# Patient Record
Sex: FEMALE | Race: WHITE | NOT HISPANIC OR LATINO | Employment: OTHER | URBAN - NONMETROPOLITAN AREA
[De-identification: names, ages, dates, MRNs, and addresses within clinical notes are randomized per-mention and may not be internally consistent; named-entity substitution may affect disease eponyms.]

---

## 2017-01-20 ENCOUNTER — OFFICE VISIT (OUTPATIENT)
Dept: ORTHOPEDIC SURGERY | Facility: CLINIC | Age: 62
End: 2017-01-20

## 2017-01-20 VITALS — HEIGHT: 72 IN | BODY MASS INDEX: 28.58 KG/M2 | WEIGHT: 211 LBS

## 2017-01-20 DIAGNOSIS — M17.12 LOCALIZED OSTEOARTHRITIS OF LEFT KNEE: ICD-10-CM

## 2017-01-20 DIAGNOSIS — M25.562 CHRONIC PAIN OF LEFT KNEE: Primary | ICD-10-CM

## 2017-01-20 DIAGNOSIS — G89.29 CHRONIC PAIN OF LEFT KNEE: Primary | ICD-10-CM

## 2017-01-20 PROCEDURE — 73560 X-RAY EXAM OF KNEE 1 OR 2: CPT | Performed by: NURSE PRACTITIONER

## 2017-01-20 PROCEDURE — 20610 DRAIN/INJ JOINT/BURSA W/O US: CPT | Performed by: NURSE PRACTITIONER

## 2017-01-20 PROCEDURE — 73565 X-RAY EXAM OF KNEES: CPT | Performed by: NURSE PRACTITIONER

## 2017-01-20 RX ORDER — LIDOCAINE HYDROCHLORIDE 10 MG/ML
2 INJECTION, SOLUTION INFILTRATION; PERINEURAL
Status: COMPLETED | OUTPATIENT
Start: 2017-01-20 | End: 2017-01-20

## 2017-01-20 RX ORDER — TRIAMCINOLONE ACETONIDE 40 MG/ML
40 INJECTION, SUSPENSION INTRA-ARTICULAR; INTRAMUSCULAR
Status: COMPLETED | OUTPATIENT
Start: 2017-01-20 | End: 2017-01-20

## 2017-01-20 RX ADMIN — LIDOCAINE HYDROCHLORIDE 2 ML: 10 INJECTION, SOLUTION INFILTRATION; PERINEURAL at 13:42

## 2017-01-20 RX ADMIN — TRIAMCINOLONE ACETONIDE 40 MG: 40 INJECTION, SUSPENSION INTRA-ARTICULAR; INTRAMUSCULAR at 13:42

## 2017-01-20 NOTE — MR AVS SNAPSHOT
"                        Mini Andersen   1/20/2017 1:20 PM   Office Visit    Dept Phone:  [phone]   Encounter #:  [id number]    Provider:  EDUARDA Florian   Department:  [facility]                Your Full Care Plan              Your Updated Medication List          This list is accurate as of: 1/20/17  2:07 PM.  Always use your most recent med list.                B-D 3CC LUER-AXEL SYR 26NJ1-6/2 22G X 1-1/2\" 3 ML misc   Generic drug:  Syringe/Needle (Disp)       clobetasol 0.05 % cream   Commonly known as:  TEMOVATE       cyanocobalamin 1000 MCG/ML injection       diphenhydrAMINE 25 mg capsule   Commonly known as:  BENADRYL       FLUVIRIN suspension injection   Generic drug:  Influenza Vac Typ A&B Surf Ant       furosemide 40 MG tablet   Commonly known as:  LASIX       gabapentin 100 MG capsule   Commonly known as:  NEURONTIN       levothyroxine 50 MCG tablet   Commonly known as:  SYNTHROID, LEVOTHROID       lidocaine 5 % ointment   Commonly known as:  XYLOCAINE       metFORMIN 500 MG tablet   Commonly known as:  GLUCOPHAGE       MONUROL 3 G pack   Generic drug:  fosfomycin       nystatin 474270 UNIT/GM powder   Commonly known as:  MYCOSTATIN       ondansetron 4 MG tablet   Commonly known as:  ZOFRAN       ONE TOUCH ULTRA TEST test strip   Generic drug:  glucose blood       oxyCODONE-acetaminophen  MG per tablet   Commonly known as:  PERCOCET       vitamin D 23734 UNITS capsule capsule   Commonly known as:  ERGOCALCIFEROL               We Performed the Following     Large Joint Arthrocentesis     XR Knee 1 or 2 View Left     XR Knee Bilateral AP Standing       You Were Diagnosed With        Codes Comments    Chronic pain of left knee    -  Primary ICD-10-CM: M25.562, G89.29  ICD-9-CM: 719.46, 338.29     Localized osteoarthritis of left knee     ICD-10-CM: M17.9  ICD-9-CM: 715.36       Instructions     None    Patient Instructions History      Upcoming Appointments  "    Visit Type Date Time Department    FOLLOW UP 1/20/2017  1:20 PM [facility]    FOLLOW UP 2/28/2017  1:50 PM [facility]    VISUAL FIELD 7/5/2017  9:45 AM [facility]    OV WITH VISUAL FIELD 7/5/2017 10:15 AM [facility]      MyChart Signup     Our records indicate that you have declined [facility] sign[facility]. If you would like to sign up for MyChart, please email [email or web address] or call [phone] to obtain an activation code.             Other Info from Your Visit           Your Appointments     Feb 28, 2017  1:50 PM CST   Follow Up with Jose Ballesteros MD   [facility] ORTHOPED[facility] (--)    [address]  [facility] [address] [phone]           Arrive 15 minutes prior to appointment.            Jul 05, 2017  9:45 AM CDT   Visual Field with [facility] O[facility]THALMOLOGY (--)    [address] [place] [address] [address]  [facility] [address] [place] [id number] [phone]            Jul 05, 2017 10:15 AM CDT   office visit with visual vitale with David Bustillos MD   [facility] OPHTHALMOLOGY (--)    [address] [place] [address] [address]  [facility] [address] [place] [id number] [phone]              Allergies     Bextra [Valdecoxib]  Shortness Of Breath    Cephalosporins  Shortness Of Breath    Detrol La [Tolterodine Tartrate Er]  Shortness Of Breath    Dicyclomine  Shortness Of Breath    Fluoxetine  Shortness Of Breath    Toprol Xl [Metoprolol]  Anaphylaxis    Chocolate Flavor      Tape      Aspirin  Rash    Atorvastatin  Rash    Ciprofloxin Hcl [Ciprofloxacin]  Rash    Claritin-d 12 Hour [Loratadine-pseudoephedrine Er]  Rash    Codeine  Rash    Macrobid [Nitrofurantoin]  Rash    Morphine And Related  Rash    Sulfa Antibiotics  Rash    Tramadol  Rash    Vioxx [Rofecoxib]  Rash    Zegerid [Omeprazole]  Rash      Reason for Visit     Left Knee - Follow-up PATIENT  "WAS INJECTED ON 11/11/2016      Vital Signs     Height Weight Body Mass Index Smoking Status          72\" (182.9 cm) 211 lb (95.7 kg) 28.62 kg/m2 Former Smoker        Problems and Diagnoses Noted     Chronic pain of left knee    Localized osteoarthritis of left knee      Results     XR Knee 1 or 2 View Left           XR Knee Bilateral AP Standing               "

## 2017-01-20 NOTE — PROGRESS NOTES
"Knee Joint Injection      Patient: Mini Andersen    YOB: 1955    Chief Complaint   Patient presents with   • Left Knee - Follow-up     PATIENT WAS INJECTED ON 11/11/2016       History of Present Illness: PAIN LEVEL IS AT 9. 1 WEEK AGO SHE TWISTED HER KNEE COMING STEPS. REPEAT XRAYS TODAY  Patient is here for an injection.  No new complaints.    Physical Exam: 61 y.o. female  General Appearance:    Alert, cooperative, in no acute distress                   Vitals:    01/20/17 1338   Weight: 211 lb (95.7 kg)   Height: 72\" (182.9 cm)        Patient is alert and oriented, ×3 no acute distress.  Affect is normal respiratory rate is normal unlabored.  Exam and complaints are unchanged.    Procedure:  Large Joint Arthrocentesis  Date/Time: 1/20/2017 1:42 PM  Consent given by: patient  Site marked: site marked  Timeout: Immediately prior to procedure a time out was called to verify the correct patient, procedure, equipment, support staff and site/side marked as required   Supporting Documentation  Indications: pain   Procedure Details  Location: knee - L knee  Preparation: Patient was prepped and draped in the usual sterile fashion  Needle size: 22 G  Approach: anteromedial  Medications administered: 40 mg triamcinolone acetonide 40 MG/ML; 2 mL lidocaine 1 %  Patient tolerance: patient tolerated the procedure well with no immediate complications          Assessment:    Diagnoses and all orders for this visit:    Chronic pain of left knee  -     Large Joint Arthrocentesis  -     XR Knee 1 or 2 View Left  -     XR Knee Bilateral AP Standing    Localized osteoarthritis of left knee  -     Large Joint Arthrocentesis        Plan:   Patient recommended to progress activity as tolerated based on pain.  Follow-up with Dr. Ballesteros in 1 month to discuss total knee replacement.    No Follow-up on file.    EDUARDA Nunez        Physical Exam    Ortho Exam    "

## 2017-05-23 ENCOUNTER — OFFICE VISIT (OUTPATIENT)
Dept: ORTHOPEDIC SURGERY | Facility: CLINIC | Age: 62
End: 2017-05-23

## 2017-05-23 VITALS — HEIGHT: 72 IN | BODY MASS INDEX: 30.34 KG/M2 | WEIGHT: 224 LBS

## 2017-05-23 DIAGNOSIS — G89.29 CHRONIC PAIN OF LEFT KNEE: ICD-10-CM

## 2017-05-23 DIAGNOSIS — M25.562 CHRONIC PAIN OF LEFT KNEE: ICD-10-CM

## 2017-05-23 DIAGNOSIS — M17.12 PRIMARY OSTEOARTHRITIS OF LEFT KNEE: Primary | ICD-10-CM

## 2017-05-23 DIAGNOSIS — E03.9 ACQUIRED HYPOTHYROIDISM: ICD-10-CM

## 2017-05-23 DIAGNOSIS — M17.12 LOCALIZED OSTEOARTHRITIS OF LEFT KNEE: ICD-10-CM

## 2017-05-23 DIAGNOSIS — E11.9 TYPE 2 DIABETES MELLITUS WITHOUT COMPLICATION, WITHOUT LONG-TERM CURRENT USE OF INSULIN (HCC): ICD-10-CM

## 2017-05-23 DIAGNOSIS — Z86.718 HISTORY OF DVT (DEEP VEIN THROMBOSIS): ICD-10-CM

## 2017-05-23 PROCEDURE — 99215 OFFICE O/P EST HI 40 MIN: CPT | Performed by: ORTHOPAEDIC SURGERY

## 2017-05-25 RX ORDER — OXYCODONE HCL 10 MG/1
10 TABLET, FILM COATED, EXTENDED RELEASE ORAL ONCE
Status: CANCELLED | OUTPATIENT
Start: 2017-07-24 | End: 2017-05-25

## 2017-05-25 RX ORDER — PREGABALIN 25 MG/1
75 CAPSULE ORAL ONCE
Status: CANCELLED | OUTPATIENT
Start: 2017-07-24 | End: 2017-05-25

## 2017-05-25 RX ORDER — ACETAMINOPHEN 325 MG/1
1000 TABLET ORAL ONCE
Status: CANCELLED | OUTPATIENT
Start: 2017-07-24 | End: 2017-05-25

## 2017-05-25 RX ORDER — SODIUM CHLORIDE 0.9 % (FLUSH) 0.9 %
1-10 SYRINGE (ML) INJECTION AS NEEDED
Status: CANCELLED | OUTPATIENT
Start: 2017-07-24

## 2017-06-12 DIAGNOSIS — Z86.718 PERSONAL HISTORY OF VENOUS THROMBOSIS AND EMBOLISM: Primary | ICD-10-CM

## 2017-06-12 DIAGNOSIS — M17.12 PRIMARY OSTEOARTHRITIS OF LEFT KNEE: ICD-10-CM

## 2017-07-05 ENCOUNTER — OFFICE VISIT (OUTPATIENT)
Dept: OPHTHALMOLOGY | Facility: CLINIC | Age: 62
End: 2017-07-05

## 2017-07-05 DIAGNOSIS — E11.9 DIABETES MELLITUS WITHOUT COMPLICATION (HCC): ICD-10-CM

## 2017-07-05 DIAGNOSIS — H40.003 BORDERLINE GLAUCOMA, BILATERAL: Primary | ICD-10-CM

## 2017-07-05 PROCEDURE — 92083 EXTENDED VISUAL FIELD XM: CPT | Performed by: OPHTHALMOLOGY

## 2017-07-05 PROCEDURE — 99213 OFFICE O/P EST LOW 20 MIN: CPT | Performed by: OPHTHALMOLOGY

## 2017-07-05 NOTE — PROGRESS NOTES
Subjective   Mini Andersen is a 62 y.o. female.   Chief Complaint   Patient presents with   • Follow-up   • Visual Field         Review of Systems    Objective   Base Eye Exam     Visual Acuity (Snellen - Linear)      Right Left   Dist sc 20/50 +1 20/40 -2         Tonometry (Applanation, 10:28 AM)      Right Left   Pressure 16 16         Pupils      Pupils   Right PERRL   Left PERRL         Extraocular Movement      Right Left   Result Full Full               Slit Lamp and Fundus Exam     External Exam      Right Left    External Normal Normal      Slit Lamp Exam      Right Left    Lids/Lashes Normal Normal    Conjunctiva/Sclera White and quiet White and quiet    Cornea Clear Clear    Anterior Chamber Deep and quiet Deep and quiet    Iris Round and reactive Round and reactive    Lens 1+ Nuclear sclerosis 1+ Nuclear sclerosis    Vitreous Normal Normal      Fundus Exam      Right Left    Disc Thin rim 0.15 ST, 0.2 IT Normal 0.3 ST, IT    Macula Normal Normal    Vessels Normal Normal              Das Visual Field :   OD- normal  OS- normal    Assessment/Plan   Diagnoses and all orders for this visit:    Borderline glaucoma, bilateral    Diabetes mellitus without complication      Plan:    Eye disease risks with diabetes discussed  Recommend ophthalmology f/u one year/prn

## 2017-07-10 ENCOUNTER — OFFICE VISIT (OUTPATIENT)
Dept: CARDIOLOGY | Facility: CLINIC | Age: 62
End: 2017-07-10

## 2017-07-10 VITALS
DIASTOLIC BLOOD PRESSURE: 75 MMHG | WEIGHT: 223 LBS | SYSTOLIC BLOOD PRESSURE: 123 MMHG | BODY MASS INDEX: 30.2 KG/M2 | HEIGHT: 72 IN | HEART RATE: 78 BPM

## 2017-07-10 DIAGNOSIS — I20.9 ANGINA PECTORIS (HCC): ICD-10-CM

## 2017-07-10 DIAGNOSIS — E03.9 HYPOTHYROIDISM, UNSPECIFIED TYPE: ICD-10-CM

## 2017-07-10 DIAGNOSIS — Z01.818 PRE-OPERATIVE CLEARANCE: ICD-10-CM

## 2017-07-10 DIAGNOSIS — E11.9 TYPE 2 DIABETES MELLITUS WITHOUT COMPLICATION, WITHOUT LONG-TERM CURRENT USE OF INSULIN (HCC): ICD-10-CM

## 2017-07-10 DIAGNOSIS — R06.02 SOB (SHORTNESS OF BREATH): Primary | ICD-10-CM

## 2017-07-10 PROBLEM — G47.33 OBSTRUCTIVE SLEEP APNEA SYNDROME: Status: ACTIVE | Noted: 2017-07-10

## 2017-07-10 PROCEDURE — 93000 ELECTROCARDIOGRAM COMPLETE: CPT | Performed by: INTERNAL MEDICINE

## 2017-07-10 PROCEDURE — 99204 OFFICE O/P NEW MOD 45 MIN: CPT | Performed by: INTERNAL MEDICINE

## 2017-07-10 RX ORDER — ATORVASTATIN CALCIUM 20 MG/1
20 TABLET, FILM COATED ORAL DAILY
Qty: 30 TABLET | Refills: 12 | Status: SHIPPED | OUTPATIENT
Start: 2017-07-10 | End: 2017-07-12

## 2017-07-10 NOTE — PROGRESS NOTES
[facility]  OFFICE NOTE    Mini Andersen  62 y.o. female    07/10/2017  1. Pre-operative clearance    2. Type 2 diabetes mellitus without complication, without long-term current use of insulin    3. Hypothyroidism, unspecified type    4. Angina pectoris     5. SOB (shortness of breath)        Chief complaint -Pre-Op left knee replacement surgery      History of present Illness- 62-year-old lady with history of diabetes, hypertension and hyperlipidemia cannot walk much due to severe arthritis of the left knee and she is going to have total knee replacement of the left knee.  She does have shortness of breath and she cannot exercise or walk too much because of the knee problems.  She was a former smoker quit 37 years ago.  I started her on Lipitor 20 mg daily as she is a diabetic and her LDL is 101 and triglycerides are high.  He never had any cardiac causes in the past of EKG showed sinus rhythm with small Q in aVL and lead 1              Allergies   Allergen Reactions   • Bextra [Valdecoxib] Shortness Of Breath   • Cephalosporins Shortness Of Breath   • Detrol La [Tolterodine Tartrate Er] Shortness Of Breath   • Dicyclomine Shortness Of Breath   • Fluoxetine Shortness Of Breath   • Toprol Xl [Metoprolol] Anaphylaxis   • Chocolate Flavor    • Tape    • Aspirin Rash   • Ciprofloxin Hcl [Ciprofloxacin] Rash   • Claritin-D 12 Hour [Loratadine-Pseudoephedrine Er] Rash   • Codeine Rash   • Macrobid [Nitrofurantoin] Rash   • Morphine And Related Rash   • Sulfa Antibiotics Rash   • Tramadol Rash   • Vioxx [Rofecoxib] Rash   • Zegerid [Omeprazole] Rash         Past Medical History:   Diagnosis Date   • Acute peritonitis    • Allergic rhinitis    • Bee sting    • Borderline glaucoma    • Chronic bronchitis    • Constipation     severe with an immotile colon      • Deep venous thrombosis    • Hypothyroidism    • Intestinal volvulus    • Muscle atrophy     O/E   • Nuclear senile cataract    •  "Nuclear senile cataract    • Polyneuropathy in diabetes    • Type 2 diabetes mellitus     NO BDR   • Type 2 diabetes mellitus without complications    • Unspecified disease of nail          Past Surgical History:   Procedure Laterality Date   • COLECTOMY PARTIAL / TOTAL  05/22/2014    Subtotal colectomy with ineo-rectostomy.   • INJECTION OF MEDICATION  12/07/2010    DEPO MEDROL   • OTHER SURGICAL HISTORY      Multiple gynecologic procedures         No family history on file.      Social History     Social History   • Marital status:      Spouse name: N/A   • Number of children: N/A   • Years of education: N/A     Occupational History   • Not on file.     Social History Main Topics   • Smoking status: Former Smoker   • Smokeless tobacco: Not on file   • Alcohol use No   • Drug use: Not on file   • Sexual activity: Not on file     Other Topics Concern   • Not on file     Social History Narrative         Current Outpatient Prescriptions   Medication Sig Dispense Refill   • B-D 3CC LUER-AXEL SYR 42WJ1-7/2 22G X 1-1/2\" 3 ML misc   12   • clobetasol (TEMOVATE) 0.05 % cream      • cyanocobalamin 1000 MCG/ML injection   12   • diphenhydrAMINE (BENADRYL) 25 mg capsule   6   • levothyroxine (SYNTHROID, LEVOTHROID) 50 MCG tablet Take 50 mcg by mouth daily.  1   • lidocaine (XYLOCAINE) 5 % ointment      • metFORMIN (GLUCOPHAGE) 500 MG tablet Take 500 mg by mouth 2 (two) times a day.  1   • MONUROL 3 G pack   0   • nystatin (MYCOSTATIN) 994680 UNIT/GM powder   2   • ondansetron (ZOFRAN) 4 MG tablet   0   • ONE TOUCH ULTRA TEST test strip TEST BID  5   • oxyCODONE-acetaminophen (PERCOCET)  MG per tablet 3 (Three) Times a Day As Needed.  0   • vitamin D (ERGOCALCIFEROL) 05715 UNITS capsule capsule Take 50,000 Units by mouth as needed.  1   • atorvastatin (LIPITOR) 20 MG tablet Take 1 tablet by mouth Daily. 30 tablet 12     No current facility-administered medications for this visit.          Review of Systems " "    Constitution: Denies any fatigue, fever or chills    HENT: Denies any headache, hearing impairment,     Eyes: Denies any blurring of vision, or photophobia     Cardivascular - As per history of present illness     Respiratory system- The separate NYHA class II with sleep apnea       Endocrine:   history of hyperlipidemia, diabetes,                      Hypothyrodism       Musculoskeletal:  Severe  debilitating arthritis of the left knee    Gastrointestinal: No nausea, vomiting, or melena    Genitourinary: No dysuria or hematuria    Neurological:   No history of seizure disorder, stroke, memory problems    Psychiatric/Behavioral:        No history of depression,  No history of bipolar disorder or schizophrenia     Hematological- no history of easy bruising or any bleeding diathesis            OBJECTIVE    /75  Pulse 78  Ht 72\" (182.9 cm)  Wt 223 lb (101 kg)  BMI 30.24 kg/m2      Physical Exam     Constitutional: is oriented to person, place, and time.     Skin-warm and dry    Well developed and nourished in no acute distress      Head: Normocephalic and atraumatic.     Eyes: Pupils are equal, round, and reactive to light.     Neck: Neck supple. No bruit in the carotids, no elevation of JVD    Cardiovascular: [place] in the fifth intercostal space   Regular rate, and  Rhythm,    S1 greater than S2, no S3 or S4, no gallop     Pulmonary/Chest:   Air  Entry is equal on both sides  No wheezing or crackles,      Abdominal: Soft.  No hepatosplenomegaly, bowel sounds are present    Musculoskeletal: No kyphoscoliosis, swollen left knee    Neurological: is alert and oriented to person, place, and time.    cranial nerve are intact .   No motor or sensory deficit    Extremities-no edema, no radial femoral delay      Psychiatric: He has a normal mood and affect.                  His behavior is normal.             ECG 12 Lead  Date/Time: 7/10/2017 10:59 AM  Performed by: ACE RIZZO  Authorized by: SO, " ACE SARMIENTO   Comparison: not compared with previous ECG   Rhythm: sinus rhythm  Comments: Sinus rhythm, with small Q in lead 1 and aVL with nonspecific ST changes              A/P    Pre-op left knee replacement surgery-patient with history of diabetes, shortness of breath and sleep apnea, hyperlipidemia schedule for a Lexiscan nuclear stress to rule out ischemia and if that is okay she can proceed with moderate risk by ACC/AHA guidelines.    Hyperlipidemia mild started on Lipitor she is a diabetic.    Severe pain of the knee she is on oxycodone.    Hypothyroidism on Synthroid supplements.            This document has been electronically signed by Ace Root MD on July 10, 2017 10:56 AM       EMR Dragon/Transcription disclaimer:   Some of this note may be an electronic transcription/translation of spoken language to printed text. The electronic translation of spoken language may permit erroneous, or at times, nonsensical words or phrases to be inadvertently transcribed; Although I have reviewed the note for such errors, some may still exist.

## 2017-07-11 LAB
BH CV ECHO MEAS - ACS: 2.1 CM
BH CV ECHO MEAS - AO MAX PG (FULL): 1.3 MMHG
BH CV ECHO MEAS - AO MAX PG: 6.8 MMHG
BH CV ECHO MEAS - AO MEAN PG (FULL): 2 MMHG
BH CV ECHO MEAS - AO MEAN PG: 4 MMHG
BH CV ECHO MEAS - AO ROOT AREA: 11.3 CM^2
BH CV ECHO MEAS - AO ROOT DIAM: 3.8 CM
BH CV ECHO MEAS - AO V2 MAX: 130 CM/SEC
BH CV ECHO MEAS - AO V2 MEAN: 87.3 CM/SEC
BH CV ECHO MEAS - AO V2 VTI: 29.4 CM
BH CV ECHO MEAS - EDV(CUBED): 132.7 ML
BH CV ECHO MEAS - EDV(TEICH): 123.8 ML
BH CV ECHO MEAS - EF(CUBED): 61.8 %
BH CV ECHO MEAS - EF(MOD-SP4): 60 %
BH CV ECHO MEAS - EF(TEICH): 53.1 %
BH CV ECHO MEAS - EPSS: 0.3 CM
BH CV ECHO MEAS - ESV(CUBED): 50.7 ML
BH CV ECHO MEAS - ESV(TEICH): 58.1 ML
BH CV ECHO MEAS - FS: 27.5 %
BH CV ECHO MEAS - LA DIMENSION: 3.9 CM
BH CV ECHO MEAS - LA/AO: 1
BH CV ECHO MEAS - LV MAX PG: 5.5 MMHG
BH CV ECHO MEAS - LV MEAN PG: 2 MMHG
BH CV ECHO MEAS - LV V1 MAX: 117 CM/SEC
BH CV ECHO MEAS - LV V1 MEAN: 69 CM/SEC
BH CV ECHO MEAS - LV V1 VTI: 25.9 CM
BH CV ECHO MEAS - LVIDD: 5.1 CM
BH CV ECHO MEAS - LVIDS: 3.7 CM
BH CV ECHO MEAS - LVPWD: 1.3 CM
BH CV ECHO MEAS - MR MAX PG: 16.2 MMHG
BH CV ECHO MEAS - MR MAX VEL: 201 CM/SEC
BH CV ECHO MEAS - MV A MAX VEL: 76 CM/SEC
BH CV ECHO MEAS - MV E MAX VEL: 90.6 CM/SEC
BH CV ECHO MEAS - MV E/A: 1.2
BH CV ECHO MEAS - PA MAX PG: 3.9 MMHG
BH CV ECHO MEAS - PA MEAN PG: 2 MMHG
BH CV ECHO MEAS - PA V2 MAX: 99.3 CM/SEC
BH CV ECHO MEAS - PA V2 MEAN: 66.8 CM/SEC
BH CV ECHO MEAS - PA V2 VTI: 23.2 CM
BH CV ECHO MEAS - PI END-D VEL: 117 CM/SEC
BH CV ECHO MEAS - RAP SYSTOLE: 10 MMHG
BH CV ECHO MEAS - RVDD: 2.8 CM
BH CV ECHO MEAS - RVSP: 28.3 MMHG
BH CV ECHO MEAS - SV(AO): 333.4 ML
BH CV ECHO MEAS - SV(CUBED): 82 ML
BH CV ECHO MEAS - SV(TEICH): 65.7 ML
BH CV ECHO MEAS - TR MAX VEL: 213.5 CM/SEC
BH CV NUCLEAR PRIOR STUDY: 2
BH CV STRESS COMMENTS STAGE 1: NORMAL
BH CV STRESS DOSE REGADENOSON STAGE 1: 0.4
BH CV STRESS DURATION MIN STAGE 1: 0
BH CV STRESS DURATION SEC STAGE 1: 15
BH CV STRESS PROTOCOL 1: NORMAL
BH CV STRESS RECOVERY BP: NORMAL MMHG
BH CV STRESS RECOVERY HR: 123 BPM
BH CV STRESS STAGE 1: 1
LV EF 2D ECHO EST: 55 %
LV EF NUC BP: 73 %
MAXIMAL PREDICTED HEART RATE: 160 BPM
PERCENT MAX PREDICTED HR: 78.13 %
STRESS BASELINE BP: NORMAL MMHG
STRESS BASELINE HR: 73 BPM
STRESS PERCENT HR: 92 %
STRESS POST PEAK BP: NORMAL MMHG
STRESS POST PEAK HR: 125 BPM
STRESS TARGET HR: 136 BPM

## 2017-07-12 ENCOUNTER — OFFICE VISIT (OUTPATIENT)
Dept: ORTHOPEDIC SURGERY | Facility: CLINIC | Age: 62
End: 2017-07-12

## 2017-07-12 ENCOUNTER — DOCUMENTATION (OUTPATIENT)
Dept: CARDIOLOGY | Facility: CLINIC | Age: 62
End: 2017-07-12

## 2017-07-12 ENCOUNTER — TELEPHONE (OUTPATIENT)
Dept: CARDIOLOGY | Facility: CLINIC | Age: 62
End: 2017-07-12

## 2017-07-12 ENCOUNTER — APPOINTMENT (OUTPATIENT)
Dept: PREADMISSION TESTING | Facility: HOSPITAL | Age: 62
End: 2017-07-12

## 2017-07-12 VITALS
BODY MASS INDEX: 29.8 KG/M2 | HEIGHT: 72 IN | SYSTOLIC BLOOD PRESSURE: 100 MMHG | OXYGEN SATURATION: 95 % | HEART RATE: 98 BPM | RESPIRATION RATE: 18 BRPM | WEIGHT: 220 LBS | DIASTOLIC BLOOD PRESSURE: 64 MMHG

## 2017-07-12 VITALS — HEIGHT: 72 IN | WEIGHT: 223 LBS | BODY MASS INDEX: 30.2 KG/M2

## 2017-07-12 DIAGNOSIS — M17.12 LOCALIZED OSTEOARTHRITIS OF LEFT KNEE: ICD-10-CM

## 2017-07-12 DIAGNOSIS — G89.29 CHRONIC PAIN OF LEFT KNEE: ICD-10-CM

## 2017-07-12 DIAGNOSIS — Z86.718 HISTORY OF DVT (DEEP VEIN THROMBOSIS): ICD-10-CM

## 2017-07-12 DIAGNOSIS — E11.9 TYPE 2 DIABETES MELLITUS WITHOUT COMPLICATION, WITHOUT LONG-TERM CURRENT USE OF INSULIN (HCC): ICD-10-CM

## 2017-07-12 DIAGNOSIS — M17.12 PRIMARY OSTEOARTHRITIS OF LEFT KNEE: ICD-10-CM

## 2017-07-12 DIAGNOSIS — M25.562 CHRONIC PAIN OF LEFT KNEE: ICD-10-CM

## 2017-07-12 DIAGNOSIS — M81.0 OSTEOPOROSIS: Primary | ICD-10-CM

## 2017-07-12 DIAGNOSIS — E03.9 ACQUIRED HYPOTHYROIDISM: ICD-10-CM

## 2017-07-12 LAB
ABO GROUP BLD: NORMAL
ANION GAP SERPL CALCULATED.3IONS-SCNC: 9 MMOL/L (ref 5–15)
BLD GP AB SCN SERPL QL: NEGATIVE
BUN BLD-MCNC: 12 MG/DL (ref 7–21)
BUN/CREAT SERPL: 11.5 (ref 7–25)
CALCIUM SPEC-SCNC: 9.4 MG/DL (ref 8.4–10.2)
CHLORIDE SERPL-SCNC: 104 MMOL/L (ref 95–110)
CO2 SERPL-SCNC: 27 MMOL/L (ref 22–31)
CREAT BLD-MCNC: 1.04 MG/DL (ref 0.5–1)
DEPRECATED RDW RBC AUTO: 45.5 FL (ref 36.4–46.3)
ERYTHROCYTE [DISTWIDTH] IN BLOOD BY AUTOMATED COUNT: 14.1 % (ref 11.5–14.5)
GFR SERPL CREATININE-BSD FRML MDRD: 54 ML/MIN/1.73 (ref 45–104)
GLUCOSE BLD-MCNC: 144 MG/DL (ref 60–100)
HCT VFR BLD AUTO: 38.2 % (ref 35–45)
HGB BLD-MCNC: 12.4 G/DL (ref 12–15.5)
Lab: NORMAL
MCH RBC QN AUTO: 28.4 PG (ref 26.5–34)
MCHC RBC AUTO-ENTMCNC: 32.5 G/DL (ref 31.4–36)
MCV RBC AUTO: 87.4 FL (ref 80–98)
MRSA DNA SPEC QL NAA+PROBE: NEGATIVE
PLATELET # BLD AUTO: 184 10*3/MM3 (ref 150–450)
PMV BLD AUTO: 11.9 FL (ref 8–12)
POTASSIUM BLD-SCNC: 4.3 MMOL/L (ref 3.5–5.1)
RBC # BLD AUTO: 4.37 10*6/MM3 (ref 3.77–5.16)
RH BLD: POSITIVE
SODIUM BLD-SCNC: 140 MMOL/L (ref 137–145)
WBC NRBC COR # BLD: 5.97 10*3/MM3 (ref 3.2–9.8)

## 2017-07-12 PROCEDURE — 87641 MR-STAPH DNA AMP PROBE: CPT | Performed by: ORTHOPAEDIC SURGERY

## 2017-07-12 PROCEDURE — 99215 OFFICE O/P EST HI 40 MIN: CPT | Performed by: NURSE PRACTITIONER

## 2017-07-12 PROCEDURE — 36415 COLL VENOUS BLD VENIPUNCTURE: CPT

## 2017-07-12 PROCEDURE — 85027 COMPLETE CBC AUTOMATED: CPT | Performed by: ANESTHESIOLOGY

## 2017-07-12 PROCEDURE — 86850 RBC ANTIBODY SCREEN: CPT | Performed by: ORTHOPAEDIC SURGERY

## 2017-07-12 PROCEDURE — 86900 BLOOD TYPING SEROLOGIC ABO: CPT | Performed by: ORTHOPAEDIC SURGERY

## 2017-07-12 PROCEDURE — 80048 BASIC METABOLIC PNL TOTAL CA: CPT | Performed by: ANESTHESIOLOGY

## 2017-07-12 PROCEDURE — 86901 BLOOD TYPING SEROLOGIC RH(D): CPT | Performed by: ORTHOPAEDIC SURGERY

## 2017-07-12 RX ORDER — SODIUM CHLORIDE 9 MG/ML
1000 INJECTION, SOLUTION INTRAVENOUS CONTINUOUS PRN
Status: CANCELLED | OUTPATIENT
Start: 2017-07-24

## 2017-07-12 RX ORDER — OMEPRAZOLE 20 MG/1
20 CAPSULE, DELAYED RELEASE ORAL AS NEEDED
COMMUNITY
End: 2022-05-11

## 2017-07-12 RX ORDER — CLONAZEPAM 1 MG/1
1 TABLET ORAL 3 TIMES DAILY PRN
COMMUNITY
End: 2017-08-10

## 2017-07-12 RX ORDER — CARBOXYMETHYLCELLULOSE SODIUM 5 MG/ML
1 SOLUTION/ DROPS OPHTHALMIC DAILY PRN
COMMUNITY

## 2017-07-12 RX ORDER — DOCUSATE SODIUM 100 MG/1
100 CAPSULE, LIQUID FILLED ORAL 2 TIMES DAILY
COMMUNITY
End: 2018-04-13

## 2017-07-12 NOTE — PROGRESS NOTES
Mini Andersen is a 62 y.o. female   Primary Care Provider Hawa Aquino MD     Chief Complaint   Patient presents with   • Left Knee - Pain   • Pre-op Exam   Pain scale today 8/10     HISTORY OF PRESENT ILLNESS:  Mini Andersen is here for followup of left knee pain and updated pre-op evaluation for left total knee arthroplasty. Medical and surgical history reviewed today. Home medication list reviewed today. The pain has not improved despite long term treatment with multiple conservative management trials.      Prior Arthritis treatments: [x]  PT    [x]  HEP      [x]  Attempt weight loss  [x]  NSAIDS      [x]  Cane   [x]  Intra-articular steroid inj      [x]  Viscosupplementation    Pain is chronic dull ache that is severe at times and worse with activity.  Difficulty with ADL's.  Patient is not happy with current quality of life and wants to discuss proceeding with surgical intervention.     CONCURRENT MEDICAL HISTORY:    Past Medical History:   Diagnosis Date   • Acute peritonitis    • Allergic rhinitis    • Bee sting    • Borderline glaucoma    • Chronic bronchitis    • Constipation     severe with an immotile colon      • Deep venous thrombosis    • Hypothyroidism    • Intestinal volvulus    • Muscle atrophy     O/E   • Nuclear senile cataract    • Nuclear senile cataract    • Polyneuropathy in diabetes    • Type 2 diabetes mellitus     NO BDR   • Type 2 diabetes mellitus without complications    • Unspecified disease of nail        Allergies   Allergen Reactions   • Bextra [Valdecoxib] Shortness Of Breath   • Cephalosporins Shortness Of Breath   • Detrol La [Tolterodine Tartrate Er] Shortness Of Breath   • Dicyclomine Shortness Of Breath   • Fluoxetine Shortness Of Breath   • Keflex [Cephalexin] Shortness Of Breath   • Toprol Xl [Metoprolol] Anaphylaxis   • Chocolate Flavor    • Tape    • Amoxicillin Rash   • Aspirin Rash   • Ciprofloxin Hcl [Ciprofloxacin] Rash   • Claritin-D 12 Hour  "[Loratadine-Pseudoephedrine Er] Rash   • Codeine Rash   • Macrobid [Nitrofurantoin] Rash   • Morphine And Related Rash   • Paxil [Paroxetine Hcl] Rash   • Prozac [Fluoxetine Hcl] Rash   • Sulfa Antibiotics Rash   • Tramadol Rash   • Vioxx [Rofecoxib] Rash   • Zegerid [Omeprazole] Rash   • Zoloft [Sertraline Hcl] Rash         Current Outpatient Prescriptions:   •  clonazePAM (KlonoPIN) 1 MG tablet, Take 1 mg by mouth 2 (Two) Times a Day As Needed for Seizures., Disp: , Rfl:   •  atorvastatin (LIPITOR) 20 MG tablet, Take 1 tablet by mouth Daily., Disp: 30 tablet, Rfl: 12  •  B-D 3CC LUER-AXEL SYR 69GK4-5/2 22G X 1-1/2\" 3 ML misc, , Disp: , Rfl: 12  •  clobetasol (TEMOVATE) 0.05 % cream, , Disp: , Rfl:   •  cyanocobalamin 1000 MCG/ML injection, , Disp: , Rfl: 12  •  diphenhydrAMINE (BENADRYL) 25 mg capsule, , Disp: , Rfl: 6  •  levothyroxine (SYNTHROID, LEVOTHROID) 50 MCG tablet, Take 50 mcg by mouth daily., Disp: , Rfl: 1  •  lidocaine (XYLOCAINE) 5 % ointment, , Disp: , Rfl:   •  metFORMIN (GLUCOPHAGE) 500 MG tablet, Take 500 mg by mouth 2 (two) times a day., Disp: , Rfl: 1  •  MONUROL 3 G pack, , Disp: , Rfl: 0  •  nystatin (MYCOSTATIN) 118740 UNIT/GM powder, , Disp: , Rfl: 2  •  ondansetron (ZOFRAN) 4 MG tablet, , Disp: , Rfl: 0  •  ONE TOUCH ULTRA TEST test strip, TEST BID, Disp: , Rfl: 5  •  oxyCODONE-acetaminophen (PERCOCET)  MG per tablet, 3 (Three) Times a Day As Needed., Disp: , Rfl: 0  •  vitamin D (ERGOCALCIFEROL) 84816 UNITS capsule capsule, Take 50,000 Units by mouth as needed., Disp: , Rfl: 1    Past Surgical History:   Procedure Laterality Date   • COLECTOMY PARTIAL / TOTAL  05/22/2014    Subtotal colectomy with ineo-rectostomy.   • INJECTION OF MEDICATION  12/07/2010    DEPO MEDROL   • OTHER SURGICAL HISTORY      Multiple gynecologic procedures       History reviewed. No pertinent family history.    Social History     Social History   • Marital status:      Spouse name: N/A   • Number of " "children: N/A   • Years of education: N/A     Occupational History   • Not on file.     Social History Main Topics   • Smoking status: Former Smoker   • Smokeless tobacco: Not on file   • Alcohol use No   • Drug use: Not on file   • Sexual activity: Not on file     Other Topics Concern   • Not on file     Social History Narrative        Review of Systems    PHYSICAL EXAMINATION:       Ht 72\" (182.9 cm)  Wt 223 lb (101 kg)  BMI 30.24 kg/m2    Physical Exam   Constitutional: She is oriented to person, place, and time. Vital signs are normal. She appears well-developed and well-nourished.   HENT:   Head: Normocephalic.   Cardiovascular: Normal heart sounds.    Pulmonary/Chest: Effort normal and breath sounds normal. No respiratory distress.   Abdominal: Soft. She exhibits no distension.   Musculoskeletal:        Right knee: She exhibits no effusion.        Left knee: She exhibits no effusion.   Neurological: She is alert and oriented to person, place, and time. GCS eye subscore is 4. GCS verbal subscore is 5. GCS motor subscore is 6.   Skin: Skin is warm, dry and intact.   Psychiatric: She has a normal mood and affect. Her speech is normal and behavior is normal. Judgment and thought content normal. Cognition and memory are normal.   Vitals reviewed.      GAIT:     []  Normal  [x]  Antalgic    Assistive device: [x]  None  []  Walker     []  Crutches  []  Cane     []  Wheelchair  []  Stretcher    Right Knee Exam     Tenderness   Right knee tenderness location: diffuse.    Range of Motion   Extension: abnormal   Flexion: abnormal     Muscle Strength     The patient has normal right knee strength.    Tests   Drawer:       Anterior - negative      Varus: negative  Valgus: negative    Other   Erythema: absent  Sensation: normal  Pulse: present  Swelling: none  Other tests: no effusion present    Comments:  Pain and crepitus with flexion and extension.       Left Knee Exam     Tenderness   Left knee tenderness location: " diffuse.    Range of Motion   Extension: abnormal   Flexion: abnormal     Muscle Strength     The patient has normal left knee strength.    Tests   Drawer:       Anterior - negative       Varus: negative  Valgus: negative    Other   Erythema: absent  Sensation: normal  Pulse: present  Swelling: none  Effusion: no effusion present    Comments:  Pain and crepitus with flexion and extension.                     PRIOR XRAYS FOR REVIEW:      Standing AP of both knees with lateral views of the left reveal severe medial compartmental joint degenerative changes with complete medial compartmental collapse and significant patellofemoral compartmental generative changes without any acute bony abnormality noted  01/20/17 at 2:45 PM by EDUARDA Nunez      Previous or Active DVT/PE: YES   SHE IS NOT A CANDIDATE FOR TXA    ASSESSMENT:    Diagnoses and all orders for this visit:    Osteoporosis    Localized osteoarthritis of left knee      PLAN    The patient voiced understanding of the risks, benefits, and alternative forms of treatment that were discussed and the patient consents to proceed with surgery.  All risks, benefits and alternatives were discussed.  Risks including to but not exclusive to anesthetic complications, including death, MI, CVA, infection, bleeding DVT, fracture, residual pain and need for future surgery.    High risk due to DM and prior DVT    Scheduled for left total knee arthroplasty by Dr. Ballesteros on 7/24/17     Continue HEP  Continue strength and conditioning     Patient has seen cardiology for pre-op clearance, Dr. Root.      Return to office for Post-operative evaluation.       EDUARDA Minor

## 2017-07-12 NOTE — DISCHARGE INSTRUCTIONS
[facility]  Pre-op Information and Guidelines    You will be called after 2 p.m. the day before your surgery (Friday for Monday surgery) and notified of your time for arrival and approximate surgery time.  If you have not received a call by 4P.M., please contact Same Day Surgery at [phone] of if outside [facility] call [phone].    Please Follow these Important Safety Guidelines:    • The morning of your procedure, take only the medications listed below with   A sip of water:_____________________________________________       ______________________________________________    • DO NOT eat or drink anything after 12:00 midnight the night before surgery  Specific instructions concerning drinking clear liquids will be discussed during  the pre-surgery instruction call the day before your surgery.    • If you take a blood thinner (ex. Plavix, Coumadin, aspirin), ask your doctor when to stop it before surgery  STOP DATE: _________________    • Only 2 visitors are allowed in patient rooms at a time  Your visitors will be asked to wait in the lobby until the admission process is complete with the exception of a parent with a child and patients in need of special assistance.    • YOU CANNOT DRIVE YOURSELF HOME  You must be accompanied by someone who will be responsible for driving you home after surgery and for your care at home.    • DO NOT chew gum, use breath mints, hard candy, or smoke the day of surgery  • DO NOT drink alcohol for at least 24 hours before your surgery  • DO NOT wear any jewelry and remove all body piercing before coming to the hospital  • DO NOT wear make-up to the hospital  • If you are having surgery on an extremity (arm/leg/foot) remove nail polish/artificial nails on the surgical side  • Clothing, glasses, contacts, dentures, and hairpieces must be removed before surgery  • Bathe the night before or the morning of your surgery and do not use powders/lotions on  skin.

## 2017-07-23 ENCOUNTER — ANESTHESIA EVENT (OUTPATIENT)
Dept: PERIOP | Facility: HOSPITAL | Age: 62
End: 2017-07-23

## 2017-07-24 ENCOUNTER — APPOINTMENT (OUTPATIENT)
Dept: GENERAL RADIOLOGY | Facility: HOSPITAL | Age: 62
End: 2017-07-24

## 2017-07-24 ENCOUNTER — ANESTHESIA (OUTPATIENT)
Dept: PERIOP | Facility: HOSPITAL | Age: 62
End: 2017-07-24

## 2017-07-24 ENCOUNTER — HOSPITAL ENCOUNTER (INPATIENT)
Facility: HOSPITAL | Age: 62
LOS: 2 days | Discharge: HOME HEALTH | End: 2017-07-26
Attending: ORTHOPAEDIC SURGERY | Admitting: ORTHOPAEDIC SURGERY

## 2017-07-24 DIAGNOSIS — M25.562 CHRONIC PAIN OF LEFT KNEE: ICD-10-CM

## 2017-07-24 DIAGNOSIS — M17.12 PRIMARY OSTEOARTHRITIS OF LEFT KNEE: Primary | ICD-10-CM

## 2017-07-24 DIAGNOSIS — Z74.09 IMPAIRED PHYSICAL MOBILITY: ICD-10-CM

## 2017-07-24 DIAGNOSIS — E11.9 TYPE 2 DIABETES MELLITUS WITHOUT COMPLICATION, WITHOUT LONG-TERM CURRENT USE OF INSULIN (HCC): ICD-10-CM

## 2017-07-24 DIAGNOSIS — Z86.718 HISTORY OF DVT (DEEP VEIN THROMBOSIS): ICD-10-CM

## 2017-07-24 DIAGNOSIS — G47.33 OBSTRUCTIVE SLEEP APNEA SYNDROME: ICD-10-CM

## 2017-07-24 DIAGNOSIS — K21.9 GASTROESOPHAGEAL REFLUX DISEASE WITHOUT ESOPHAGITIS: ICD-10-CM

## 2017-07-24 DIAGNOSIS — Z74.09 IMPAIRED MOBILITY AND ADLS: ICD-10-CM

## 2017-07-24 DIAGNOSIS — E03.9 ACQUIRED HYPOTHYROIDISM: ICD-10-CM

## 2017-07-24 DIAGNOSIS — G89.29 CHRONIC PAIN OF LEFT KNEE: ICD-10-CM

## 2017-07-24 DIAGNOSIS — M17.12 LOCALIZED OSTEOARTHRITIS OF LEFT KNEE: ICD-10-CM

## 2017-07-24 DIAGNOSIS — Z78.9 IMPAIRED MOBILITY AND ADLS: ICD-10-CM

## 2017-07-24 DIAGNOSIS — R00.2 PALPITATIONS: ICD-10-CM

## 2017-07-24 LAB
ABO GROUP BLD: NORMAL
BLD GP AB SCN SERPL QL: NEGATIVE
GLUCOSE BLDC GLUCOMTR-MCNC: 103 MG/DL (ref 70–130)
GLUCOSE BLDC GLUCOMTR-MCNC: 141 MG/DL (ref 70–130)
GLUCOSE BLDC GLUCOMTR-MCNC: 180 MG/DL (ref 70–130)
GLUCOSE BLDC GLUCOMTR-MCNC: 85 MG/DL (ref 70–130)
GLUCOSE BLDC GLUCOMTR-MCNC: 91 MG/DL (ref 70–130)
HCT VFR BLD AUTO: 32.5 % (ref 35–45)
HGB BLD-MCNC: 10.5 G/DL (ref 12–15.5)
Lab: NORMAL
RH BLD: POSITIVE

## 2017-07-24 PROCEDURE — 82962 GLUCOSE BLOOD TEST: CPT

## 2017-07-24 PROCEDURE — 88305 TISSUE EXAM BY PATHOLOGIST: CPT | Performed by: PATHOLOGY

## 2017-07-24 PROCEDURE — G8987 SELF CARE CURRENT STATUS: HCPCS

## 2017-07-24 PROCEDURE — 25010000002 MIDAZOLAM PER 1 MG: Performed by: NURSE ANESTHETIST, CERTIFIED REGISTERED

## 2017-07-24 PROCEDURE — G8978 MOBILITY CURRENT STATUS: HCPCS | Performed by: PHYSICAL THERAPIST

## 2017-07-24 PROCEDURE — 25010000002 HYDROMORPHONE PER 4 MG: Performed by: ORTHOPAEDIC SURGERY

## 2017-07-24 PROCEDURE — 25010000002 PROPOFOL 1000 MG/ML EMULSION: Performed by: NURSE ANESTHETIST, CERTIFIED REGISTERED

## 2017-07-24 PROCEDURE — G8988 SELF CARE GOAL STATUS: HCPCS

## 2017-07-24 PROCEDURE — 86850 RBC ANTIBODY SCREEN: CPT | Performed by: ORTHOPAEDIC SURGERY

## 2017-07-24 PROCEDURE — 97530 THERAPEUTIC ACTIVITIES: CPT | Performed by: PHYSICAL THERAPIST

## 2017-07-24 PROCEDURE — 86901 BLOOD TYPING SEROLOGIC RH(D): CPT | Performed by: ORTHOPAEDIC SURGERY

## 2017-07-24 PROCEDURE — 97162 PT EVAL MOD COMPLEX 30 MIN: CPT | Performed by: PHYSICAL THERAPIST

## 2017-07-24 PROCEDURE — 88305 TISSUE EXAM BY PATHOLOGIST: CPT | Performed by: ORTHOPAEDIC SURGERY

## 2017-07-24 PROCEDURE — 94799 UNLISTED PULMONARY SVC/PX: CPT

## 2017-07-24 PROCEDURE — 85018 HEMOGLOBIN: CPT | Performed by: ORTHOPAEDIC SURGERY

## 2017-07-24 PROCEDURE — 85014 HEMATOCRIT: CPT | Performed by: ORTHOPAEDIC SURGERY

## 2017-07-24 PROCEDURE — 97535 SELF CARE MNGMENT TRAINING: CPT

## 2017-07-24 PROCEDURE — 0SRD0J9 REPLACEMENT OF LEFT KNEE JOINT WITH SYNTHETIC SUBSTITUTE, CEMENTED, OPEN APPROACH: ICD-10-PCS | Performed by: ORTHOPAEDIC SURGERY

## 2017-07-24 PROCEDURE — 63710000001 INSULIN ASPART PER 5 UNITS: Performed by: ORTHOPAEDIC SURGERY

## 2017-07-24 PROCEDURE — 73560 X-RAY EXAM OF KNEE 1 OR 2: CPT

## 2017-07-24 PROCEDURE — 97165 OT EVAL LOW COMPLEX 30 MIN: CPT

## 2017-07-24 PROCEDURE — 25010000002 ONDANSETRON PER 1 MG: Performed by: NURSE ANESTHETIST, CERTIFIED REGISTERED

## 2017-07-24 PROCEDURE — C1776 JOINT DEVICE (IMPLANTABLE): HCPCS | Performed by: ORTHOPAEDIC SURGERY

## 2017-07-24 PROCEDURE — G8979 MOBILITY GOAL STATUS: HCPCS | Performed by: PHYSICAL THERAPIST

## 2017-07-24 PROCEDURE — C1713 ANCHOR/SCREW BN/BN,TIS/BN: HCPCS | Performed by: ORTHOPAEDIC SURGERY

## 2017-07-24 PROCEDURE — 27447 TOTAL KNEE ARTHROPLASTY: CPT | Performed by: ORTHOPAEDIC SURGERY

## 2017-07-24 PROCEDURE — 86900 BLOOD TYPING SEROLOGIC ABO: CPT | Performed by: ORTHOPAEDIC SURGERY

## 2017-07-24 DEVICE — ATTUNE PATELLA MEDIALIZED DOME 32MM CEMENTED AOX
Type: IMPLANTABLE DEVICE | Status: FUNCTIONAL
Brand: ATTUNE

## 2017-07-24 DEVICE — SMARTSET HV HIGH VISCOSITY BONE CEMENT 40G
Type: IMPLANTABLE DEVICE | Status: FUNCTIONAL
Brand: SMARTSET

## 2017-07-24 DEVICE — ATTUNE KNEE SYSTEM FEMORAL POSTERIOR STABILIZED SIZE 6 LEFT CEMENTED
Type: IMPLANTABLE DEVICE | Status: FUNCTIONAL
Brand: ATTUNE

## 2017-07-24 DEVICE — ATTUNE KNEE SYSTEM TIBIAL INSERT ROTATING PLATFORM POSTERIOR STABILIZED 6 8MM AOX
Type: IMPLANTABLE DEVICE | Status: FUNCTIONAL
Brand: ATTUNE

## 2017-07-24 DEVICE — TOTL KN ATTUNE DEPUY 9527038: Type: IMPLANTABLE DEVICE | Status: FUNCTIONAL

## 2017-07-24 DEVICE — ATTUNE KNEE SYSTEM TIBIAL BASE ROTATING PLATFORM SIZE 6 CEMENTED
Type: IMPLANTABLE DEVICE | Status: FUNCTIONAL
Brand: ATTUNE

## 2017-07-24 RX ORDER — CLONAZEPAM 0.5 MG/1
1 TABLET ORAL 3 TIMES DAILY PRN
Status: DISCONTINUED | OUTPATIENT
Start: 2017-07-24 | End: 2017-07-26 | Stop reason: HOSPADM

## 2017-07-24 RX ORDER — OXYCODONE HYDROCHLORIDE 5 MG/1
5 TABLET ORAL EVERY 4 HOURS PRN
Status: DISCONTINUED | OUTPATIENT
Start: 2017-07-24 | End: 2017-07-26 | Stop reason: HOSPADM

## 2017-07-24 RX ORDER — DIPHENHYDRAMINE HCL 25 MG
25 CAPSULE ORAL EVERY 6 HOURS PRN
Status: DISCONTINUED | OUTPATIENT
Start: 2017-07-24 | End: 2017-07-26 | Stop reason: HOSPADM

## 2017-07-24 RX ORDER — BACTERIOSTATIC SODIUM CHLORIDE 0.9 %
VIAL (ML) INJECTION AS NEEDED
Status: DISCONTINUED | OUTPATIENT
Start: 2017-07-24 | End: 2017-07-26 | Stop reason: HOSPADM

## 2017-07-24 RX ORDER — ACETAMINOPHEN 500 MG
1000 TABLET ORAL 4 TIMES DAILY
Status: DISCONTINUED | OUTPATIENT
Start: 2017-07-24 | End: 2017-07-26 | Stop reason: HOSPADM

## 2017-07-24 RX ORDER — OXYCODONE HCL 10 MG/1
10 TABLET, FILM COATED, EXTENDED RELEASE ORAL EVERY 12 HOURS SCHEDULED
Status: DISCONTINUED | OUTPATIENT
Start: 2017-07-24 | End: 2017-07-26 | Stop reason: HOSPADM

## 2017-07-24 RX ORDER — ONDANSETRON 4 MG/1
4 TABLET, FILM COATED ORAL EVERY 6 HOURS PRN
Status: DISCONTINUED | OUTPATIENT
Start: 2017-07-24 | End: 2017-07-26 | Stop reason: HOSPADM

## 2017-07-24 RX ORDER — NALOXONE HCL 0.4 MG/ML
0.2 VIAL (ML) INJECTION AS NEEDED
Status: DISCONTINUED | OUTPATIENT
Start: 2017-07-24 | End: 2017-07-24 | Stop reason: HOSPADM

## 2017-07-24 RX ORDER — NICOTINE POLACRILEX 4 MG
15 LOZENGE BUCCAL
Status: DISCONTINUED | OUTPATIENT
Start: 2017-07-24 | End: 2017-07-26 | Stop reason: HOSPADM

## 2017-07-24 RX ORDER — DIPHENHYDRAMINE HYDROCHLORIDE 50 MG/ML
12.5 INJECTION INTRAMUSCULAR; INTRAVENOUS
Status: DISCONTINUED | OUTPATIENT
Start: 2017-07-24 | End: 2017-07-24 | Stop reason: HOSPADM

## 2017-07-24 RX ORDER — ACETAMINOPHEN 650 MG/1
650 SUPPOSITORY RECTAL ONCE AS NEEDED
Status: DISCONTINUED | OUTPATIENT
Start: 2017-07-24 | End: 2017-07-24 | Stop reason: HOSPADM

## 2017-07-24 RX ORDER — ACETAMINOPHEN 325 MG/1
650 TABLET ORAL ONCE AS NEEDED
Status: DISCONTINUED | OUTPATIENT
Start: 2017-07-24 | End: 2017-07-24 | Stop reason: HOSPADM

## 2017-07-24 RX ORDER — ONDANSETRON 4 MG/1
4 TABLET, ORALLY DISINTEGRATING ORAL EVERY 6 HOURS PRN
Status: DISCONTINUED | OUTPATIENT
Start: 2017-07-24 | End: 2017-07-26 | Stop reason: HOSPADM

## 2017-07-24 RX ORDER — BISACODYL 5 MG/1
10 TABLET, DELAYED RELEASE ORAL DAILY PRN
Status: DISCONTINUED | OUTPATIENT
Start: 2017-07-25 | End: 2017-07-26 | Stop reason: HOSPADM

## 2017-07-24 RX ORDER — ACETAMINOPHEN 500 MG
500 TABLET ORAL ONCE
Status: COMPLETED | OUTPATIENT
Start: 2017-07-24 | End: 2017-07-24

## 2017-07-24 RX ORDER — ONDANSETRON 2 MG/ML
4 INJECTION INTRAMUSCULAR; INTRAVENOUS EVERY 6 HOURS PRN
Status: DISCONTINUED | OUTPATIENT
Start: 2017-07-24 | End: 2017-07-26 | Stop reason: HOSPADM

## 2017-07-24 RX ORDER — SCOLOPAMINE TRANSDERMAL SYSTEM 1 MG/1
1 PATCH, EXTENDED RELEASE TRANSDERMAL ONCE
Status: DISCONTINUED | OUTPATIENT
Start: 2017-07-24 | End: 2017-07-26 | Stop reason: HOSPADM

## 2017-07-24 RX ORDER — WARFARIN SODIUM 5 MG/1
5 TABLET ORAL
Status: DISCONTINUED | OUTPATIENT
Start: 2017-07-24 | End: 2017-07-26 | Stop reason: HOSPADM

## 2017-07-24 RX ORDER — LEVOTHYROXINE SODIUM 0.05 MG/1
50 TABLET ORAL
Status: DISCONTINUED | OUTPATIENT
Start: 2017-07-24 | End: 2017-07-26 | Stop reason: HOSPADM

## 2017-07-24 RX ORDER — CLINDAMYCIN PHOSPHATE 600 MG/50ML
600 INJECTION INTRAVENOUS EVERY 8 HOURS
Status: COMPLETED | OUTPATIENT
Start: 2017-07-24 | End: 2017-07-24

## 2017-07-24 RX ORDER — SODIUM CHLORIDE 0.9 % (FLUSH) 0.9 %
1-10 SYRINGE (ML) INJECTION AS NEEDED
Status: DISCONTINUED | OUTPATIENT
Start: 2017-07-24 | End: 2017-07-24

## 2017-07-24 RX ORDER — FLUMAZENIL 0.1 MG/ML
0.2 INJECTION INTRAVENOUS AS NEEDED
Status: DISCONTINUED | OUTPATIENT
Start: 2017-07-24 | End: 2017-07-24 | Stop reason: HOSPADM

## 2017-07-24 RX ORDER — DOCUSATE SODIUM 100 MG/1
100 CAPSULE, LIQUID FILLED ORAL 2 TIMES DAILY
Status: DISCONTINUED | OUTPATIENT
Start: 2017-07-24 | End: 2017-07-26 | Stop reason: HOSPADM

## 2017-07-24 RX ORDER — MIDAZOLAM HYDROCHLORIDE 1 MG/ML
INJECTION INTRAMUSCULAR; INTRAVENOUS AS NEEDED
Status: DISCONTINUED | OUTPATIENT
Start: 2017-07-24 | End: 2017-07-24 | Stop reason: SURG

## 2017-07-24 RX ORDER — SODIUM CHLORIDE 9 MG/ML
1000 INJECTION, SOLUTION INTRAVENOUS CONTINUOUS PRN
Status: DISCONTINUED | OUTPATIENT
Start: 2017-07-24 | End: 2017-07-24 | Stop reason: HOSPADM

## 2017-07-24 RX ORDER — SODIUM CHLORIDE 0.9 % (FLUSH) 0.9 %
1-10 SYRINGE (ML) INJECTION AS NEEDED
Status: DISCONTINUED | OUTPATIENT
Start: 2017-07-24 | End: 2017-07-26 | Stop reason: HOSPADM

## 2017-07-24 RX ORDER — NALOXONE HCL 0.4 MG/ML
0.1 VIAL (ML) INJECTION
Status: DISCONTINUED | OUTPATIENT
Start: 2017-07-24 | End: 2017-07-26 | Stop reason: HOSPADM

## 2017-07-24 RX ORDER — ACETAMINOPHEN 325 MG/1
325 TABLET ORAL EVERY 4 HOURS PRN
Status: DISCONTINUED | OUTPATIENT
Start: 2017-07-24 | End: 2017-07-26 | Stop reason: HOSPADM

## 2017-07-24 RX ORDER — OXYCODONE HCL 10 MG/1
10 TABLET, FILM COATED, EXTENDED RELEASE ORAL ONCE
Status: DISCONTINUED | OUTPATIENT
Start: 2017-07-24 | End: 2017-07-24

## 2017-07-24 RX ORDER — DEXTROSE MONOHYDRATE 25 G/50ML
25 INJECTION, SOLUTION INTRAVENOUS
Status: DISCONTINUED | OUTPATIENT
Start: 2017-07-24 | End: 2017-07-26 | Stop reason: HOSPADM

## 2017-07-24 RX ORDER — BACITRACIN 50000 [IU]/1
INJECTION, POWDER, FOR SOLUTION INTRAMUSCULAR AS NEEDED
Status: DISCONTINUED | OUTPATIENT
Start: 2017-07-24 | End: 2017-07-26 | Stop reason: HOSPADM

## 2017-07-24 RX ORDER — ONDANSETRON 2 MG/ML
4 INJECTION INTRAMUSCULAR; INTRAVENOUS ONCE AS NEEDED
Status: DISCONTINUED | OUTPATIENT
Start: 2017-07-24 | End: 2017-07-24 | Stop reason: HOSPADM

## 2017-07-24 RX ORDER — ACETAMINOPHEN 325 MG/1
1000 TABLET ORAL ONCE
Status: DISCONTINUED | OUTPATIENT
Start: 2017-07-24 | End: 2017-07-24

## 2017-07-24 RX ORDER — ONDANSETRON 2 MG/ML
INJECTION INTRAMUSCULAR; INTRAVENOUS AS NEEDED
Status: DISCONTINUED | OUTPATIENT
Start: 2017-07-24 | End: 2017-07-24 | Stop reason: SURG

## 2017-07-24 RX ORDER — SODIUM CHLORIDE 9 MG/ML
100 INJECTION, SOLUTION INTRAVENOUS CONTINUOUS
Status: DISCONTINUED | OUTPATIENT
Start: 2017-07-24 | End: 2017-07-26 | Stop reason: HOSPADM

## 2017-07-24 RX ORDER — FERROUS SULFATE TAB EC 324 MG (65 MG FE EQUIVALENT) 324 (65 FE) MG
324 TABLET DELAYED RESPONSE ORAL
Status: DISCONTINUED | OUTPATIENT
Start: 2017-07-24 | End: 2017-07-26 | Stop reason: HOSPADM

## 2017-07-24 RX ORDER — FAMOTIDINE 40 MG/1
40 TABLET, FILM COATED ORAL DAILY
Status: DISCONTINUED | OUTPATIENT
Start: 2017-07-24 | End: 2017-07-26 | Stop reason: HOSPADM

## 2017-07-24 RX ORDER — PREGABALIN 25 MG/1
75 CAPSULE ORAL ONCE
Status: COMPLETED | OUTPATIENT
Start: 2017-07-24 | End: 2017-07-24

## 2017-07-24 RX ADMIN — OXYCODONE HYDROCHLORIDE 5 MG: 5 TABLET ORAL at 23:40

## 2017-07-24 RX ADMIN — ACETAMINOPHEN 1000 MG: 500 TABLET ORAL at 18:26

## 2017-07-24 RX ADMIN — MIDAZOLAM 2 MG: 1 INJECTION INTRAMUSCULAR; INTRAVENOUS at 07:04

## 2017-07-24 RX ADMIN — CLINDAMYCIN IN 5 PERCENT DEXTROSE 600 MG: 12 INJECTION, SOLUTION INTRAVENOUS at 13:54

## 2017-07-24 RX ADMIN — PROPOFOL 40 MCG/KG/MIN: 10 INJECTION, EMULSION INTRAVENOUS at 07:50

## 2017-07-24 RX ADMIN — DEXTROSE MONOHYDRATE 900 MG: 50 INJECTION, SOLUTION INTRAVENOUS at 07:30

## 2017-07-24 RX ADMIN — ACETAMINOPHEN 1000 MG: 500 TABLET ORAL at 20:38

## 2017-07-24 RX ADMIN — HYDROMORPHONE HYDROCHLORIDE 1 MG: 1 INJECTION, SOLUTION INTRAMUSCULAR; INTRAVENOUS; SUBCUTANEOUS at 18:26

## 2017-07-24 RX ADMIN — DOCUSATE SODIUM 100 MG: 100 CAPSULE, LIQUID FILLED ORAL at 13:53

## 2017-07-24 RX ADMIN — OXYCODONE HYDROCHLORIDE 5 MG: 5 TABLET ORAL at 16:09

## 2017-07-24 RX ADMIN — HYDROMORPHONE HYDROCHLORIDE 1 MG: 1 INJECTION, SOLUTION INTRAMUSCULAR; INTRAVENOUS; SUBCUTANEOUS at 13:53

## 2017-07-24 RX ADMIN — FERROUS SULFATE TAB EC 324 MG (65 MG FE EQUIVALENT) 324 MG: 324 (65 FE) TABLET DELAYED RESPONSE at 12:04

## 2017-07-24 RX ADMIN — OXYCODONE HYDROCHLORIDE 5 MG: 5 TABLET ORAL at 12:05

## 2017-07-24 RX ADMIN — SCOPALAMINE 1 PATCH: 1 PATCH, EXTENDED RELEASE TRANSDERMAL at 06:55

## 2017-07-24 RX ADMIN — WARFARIN SODIUM 5 MG: 5 TABLET ORAL at 18:26

## 2017-07-24 RX ADMIN — INSULIN ASPART 2 UNITS: 100 INJECTION, SOLUTION INTRAVENOUS; SUBCUTANEOUS at 20:38

## 2017-07-24 RX ADMIN — FAMOTIDINE 40 MG: 40 TABLET ORAL at 12:04

## 2017-07-24 RX ADMIN — OXYCODONE HYDROCHLORIDE 10 MG: 10 TABLET, FILM COATED, EXTENDED RELEASE ORAL at 20:24

## 2017-07-24 RX ADMIN — ACETAMINOPHEN 1000 MG: 500 TABLET ORAL at 12:04

## 2017-07-24 RX ADMIN — SODIUM CHLORIDE 1000 ML: 9 INJECTION, SOLUTION INTRAVENOUS at 06:43

## 2017-07-24 RX ADMIN — CLINDAMYCIN IN 5 PERCENT DEXTROSE 600 MG: 12 INJECTION, SOLUTION INTRAVENOUS at 20:23

## 2017-07-24 RX ADMIN — DOCUSATE SODIUM 100 MG: 100 CAPSULE, LIQUID FILLED ORAL at 18:26

## 2017-07-24 RX ADMIN — SODIUM CHLORIDE 100 ML/HR: 900 INJECTION, SOLUTION INTRAVENOUS at 12:11

## 2017-07-24 RX ADMIN — PREGABALIN 75 MG: 75 CAPSULE ORAL at 06:43

## 2017-07-24 RX ADMIN — HYDROMORPHONE HYDROCHLORIDE 1 MG: 1 INJECTION, SOLUTION INTRAMUSCULAR; INTRAVENOUS; SUBCUTANEOUS at 23:38

## 2017-07-24 RX ADMIN — ONDANSETRON 4 MG: 2 INJECTION INTRAMUSCULAR; INTRAVENOUS at 08:17

## 2017-07-24 RX ADMIN — ACETAMINOPHEN 500 MG: 500 TABLET ORAL at 06:56

## 2017-07-24 NOTE — PLAN OF CARE
Problem: Patient Care Overview (Adult)  Goal: Plan of Care Review  Outcome: Ongoing (interventions implemented as appropriate)    07/24/17 9524   Coping/Psychosocial Response Interventions   Plan Of Care Reviewed With patient   Patient Care Overview   Progress no change   Outcome Evaluation   Outcome Summary/Follow up Plan pt vss. Working on pain medication       Goal: Adult Individualization and Mutuality  Outcome: Ongoing (interventions implemented as appropriate)  Goal: Discharge Needs Assessment  Outcome: Ongoing (interventions implemented as appropriate)    Problem: Fall Risk (Adult)  Goal: Absence of Falls  Outcome: Ongoing (interventions implemented as appropriate)    Problem: Knee Replacement, Total (Adult)  Goal: Signs and Symptoms of Listed Potential Problems Will be Absent or Manageable (Knee Replacement, Total)  Outcome: Ongoing (interventions implemented as appropriate)

## 2017-07-24 NOTE — THERAPY EVALUATION
Acute Care - Physical Therapy Initial Evaluation  [facility]     Patient Name: Mini Andersen  : 1955  MRN: [id number]  Today's Date: 2017   Onset of Illness/Injury or Date of Surgery Date: 17  Date of Referral to PT: 17  Referring Physician: Dr Ballesteros      Admit Date: 2017     Visit Dx:    ICD-10-CM ICD-9-CM   1. Chronic pain of left knee M25.562 719.46    G89.29 338.29   2. Localized osteoarthritis of left knee M17.9 715.36   3. Type 2 diabetes mellitus without complication, without long-term current use of insulin E11.9 250.00   4. Acquired hypothyroidism E03.9 244.9   5. History of DVT (deep vein thrombosis) Z86.718 V12.51   6. Primary osteoarthritis of left knee M17.12 715.16   7. Impaired physical mobility Z74.09 781.99     Patient Active Problem List   Diagnosis   • Palpitations   • Type 2 diabetes mellitus   • Hypothyroidism   • Primary osteoarthritis of left knee   • Knee pain   • Diabetes mellitus without complication   • Borderline glaucoma   • Nuclear cataract   • Abdominal pain   • Cobalamin deficiency   • Cramp of limb   • Depression   • Gastroesophageal reflux disease   • Callus of foot   • Deformity of foot   • Foot pain   • History of colonoscopy   • Low back pain   • Mononeuritis   • Spasm   • Neuropathy   • Lung field abnormal   • Encounter for screening mammogram for malignant neoplasm of breast   • Cardiac arrhythmia   • Arthralgia of lower leg   • Arthralgia of shoulder   • Pure hypercholesterolemia   • Seasonal allergic rhinitis   • Disorder of hand   • Osteoporosis   • Pre-operative clearance   • Type 2 diabetes mellitus without complication, without long-term current use of insulin   • Angina pectoris    • SOB (shortness of breath)   • Obstructive sleep apnea syndrome   • Chronic pain of left knee     Past Medical History:   Diagnosis Date   • Acid reflux    • Acute peritonitis    • Allergic rhinitis    • Arthritis    • Bee sting    • Borderline glaucoma     • Chronic bronchitis    • Constipation     severe with an immotile colon      • Deep venous thrombosis    • High cholesterol    • Hypothyroidism    • Intestinal volvulus    • Muscle atrophy     O/E   • Nuclear senile cataract    • Nuclear senile cataract    • Polyneuropathy in diabetes    • PONV (postoperative nausea and vomiting)    • Type 2 diabetes mellitus     NO BDR   • Type 2 diabetes mellitus without complications    • Unspecified disease of nail      Past Surgical History:   Procedure Laterality Date   • APPENDECTOMY     • BLADDER SUSPENSION     • COLECTOMY PARTIAL / TOTAL  05/22/2014    Subtotal colectomy with ineo-rectostomy.   • HERNIA REPAIR      multiple   • HYSTERECTOMY     • INJECTION OF MEDICATION  12/07/2010    DEPO MEDROL   • KNEE ARTHROSCOPY Left    • LAPAROSCOPIC CHOLECYSTECTOMY     • SALPINGO OOPHORECTOMY Left    • SALPINGO OOPHORECTOMY Right           PT ASSESSMENT (last 72 hours)      PT Evaluation       07/24/17 1332 07/24/17 1249    Rehab Evaluation    Document Type evaluation  -RM evaluation  -CB    Subjective Information agree to therapy;complains of;fatigue;pain  -RM agree to therapy;complains of;pain  -CB    Patient Effort, Rehab Treatment good  -RM adequate  -CB    Symptoms Noted During/After Treatment increased pain  -RM increased pain   nausea  -CB    General Information    Patient Profile Review yes  -RM yes  -CB    Onset of Illness/Injury or Date of Surgery Date  07/24/17  -CB    Referring Physician  Dr Ballesteros  -CB    General Observations  pale, laying supine with LLE externally rotated and flexed for comfort  -CB    Pertinent History Of Current Problem  elective surgery due to OA  -CB    Precautions/Limitations  fall precautions   total knee precautions  -CB    Prior Level of Function  independent:;gait;ADL's  -CB    Equipment Currently Used at Home  cane, straight;walker, rolling;commode  -CB    Plans/Goals Discussed With  patient and family  -CB    Living Environment    Lives  With spouse  -RM spouse  -CB    Living Arrangements house  -RM house  -CB    Home Accessibility ramps present at home  -RM ramps present at home  -CB    Stair Railings at Home  outside, present on right side  -CB    Transportation Available  family or friend will provide  -CB    Clinical Impression    Date of Referral to PT  07/24/17  -CB    PT Diagnosis  impaired physical mobility due to L TKA   -CB    Criteria for Skilled Therapeutic Interventions Met  treatment indicated;yes  -CB    Pathology/Pathophysiology Noted (Describe Specifically for Each System)  musculoskeletal  -CB    Impairments Found (describe specific impairments)  aerobic capacity/endurance;gait, locomotion, and balance;ROM  -CB    Functional Limitations in Following Categories (Describe Specific Limitations)  self-care;home management  -CB    Rehab Potential  good, to achieve stated therapy goals  -CB    Predicted Duration of Therapy Intervention (days/wks)  until goalsme or discharged  -CB    Vital Signs    Pre Systolic BP Rehab  131  -CB    Pre Treatment Diastolic BP  60  -CB    Post Systolic BP Rehab  142  -CB    Post Treatment Diastolic BP  64  -CB    Pretreatment Heart Rate (beats/min)  79  -CB    Posttreatment Heart Rate (beats/min)  73  -CB    Pre SpO2 (%)  95  -CB    O2 Delivery Pre Treatment  supplemental O2  -CB    Post SpO2 (%)  97  -CB    O2 Delivery Post Treatment  supplemental O2  -CB    Pre Patient Position  Supine  -CB    Intra Patient Position  Standing  -CB    Post Patient Position  Supine  -CB    Vision Assessment/Intervention    Visual Impairment  WFL with corrective lenses  -CB    Visual Impairment Comment  part time  -CB    Cognitive Assessment/Intervention    Current Cognitive/Communication Assessment  functional  -CB    Orientation Status  oriented x 4  -CB    Follows Commands/Answers Questions  100% of the time  -CB    Personal Safety  WNL/WFL  -CB    Personal Safety Interventions  fall prevention program maintained;gait  belt;nonskid shoes/slippers when out of bed  -CB    ROM (Range of Motion)    General ROM  lower extremity range of motion deficits identified  -CB    General ROM Detail  L knee -20 extension, 76 flexion  -CB    MMT (Manual Muscle Testing)    General MMT Assessment  lower extremity strength deficits identified  -CB    Mobility Assessment/Training    Extremity Weight-Bearing Status  left lower extremity  -CB    Left Lower Extremity Weight-Bearing  weight-bearing as tolerated  -CB    Bed Mobility, Assessment/Treatment    Bed Mobility, Assistive Device  bed rails;head of bed elevated  -CB    Bed Mob, Supine to Sit, [place]  minimum assist (75% patient effort)  -CB    Bed Mob, Sit to Supine, [place]  minimum assist (75% patient effort);moderate assist (50% patient effort)  -CB    Bed Mobility, Safety Issues  decreased use of legs for bridging/pushing  -CB    Bed Mobility, Impairments  pain;strength decreased;ROM decreased  -CB    Transfer Assessment/Treatment    Transfers, Sit-Stand [place]  minimum assist (75% patient effort)  -CB    Transfers, Stand-Sit [place]  minimum assist (75% patient effort)  -CB    Transfers, Sit-Stand-Sit, Assist Device  rolling walker  -CB    Transfer, Maintain Weight Bearing Status  able to maintain weight bearing status  -CB    Transfer, Impairments  pain;strength decreased;ROM decreased  -CB    Gait Assessment/Treatment    Gait, [place] Level  minimum assist (75% patient effort);contact guard assist  -CB    Gait, Assistive Device  rolling walker  -CB    Gait, Distance (Feet)  4  -CB    Sensory Assessment/Intervention    Light Touch  LLE;RLE  -CB    LLE Light Touch  WNL  -CB    Positioning and Restraints    Pre-Treatment Position  in bed  -CB    Post Treatment Position  bed  -CB      07/24/17 1227 07/24/17 0626    General Information    Equipment Currently Used at Home none  -AC glucometer;cane, straight;walker, rolling;walker, standard  -JEWELL    Living Environment     Lives With  spouse  -JEWELL    Living Arrangements  house  -JEWELL      User Key  (r) = Recorded By, (t) = Taken By, (c) = Cosigned By    Initials Name Provider Type    MT Osborn, PT Physical Therapist    AC Sandrine Hui, RN Registered Nurse    JEWELL English RN Registered Nurse    RM Keiry Escalera, OT Occupational Therapist              PT Recommendation and Plan  Anticipated Discharge Disposition: home with home health, home with outpatient services  Planned Therapy Interventions: bed mobility training, gait training, home exercise program, patient/family education, ROM (Range of Motion), strengthening, transfer training  PT Frequency: per priority policy (5-14)                 Outcome Measures       07/24/17 1249          How much help from another person do you currently need...    Turning from your back to your side while in flat bed without using bedrails? 3  -CB      Moving from lying on back to sitting on the side of a flat bed without bedrails? 3  -CB      Moving to and from a bed to a chair (including a wheelchair)? 3  -CB      Standing up from a chair using your arms (e.g., wheelchair, bedside chair)? 3  -CB      Climbing 3-5 steps with a railing? 2  -CB      To walk in hospital room? 3  -CB      AM-PAC 6 Clicks Score 17  -CB      Functional Assessment    Outcome Measure Options AM-PAC 6 Clicks Basic Mobility (PT)  -CB        User Key  (r) = Recorded By, (t) = Taken By, (c) = Cosigned By    Initials Name Provider Type    MT Osborn, PT Physical Therapist           Time Calculation:         PT Charges       07/24/17 1401          Time Calculation    Start Time 1249  -CB      Stop Time 1332  -CB      Time Calculation (min) 43 min  -CB      PT Received On 07/24/17  -CB      PT Goal Re-Cert Due Date 08/06/17  -CB        User Key  (r) = Recorded By, (t) = Taken By, (c) = Cosigned By    Initials Name Provider Type    MT Osborn, PT Physical Therapist          Therapy Charges for Today      Code Description Service Date Service Provider Modifiers Qty    93895809822 HC PT MOBILITY CURRENT 7/24/2017 Ivonne Osborn, PT GP, CK 1    29705766472 HC PT MOBILITY PROJECTED 7/24/2017 Ivonne Osborn, PT GP, CJ 1    44867581223 HC PT EVAL MOD COMPLEXITY 1 7/24/2017 Ivonne Osborn, PT GP 1    86940766543 HC PT THERAPEUTIC ACT EA 15 MIN 7/24/2017 Ivonne Osborn, PT GP 2          PT G-Codes  PT Professional Judgement Used?: Yes  Outcome Measure Options: AM-PAC 6 Clicks Basic Mobility (PT)  Score: 17  Functional Limitation: Mobility: Walking and moving around  Mobility: Walking and Moving Around Current Status (): At least 40 percent but less than 60 percent impaired, limited or restricted  Mobility: Walking and Moving Around Goal Status (): At least 20 percent but less than 40 percent impaired, limited or restricted      Ivonne Osborn, PT  7/24/2017

## 2017-07-24 NOTE — PLAN OF CARE
Problem: Patient Care Overview (Adult)  Goal: Plan of Care Review  Outcome: Ongoing (interventions implemented as appropriate)    07/24/17 1332   Coping/Psychosocial Response Interventions   Plan Of Care Reviewed With patient;spouse;family   Outcome Evaluation   Outcome Summary/Follow up Plan Initial OT evaluation complete. Pt reports 10/10 pain L knee. Performed bed mobility, transfers from bed & toilet withy Min assist. BUE tricep weakness. Pt may benefit from OT intervention of ADLs, strengthening, and education.  May benefit from HH services at discharge         Problem: Inpatient Occupational Therapy  Goal: Bed Mobility Goal LTG- OT  Outcome: Ongoing (interventions implemented as appropriate)    07/24/17 1332   Bed Mobility OT LTG   Bed Mobility OT LTG, Time to Achieve by discharge   Bed Mobility OT LTG, Activity Type all bed mobility   Bed Mobility OT LTG, [place] Level supervision required       Goal: Transfer Training Goal 1 LTG- OT  Outcome: Ongoing (interventions implemented as appropriate)    07/24/17 1332   Transfer Training OT LTG   Transfer Training OT LTG, Date Established 07/24/17   Transfer Training OT LTG, Time to Achieve by discharge   Transfer Training OT LTG, Activity Type all transfers   Transfer Training OT LTG, [place] Level supervision required   Transfer Training OT LTG, Assist Device walker, rolling       Goal: ADL Goal LTG- OT  Outcome: Ongoing (interventions implemented as appropriate)    07/24/17 1332   ADL OT LTG   ADL OT LTG, Date Established 07/24/17   ADL OT LTG, Time to Achieve by discharge   ADL OT LTG, Activity Type ADL skills  (bath, dress, toilet)   ADL OT LTG, [place] Level standby assist       Goal: Functional Mobility Goal LTG- OT  Outcome: Ongoing (interventions implemented as appropriate)    07/24/17 1332   Functional Mobility OT LTG   Functional Mobility Goal OT LTG, Date Established 07/24/17   Functional Mobility Goal OT LTG, Time to Achieve by discharge    Functional Mobility Goal OT LTG, [place] Level standby assist   Functional Mobility Goal OT LTG, Assist Device rolling walker   Functional Mobility Goal OT LTG, Distance to Achieve in hallway;to the sink;to the bathroom

## 2017-07-24 NOTE — OP NOTE
TOTAL KNEE ARTHROPLASTY ATTUNE  Procedure Note    Name:    Mini Andersen  YOB: 1955  Date of surgery:   7/24/2017    Pre-op Diagnosis:   Acquired hypothyroidism [E03.9]  History of DVT (deep vein thrombosis) [Z86.718]  Primary osteoarthritis of left knee [M17.12]  Chronic pain of left knee [M25.562, G89.29]  Localized osteoarthritis of left knee [M17.9]  Type 2 diabetes mellitus without complication, without long-term current use of insulin [E11.9]    Post-op Diagnosis:    Post-Op Diagnosis Codes:     * Acquired hypothyroidism [E03.9]     * History of DVT (deep vein thrombosis) [Z86.718]     * Primary osteoarthritis of left knee [M17.12]     * Chronic pain of left knee [M25.562, G89.29]     * Localized osteoarthritis of left knee [M17.9]     * Type 2 diabetes mellitus without complication, without long-term current use of insulin [E11.9]    Procedure:  Procedure(s):  TOTAL KNEE ARTHROPLASTY ATTUNE with adductor canal block - left    Surgeon(s):  Jose Ballesteros MD    SURGEON: Jose Ballesteros MD    ASSISTANT:  MARY CARMEN Beltran    Anesthesia: Spinal    Staff:   Circulator: Eloise Ramírez RN  Scrub Person: Desirae Guardado  Assistant: Beverley Abel CSA    Estimated Blood Loss: 200 mL    Specimens:                  ID Type Source Tests Collected by Time Destination   A : bone and soft tissue right knee Tissue Knee, Right TISSUE EXAM Jose Ballesteros MD 7/24/2017 0848          Drains:   Drain/Device Site 07/24/17 0911 Left knee gravity (Active)       [REMOVED] Urethral Catheter 07/24/17 0950 100% silicone 16 (Removed)   Removed 07/24/17 0951       Findings:  End-stage osteoarthritis Left knee    Complications: None    IMPLANTS:     Implant Name Type Inv. Item Serial No.  Lot No. LRB No. Used   CMT BONE SMARTSET HV 40GM - [id number] - [id number] Implant CMT BONE SMARTSET HV 40 [id number] DEPUY [id number] Left 1   CMT BONE SMARTSET HV 40GM - [id number] -  [id number] Implant [facility] BONE SMARTSET HV 40GM [id number] DEPUY [id number] Left 1   BASE TIB ATTUNE CMT RP SZ6 - [id number] - [id number] Implant BASE TIB ATTUNE CMT RP SZ6 [id number] [id number] Left 1   COMP PAT ATTUNE CMT MEDL/DOME 32MM - [id number] - [id number] Implant COMP PAT ATTUNE CMT MEDL/DOME 32MM [id number] DEPUY [id number] Left 1   COMP FEM ATTUNE CMT PS SZ6 LT - [id number] - [id number] Implant COMP FEM ATTUNE CMT PS SZ6 LT [id number] DEPUY [id number] Left 1   INSRT TIB ATTUNE PS RP SZ6 8MM - [id number] - [id number] Implant INSRT TIB ATTUNE PS RP SZ6 8MM [id number] DEPUY [id number] Left 1         PROCEDURE:    The patient was taken to the operating room and placed in the supine position. Preoperative antibiotics were administered. Surgical time out was performed. After adequate induction of anesthesia, the leg was prepped and draped in the usual sterile fashion, exsanguinated with an Ace bandage and the tourniquet inflated to 300 mmHg. A midline incision was performed followed by a medial parapatellar arthrotomy.    Attention was then placed to the patella. The patella was noted to track centrally through range of motion. The patella was then sized with the trials. The thickness of the patella was then measured and the cut was made in a free-hand technique. The patella protector was then placed and the surrounding osteophytes were removed.   The patella was subluxed laterally in a non-everted position.  A portion of the fat pad, ACL, and anterior horns of the meniscus were excised.     The drill hole was placed in the distal femur and the canal was the irrigated and suctioned. The IM leigh was placed and a 5 degree distal valgus cut was performed on the femur. The femur was then sized with a sizing guide. The femoral cutting block was placed and all femoral cuts were performed. The proximal tibia was exposed. We used the extramedullary tibial cutting guide set for removal of an appropriate amount of proximal tibia. The tibial cut  was performed. The posterior horns of the menisci were excised. The posterior osteophytes were removed. Flexion extension blocks were then used to balance the knee. The tibial cut surface was then sized with the sizing templates and the tibial and femoral trial were then placed. The knee was placed in full extension and then the patella was re-addressed. The patella was resurfaced and the preoperative thickness was reproduced. The patella tracked centrally through range of motion.     At this point all trial components were removed, the knee was copiously irrigated with pulsed lavage.. The cut surfaces were then dried with clean lap sponges, and the components were cemented tibia, followed by femur, then patella. The knee was held in full extension and all excess cement was removed. The knee was held still until the cement had completely hardened. We then placed the trial polyethylene spacer which resulted in full extension and excellent flexion-extension balance. We placed the final polyethylene spacer.    The knee was then copiously irrigated. The tourniquet was then released. There was excellent hemostasis. We placed a 10 Wolof Hemovac drain. We closed the knee in multiple layers in standard fashion. Sterile dressing were applied. At the end of the case, the sponge and needle counts were reported as being correct. There were no known complications. The patient was then transported to the recovery room in satisfactory condition..      Jose Ballesteros MD     Date: 7/24/2017  Time: 10:01 AM

## 2017-07-24 NOTE — ANESTHESIA POSTPROCEDURE EVALUATION
Patient: Mini Andersen    Procedure Summary     Date Anesthesia Start Anesthesia Stop Room / Location    07/24/17 0707 0954  [facility] OR 11 / [facility]       Procedure Diagnosis Surgeon Provider    TOTAL KNEE ARTHROPLASTY ATTUNE with adductor canal block (Left ) Acquired hypothyroidism; History of DVT (deep vein thrombosis); Primary osteoarthritis of left knee; Chronic pain of left knee; Localized osteoarthritis of left knee; Type 2 diabetes mellitus without complication, without long-term current use of insulin  (Acquired hypothyroidism [E03.9]; History of DVT (deep vein thrombosis) [Z86.718]; Primary osteoarthritis of left knee [M17.12]; Chronic pain of left knee [M25.562, G89.29]; Localized osteoarthritis of left knee [M17.9]; Type 2 diabetes mellitus without complication, without long-term current use of insulin [E11.9]) MD Rusty Shen MD          Anesthesia Type: spinal, general  Last vitals  BP        Temp        Pulse       Resp        SpO2          Post Anesthesia Care and Evaluation    Patient location during evaluation: PACU  Patient participation: complete - patient participated  Level of consciousness: awake  Pain score: 0  Pain management: adequate  Airway patency: patent  Anesthetic complications: No anesthetic complications  PONV Status: none  Cardiovascular status: acceptable  Respiratory status: acceptable  Hydration status: acceptable

## 2017-07-24 NOTE — PLAN OF CARE
Problem: Patient Care Overview (Adult)  Goal: Plan of Care Review  Outcome: Ongoing (interventions implemented as appropriate)    07/24/17 1249   Coping/Psychosocial Response Interventions   Plan Of Care Reviewed With spouse;patient   Outcome Evaluation   Outcome Summary/Follow up Plan Patient seen for initital PT greyson, was complaining of great deal of pain thru out, was able to come to sit EOB with min assist of one, sit to stand with entire bed raised up with CGA of fone, able to take 4-5 steps to HOB with rolling walker and CGAS of fone, stand to sit min, sit to supine min of fone ROM L Knee -20 extension and 76 flexion, could benefit from skilled PT to regain and return to highest elvel of function in ROM and strength, bed mobility, trnafers, and gait       Goal: Discharge Needs Assessment  Outcome: Ongoing (interventions implemented as appropriate)    07/24/17 1249   Discharge Needs Assessment   Concerns To Be Addressed discharge planning concerns   Discharge Facility/Level Of Care Needs (none identified)   Current Discharge Risk physical impairment   Discharge Disposition still a patient   Current Health   Outpatient/Agency/Support Group Needs homecare agency (specify level of care)   Living Environment   Transportation Available family or friend will provide   Self-Care   Equipment Currently Used at Home cane, straight;walker, rolling;commode         Problem: Inpatient Physical Therapy  Goal: Bed Mobility Goal STG- PT  Outcome: Ongoing (interventions implemented as appropriate)    07/24/17 1249   Bed Mobility PT STG   Bed Mobility PT STG, Date Established 07/24/17   Bed Mobility PT STG, Time to Achieve 2 - 3 days   Bed Mobility PT STG, Activity Type supine to sit/sit to supine   Bed Mobility PT STG, [place] Level independent   Transfer Training Goal, Assist Device bed rails   Bed Mobility PT STG, Additional Goal HOB up or down       Goal: Gait Training Goal LTG- PT  Outcome: Ongoing (interventions  implemented as appropriate)    07/24/17 1249   Gait Training PT LTG   Gait Training Goal PT LTG, Date Established 07/24/17   Gait Training Goal PT LTG, Time to Achieve by discharge   Gait Training Goal PT LTG, [place] Level contact guard assist   Gait Training Goal PT LTG, Assist Device walker, rolling   Gait Training Goal PT LTG, Distance to Achieve 200 feet       Goal: Strength Goal LTG- PT  Outcome: Ongoing (interventions implemented as appropriate)    07/24/17 1249   Strength Goal PT LTG   Strength Goal PT LTG, Date Established 07/24/17   Strength Goal PT LTG, Time to Achieve by discharge   Strength Goal PT LTG, Measure to Achieve 20 reps all ex BLE independently with no knee lag on L   Strength Goal PT LTG, Functional Goal get legs up onto bed without using UE       Goal: Range of Motion Goal LTG- PT  Outcome: Ongoing (interventions implemented as appropriate)    07/24/17 1249   Range of Motion PT LTG   Range of Motion Goal PT LTG, Date Established 07/24/17   Range of Motion Goal PT LTG, Time to Achieve by discharge   Range fo Motion Goal PT LTG, Joint L knee   Range of Motion Goal PT LTG, AROM Measure 0-90

## 2017-07-24 NOTE — INTERVAL H&P NOTE
H&P reviewed. The patient was examined and there are no changes to the H&P.     The patient voiced understanding of the risks, benefits, and alternative forms of treatment that were discussed and the patient consents to proceed with surgery.  All risks, benefits and alternatives were discussed.  Risks including to but not exclusive to anesthetic complications, including death, MI, CVA, infection, bleeding DVT, fracture, residual pain and need for future surgery.  This discussion was held with the patient by Jose Ballesteros MD and all questions were answered.

## 2017-07-24 NOTE — PLAN OF CARE
Problem: Patient Care Overview (Adult)  Goal: Plan of Care Review  Outcome: Ongoing (interventions implemented as appropriate)    07/24/17 1015   Coping/Psychosocial Response Interventions   Plan Of Care Reviewed With patient   Patient Care Overview   Progress no change   Outcome Evaluation   Outcome Summary/Follow up Plan vss meets pacu dc criteria          Problem: Perioperative Period (Adult)  Goal: Signs and Symptoms of Listed Potential Problems Will be Absent or Manageable (Perioperative Period)  Outcome: Ongoing (interventions implemented as appropriate)

## 2017-07-24 NOTE — ANESTHESIA PROCEDURE NOTES
Peripheral Block    Patient location during procedure: OR  Reason for block: at surgeon's request and post-op pain management  Preanesthetic Checklist  Completed: patient identified, site marked, surgical consent, pre-op evaluation, timeout performed, IV checked, risks and benefits discussed and monitors and equipment checked  Prep:  Sterile barriers:cap, gloves and sterile barriers  Prep: ChloraPrep  Patient monitoring: blood pressure monitoring, continuous pulse oximetry and EKG  Procedure  Sedation:yes  Guidance:ultrasound guided  ULTRASOUND INTERPRETATION.  Using ultrasound guidance a 22 G gauge needle was placed in close proximity to the femoral nerve, at which point, under ultrasound guidance anesthetic was injected in the area of the nerve and spread of the anesthesia was seen on ultrasound in close proximity thereto.  There were no abnormalities seen on ultrasound; a digital image was taken; and the patient tolerated the procedure with no complications.   Laterality:left  Block Type:adductor canal block  Injection Technique:single-shotNeedle Gauge:22 G    Medications  Local Injected:ropivacaine 0.5% Local Amount Injected:30mL  Post Assessment  Patient Tolerance:comfortable throughout block  Complications:no

## 2017-07-24 NOTE — ANESTHESIA PROCEDURE NOTES
Spinal Block    Patient location during procedure: OR  Indication:at surgeon's request and procedure for pain  Preanesthetic Checklist  Completed: patient identified, site marked, surgical consent, pre-op evaluation, timeout performed, IV checked, risks and benefits discussed and monitors and equipment checked  Spinal Block Prep:  Patient Position:sitting  Sterile Tech:cap, gloves, gown, mask and sterile barriers  Prep:Chloraprep  Patient Monitoring:blood pressure monitoring, continuous pulse oximetry and EKG  Spinal Block Procedure  Approach:midline  Guidance:landmark technique and palpation technique  Location:L3-L4  Needle Type:Pencan  Needle Gauge:24 G  Placement of Spinal needle event:cerebrospinal fluid aspirated    Fluid Appearance:clear  Post Assessment  Patient Tolerance:patient tolerated the procedure well with no apparent complications  Complications no

## 2017-07-24 NOTE — ANESTHESIA PREPROCEDURE EVALUATION
Anesthesia Evaluation     history of anesthetic complications (Scopalamine patch applied pre-op.): PONV  NPO Solid Status: > 8 hours       Airway   Mallampati: II  TM distance: >3 FB  Neck ROM: full  possible difficult intubation  Dental    (+) edentulous    Pulmonary - normal exam    breath sounds clear to auscultation  (+) shortness of breath, sleep apnea,   Cardiovascular - normal exam    ECG reviewed  Rhythm: regular  Rate: normal    (+) angina with exertion, DVT resolved, hyperlipidemia    ROS comment: EKG:NSR. EF 55%.    Neuro/Psych  (+) psychiatric history Depression,    GI/Hepatic/Renal/Endo    (+) obesity,  GERD well controlled, renal disease (Creatinine 1.04), diabetes mellitus type 2 poorly controlled, hypothyroidism,     Musculoskeletal     Abdominal   (+) obese,    Substance History      OB/GYN          Other   (+) arthritis                                     Anesthesia Plan    ASA 3     spinal and general   (Discussed peripheral nerve block(adductor canal) for post op pain relief and patient understands possible complications,risks and agrees.)  intravenous induction   Anesthetic plan and risks discussed with patient and spouse/significant other.

## 2017-07-24 NOTE — THERAPY EVALUATION
Acute Care - Occupational Therapy Initial Evaluation  [facility]     Patient Name: Mini Andersen  : 1955  MRN: [id number]  Today's Date: 2017  Onset of Illness/Injury or Date of Surgery Date: 17  Date of Referral to OT: 17  Referring Physician: Dr. Ballesteros    Admit Date: 2017       ICD-10-CM ICD-9-CM   1. Chronic pain of left knee M25.562 719.46    G89.29 338.29   2. Localized osteoarthritis of left knee M17.9 715.36   3. Type 2 diabetes mellitus without complication, without long-term current use of insulin E11.9 250.00   4. Acquired hypothyroidism E03.9 244.9   5. History of DVT (deep vein thrombosis) Z86.718 V12.51   6. Primary osteoarthritis of left knee M17.12 715.16   7. Impaired physical mobility Z74.09 781.99   8. Impaired mobility and ADLs Z74.09 799.89     Patient Active Problem List   Diagnosis   • Palpitations   • Type 2 diabetes mellitus   • Hypothyroidism   • Primary osteoarthritis of left knee   • Knee pain   • Diabetes mellitus without complication   • Borderline glaucoma   • Nuclear cataract   • Abdominal pain   • Cobalamin deficiency   • Cramp of limb   • Depression   • Gastroesophageal reflux disease   • Callus of foot   • Deformity of foot   • Foot pain   • History of colonoscopy   • Low back pain   • Mononeuritis   • Spasm   • Neuropathy   • Lung field abnormal   • Encounter for screening mammogram for malignant neoplasm of breast   • Cardiac arrhythmia   • Arthralgia of lower leg   • Arthralgia of shoulder   • Pure hypercholesterolemia   • Seasonal allergic rhinitis   • Disorder of hand   • Osteoporosis   • Pre-operative clearance   • Type 2 diabetes mellitus without complication, without long-term current use of insulin   • Angina pectoris    • SOB (shortness of breath)   • Obstructive sleep apnea syndrome   • Chronic pain of left knee     Past Medical History:   Diagnosis Date   • Acid reflux    • Acute peritonitis    • Allergic rhinitis    • Arthritis     • Bee sting    • Borderline glaucoma    • Chronic bronchitis    • Constipation     severe with an immotile colon      • Deep venous thrombosis    • High cholesterol    • Hypothyroidism    • Intestinal volvulus    • Muscle atrophy     O/E   • Nuclear senile cataract    • Nuclear senile cataract    • Polyneuropathy in diabetes    • PONV (postoperative nausea and vomiting)    • Type 2 diabetes mellitus     NO BDR   • Type 2 diabetes mellitus without complications    • Unspecified disease of nail      Past Surgical History:   Procedure Laterality Date   • APPENDECTOMY     • BLADDER SUSPENSION     • COLECTOMY PARTIAL / TOTAL  05/22/2014    Subtotal colectomy with ineo-rectostomy.   • HERNIA REPAIR      multiple   • HYSTERECTOMY     • INJECTION OF MEDICATION  12/07/2010    DEPO MEDROL   • KNEE ARTHROSCOPY Left    • LAPAROSCOPIC CHOLECYSTECTOMY     • SALPINGO OOPHORECTOMY Left    • SALPINGO OOPHORECTOMY Right           OT ASSESSMENT FLOWSHEET (last 72 hours)      OT Evaluation       07/24/17 1332 07/24/17 1249 07/24/17 1227 07/24/17 1222 07/24/17 0626    Rehab Evaluation    Document Type evaluation  -RM evaluation  -CB       Subjective Information agree to therapy;complains of;fatigue;pain  -RM agree to therapy;complains of;pain  -CB       Patient Effort, Rehab Treatment good  -RM adequate  -CB       Symptoms Noted During/After Treatment increased pain  -RM increased pain   nausea  -CB       General Information    Patient Profile Review yes  -RM yes  -CB       Onset of Illness/Injury or Date of Surgery Date 07/24/17  -RM 07/24/17  -CB       Referring Physician Dr. Ballesteros  -RM Dr Ballesteros  -CB       General Observations supine, cooperative with encouragement  -RM pale, laying supine with LLE externally rotated and flexed for comfort  -CB       Pertinent History Of Current Problem  elective surgery due to OA  -CB       Precautions/Limitations fall precautions  -RM fall precautions   total knee precautions  -CB       Prior  Level of Function independent:  -RM independent:;gait;ADL's  -CB       Equipment Currently Used at Home cane, straight;walker, rolling  -RM cane, straight;walker, rolling;commode  -CB none  -AC  glucometer;cane, straight;walker, rolling;walker, standard  -JEWELL    Plans/Goals Discussed With patient and family;agreed upon  -RM patient and family  -CB       Barriers to Rehab none identified  -RM        Living Environment    Lives With spouse  -RM spouse  -CB   spouse  -JEWELL    Living Arrangements house  -RM house  -CB   house  -JEWELL    Home Accessibility ramps present at home  -RM ramps present at home  -CB       Stair Railings at Home outside, present on right side  -RM outside, present on right side  -CB       Transportation Available family or friend will provide  -RM family or friend will provide  -CB       Clinical Impression    Date of Referral to OT 07/24/17  -RM        OT Diagnosis Impaired mobility & ADLs  -RM        Impairments Found (describe specific impairments) aerobic capacity/endurance;gait, locomotion, and balance;joint integrity and mobility;muscle performance;ventilation and respiration/gas exchange  -RM        Criteria for Skilled Therapeutic Interventions Met yes;treatment indicated  -RM        Rehab Potential good, to achieve stated therapy goals  -RM        Therapy Frequency other (see comments)   3-14x/wk  -RM        Anticipated Discharge Disposition home with home health  -RM        Functional Level Prior    Ambulation 0-->independent  -RM  0-->independent  -AC 0-->independent  -AC 1-->assistive equipment  -JEWLEL    Transferring 0-->independent  -RM  0-->independent  -AC 0-->independent  -AC 1-->assistive equipment  -JEWELL    Toileting 0-->independent  -RM  0-->independent  -AC 0-->independent  -AC 0-->independent  -JEWELL    Bathing 0-->independent  -RM  0-->independent  -AC 0-->independent  -AC 0-->independent  -JEWELL    Dressing 0-->independent  -RM  0-->independent  -AC 0-->independent  -AC  0-->independent  -JEWELL    Eating 0-->independent  -RM  0-->independent  -AC 0-->independent  -AC 0-->independent  -JEWELL    Communication 0-->understands/communicates without difficulty  -RM  0-->understands/communicates without difficulty  -AC 0-->understands/communicates without difficulty  -AC 0-->understands/communicates without difficulty  -JEWELL    Swallowing 0-->swallows foods/liquids without difficulty  -RM  0-->swallows foods/liquids without difficulty  -AC 0-->swallows foods/liquids without difficulty  -AC 0-->swallows foods/liquids without difficulty  -JEWELL    Vital Signs    Pre Systolic BP Rehab 142  -  -CB       Pre Treatment Diastolic BP 64  -RM 60  -CB       Post Systolic BP Rehab 129  -  -CB       Post Treatment Diastolic BP 61  -RM 64  -CB       Pretreatment Heart Rate (beats/min) 72  -RM 79  -CB       Posttreatment Heart Rate (beats/min) 73  -RM 73  -CB       Pre SpO2 (%) 98  -RM 95  -CB       O2 Delivery Pre Treatment supplemental O2  -RM supplemental O2  -CB       Post SpO2 (%) 93  -RM 97  -CB       O2 Delivery Post Treatment supplemental O2  -RM supplemental O2  -CB       Pre Patient Position Supine  -RM Supine  -CB       Intra Patient Position  Standing  -CB       Post Patient Position Supine  -RM Supine  -CB       Pain Assessment    Pain Assessment 0-10  -RM        Pain Score 10  -RM        Pain Type Acute pain  -RM        Pain Location Knee  -RM        Pain Orientation Left  -RM        Vision Assessment/Intervention    Visual Impairment WFL with corrective lenses  -RM WFL with corrective lenses  -CB       Visual Impairment Comment  part time  -CB       Cognitive Assessment/Intervention    Current Cognitive/Communication Assessment functional  -RM functional  -CB       Orientation Status oriented x 4  -RM oriented x 4  -CB       Follows Commands/Answers Questions 100% of the time  -% of the time  -CB       Personal Safety WNL/WFL  -RM WNL/WFL  -CB       Personal Safety  Interventions fall prevention program maintained;gait belt;nonskid shoes/slippers when out of bed;supervised activity  -RM fall prevention program maintained;gait belt;nonskid shoes/slippers when out of bed  -CB       ROM (Range of Motion)    General ROM upper extremity range of motion deficits identified  -RM lower extremity range of motion deficits identified  -CB       General ROM Detail limited B shoulder ROM  -RM L knee -20 extension, 76 flexion  -CB       General UE Assessment    ROM shoulder, left: UE ROM deficit;shoulder, right: UE ROM deficit  -RM        MMT (Manual Muscle Testing)    General MMT Assessment upper extremity strength deficits identified  -RM lower extremity strength deficits identified  -CB       General MMT Assessment Detail B elbow ext grossly 3+/5  -RM        Mobility Assessment/Training    Extremity Weight-Bearing Status  left lower extremity  -CB       Left Lower Extremity Weight-Bearing  weight-bearing as tolerated  -CB       Bed Mobility, Assessment/Treatment    Bed Mobility, Assistive Device  bed rails;head of bed elevated  -CB       Bed Mobility, Roll Left, [place] minimum assist (75% patient effort)  -RM        Bed Mob, Supine to Sit, [place] minimum assist (75% patient effort)  -RM minimum assist (75% patient effort)  -CB       Bed Mob, Sit to Supine, [place] minimum assist (75% patient effort)  -RM minimum assist (75% patient effort);moderate assist (50% patient effort)  -CB       Bed Mobility, Safety Issues decreased use of legs for bridging/pushing  -RM decreased use of legs for bridging/pushing  -CB       Bed Mobility, Impairments pain;strength decreased  -RM pain;strength decreased;ROM decreased  -CB       Transfer Assessment/Treatment    Transfers, Sit-Stand [place] minimum assist (75% patient effort)  -RM minimum assist (75% patient effort)  -CB       Transfers, Stand-Sit [place] minimum assist (75% patient effort)  -RM minimum assist (75% patient  effort)  -CB       Transfers, Sit-Stand-Sit, Assist Device rolling walker  -RM rolling walker  -CB       Toilet Transfer, [place] minimum assist (75% patient effort)  -RM        Toilet Transfer, Assistive Device bedside commode with drop arms;rolling walker  -RM        Transfer, Maintain Weight Bearing Status able to maintain weight bearing status  -RM able to maintain weight bearing status  -CB       Transfer, Impairments pain;strength decreased  -RM pain;strength decreased;ROM decreased  -CB       Functional Mobility    Functional Mobility- Ind. Level minimum assist (75% patient effort)  -RM        Functional Mobility- Device rolling walker  -RM        Functional Mobility-Distance (Feet) 30  -RM        Functional Mobility-Maintain WBing able to maintain weight bearing status  -RM        Therapy Exercises    Bilateral Upper Extremity elbow flexion/extension;pronation/supination;shoulder abduction/adduction;shoulder ER/IR;shoulder extension/flexion  -RM        Sensory Assessment/Intervention    Light Touch LUE;RUE  -RM LLE;RLE  -CB       LUE Light Touch WNL  -RM        RUE Light Touch WNL  -RM        LLE Light Touch  WNL  -CB       General Therapy Interventions    Planned Therapy Interventions activity intolerance;adaptive equipment training;ADL retraining;IADL retraining;balance training;bed mobility training;energy conservation;home exercise program;neuromuscular re-education;joint mobilization;ROM (Range of Motion);strengthening;stretching;transfer training  -RM        Positioning and Restraints    Pre-Treatment Position in bed  -RM in bed  -CB       Post Treatment Position bed  -RM bed  -CB       In Bed call light within reach;encouraged to call for assist;with nsg  -RM          User Key  (r) = Recorded By, (t) = Taken By, (c) = Cosigned By    Initials Name Effective Dates    CB Ivonne Osborn, PT 04/06/17 -     AC Sandrine Hui, RN 10/17/16 -     JEWELL Kenna English RN 10/17/16 -     RM Keiry Escalera, OT  03/22/17 -            Occupational Therapy Education     Title: PT OT SLP Therapies (Active)     Topic: Occupational Therapy (Active)     Point: ADL training (Active)    Description: Instruct learner(s) on proper safety adaptation and remediation techniques during self care or transfers.   Instruct in proper use of assistive devices.    Learning Progress Summary    Learner Readiness Method Response Comment Documented by Status   Patient Acceptance E NR   07/24/17 1423 Active               Point: Precautions (Active)    Description: Instruct learner(s) on prescribed precautions during self-care and functional transfers.    Learning Progress Summary    Learner Readiness Method Response Comment Documented by Status   Patient Acceptance E NR   07/24/17 1423 Active               Point: Body mechanics (Active)    Description: Instruct learner(s) on proper positioning and spine alignment during self-care, functional mobility activities and/or exercises.    Learning Progress Summary    Learner Readiness Method Response Comment Documented by Status   Patient Acceptance E NR   07/24/17 1423 Active                      User Key     Initials Effective Dates Name Provider Type Discipline     03/22/17 -  Keiry Escalera OT Occupational Therapist OT                  OT Recommendation and Plan  Anticipated Discharge Disposition: home with home health  Planned Therapy Interventions: activity intolerance, adaptive equipment training, ADL retraining, IADL retraining, balance training, bed mobility training, energy conservation, home exercise program, neuromuscular re-education, joint mobilization, ROM (Range of Motion), strengthening, stretching, transfer training  Therapy Frequency: other (see comments) (3-14x/wk)  Plan of Care Review  Plan Of Care Reviewed With: patient, spouse, family  Outcome Summary/Follow up Plan: Initial OT evaluation complete.  Pt reports 10/10 pain L knee.  Performed bed mobility, transfers from bed &  toilet withy Min assist.  BUE tricep weakness.  Pt may benefit from OT intervention of ADLs, strengthening, and education.          OT Goals       07/24/17 1332          Bed Mobility OT LTG    Bed Mobility OT LTG, Time to Achieve by discharge  -RM      Bed Mobility OT LTG, Activity Type all bed mobility  -RM      Bed Mobility OT LTG, [place] Level supervision required  -RM      Transfer Training OT LTG    Transfer Training OT LTG, Date Established 07/24/17  -RM      Transfer Training OT LTG, Time to Achieve by discharge  -RM      Transfer Training OT LTG, Activity Type all transfers  -RM      Transfer Training OT LTG, [place] Level supervision required  -RM      Transfer Training OT LTG, Assist Device walker, rolling  -RM      ADL OT LTG    ADL OT LTG, Date Established 07/24/17  -RM      ADL OT LTG, Time to Achieve by discharge  -RM      ADL OT LTG, Activity Type ADL skills   bath, dress, toilet  -RM      ADL OT LTG, [place] Level standby assist  -RM      Functional Mobility OT LTG    Functional Mobility Goal OT LTG, Date Established 07/24/17  -RM      Functional Mobility Goal OT LTG, Time to Achieve by discharge  -RM      Functional Mobility Goal OT LTG, [place] Level standby assist  -RM      Functional Mobility Goal OT LTG, Assist Device rolling walker  -RM      Functional Mobility Goal OT LTG, Distance to Achieve in hallway;to the sink;to the bathroom  -RM        User Key  (r) = Recorded By, (t) = Taken By, (c) = Cosigned By    Initials Name Provider Type     Keiry Escalera, OT Occupational Therapist                Outcome Measures       07/24/17 1332 07/24/17 1249       How much help from another person do you currently need...    Turning from your back to your side while in flat bed without using bedrails?  3  -CB     Moving from lying on back to sitting on the side of a flat bed without bedrails?  3  -CB     Moving to and from a bed to a chair (including a wheelchair)?  3  -CB     Standing  up from a chair using your arms (e.g., wheelchair, bedside chair)?  3  -CB     Climbing 3-5 steps with a railing?  2  -CB     To walk in hospital room?  3  -CB     AM-PAC 6 Clicks Score  17  -CB     How much help from another is currently needed...    Putting on and taking off regular lower body clothing? 2  -RM      Bathing (including washing, rinsing, and drying) 2  -RM      Toileting (which includes using toilet bed pan or urinal) 4  -RM      Putting on and taking off regular upper body clothing 3  -RM      Taking care of personal grooming (such as brushing teeth) 3  -RM      Eating meals 4  -RM      Score 18  -RM      Functional Assessment    Outcome Measure Options AM-PAC 6 Clicks Daily Activity (OT)  -RM AM-PAC 6 Clicks Basic Mobility (PT)  -CB       User Key  (r) = Recorded By, (t) = Taken By, (c) = Cosigned By    Initials Name Provider Type    CB Ivonne Osborn, PT Physical Therapist    RM Keiry Escalera OT Occupational Therapist          Time Calculation:   OT Start Time: 1332  OT Stop Time: 1403  OT Time Calculation (min): 31 min    Therapy Charges for Today     Code Description Service Date Service Provider Modifiers Qty    51588171741  OT SELFCARE CURRENT 7/24/2017 NICKI Goodman, CK 1    [id number]  OT SELFCARE PROJECTED 7/24/2017 NICKI Goodman, CI 1    [id number]  OT EVAL LOW COMPLEXITY 1 7/24/2017 Keiry Escalera OT GO 1    [id number]  OT SELF CARE/MGMT/TRAIN EA 15 MIN 7/24/2017 Keiry Escalera OT GO 1          OT G-codes  OT Professional Judgement Used?: Yes  OT Functional Scales Options: AM-PAC 6 Clicks Daily Activity (OT)  Score: 18  Functional Limitation: Self care  Self Care Current Status (): At least 40 percent but less than 60 percent impaired, limited or restricted  Self Care Goal Status (): At least 1 percent but less than 20 percent impaired, limited or restricted    Keiry Escalera OT  7/24/2017

## 2017-07-24 NOTE — PROGRESS NOTES
"Anticoagulation by Pharmacy - Warfarin Day 1 of 42    Mini Andersen is a 62 y.o.female  [Ht: 72\" (182.9 cm); Wt: 225 lb 5 oz (102 kg)] on Warfarin 5 mg PO  for indication of VTE prophylaxis s/p left TKA.    Goal INR: 1.7-2.5  Today's INR:   Lab Results   Component Value Date    INR 1.0 09/01/2015         Lab Results   Component Value Date    INR 1.0 09/01/2015    INR 1.1 06/03/2014    PROTIME 13.1 09/01/2015    PROTIME 14.0 06/03/2014     Lab Results   Component Value Date    HGB 10.5 (L) 07/24/2017    HGB 12.4 07/12/2017    HGB 12.2 09/01/2015     Lab Results   Component Value Date    HCT 32.5 (L) 07/24/2017    HCT 38.2 07/12/2017    HCT 37.7 09/01/2015     Assessment/Plan:  Will initiate therapy with warfarin 5 mg. Will continue to follow and adjust.    Sean Wells [facility]  07/24/17 1:32 PM     "

## 2017-07-24 NOTE — H&P (VIEW-ONLY)
Mini Andersen is a 62 y.o. female   Primary Care Provider Hawa Aquino MD     Chief Complaint   Patient presents with   • Left Knee - Pain   • Pre-op Exam   Pain scale today 8/10     HISTORY OF PRESENT ILLNESS:  Mini Andersen is here for followup of left knee pain and updated pre-op evaluation for left total knee arthroplasty. Medical and surgical history reviewed today. Home medication list reviewed today. The pain has not improved despite long term treatment with multiple conservative management trials.      Prior Arthritis treatments: [x]  PT    [x]  HEP      [x]  Attempt weight loss  [x]  NSAIDS      [x]  Cane   [x]  Intra-articular steroid inj      [x]  Viscosupplementation    Pain is chronic dull ache that is severe at times and worse with activity.  Difficulty with ADL's.  Patient is not happy with current quality of life and wants to discuss proceeding with surgical intervention.     CONCURRENT MEDICAL HISTORY:    Past Medical History:   Diagnosis Date   • Acute peritonitis    • Allergic rhinitis    • Bee sting    • Borderline glaucoma    • Chronic bronchitis    • Constipation     severe with an immotile colon      • Deep venous thrombosis    • Hypothyroidism    • Intestinal volvulus    • Muscle atrophy     O/E   • Nuclear senile cataract    • Nuclear senile cataract    • Polyneuropathy in diabetes    • Type 2 diabetes mellitus     NO BDR   • Type 2 diabetes mellitus without complications    • Unspecified disease of nail        Allergies   Allergen Reactions   • Bextra [Valdecoxib] Shortness Of Breath   • Cephalosporins Shortness Of Breath   • Detrol La [Tolterodine Tartrate Er] Shortness Of Breath   • Dicyclomine Shortness Of Breath   • Fluoxetine Shortness Of Breath   • Keflex [Cephalexin] Shortness Of Breath   • Toprol Xl [Metoprolol] Anaphylaxis   • Chocolate Flavor    • Tape    • Amoxicillin Rash   • Aspirin Rash   • Ciprofloxin Hcl [Ciprofloxacin] Rash   • Claritin-D 12 Hour  "[Loratadine-Pseudoephedrine Er] Rash   • Codeine Rash   • Macrobid [Nitrofurantoin] Rash   • Morphine And Related Rash   • Paxil [Paroxetine Hcl] Rash   • Prozac [Fluoxetine Hcl] Rash   • Sulfa Antibiotics Rash   • Tramadol Rash   • Vioxx [Rofecoxib] Rash   • Zegerid [Omeprazole] Rash   • Zoloft [Sertraline Hcl] Rash         Current Outpatient Prescriptions:   •  clonazePAM (KlonoPIN) 1 MG tablet, Take 1 mg by mouth 2 (Two) Times a Day As Needed for Seizures., Disp: , Rfl:   •  atorvastatin (LIPITOR) 20 MG tablet, Take 1 tablet by mouth Daily., Disp: 30 tablet, Rfl: 12  •  B-D 3CC LUER-AXEL SYR 25XZ7-6/2 22G X 1-1/2\" 3 ML misc, , Disp: , Rfl: 12  •  clobetasol (TEMOVATE) 0.05 % cream, , Disp: , Rfl:   •  cyanocobalamin 1000 MCG/ML injection, , Disp: , Rfl: 12  •  diphenhydrAMINE (BENADRYL) 25 mg capsule, , Disp: , Rfl: 6  •  levothyroxine (SYNTHROID, LEVOTHROID) 50 MCG tablet, Take 50 mcg by mouth daily., Disp: , Rfl: 1  •  lidocaine (XYLOCAINE) 5 % ointment, , Disp: , Rfl:   •  metFORMIN (GLUCOPHAGE) 500 MG tablet, Take 500 mg by mouth 2 (two) times a day., Disp: , Rfl: 1  •  MONUROL 3 G pack, , Disp: , Rfl: 0  •  nystatin (MYCOSTATIN) 384891 UNIT/GM powder, , Disp: , Rfl: 2  •  ondansetron (ZOFRAN) 4 MG tablet, , Disp: , Rfl: 0  •  ONE TOUCH ULTRA TEST test strip, TEST BID, Disp: , Rfl: 5  •  oxyCODONE-acetaminophen (PERCOCET)  MG per tablet, 3 (Three) Times a Day As Needed., Disp: , Rfl: 0  •  vitamin D (ERGOCALCIFEROL) 24904 UNITS capsule capsule, Take 50,000 Units by mouth as needed., Disp: , Rfl: 1    Past Surgical History:   Procedure Laterality Date   • COLECTOMY PARTIAL / TOTAL  05/22/2014    Subtotal colectomy with ineo-rectostomy.   • INJECTION OF MEDICATION  12/07/2010    DEPO MEDROL   • OTHER SURGICAL HISTORY      Multiple gynecologic procedures       History reviewed. No pertinent family history.    Social History     Social History   • Marital status:      Spouse name: N/A   • Number of " "children: N/A   • Years of education: N/A     Occupational History   • Not on file.     Social History Main Topics   • Smoking status: Former Smoker   • Smokeless tobacco: Not on file   • Alcohol use No   • Drug use: Not on file   • Sexual activity: Not on file     Other Topics Concern   • Not on file     Social History Narrative        Review of Systems    PHYSICAL EXAMINATION:       Ht 72\" (182.9 cm)  Wt 223 lb (101 kg)  BMI 30.24 kg/m2    Physical Exam   Constitutional: She is oriented to person, place, and time. Vital signs are normal. She appears well-developed and well-nourished.   HENT:   Head: Normocephalic.   Cardiovascular: Normal heart sounds.    Pulmonary/Chest: Effort normal and breath sounds normal. No respiratory distress.   Abdominal: Soft. She exhibits no distension.   Musculoskeletal:        Right knee: She exhibits no effusion.        Left knee: She exhibits no effusion.   Neurological: She is alert and oriented to person, place, and time. GCS eye subscore is 4. GCS verbal subscore is 5. GCS motor subscore is 6.   Skin: Skin is warm, dry and intact.   Psychiatric: She has a normal mood and affect. Her speech is normal and behavior is normal. Judgment and thought content normal. Cognition and memory are normal.   Vitals reviewed.      GAIT:     []  Normal  [x]  Antalgic    Assistive device: [x]  None  []  Walker     []  Crutches  []  Cane     []  Wheelchair  []  Stretcher    Right Knee Exam     Tenderness   Right knee tenderness location: diffuse.    Range of Motion   Extension: abnormal   Flexion: abnormal     Muscle Strength     The patient has normal right knee strength.    Tests   Drawer:       Anterior - negative      Varus: negative  Valgus: negative    Other   Erythema: absent  Sensation: normal  Pulse: present  Swelling: none  Other tests: no effusion present    Comments:  Pain and crepitus with flexion and extension.       Left Knee Exam     Tenderness   Left knee tenderness location: " diffuse.    Range of Motion   Extension: abnormal   Flexion: abnormal     Muscle Strength     The patient has normal left knee strength.    Tests   Drawer:       Anterior - negative       Varus: negative  Valgus: negative    Other   Erythema: absent  Sensation: normal  Pulse: present  Swelling: none  Effusion: no effusion present    Comments:  Pain and crepitus with flexion and extension.                     PRIOR XRAYS FOR REVIEW:      Standing AP of both knees with lateral views of the left reveal severe medial compartmental joint degenerative changes with complete medial compartmental collapse and significant patellofemoral compartmental generative changes without any acute bony abnormality noted  01/20/17 at 2:45 PM by EDUARDA Nunez      Previous or Active DVT/PE: YES   SHE IS NOT A CANDIDATE FOR TXA    ASSESSMENT:    Diagnoses and all orders for this visit:    Osteoporosis    Localized osteoarthritis of left knee      PLAN    The patient voiced understanding of the risks, benefits, and alternative forms of treatment that were discussed and the patient consents to proceed with surgery.  All risks, benefits and alternatives were discussed.  Risks including to but not exclusive to anesthetic complications, including death, MI, CVA, infection, bleeding DVT, fracture, residual pain and need for future surgery.    High risk due to DM and prior DVT    Scheduled for left total knee arthroplasty by Dr. Ballesteros on 7/24/17     Continue HEP  Continue strength and conditioning     Patient has seen cardiology for pre-op clearance, Dr. Root.      Return to office for Post-operative evaluation.       EDUARDA Minor

## 2017-07-25 LAB
GLUCOSE BLDC GLUCOMTR-MCNC: 114 MG/DL (ref 70–130)
GLUCOSE BLDC GLUCOMTR-MCNC: 136 MG/DL (ref 70–130)
GLUCOSE BLDC GLUCOMTR-MCNC: 163 MG/DL (ref 70–130)
GLUCOSE BLDC GLUCOMTR-MCNC: 166 MG/DL (ref 70–130)
HBA1C MFR BLD: 5.93 % (ref 4–5.6)
HOLD SPECIMEN: NORMAL
INR PPP: 1.08 (ref 0.8–1.2)
LAB AP CASE REPORT: NORMAL
Lab: NORMAL
PATH REPORT.FINAL DX SPEC: NORMAL
PATH REPORT.GROSS SPEC: NORMAL
PROTHROMBIN TIME: 13.9 SECONDS (ref 11.1–15.3)
WHOLE BLOOD HOLD SPECIMEN: NORMAL

## 2017-07-25 PROCEDURE — 94760 N-INVAS EAR/PLS OXIMETRY 1: CPT

## 2017-07-25 PROCEDURE — 97110 THERAPEUTIC EXERCISES: CPT

## 2017-07-25 PROCEDURE — 85610 PROTHROMBIN TIME: CPT | Performed by: ORTHOPAEDIC SURGERY

## 2017-07-25 PROCEDURE — 97116 GAIT TRAINING THERAPY: CPT

## 2017-07-25 PROCEDURE — 94799 UNLISTED PULMONARY SVC/PX: CPT

## 2017-07-25 PROCEDURE — 82962 GLUCOSE BLOOD TEST: CPT

## 2017-07-25 PROCEDURE — 83036 HEMOGLOBIN GLYCOSYLATED A1C: CPT | Performed by: ORTHOPAEDIC SURGERY

## 2017-07-25 PROCEDURE — 97535 SELF CARE MNGMENT TRAINING: CPT

## 2017-07-25 PROCEDURE — 99024 POSTOP FOLLOW-UP VISIT: CPT | Performed by: ORTHOPAEDIC SURGERY

## 2017-07-25 PROCEDURE — 25010000002 HYDROMORPHONE PER 4 MG: Performed by: ORTHOPAEDIC SURGERY

## 2017-07-25 PROCEDURE — 63710000001 INSULIN ASPART PER 5 UNITS: Performed by: ORTHOPAEDIC SURGERY

## 2017-07-25 RX ADMIN — OXYCODONE HYDROCHLORIDE 5 MG: 5 TABLET ORAL at 23:54

## 2017-07-25 RX ADMIN — SODIUM CHLORIDE 100 ML/HR: 900 INJECTION, SOLUTION INTRAVENOUS at 00:21

## 2017-07-25 RX ADMIN — LEVOTHYROXINE SODIUM 50 MCG: 50 TABLET ORAL at 05:37

## 2017-07-25 RX ADMIN — DOCUSATE SODIUM 100 MG: 100 CAPSULE, LIQUID FILLED ORAL at 08:21

## 2017-07-25 RX ADMIN — ACETAMINOPHEN 1000 MG: 500 TABLET ORAL at 17:02

## 2017-07-25 RX ADMIN — INSULIN ASPART 2 UNITS: 100 INJECTION, SOLUTION INTRAVENOUS; SUBCUTANEOUS at 17:02

## 2017-07-25 RX ADMIN — OXYCODONE HYDROCHLORIDE 10 MG: 10 TABLET, FILM COATED, EXTENDED RELEASE ORAL at 20:10

## 2017-07-25 RX ADMIN — HYDROMORPHONE HYDROCHLORIDE 1 MG: 1 INJECTION, SOLUTION INTRAMUSCULAR; INTRAVENOUS; SUBCUTANEOUS at 03:57

## 2017-07-25 RX ADMIN — INSULIN ASPART 2 UNITS: 100 INJECTION, SOLUTION INTRAVENOUS; SUBCUTANEOUS at 21:00

## 2017-07-25 RX ADMIN — ACETAMINOPHEN 1000 MG: 500 TABLET ORAL at 08:21

## 2017-07-25 RX ADMIN — ACETAMINOPHEN 1000 MG: 500 TABLET ORAL at 21:00

## 2017-07-25 RX ADMIN — ACETAMINOPHEN 1000 MG: 500 TABLET ORAL at 11:04

## 2017-07-25 RX ADMIN — OXYCODONE HYDROCHLORIDE 5 MG: 5 TABLET ORAL at 14:59

## 2017-07-25 RX ADMIN — FERROUS SULFATE TAB EC 324 MG (65 MG FE EQUIVALENT) 324 MG: 324 (65 FE) TABLET DELAYED RESPONSE at 08:21

## 2017-07-25 RX ADMIN — DOCUSATE SODIUM 100 MG: 100 CAPSULE, LIQUID FILLED ORAL at 17:02

## 2017-07-25 RX ADMIN — OXYCODONE HYDROCHLORIDE 5 MG: 5 TABLET ORAL at 10:07

## 2017-07-25 RX ADMIN — OXYCODONE HYDROCHLORIDE 10 MG: 10 TABLET, FILM COATED, EXTENDED RELEASE ORAL at 08:21

## 2017-07-25 RX ADMIN — FAMOTIDINE 40 MG: 40 TABLET ORAL at 08:21

## 2017-07-25 RX ADMIN — WARFARIN SODIUM 5 MG: 5 TABLET ORAL at 17:02

## 2017-07-25 RX ADMIN — ONDANSETRON 4 MG: 4 TABLET, FILM COATED ORAL at 14:59

## 2017-07-25 NOTE — NURSING NOTE
ARU consult received. Patient is not a candidate for ARU due to diagnosis of single joint replacement and not a top 10 diagnosis for rehab. Case management notified of this.

## 2017-07-25 NOTE — THERAPY TREATMENT NOTE
Acute Care - Physical Therapy Treatment Note  [facility]     Patient Name: Mini Andersen  : 1955  MRN: [id number]  Today's Date: 2017  Onset of Illness/Injury or Date of Surgery Date: 17  Date of Referral to PT: 17  Referring Physician: Dr. Ballesteros    Admit Date: 2017    Visit Dx:    ICD-10-CM ICD-9-CM   1. Chronic pain of left knee M25.562 719.46    G89.29 338.29   2. Localized osteoarthritis of left knee M17.9 715.36   3. Type 2 diabetes mellitus without complication, without long-term current use of insulin E11.9 250.00   4. Acquired hypothyroidism E03.9 244.9   5. History of DVT (deep vein thrombosis) Z86.718 V12.51   6. Primary osteoarthritis of left knee M17.12 715.16   7. Impaired physical mobility Z74.09 781.99   8. Impaired mobility and ADLs Z74.09 799.89     Patient Active Problem List   Diagnosis   • Palpitations   • Type 2 diabetes mellitus   • Hypothyroidism   • Primary osteoarthritis of left knee   • Knee pain   • Diabetes mellitus without complication   • Borderline glaucoma   • Nuclear cataract   • Abdominal pain   • Cobalamin deficiency   • Cramp of limb   • Depression   • Gastroesophageal reflux disease   • Callus of foot   • Deformity of foot   • Foot pain   • History of colonoscopy   • Low back pain   • Mononeuritis   • Spasm   • Neuropathy   • Lung field abnormal   • Encounter for screening mammogram for malignant neoplasm of breast   • Cardiac arrhythmia   • Arthralgia of lower leg   • Arthralgia of shoulder   • Pure hypercholesterolemia   • Seasonal allergic rhinitis   • Disorder of hand   • Osteoporosis   • Pre-operative clearance   • Type 2 diabetes mellitus without complication, without long-term current use of insulin   • Angina pectoris    • SOB (shortness of breath)   • Obstructive sleep apnea syndrome   • Chronic pain of left knee               Adult Rehabilitation Note       17 0915          Rehab Assessment/Intervention    Discipline physical  therapy assistant  -AM      Document Type therapy note (daily note)  -AM      Subjective Information agree to therapy;complains of;pain  -AM      Patient Effort, Rehab Treatment good  -AM      Symptoms Noted During/After Treatment fatigue  -AM      Precautions/Limitations other (see comments)   WBAT LLE  -AM      Equipment Issued to Patient gait belt  -AM      Recorded by [AM] Jaden Mtz PTA      Vital Signs    Pre Systolic BP Rehab 123  -AM      Pre Treatment Diastolic BP 60  -AM      Post Systolic BP Rehab 137  -AM      Post Treatment Diastolic BP 65  -AM      Pretreatment Heart Rate (beats/min) 104  -AM      Posttreatment Heart Rate (beats/min) 99  -AM      Pre SpO2 (%) 97  -AM      O2 Delivery Pre Treatment room air  -AM      Post SpO2 (%) 94  -AM      O2 Delivery Post Treatment room air  -AM      Pre Patient Position Sitting  -AM      Intra Patient Position Standing  -AM      Post Patient Position Supine  -AM      Recorded by [AM] Jaden Mtz PTA      Pain Assessment    Pain Assessment 0-10  -AM      Pain Score 10  -AM      Post Pain Score 10  -AM      Pain Type Acute pain;Surgical pain  -AM      Pain Location Knee  -AM      Pain Orientation Left  -AM      Pain Descriptors Sore  -AM      Pain Frequency Constant/continuous  -AM      Date Pain First Started 07/24/17  -AM      Clinical Progression Gradually improving  -AM      Patient's Stated Pain Goal No pain  -AM      Pain Intervention(s) Medication (See MAR);Cold applied;Ambulation/increased activity  -AM      Result of Injury No  -AM      Work-Related Injury No  -AM      Multiple Pain Sites No  -AM      Recorded by [AM] Jaden Mtz PTA      Vision Assessment/Intervention    Visual Impairment --  -AM      Recorded by [AM] Jaden Mtz PTA      Cognitive Assessment/Intervention    Current Cognitive/Communication Assessment functional  -AM      Orientation Status oriented x 4  -AM      Follows Commands/Answers Questions 100% of the time  -AM       Personal Safety Interventions gait belt;nonskid shoes/slippers when out of bed;supervised activity  -AM      Recorded by [AM] Jaden Mtz PTA      ROM (Range of Motion)    General ROM lower extremity range of motion deficits identified  -AM      Recorded by [AM] Jaden Mtz PTA      General LE Assessment    ROM knee, left: LE ROM deficit  -AM      Recorded by [AM] Jaden Mtz PTA      Left Knee    Extension/Flexion AROM within functional limits   16-74  -AM      Recorded by [AM] Jaden Mtz PTA      Mobility Assessment/Training    Extremity Weight-Bearing Status left lower extremity  -AM      Left Lower Extremity Weight-Bearing weight-bearing as tolerated  -AM      Recorded by [AM] Jaden Mtz PTA      Bed Mobility, Assessment/Treatment    Bed Mobility, Assistive Device head of bed elevated  -AM      Bed Mobility, Roll Left, [place] not tested  -AM      Bed Mobility, Roll Right, [place] not tested  -AM      Bed Mobility, Scoot/Bridge, [place] not tested  -AM      Bed Mob, Supine to Sit, [place] minimum assist (75% patient effort)  -AM      Bed Mob, Sit to Supine, [place] not tested  -AM      Bed Mob, Sidelying to Sit, [place] not tested  -AM      Bed Mob, Sit to Sidelying, [place] not tested  -AM      Bed Mobility, Safety Issues decreased use of arms for pushing/pulling;decreased use of legs for bridging/pushing  -AM      Bed Mobility, Impairments ROM decreased;strength decreased;pain  -AM      Recorded by [AM] Jaden Mtz PTA      Transfer Assessment/Treatment    Transfers, Bed-Chair [place] contact guard assist  -AM      Transfers, Chair-Bed [place] contact guard assist  -AM      Transfers, Bed-Chair-Bed, Assist Device rolling walker  -AM      Transfers, Sit-Stand [place] contact guard assist  -AM      Transfers, Stand-Sit [place] contact guard assist  -AM      Transfers, Sit-Stand-Sit, Assist Device rolling walker  -AM       Toilet Transfer, [place] not tested  -AM      Walk-In Shower Transfer, [place] not tested  -AM      Bathtub Transfer, [place] not tested  -AM      Transfer, Maintain Weight Bearing Status able to maintain weight bearing status  -AM      Transfer, Safety Issues step length decreased  -AM      Transfer, Impairments ROM decreased;strength decreased;pain  -AM      Recorded by [AM] Jaden Mtz PTA      Gait Assessment/Treatment    Gait, [place] Level contact guard assist  -AM      Gait, Assistive Device rolling walker  -AM      Gait, Distance (Feet) 70  -AM      Gait, Gait Pattern Analysis 3-point gait  -AM      Gait, Gait Deviations bilateral:;[place] decreased  -AM      Gait, Maintain Weight Bearing Status able to maintain weight bearing status  -AM      Gait, Safety Issues step length decreased  -AM      Gait, Impairments ROM decreased;strength decreased;pain  -AM      Recorded by [AM] Jaden Mtz PTA      Stairs Assessment/Treatment    Stairs, [place] Level not tested  -AM      Recorded by [AM] Jaden Mtz PTA      Therapy Exercises    Bilateral Lower Extremities AROM:;20 reps;supine;sitting;ankle pumps/circles;glut sets;heel slides;quad sets;SLR  -AM      Recorded by [AM] Jaden Mtz PTA      Sensory Assessment/Intervention    Light Touch --  -AM      LLE Light Touch --  -AM      Recorded by [AM] Jaden Mtz PTA      Positioning and Restraints    Pre-Treatment Position sitting in chair/recliner  -AM      Post Treatment Position bed  -AM      In Bed supine;call light within reach;encouraged to call for assist;exit alarm on;with family/caregiver  -AM      Recorded by [AM] Jaden Mtz PTA        User Key  (r) = Recorded By, (t) = Taken By, (c) = Cosigned By    Initials Name Effective Dates    AM Jaden Mtz PTA 10/17/16 -                 IP PT Goals       07/24/17 1249          Bed Mobility PT STG    Bed Mobility PT STG, Date Established 07/24/17  -CB      Bed  Mobility PT STG, Time to Achieve 2 - 3 days  -CB      Bed Mobility PT STG, Activity Type supine to sit/sit to supine  -CB      Bed Mobility PT STG, [place] Level independent  -CB      Transfer Training Goal, Assist Device bed rails  -CB      Bed Mobility PT STG, Additional Goal HOB up or down  -CB      Gait Training PT LTG    Gait Training Goal PT LTG, Date Established 07/24/17  -CB      Gait Training Goal PT LTG, Time to Achieve by discharge  -CB      Gait Training Goal PT LTG, [place] Level contact guard assist  -CB      Gait Training Goal PT LTG, Assist Device walker, rolling  -CB      Gait Training Goal PT LTG, Distance to Achieve 200 feet  -CB      Strength Goal PT LTG    Strength Goal PT LTG, Date Established 07/24/17  -CB      Strength Goal PT LTG, Time to Achieve by discharge  -CB      Strength Goal PT LTG, Measure to Achieve 20 reps all ex BLE independently with no knee lag on L  -CB      Strength Goal PT LTG, Functional Goal get legs up onto bed without using UE  -CB      Range of Motion PT LTG    Range of Motion Goal PT LTG, Date Established 07/24/17  -CB      Range of Motion Goal PT LTG, Time to Achieve by discharge  -CB      Range fo Motion Goal PT LTG, Joint L knee  -CB      Range of Motion Goal PT LTG, AROM Measure 0-90  -CB        User Key  (r) = Recorded By, (t) = Taken By, (c) = Cosigned By    Initials Name Provider Type    CB Ivonne Osborn, PT Physical Therapist          Physical Therapy Education     Title: PT OT SLP Therapies (Active)     Topic: Physical Therapy (Active)     Point: Mobility training (Active)    Learning Progress Summary    Learner Readiness Method Response Comment Documented by Status   Patient Acceptance D NR  CB 07/24/17 1403 Active               Point: Precautions (Active)    Learning Progress Summary    Learner Readiness Method Response Comment Documented by Status   Patient Acceptance D NR  CB 07/24/17 1403 Active                      User Key     Initials  Effective Dates Name Provider Type Discipline     04/06/17 -  Ivonne Osborn, PT Physical Therapist PT                    PT Recommendation and Plan  Anticipated Discharge Disposition: home with home health  Planned Therapy Interventions: bed mobility training, gait training, home exercise program, patient/family education, ROM (Range of Motion), strengthening, transfer training  PT Frequency: per priority policy (5-14)             Outcome Measures       07/25/17 0915 07/24/17 1332 07/24/17 1249    How much help from another person do you currently need...    Turning from your back to your side while in flat bed without using bedrails? 3  -AM  3  -CB    Moving from lying on back to sitting on the side of a flat bed without bedrails? 3  -AM  3  -CB    Moving to and from a bed to a chair (including a wheelchair)? 3  -AM  3  -CB    Standing up from a chair using your arms (e.g., wheelchair, bedside chair)? 3  -AM  3  -CB    Climbing 3-5 steps with a railing? 1  -AM  2  -CB    To walk in hospital room? 3  -AM  3  -CB    AM-PAC 6 Clicks Score 16  -AM  17  -CB    How much help from another is currently needed...    Putting on and taking off regular lower body clothing?  2  -RM     Bathing (including washing, rinsing, and drying)  2  -RM     Toileting (which includes using toilet bed pan or urinal)  4  -RM     Putting on and taking off regular upper body clothing  3  -RM     Taking care of personal grooming (such as brushing teeth)  3  -RM     Eating meals  4  -RM     Score  18  -RM     Other Outcome Measure Tool Used    Other Outcome Measure Tool Comments PADD SCORE   7  -AM      Functional Assessment    Outcome Measure Options AM-PAC 6 Clicks Basic Mobility (PT);Other Outcome Measure   PADD SCORE  -AM AM-PAC 6 Clicks Daily Activity (OT)  -RM AM-PAC 6 Clicks Basic Mobility (PT)  -CB      User Key  (r) = Recorded By, (t) = Taken By, (c) = Cosigned By    Initials Name Provider Type    CB Ivonne Osborn, PT Physical  Therapist    AM Jaden Mtz PTA Physical Therapy Assistant    IMELDA Escalera, OT Occupational Therapist           Time Calculation:         PT Charges       07/25/17 0915          Time Calculation    Start Time 0915  -AM      Stop Time 1010  -AM      Time Calculation (min) 55 min  -AM      PT Received On 07/25/17  -AM      PT - Next Appointment 07/25/17  -AM      Time Calculation- PT    Total Timed Code Minutes- PT 55 minute(s)  -AM        User Key  (r) = Recorded By, (t) = Taken By, (c) = Cosigned By    Initials Name Provider Type    AM Jaden Mtz PTA Physical Therapy Assistant          Therapy Charges for Today     Code Description Service Date Service Provider Modifiers Qty    41680292876 HC GAIT TRAINING EA 15 MIN 7/25/2017 Jaden Mtz PTA GP 2    12379999719 HC PT THER PROC EA 15 MIN 7/25/2017 Jaden Mtz PTA GP 2          PT G-Codes  PT Professional Judgement Used?: Yes  Outcome Measure Options: AM-PAC 6 Clicks Basic Mobility (PT), Other Outcome Measure (PADD SCORE)  Score: 17  Functional Limitation: Mobility: Walking and moving around  Mobility: Walking and Moving Around Current Status (): At least 40 percent but less than 60 percent impaired, limited or restricted  Mobility: Walking and Moving Around Goal Status (): At least 20 percent but less than 40 percent impaired, limited or restricted    Jaden Mtz PTA  7/25/2017

## 2017-07-25 NOTE — PROGRESS NOTES
Orthopedic Total Knee Progress Note      Patient: Mini Andersen  Date of Admission: 7/24/2017  YOB: 1955  Medical Record Number: [id number]    LOS: 1    Status Post: Procedure(s) (LRB):  TOTAL KNEE ARTHROPLASTY ATTUNE with adductor canal block (Left)        Systemic or Specific Complaints: Pain Control  Complications: None  Pain Relief: some relief    Pt seen and examined in AM in NAD. Per nursing no acute events overnight. Pt worked with physical therapy yesterday afternoon and was able to take 4-5 steps with rolling walker. Pt complained of pain during the physical therapy but states her pain medications are helping. Pt had no new complaints.    Physical Exam:  62 y.o. female alert and oriented  Temp:  [96.3 °F (35.7 °C)-99.2 °F (37.3 °C)] 98.1 °F (36.7 °C)  Heart Rate:  [] 92  Resp:  [14-20] 18  BP: (115-159)/(58-80) 130/62  Chest: Clear to auscultation bilaterally, no accessory muscle use  CV: Regular Rate and Rhythm, no murmurs, rubs, gallops  Abd: Soft, NT, nondistended with BS +  Ext: NV intact. ROM appropriate. Calf is soft and nontender.   Skin: bandage dry and intact.    Activity: Mobilizing Per P.T.   Weight Bearing: As Tolerated    Data Review  Admission on 07/24/2017   Component Date Value Ref Range Status   • ABO Type 07/24/2017 O   Final   • RH type 07/24/2017 Positive   Final   • Antibody Screen 07/24/2017 Negative   Final   • Previous History 07/24/2017 Previous Record on File   Final   • Glucose 07/24/2017 103  70 - 130 mg/dL Final    Meter: [id number]: [id number] HAILE LOJA   • Glucose 07/24/2017 91  70 - 130 mg/dL Final    Meter: [id number]: [id number] JIMMY ROGERS   • Hemoglobin 07/24/2017 10.5* 12.0 - 15.5 g/dL Final   • Hematocrit 07/24/2017 32.5* 35.0 - 45.0 % Final   • Glucose 07/24/2017 85  70 - 130 mg/dL Final    Meter: [id number]: [id number] EVANS KAYELA DAY   • Glucose 07/24/2017 141* 70 - 130 mg/dL Final    RN NotifiedMeter:  [id number]: [id number] QUYNH SCHMITZ   • Glucose 07/24/2017 180* 70 - 130 mg/dL Final    Sliding Scale AdminMeter: [id number]: [id number] ROSIBEL GALLOWAY       Xr Knee 1 Or 2 View Left    Result Date: 7/24/2017  Narrative: PROCEDURE: AP and lateral left knee COMPARISON: No comparison. HISTORY: Postop FINDINGS: A left knee prosthesis has been placed. Postsurgical air and fluid are noted in the soft tissues. There are overlying cutaneous staples. A drain has been placed.     Impression: CONCLUSION:  Postoperative left knee. Electronically signed by:  Ramana Barger MD  7/24/2017 10:49 AM CDT Workstation: [id number]      Medications    acetaminophen 1,000 mg Oral 4x Daily   docusate sodium 100 mg Oral BID   famotidine 40 mg Oral Daily   ferrous sulfate 324 mg Oral Daily With Breakfast   insulin aspart 0-9 Units Subcutaneous 4x Daily AC & at Bedtime   levothyroxine 50 mcg Oral Q AM   oxyCODONE 10 mg Oral Q12H   Scopolamine 1 patch Transdermal Once   warfarin 5 mg Oral Daily     acetaminophen  •  bacitracin  •  bisacodyl  •  clonazePAM  •  dextrose  •  dextrose  •  diphenhydrAMINE  •  glucagon (human recombinant)  •  HYDROmorphone **AND** naloxone  •  metFORMIN  •  ondansetron **OR** ondansetron ODT **OR** ondansetron  •  oxyCODONE  •  Pharmacy to dose warfarin  •  sodium chloride  •  sodium chloride    Assessment:  Doing well following total knee replacement  Patient Active Problem List   Diagnosis   • Palpitations   • Type 2 diabetes mellitus   • Hypothyroidism   • Primary osteoarthritis of left knee   • Knee pain   • Diabetes mellitus without complication   • Borderline glaucoma   • Nuclear cataract   • Abdominal pain   • Cobalamin deficiency   • Cramp of limb   • Depression   • Gastroesophageal reflux disease   • Callus of foot   • Deformity of foot   • Foot pain   • History of colonoscopy   • Low back pain   • Mononeuritis   • Spasm   • Neuropathy   • Lung field abnormal   • Encounter for screening  mammogram for malignant neoplasm of breast   • Cardiac arrhythmia   • Arthralgia of lower leg   • Arthralgia of shoulder   • Pure hypercholesterolemia   • Seasonal allergic rhinitis   • Disorder of hand   • Osteoporosis   • Pre-operative clearance   • Type 2 diabetes mellitus without complication, without long-term current use of insulin   • Angina pectoris    • SOB (shortness of breath)   • Obstructive sleep apnea syndrome   • Chronic pain of left knee         Plan:   Continue efforts to mobilize  Continue Pain Control Measures  Continue incisional Care  DVT prophylaxis    Discharge Plan:Home today or tomorrow    Zachary Mondragon MD PGY2  [place] [address]  Office: [phone]      This document has been electronically signed by Zachary Mondragon MD on July 25, 2017 5:54 AM       As above  No new complaints.  Mobilize with PT  Anticipate d/c planning for d/c 1-2 days

## 2017-07-25 NOTE — THERAPY TREATMENT NOTE
Acute Care - Occupational Therapy Treatment Note  [facility]     Patient Name: Mini Andersen  : 1955  MRN: [id number]  Today's Date: 2017  Onset of Illness/Injury or Date of Surgery Date: 17  Date of Referral to OT: 17  Referring Physician: Dr. Ballesteros      Admit Date: 2017    Visit Dx:     ICD-10-CM ICD-9-CM   1. Chronic pain of left knee M25.562 719.46    G89.29 338.29   2. Localized osteoarthritis of left knee M17.9 715.36   3. Type 2 diabetes mellitus without complication, without long-term current use of insulin E11.9 250.00   4. Acquired hypothyroidism E03.9 244.9   5. History of DVT (deep vein thrombosis) Z86.718 V12.51   6. Primary osteoarthritis of left knee M17.12 715.16   7. Impaired physical mobility Z74.09 781.99   8. Impaired mobility and ADLs Z74.09 799.89     Patient Active Problem List   Diagnosis   • Palpitations   • Type 2 diabetes mellitus   • Hypothyroidism   • Primary osteoarthritis of left knee   • Knee pain   • Diabetes mellitus without complication   • Borderline glaucoma   • Nuclear cataract   • Abdominal pain   • Cobalamin deficiency   • Cramp of limb   • Depression   • Gastroesophageal reflux disease   • Callus of foot   • Deformity of foot   • Foot pain   • History of colonoscopy   • Low back pain   • Mononeuritis   • Spasm   • Neuropathy   • Lung field abnormal   • Encounter for screening mammogram for malignant neoplasm of breast   • Cardiac arrhythmia   • Arthralgia of lower leg   • Arthralgia of shoulder   • Pure hypercholesterolemia   • Seasonal allergic rhinitis   • Disorder of hand   • Osteoporosis   • Pre-operative clearance   • Type 2 diabetes mellitus without complication, without long-term current use of insulin   • Angina pectoris    • SOB (shortness of breath)   • Obstructive sleep apnea syndrome   • Chronic pain of left knee             Adult Rehabilitation Note       17 1310 17 1307 17 0915    Rehab  Assessment/Intervention    Discipline physical therapy assistant  -AM occupational therapy assistant  -CS physical therapy assistant  -AM    Document Type therapy note (daily note)  -AM therapy note (daily note)  -CS therapy note (daily note)  -AM    Subjective Information agree to therapy;complains of;pain  -AM agree to therapy  -CS agree to therapy;complains of;pain  -AM    Patient Effort, Rehab Treatment good  -AM  good  -AM    Symptoms Noted During/After Treatment increased pain  -AM  fatigue  -AM    Precautions/Limitations other (see comments)   WBAT LLE  -AM  other (see comments)   WBAT LLE  -AM    Equipment Issued to Patient gait belt  -AM  gait belt  -AM    Recorded by [AM] Jaden Mtz PTA [CS] DRAKE Segal/TONE [AM] Jadne Mtz PTA    Vital Signs    Pre Systolic BP Rehab 111  -AM  123  -AM    Pre Treatment Diastolic BP 52  -AM  60  -AM    Post Systolic BP Rehab 132  -AM  137  -AM    Post Treatment Diastolic BP 65  -AM  65  -AM    Pretreatment Heart Rate (beats/min) 114  -  -  -AM    Posttreatment Heart Rate (beats/min) 133  -  -CS 99  -AM    Pre SpO2 (%) 93  -AM 93  -CS 97  -AM    O2 Delivery Pre Treatment room air  -AM room air  -CS room air  -AM    Post SpO2 (%) 94  -AM 98  -CS 94  -AM    O2 Delivery Post Treatment room air  -AM room air  -CS room air  -AM    Pre Patient Position Supine  -AM Supine  -CS Sitting  -AM    Intra Patient Position Standing  -AM  Standing  -AM    Post Patient Position Sitting  -AM  Supine  -AM    Recorded by [AM] Jaden Mtz PTA [CS] DRAKE Segal/TONE [AM] Jaden Mtz PTA    Pain Assessment    Pain Assessment 0-10  -AM 0-10  -CS 0-10  -AM    Pain Score 8  -AM 8  -CS 10  -AM    Post Pain Score 9  -AM 8  -CS 10  -AM    Pain Type Acute pain;Surgical pain  -AM Surgical pain  -CS Acute pain;Surgical pain  -AM    Pain Location Knee  -AM Knee  -CS Knee  -AM    Pain Orientation Left  -AM Left  -CS Left  -AM    Pain Descriptors Sore   -AM  Sore  -AM    Pain Frequency Constant/continuous  -AM  Constant/continuous  -AM    Date Pain First Started 07/24/17  -AM  07/24/17  -AM    Clinical Progression Gradually improving  -AM  Gradually improving  -AM    Patient's Stated Pain Goal No pain  -AM  No pain  -AM    Pain Intervention(s) Medication (See MAR);Cold applied;Ambulation/increased activity  -AM Medication (See MAR)  -CS Medication (See MAR);Cold applied;Ambulation/increased activity  -AM    Result of Injury No  -AM  No  -AM    Work-Related Injury No  -AM  No  -AM    Multiple Pain Sites No  -AM  No  -AM    Recorded by [AM] Jaden Mtz PTA [CS] DRAKE Segal/TONE [AM] Jaden Mtz PTA    Vision Assessment/Intervention    Visual Impairment   --  -AM    Recorded by   [AM] Jaden Mtz PTA    Cognitive Assessment/Intervention    Current Cognitive/Communication Assessment functional  -AM functional  -CS functional  -AM    Orientation Status oriented x 4  -AM oriented x 4  -CS oriented x 4  -AM    Follows Commands/Answers Questions 100% of the time  -% of the time  -% of the time  -AM    Personal Safety Interventions gait belt;nonskid shoes/slippers when out of bed;supervised activity  -AM  gait belt;nonskid shoes/slippers when out of bed;supervised activity  -AM    Recorded by [AM] Jaden Mtz PTA [CS] DRAKE Segal/TONE [AM] Jaden Mtz PTA    ROM (Range of Motion)    General ROM lower extremity range of motion deficits identified  -AM  lower extremity range of motion deficits identified  -AM    Recorded by [AM] Jaden Mtz PTA  [AM] Jaden Mtz PTA    General LE Assessment    ROM knee, left: LE ROM deficit  -AM  knee, left: LE ROM deficit  -AM    Recorded by [AM] Jaden Mtz PTA  [AM] Jaden Mtz PTA    Left Knee    Extension/Flexion AROM within functional limits   10-64  -AM  within functional limits   16-74  -AM    Recorded by [AM] Jaden Mtz PTA  [AM] Jaden Mtz PTA     Mobility Assessment/Training    Extremity Weight-Bearing Status left lower extremity  -AM  left lower extremity  -AM    Left Lower Extremity Weight-Bearing weight-bearing as tolerated  -AM  weight-bearing as tolerated  -AM    Recorded by [AM] Jaden Mtz PTA  [AM] Jaden Mtz PTA    Bed Mobility, Assessment/Treatment    Bed Mobility, Assistive Device head of bed elevated  -AM  head of bed elevated  -AM    Bed Mobility, Roll Left, [place] not tested  -AM  not tested  -AM    Bed Mobility, Roll Right, [place] not tested  -AM  not tested  -AM    Bed Mobility, Scoot/Bridge, [place] not tested  -AM  not tested  -AM    Bed Mob, Supine to Sit, [place] minimum assist (75% patient effort)  -AM  minimum assist (75% patient effort)  -AM    Bed Mob, Sit to Supine, [place] not tested  -AM  not tested  -AM    Bed Mob, Sidelying to Sit, [place] not tested  -AM  not tested  -AM    Bed Mob, Sit to Sidelying, [place] not tested  -AM  not tested  -AM    Bed Mobility, Safety Issues decreased use of arms for pushing/pulling;decreased use of legs for bridging/pushing  -AM  decreased use of arms for pushing/pulling;decreased use of legs for bridging/pushing  -AM    Bed Mobility, Impairments ROM decreased;strength decreased;pain  -AM  ROM decreased;strength decreased;pain  -AM    Recorded by [AM] Jaden tMz PTA  [AM] Jaden Mtz PTA    Transfer Assessment/Treatment    Transfers, Bed-Chair [place] contact guard assist  -AM  contact guard assist  -AM    Transfers, Chair-Bed [place] contact guard assist  -AM  contact guard assist  -AM    Transfers, Bed-Chair-Bed, Assist Device rolling walker  -AM  rolling walker  -AM    Transfers, Sit-Stand [place] contact guard assist  -AM  contact guard assist  -AM    Transfers, Stand-Sit [place] contact guard assist  -AM  contact guard assist  -AM    Transfers, Sit-Stand-Sit, Assist Device rolling walker  -AM  rolling walker  -AM     Toilet Transfer, [place] contact guard assist  -AM  not tested  -AM    Toilet Transfer, Assistive Device bedside commode without drop arms;rolling walker  -AM      Walk-In Shower Transfer, [place] not tested  -AM  not tested  -AM    Bathtub Transfer, [place] not tested  -AM  not tested  -AM    Transfer, Maintain Weight Bearing Status able to maintain weight bearing status  -AM  able to maintain weight bearing status  -AM    Transfer, Safety Issues step length decreased  -AM  step length decreased  -AM    Transfer, Impairments ROM decreased;strength decreased;pain  -AM  ROM decreased;strength decreased;pain  -AM    Recorded by [AM] Jaden Mtz PTA  [AM] Jaden Mtz PTA    Gait Assessment/Treatment    Gait, [place] Level contact guard assist  -AM  contact guard assist  -AM    Gait, Assistive Device rolling walker  -AM  rolling walker  -AM    Gait, Distance (Feet) 140   125+15  -AM  70  -AM    Gait, Gait Pattern Analysis 3-point gait  -AM  3-point gait  -AM    Gait, Gait Deviations bilateral:;[place] decreased  -AM  bilateral:;[place] decreased  -AM    Gait, Maintain Weight Bearing Status able to maintain weight bearing status  -AM  able to maintain weight bearing status  -AM    Gait, Safety Issues step length decreased  -AM  step length decreased  -AM    Gait, Impairments ROM decreased;strength decreased;pain  -AM  ROM decreased;strength decreased;pain  -AM    Recorded by [AM] Jaden Mtz PTA  [AM] Jaden Mtz PTA    Stairs Assessment/Treatment    Stairs, [place] Level not tested  -AM  not tested  -AM    Recorded by [AM] Jaden Mtz PTA  [AM] Jaden Mtz PTA    Upper Body Bathing Assessment/Training    UB Bathing Assess/Train Assistive Device  bath mitt  -CS     UB Bathing Assess/Train, Position  long sitting  -CS     UB Bathing Assess/Train, [place] Level  set up required  -CS     Recorded by  [CS] DRAKE Segal/L     Lower Body Bathing  Assessment/Training    LB Bathing Assess/Train Assistive Device  bath mitt  -CS     LB Bathing Assess/Train, Position  long sitting  -CS     LB Bathing Assess/Train, [place] Level  minimum assist (75% patient effort)  -CS     Recorded by  [CS] CANDIS Segal     Lower Body Dressing Assessment/Training    LB Dressing Assess/Train, Clothing Type  doffing:;donning:;slipper socks  -CS     LB Dressing Assess/Train, Position  long sitting  -CS     LB Dressing Assess/Train, [place]  moderate assist (50% patient effort)  -CS     Recorded by  [CS] CANDIS Segal     Grooming Assessment/Training    Grooming Assess/Train, Position  long sitting  -CS     Grooming Assess/Train, [place] Level  set up required  -CS     Grooming Assess/Train, Comment  shampoo cap, oral care, wash face, comb hair, apply deodorant/lotion  -CS     Recorded by  [CS] CANDIS Segal     Therapy Exercises    Bilateral Lower Extremities AROM:;20 reps;supine;sitting;ankle pumps/circles;glut sets;heel slides;quad sets;SLR  -AM  AROM:;20 reps;supine;sitting;ankle pumps/circles;glut sets;heel slides;quad sets;SLR  -AM    Bilateral Upper Extremity  AROM:;20 reps;sitting;elbow flexion/extension;hand pumps;pronation/supination;shoulder extension/flexion;shoulder ER/IR  -CS     BUE Resistance  manual resistance- minimal  -CS     Recorded by [AM] Jaden Mtz PTA [CS] CANDIS Segal [AM] Jaden Mtz PTA    Sensory Assessment/Intervention    Light Touch   --  -AM    LLE Light Touch   --  -AM    Recorded by   [AM] Jaden Mtz PTA    Positioning and Restraints    Pre-Treatment Position in bed  -AM in bed  -CS sitting in chair/recliner  -AM    Post Treatment Position chair  -AM bed  -CS bed  -AM    In Bed  supine;call light within reach;with family/caregiver  -CS supine;call light within reach;encouraged to call for assist;exit alarm on;with family/caregiver  -AM    In Chair reclined;call light within  reach;encouraged to call for assist;with family/caregiver;legs elevated  -AM      Recorded by [AM] Jaden Mtz PTA [CS] DRAKE Segal/TONE [AM] Jaden Mtz PTA      User Key  (r) = Recorded By, (t) = Taken By, (c) = Cosigned By    Initials Name Effective Dates    AM Jaden Mtz PTA 10/17/16 -     CS DRAKE Segal/TONE 10/17/16 -                 OT Goals       07/25/17 1536 07/24/17 1332       Bed Mobility OT LTG    Bed Mobility OT LTG, Time to Achieve  by discharge  -RM     Bed Mobility OT LTG, Activity Type  all bed mobility  -RM     Bed Mobility OT LTG, [place] Level  supervision required  -RM     Bed Mobility OT LTG, Date Goal Reviewed 07/25/17  -CS      Bed Mobility OT LTG, Outcome goal ongoing  -CS      Transfer Training OT LTG    Transfer Training OT LTG, Date Established  07/24/17  -RM     Transfer Training OT LTG, Time to Achieve  by discharge  -RM     Transfer Training OT LTG, Activity Type  all transfers  -RM     Transfer Training OT LTG, [place] Level  supervision required  -RM     Transfer Training OT LTG, Assist Device  walker, rolling  -RM     Transfer Training OT LTG, Date Goal Reviewed 07/25/17  -CS      Transfer Training OT LTG, Outcome goal ongoing  -CS      ADL OT LTG    ADL OT LTG, Date Established  07/24/17  -RM     ADL OT LTG, Time to Achieve  by discharge  -RM     ADL OT LTG, Activity Type  ADL skills   bath, dress, toilet  -RM     ADL OT LTG, [place] Level  standby assist  -RM     ADL OT LTG, Date Goal Reviewed 07/25/17  -CS      ADL OT LTG, Outcome goal ongoing  -CS      Functional Mobility OT LTG    Functional Mobility Goal OT LTG, Date Established  07/24/17  -RM     Functional Mobility Goal OT LTG, Time to Achieve  by discharge  -RM     Functional Mobility Goal OT LTG, [place] Level  standby assist  -RM     Functional Mobility Goal OT LTG, Assist Device  rolling walker  -RM     Functional Mobility Goal OT LTG, Distance to Achieve  in  hallway;to the sink;to the bathroom  -RM     Functional Mobility Goal OT LTG, Date Goal Reviewed 07/25/17  -CS      Functional Mobility Goal OT LTG, Outcome goal ongoing  -        User Key  (r) = Recorded By, (t) = Taken By, (c) = Cosigned By    Initials Name Provider Type    CANDIS Hobbs Occupational Therapy Assistant    RM Keiry Escalera, OT Occupational Therapist          Occupational Therapy Education     Title: PT OT SLP Therapies (Active)     Topic: Occupational Therapy (Active)     Point: ADL training (Active)    Description: Instruct learner(s) on proper safety adaptation and remediation techniques during self care or transfers.   Instruct in proper use of assistive devices.    Learning Progress Summary    Learner Readiness Method Response Comment Documented by Status   Patient Acceptance E,TB,D NR   07/25/17 1534 Active    Acceptance E NR   07/24/17 1423 Active               Point: Home exercise program (Active)    Description: Instruct learner(s) on appropriate technique for monitoring, assisting and/or progressing therapeutic exercises/activities.    Learning Progress Summary    Learner Readiness Method Response Comment Documented by Status   Patient Acceptance E,TB,D NR   07/25/17 1534 Active               Point: Precautions (Active)    Description: Instruct learner(s) on prescribed precautions during self-care and functional transfers.    Learning Progress Summary    Learner Readiness Method Response Comment Documented by Status   Patient Acceptance E,TB,D NR   07/25/17 1534 Active    Acceptance E NR   07/24/17 1423 Active               Point: Body mechanics (Active)    Description: Instruct learner(s) on proper positioning and spine alignment during self-care, functional mobility activities and/or exercises.    Learning Progress Summary    Learner Readiness Method Response Comment Documented by Status   Patient Acceptance E,TB,D NR   07/25/17 1534 Active    Acceptance E NR    07/24/17 1423 Active                      User Key     Initials Effective Dates Name Provider Type Discipline     10/17/16 -  CANDIS Segal Occupational Therapy Assistant OT    RM 03/22/17 -  Keiry Escalera OT Occupational Therapist OT                  OT Recommendation and Plan  Anticipated Discharge Disposition: home with home health  Planned Therapy Interventions: activity intolerance, adaptive equipment training, ADL retraining, IADL retraining, balance training, bed mobility training, energy conservation, home exercise program, neuromuscular re-education, joint mobilization, ROM (Range of Motion), strengthening, stretching, transfer training  Therapy Frequency: other (see comments) (3-14x/wk)  Plan of Care Review  Plan Of Care Reviewed With: patient  Progress: progress toward functional goals as expected  Outcome Summary/Follow up Plan: Pt tolerated tx fair this date. Pt completed an ADL. No goals met this tx. Continue POC.        Outcome Measures       07/25/17 1500 07/25/17 1310 07/25/17 0915    How much help from another person do you currently need...    Turning from your back to your side while in flat bed without using bedrails?  3  -AM 3  -AM    Moving from lying on back to sitting on the side of a flat bed without bedrails?  3  -AM 3  -AM    Moving to and from a bed to a chair (including a wheelchair)?  3  -AM 3  -AM    Standing up from a chair using your arms (e.g., wheelchair, bedside chair)?  3  -AM 3  -AM    Climbing 3-5 steps with a railing?  1  -AM 1  -AM    To walk in hospital room?  3  -AM 3  -AM    AM-PAC 6 Clicks Score  16  -AM 16  -AM    How much help from another is currently needed...    Putting on and taking off regular lower body clothing? 2  -CS      Bathing (including washing, rinsing, and drying) 2  -CS      Toileting (which includes using toilet bed pan or urinal) 4  -CS      Putting on and taking off regular upper body clothing 3  -CS      Taking care of personal  grooming (such as brushing teeth) 3  -CS      Eating meals 4  -CS      Score 18  -CS      Other Outcome Measure Tool Used    Other Outcome Measure Tool Comments   PADD SCORE   8  -AM    Functional Assessment    Outcome Measure Options AM-PAC 6 Clicks Daily Activity (OT)  -CS AM-PAC 6 Clicks Basic Mobility (PT)  -AM AM-PAC 6 Clicks Basic Mobility (PT);Other Outcome Measure   PADD SCORE  -AM      07/24/17 1332 07/24/17 1249       How much help from another person do you currently need...    Turning from your back to your side while in flat bed without using bedrails?  3  -CB     Moving from lying on back to sitting on the side of a flat bed without bedrails?  3  -CB     Moving to and from a bed to a chair (including a wheelchair)?  3  -CB     Standing up from a chair using your arms (e.g., wheelchair, bedside chair)?  3  -CB     Climbing 3-5 steps with a railing?  2  -CB     To walk in hospital room?  3  -CB     AM-PAC 6 Clicks Score  17  -CB     How much help from another is currently needed...    Putting on and taking off regular lower body clothing? 2  -RM      Bathing (including washing, rinsing, and drying) 2  -RM      Toileting (which includes using toilet bed pan or urinal) 4  -RM      Putting on and taking off regular upper body clothing 3  -RM      Taking care of personal grooming (such as brushing teeth) 3  -RM      Eating meals 4  -RM      Score 18  -RM      Functional Assessment    Outcome Measure Options AM-PAC 6 Clicks Daily Activity (OT)  -RM AM-PAC 6 Clicks Basic Mobility (PT)  -CB       User Key  (r) = Recorded By, (t) = Taken By, (c) = Cosigned By    Initials Name Provider Type    CB Ivonne Osborn, PT Physical Therapist    EARL Mtz, TIARA Physical Therapy Assistant    CANDIS Hobbs Occupational Therapy Assistant    RM Keiry Escalera, OT Occupational Therapist           Time Calculation:         Time Calculation- OT       07/25/17 1546          Time Calculation- OT    OT Start  Time 1307  -CS      OT Stop Time 1406  -CS      OT Time Calculation (min) 59 min  -CS      Total Timed Code Minutes- OT 59 minute(s)  -CS      OT Received On 07/25/17  -CS        User Key  (r) = Recorded By, (t) = Taken By, (c) = Cosigned By    Initials Name Provider Type    CS DRAKE Segal/TONE Occupational Therapy Assistant           Therapy Charges for Today     Code Description Service Date Service Provider Modifiers Qty    52829867008 HC OT SELF CARE/MGMT/TRAIN EA 15 MIN 7/25/2017 DRAKE Segal/L GO 3    15085966589 HC OT THER PROC EA 15 MIN 7/25/2017 DRAKE Segal/L GO 1          OT G-codes  OT Professional Judgement Used?: Yes  OT Functional Scales Options: AM-PAC 6 Clicks Daily Activity (OT)  Score: 18  Functional Limitation: Self care  Self Care Current Status (): At least 40 percent but less than 60 percent impaired, limited or restricted  Self Care Goal Status (): At least 1 percent but less than 20 percent impaired, limited or restricted    DRAKE Segal/TONE  7/25/2017

## 2017-07-25 NOTE — PLAN OF CARE
Problem: Patient Care Overview (Adult)  Goal: Adult Individualization and Mutuality  Outcome: Ongoing (interventions implemented as appropriate)  Goal: Discharge Needs Assessment  Outcome: Ongoing (interventions implemented as appropriate)    Problem: Knee Replacement, Total (Adult)  Goal: Signs and Symptoms of Listed Potential Problems Will be Absent or Manageable (Knee Replacement, Total)  Outcome: Ongoing (interventions implemented as appropriate)

## 2017-07-25 NOTE — PLAN OF CARE
Problem: Patient Care Overview (Adult)  Goal: Plan of Care Review  Outcome: Ongoing (interventions implemented as appropriate)    07/25/17 1310   Coping/Psychosocial Response Interventions   Plan Of Care Reviewed With patient   Patient Care Overview   Progress progress toward functional goals as expected   Outcome Evaluation   Outcome Summary/Follow up Plan Pt did not meet any PT goals this date. Pt able to get in/out bed Min A. Pt able to amb 70+125+15 ft w/RW CGA. ROM 10-64. PADD Score 8. Pt would benefit from  PT once discharged from this facility.        Goal: Discharge Needs Assessment  Outcome: Ongoing (interventions implemented as appropriate)    07/25/17 1310   Discharge Needs Assessment   Concerns To Be Addressed discharge planning concerns   Readmission Within The Last 30 Days no previous admission in last 30 days   Equipment Needed After Discharge none   Discharge Facility/Level Of Care Needs home with home health   Current Discharge Risk physical impairment   Discharge Disposition still a patient   Current Health   Anticipated Changes Related to Illness inability to care for self   Self-Care   Equipment Currently Used at Home cane, straight;walker, rolling   Living Environment   Transportation Available family or friend will provide         Problem: Inpatient Physical Therapy  Goal: Bed Mobility Goal STG- PT  Outcome: Ongoing (interventions implemented as appropriate)    07/24/17 1249 07/25/17 1310   Bed Mobility PT STG   Bed Mobility PT STG, Date Established 07/24/17 --    Bed Mobility PT STG, Time to Achieve 2 - 3 days --    Bed Mobility PT STG, Activity Type supine to sit/sit to supine --    Bed Mobility PT STG, [place] Level independent --    Transfer Training Goal, Assist Device bed rails --    Bed Mobility PT STG, Additional Goal HOB up or down --    Bed Mobility PT STG, Date Goal Reviewed --  07/25/17   Bed Mobility PT STG, Outcome --  goal not met       Goal: Gait Training Goal LTG-  PT  Outcome: Ongoing (interventions implemented as appropriate)    07/24/17 1249 07/25/17 1310   Gait Training PT LTG   Gait Training Goal PT LTG, Date Established 07/24/17 --    Gait Training Goal PT LTG, Time to Achieve by discharge --    Gait Training Goal PT LTG, [place] Level contact guard assist --    Gait Training Goal PT LTG, Assist Device walker, rolling --    Gait Training Goal PT LTG, Distance to Achieve 200 feet --    Gait Training Goal PT LTG, Date Goal Reviewed --  07/25/17   Gait Training Goal PT LTG, Outcome --  goal not met       Goal: Strength Goal LTG- PT  Outcome: Ongoing (interventions implemented as appropriate)    07/24/17 1249 07/25/17 1310   Strength Goal PT LTG   Strength Goal PT LTG, Date Established 07/24/17 --    Strength Goal PT LTG, Time to Achieve by discharge --    Strength Goal PT LTG, Measure to Achieve 20 reps all ex BLE independently with no knee lag on L --    Strength Goal PT LTG, Functional Goal get legs up onto bed without using UE --    Strength Goal PT LTG, Date Goal Reviewed --  07/25/17   Strength Goal PT LTG, Outcome --  goal not met       Goal: Range of Motion Goal LTG- PT  Outcome: Outcome(s) achieved Date Met:  07/25/17 07/24/17 1249 07/25/17 1310   Range of Motion PT LTG   Range of Motion Goal PT LTG, Date Established 07/24/17 --    Range of Motion Goal PT LTG, Time to Achieve by discharge --    Range fo Motion Goal PT LTG, Joint L knee --    Range of Motion Goal PT LTG, AROM Measure 0-90 --    Range of Motion Goal PT LTG, Date Goal Reviewed --  07/25/17   Range of Motion Goal PT LTG, Outcome --  goal not met

## 2017-07-25 NOTE — PLAN OF CARE
Problem: Fall Risk (Adult)  Goal: Identify Related Risk Factors and Signs and Symptoms  Outcome: Outcome(s) achieved Date Met:  07/25/17

## 2017-07-25 NOTE — PLAN OF CARE
Problem: Patient Care Overview (Adult)  Goal: Plan of Care Review  Outcome: Ongoing (interventions implemented as appropriate)    07/25/17 1536   Coping/Psychosocial Response Interventions   Plan Of Care Reviewed With patient   Patient Care Overview   Progress progress toward functional goals as expected   Outcome Evaluation   Outcome Summary/Follow up Plan Pt tolerated tx fair this date. Pt completed an ADL. No goals met this tx. Continue POC.         Problem: Inpatient Occupational Therapy  Goal: Bed Mobility Goal LTG- OT  Outcome: Ongoing (interventions implemented as appropriate)    07/24/17 1332 07/25/17 1536   Bed Mobility OT LTG   Bed Mobility OT LTG, Time to Achieve by discharge --    Bed Mobility OT LTG, Activity Type all bed mobility --    Bed Mobility OT LTG, [place] Level supervision required --    Bed Mobility OT LTG, Date Goal Reviewed --  07/25/17   Bed Mobility OT LTG, Outcome --  goal ongoing       Goal: Transfer Training Goal 1 LTG- OT  Outcome: Ongoing (interventions implemented as appropriate)    07/24/17 1332 07/25/17 1536   Transfer Training OT LTG   Transfer Training OT LTG, Date Established 07/24/17 --    Transfer Training OT LTG, Time to Achieve by discharge --    Transfer Training OT LTG, Activity Type all transfers --    Transfer Training OT LTG, [place] Level supervision required --    Transfer Training OT LTG, Assist Device walker, rolling --    Transfer Training OT LTG, Date Goal Reviewed --  07/25/17   Transfer Training OT LTG, Outcome --  goal ongoing       Goal: ADL Goal LTG- OT  Outcome: Ongoing (interventions implemented as appropriate)    07/24/17 1332 07/25/17 1536   ADL OT LTG   ADL OT LTG, Date Established 07/24/17 --    ADL OT LTG, Time to Achieve by discharge --    ADL OT LTG, Activity Type ADL skills  (bath, dress, toilet) --    ADL OT LTG, [place] Level standby assist --    ADL OT LTG, Date Goal Reviewed --  07/25/17   ADL OT LTG, Outcome --  goal ongoing        Goal: Functional Mobility Goal LTG- OT  Outcome: Ongoing (interventions implemented as appropriate)    07/25/17 1536   Functional Mobility OT LTG   Functional Mobility Goal OT LTG, Date Goal Reviewed 07/25/17   Functional Mobility Goal OT LTG, Outcome goal ongoing

## 2017-07-25 NOTE — PROGRESS NOTES
"Anticoagulation by Pharmacy - Warfarin    Mini Andersen is a 62 y.o.female  [Ht: 72\" (182.9 cm); Wt: 225 lb 5 oz (102 kg)] on Warfarin 5 mg PO  for indication of VTE prophylaxis s/p left TKA.    Goal INR: 1.7-2.5  Today's INR:   Lab Results   Component Value Date    INR 1.08 07/25/2017         Lab Results   Component Value Date    INR 1.08 07/25/2017    INR 1.0 09/01/2015    INR 1.1 06/03/2014    PROTIME 13.9 07/25/2017    PROTIME 13.1 09/01/2015    PROTIME 14.0 06/03/2014     Lab Results   Component Value Date    HGB 10.5 (L) 07/24/2017    HGB 12.4 07/12/2017    HGB 12.2 09/01/2015     Lab Results   Component Value Date    HCT 32.5 (L) 07/24/2017    HCT 38.2 07/12/2017    HCT 37.7 09/01/2015     Assessment/Plan:  INR up. Will continue to follow and adjust as needed.    Sean Wells, [facility]  07/25/17 2:44 PM     "

## 2017-07-25 NOTE — PLAN OF CARE
Problem: Patient Care Overview (Adult)  Goal: Plan of Care Review  Outcome: Ongoing (interventions implemented as appropriate)    07/24/17 1704 07/25/17 0138   Coping/Psychosocial Response Interventions   Plan Of Care Reviewed With --  patient   Patient Care Overview   Progress --  no change   Outcome Evaluation   Outcome Summary/Follow up Plan pt vss. Working on pain medication --        Goal: Adult Individualization and Mutuality  Outcome: Ongoing (interventions implemented as appropriate)  Goal: Discharge Needs Assessment  Outcome: Ongoing (interventions implemented as appropriate)    Problem: Fall Risk (Adult)  Goal: Absence of Falls  Outcome: Ongoing (interventions implemented as appropriate)    Problem: Knee Replacement, Total (Adult)  Goal: Signs and Symptoms of Listed Potential Problems Will be Absent or Manageable (Knee Replacement, Total)  Outcome: Ongoing (interventions implemented as appropriate)

## 2017-07-26 ENCOUNTER — ANTICOAGULATION VISIT (OUTPATIENT)
Dept: PHARMACY | Facility: HOSPITAL | Age: 62
End: 2017-07-26

## 2017-07-26 VITALS
RESPIRATION RATE: 18 BRPM | OXYGEN SATURATION: 95 % | TEMPERATURE: 99.4 F | SYSTOLIC BLOOD PRESSURE: 142 MMHG | WEIGHT: 225.31 LBS | DIASTOLIC BLOOD PRESSURE: 69 MMHG | HEIGHT: 72 IN | BODY MASS INDEX: 30.52 KG/M2 | HEART RATE: 105 BPM

## 2017-07-26 LAB
GLUCOSE BLDC GLUCOMTR-MCNC: 123 MG/DL (ref 70–130)
INR PPP: 1.17 (ref 0.8–1.2)
PROTHROMBIN TIME: 14.9 SECONDS (ref 11.1–15.3)

## 2017-07-26 PROCEDURE — 82962 GLUCOSE BLOOD TEST: CPT

## 2017-07-26 PROCEDURE — 85610 PROTHROMBIN TIME: CPT | Performed by: ORTHOPAEDIC SURGERY

## 2017-07-26 PROCEDURE — 97535 SELF CARE MNGMENT TRAINING: CPT

## 2017-07-26 PROCEDURE — 97110 THERAPEUTIC EXERCISES: CPT

## 2017-07-26 PROCEDURE — 97116 GAIT TRAINING THERAPY: CPT

## 2017-07-26 PROCEDURE — 99024 POSTOP FOLLOW-UP VISIT: CPT | Performed by: ORTHOPAEDIC SURGERY

## 2017-07-26 RX ORDER — HYDROCODONE BITARTRATE AND ACETAMINOPHEN 10; 325 MG/1; MG/1
1 TABLET ORAL EVERY 4 HOURS PRN
Qty: 40 TABLET | Refills: 0 | Status: SHIPPED | OUTPATIENT
Start: 2017-07-26 | End: 2017-08-03 | Stop reason: SDUPTHER

## 2017-07-26 RX ORDER — WARFARIN SODIUM 5 MG/1
5 TABLET ORAL
Qty: 30 TABLET | Refills: 1 | Status: SHIPPED | OUTPATIENT
Start: 2017-07-26 | End: 2017-09-04

## 2017-07-26 RX ADMIN — ONDANSETRON 4 MG: 4 TABLET, FILM COATED ORAL at 08:11

## 2017-07-26 RX ADMIN — DOCUSATE SODIUM 100 MG: 100 CAPSULE, LIQUID FILLED ORAL at 08:11

## 2017-07-26 RX ADMIN — ACETAMINOPHEN 1000 MG: 500 TABLET ORAL at 11:11

## 2017-07-26 RX ADMIN — FAMOTIDINE 40 MG: 40 TABLET ORAL at 08:10

## 2017-07-26 RX ADMIN — OXYCODONE HYDROCHLORIDE 5 MG: 5 TABLET ORAL at 04:16

## 2017-07-26 RX ADMIN — OXYCODONE HYDROCHLORIDE 10 MG: 10 TABLET, FILM COATED, EXTENDED RELEASE ORAL at 08:11

## 2017-07-26 RX ADMIN — OXYCODONE HYDROCHLORIDE 5 MG: 5 TABLET ORAL at 09:21

## 2017-07-26 RX ADMIN — FERROUS SULFATE TAB EC 324 MG (65 MG FE EQUIVALENT) 324 MG: 324 (65 FE) TABLET DELAYED RESPONSE at 08:11

## 2017-07-26 RX ADMIN — ACETAMINOPHEN 325 MG: 325 TABLET ORAL at 03:44

## 2017-07-26 RX ADMIN — LEVOTHYROXINE SODIUM 50 MCG: 50 TABLET ORAL at 06:13

## 2017-07-26 RX ADMIN — ACETAMINOPHEN 1000 MG: 500 TABLET ORAL at 08:11

## 2017-07-26 NOTE — DISCHARGE INSTR - APPOINTMENTS
Dr. Aquino   Aug 2nd, 2017@ 1:00 pm   Follow up  [phone]    Follow up with Dr Ballesteros  Aug 10, 2017@ 1:20  [phone]  2 weeks Post Op

## 2017-07-26 NOTE — THERAPY TREATMENT NOTE
Acute Care - Physical Therapy Treatment Note  [facility]     Patient Name: Mini Andersen  : 1955  MRN: [id number]  Today's Date: 2017  Onset of Illness/Injury or Date of Surgery Date: 17  Date of Referral to PT: 17  Referring Physician: Dr. Ballesteros    Admit Date: 2017    Visit Dx:    ICD-10-CM ICD-9-CM   1. Chronic pain of left knee M25.562 719.46    G89.29 338.29   2. Localized osteoarthritis of left knee M17.9 715.36   3. Type 2 diabetes mellitus without complication, without long-term current use of insulin E11.9 250.00   4. Acquired hypothyroidism E03.9 244.9   5. History of DVT (deep vein thrombosis) Z86.718 V12.51   6. Primary osteoarthritis of left knee M17.12 715.16   7. Impaired physical mobility Z74.09 781.99   8. Impaired mobility and ADLs Z74.09 799.89     Patient Active Problem List   Diagnosis   • Palpitations   • Type 2 diabetes mellitus   • Hypothyroidism   • Primary osteoarthritis of left knee   • Knee pain   • Diabetes mellitus without complication   • Borderline glaucoma   • Nuclear cataract   • Abdominal pain   • Cobalamin deficiency   • Cramp of limb   • Depression   • Gastroesophageal reflux disease   • Callus of foot   • Deformity of foot   • Foot pain   • History of colonoscopy   • Low back pain   • Mononeuritis   • Spasm   • Neuropathy   • Lung field abnormal   • Encounter for screening mammogram for malignant neoplasm of breast   • Cardiac arrhythmia   • Arthralgia of lower leg   • Arthralgia of shoulder   • Pure hypercholesterolemia   • Seasonal allergic rhinitis   • Disorder of hand   • Osteoporosis   • Pre-operative clearance   • Type 2 diabetes mellitus without complication, without long-term current use of insulin   • Angina pectoris    • SOB (shortness of breath)   • Obstructive sleep apnea syndrome   • Chronic pain of left knee               Adult Rehabilitation Note       17 0926 17 0800 17 1310    Rehab Assessment/Intervention     Discipline occupational therapy assistant  -CS physical therapy assistant  -AM physical therapy assistant  -AM    Document Type therapy note (daily note)  -CS therapy note (daily note)  -AM therapy note (daily note)  -AM    Subjective Information agree to therapy  -CS agree to therapy;complains of;pain  -AM agree to therapy;complains of;pain  -AM    Patient Effort, Rehab Treatment  good  -AM good  -AM    Symptoms Noted During/After Treatment  none  -AM increased pain  -AM    Precautions/Limitations  other (see comments)   WBAT LLE  -AM other (see comments)   WBAT LLE  -AM    Equipment Issued to Patient  gait belt  -AM gait belt  -AM    Recorded by [CS] DRAKE Segal/TONE [AM] Jaden Mtz PTA [AM] Jaden Mtz PTA    Vital Signs    Pre Systolic BP Rehab  142  -  -AM    Pre Treatment Diastolic BP  69  -AM 52  -AM    Post Systolic BP Rehab  137  -  -AM    Post Treatment Diastolic BP  67  -AM 65  -AM    Pretreatment Heart Rate (beats/min) 99  -  -  -AM    Posttreatment Heart Rate (beats/min) 109  -  -  -AM    Pre SpO2 (%) 96  -CS 95  -AM 93  -AM    O2 Delivery Pre Treatment room air  -CS room air  -AM room air  -AM    Post SpO2 (%) 92  -CS 96  -AM 94  -AM    O2 Delivery Post Treatment room air  -CS room air  -AM room air  -AM    Pre Patient Position Sitting  -CS Supine  -AM Supine  -AM    Intra Patient Position Standing  -CS Standing  -AM Standing  -AM    Post Patient Position Sitting  -CS Sitting  -AM Sitting  -AM    Recorded by [CS] DRAKE Segal/TONE [AM] Jaden Mtz PTA [AM] Jaden Mtz PTA    Pain Assessment    Pain Assessment 0-10  -CS 0-10  -AM 0-10  -AM    Pain Score 9  -CS 10  -AM 8  -AM    Post Pain Score 9  -CS 9  -AM 9  -AM    Pain Type Acute pain;Surgical pain  -CS Acute pain;Surgical pain  -AM Acute pain;Surgical pain  -AM    Pain Location Knee  -CS Knee  -AM Knee  -AM    Pain Orientation Left  -CS Left  -AM Left  -AM    Pain Descriptors   Sore  -AM Sore  -AM    Pain Frequency  Constant/continuous  -AM Constant/continuous  -AM    Date Pain First Started  07/24/17  -AM 07/24/17  -AM    Clinical Progression  Gradually improving  -AM Gradually improving  -AM    Patient's Stated Pain Goal  No pain  -AM No pain  -AM    Pain Intervention(s) Medication (See MAR)  -CS Medication (See MAR);Cold applied;Ambulation/increased activity  -AM Medication (See MAR);Cold applied;Ambulation/increased activity  -AM    Result of Injury  No  -AM No  -AM    Work-Related Injury  No  -AM No  -AM    Multiple Pain Sites  No  -AM No  -AM    Recorded by [CS] DRAKE Segal/TONE [AM] Jaden Mtz PTA [AM] Jaden Mtz PTA    Cognitive Assessment/Intervention    Current Cognitive/Communication Assessment functional  -CS functional  -AM functional  -AM    Orientation Status oriented x 4  -CS oriented x 4  -AM oriented x 4  -AM    Follows Commands/Answers Questions 100% of the time  -% of the time  -% of the time  -AM    Personal Safety Interventions gait belt;nonskid shoes/slippers when out of bed  -CS gait belt;nonskid shoes/slippers when out of bed;supervised activity  -AM gait belt;nonskid shoes/slippers when out of bed;supervised activity  -AM    Recorded by [CS] DRAKE Segal/TONE [AM] Jaden Mtz PTA [AM] Jaden Mtz PTA    ROM (Range of Motion)    General ROM  lower extremity range of motion deficits identified  -AM lower extremity range of motion deficits identified  -AM    Recorded by  [AM] Jaden Mtz PTA [AM] Jaden Mtz PTA    General LE Assessment    ROM  knee, left: LE ROM deficit  -AM knee, left: LE ROM deficit  -AM    Recorded by  [AM] Jaden Mtz PTA [AM] Jaden Mtz PTA    Left Knee    Extension/Flexion AROM  within functional limits   10-74  -AM within functional limits   10-64  -AM    Recorded by  [AM] Jaden Mtz PTA [AM] Jaden Mtz PTA    Mobility Assessment/Training    Extremity  Weight-Bearing Status  left lower extremity  -AM left lower extremity  -AM    Left Lower Extremity Weight-Bearing  weight-bearing as tolerated  -AM weight-bearing as tolerated  -AM    Recorded by  [AM] Jaden Mtz PTA [AM] Jaden Mtz PTA    Bed Mobility, Assessment/Treatment    Bed Mobility, Assistive Device  head of bed elevated  -AM head of bed elevated  -AM    Bed Mobility, Roll Left, [place]  not tested  -AM not tested  -AM    Bed Mobility, Roll Right, [place]  not tested  -AM not tested  -AM    Bed Mobility, Scoot/Bridge, [place]  not tested  -AM not tested  -AM    Bed Mob, Supine to Sit, [place]  supervision required  -AM minimum assist (75% patient effort)  -AM    Bed Mob, Sit to Supine, [place]  supervision required  -AM not tested  -AM    Bed Mob, Sidelying to Sit, [place]  not tested  -AM not tested  -AM    Bed Mob, Sit to Sidelying, [place]  not tested  -AM not tested  -AM    Bed Mobility, Safety Issues  decreased use of arms for pushing/pulling;decreased use of legs for bridging/pushing  -AM decreased use of arms for pushing/pulling;decreased use of legs for bridging/pushing  -AM    Bed Mobility, Impairments  ROM decreased;strength decreased;pain  -AM ROM decreased;strength decreased;pain  -AM    Recorded by  [AM] Jaden Mtz PTA [AM] Jaden Mtz PTA    Transfer Assessment/Treatment    Transfers, Bed-Chair [place]  stand by assist  -AM contact guard assist  -AM    Transfers, Chair-Bed [place]  stand by assist  -AM contact guard assist  -AM    Transfers, Bed-Chair-Bed, Assist Device  rolling walker  -AM rolling walker  -AM    Transfers, Sit-Stand [place] supervision required  -CS stand by assist  -AM contact guard assist  -AM    Transfers, Stand-Sit [place] supervision required  -CS stand by assist  -AM contact guard assist  -AM    Transfers, Sit-Stand-Sit, Assist Device rolling walker  -CS rolling walker  -AM rolling walker  -AM     Toilet Transfer, [place]  supervision required  -AM contact guard assist  -AM    Toilet Transfer, Assistive Device  bedside commode without drop arms;rolling walker  -AM bedside commode without drop arms;rolling walker  -AM    Walk-In Shower Transfer, [place]  not tested  -AM not tested  -AM    Bathtub Transfer, [place]  not tested  -AM not tested  -AM    Transfer, Maintain Weight Bearing Status  able to maintain weight bearing status  -AM able to maintain weight bearing status  -AM    Transfer, Safety Issues  step length decreased  -AM step length decreased  -AM    Transfer, Impairments  ROM decreased;strength decreased;pain  -AM ROM decreased;strength decreased;pain  -AM    Recorded by [CS] CANDIS Segal [AM] Jaden Mtz PTA [AM] Jaden Mtz PTA    Gait Assessment/Treatment    Gait, [place] Level  stand by assist  -AM contact guard assist  -AM    Gait, Assistive Device  rolling walker  -AM rolling walker  -AM    Gait, Distance (Feet)  175  -   125+15  -AM    Gait, Gait Pattern Analysis  3-point gait  -AM 3-point gait  -AM    Gait, Gait Deviations  bilateral:;lupillo decreased  -AM bilateral:;lupillo decreased  -AM    Gait, Maintain Weight Bearing Status  able to maintain weight bearing status  -AM able to maintain weight bearing status  -AM    Gait, Safety Issues  step length decreased  -AM step length decreased  -AM    Gait, Impairments  ROM decreased;strength decreased;pain  -AM ROM decreased;strength decreased;pain  -AM    Recorded by  [AM] Jaden Mtz PTA [AM] Jaden Mtz PTA    Stairs Assessment/Treatment    Number of Stairs  Ramp  -AM     Stairs, Handrail Location  none  -AM     Stairs, [place] Level  contact guard assist  -AM not tested  -AM    Stairs, Assistive Device  walker  -AM     Stairs, Technique Used  step to step (ascending);step to step (descending)  -AM     Stairs, Maintain Weight Bearing Status  able to maintain weight bearing  status  -AM     Stairs, Impairments  ROM decreased;strength decreased;pain  -AM     Recorded by  [AM] Jaden Mtz, PTA [AM] Jaden Mtz, PTA    Upper Body Bathing Assessment/Training    UB Bathing Assess/Train Assistive Device bath mitt  -CS      UB Bathing Assess/Train, Position sitting  -CS      UB Bathing Assess/Train, [place] Level set up required;independent  -CS      Recorded by [CS] CANDIS Segal      Lower Body Bathing Assessment/Training    LB Bathing Assess/Train Assistive Device bath mitt  -CS      LB Bathing Assess/Train, Position sitting;standing  -CS      LB Bathing Assess/Train, [place] Level contact guard assist  -CS      Recorded by [CS] CANDIS Segal      Upper Body Dressing Assessment/Training    UB Dressing Assess/Train, Clothing Type doffing:;donning:;hospital gown;t-shirt  -CS      UB Dressing Assess/Train, Position sitting  -CS      UB Dressing Assess/Train, [place] independent  -CS      Recorded by [CS] CANDIS Segal      Lower Body Dressing Assessment/Training    LB Dressing Assess/Train, Clothing Type doffing:;donning:;pants;slipper socks   underware  -CS      LB Dressing Assess/Train, Assist Device dressing stick;reacher;sock-aid  -CS      LB Dressing Assess/Train, Position sitting;standing  -CS      LB Dressing Assess/Train, [place] minimum assist (75% patient effort)  -CS      Recorded by [CS] CANDIS Segal      Grooming Assessment/Training    Grooming Assess/Train, Position sitting  -CS      Grooming Assess/Train, Indepen Level set up required  -CS      Recorded by [CS] CANDIS Segal      Therapy Exercises    Bilateral Lower Extremities  AROM:;20 reps;supine;sitting;ankle pumps/circles;glut sets;heel slides;quad sets;SLR  -AM AROM:;20 reps;supine;sitting;ankle pumps/circles;glut sets;heel slides;quad sets;SLR  -AM    Bilateral Upper Extremity AROM:;20 reps;sitting;elbow flexion/extension;hand  pumps;pronation/supination;shoulder extension/flexion;shoulder ER/IR  -CS      BUE Resistance manual resistance- minimal  -CS      Recorded by [CS] DRAKE Segal/TONE [AM] Jaden Mtz PTA [AM] Jaden Mtz PTA    Positioning and Restraints    Pre-Treatment Position sitting in chair/recliner  -CS in bed  -AM in bed  -AM    Post Treatment Position chair  -CS chair  -AM chair  -AM    In Chair reclined;call light within reach  -CS reclined;call light within reach;encouraged to call for assist;legs elevated  -AM reclined;call light within reach;encouraged to call for assist;with family/caregiver;legs elevated  -AM    Recorded by [CS] DRAKE Segal/TONE [AM] Jaden Mtz PTA [AM] Jaden Mtz PTA      07/25/17 1307 07/25/17 0915       Rehab Assessment/Intervention    Discipline occupational therapy assistant  - physical therapy assistant  -AM     Document Type therapy note (daily note)  -CS therapy note (daily note)  -AM     Subjective Information agree to therapy  -CS agree to therapy;complains of;pain  -AM     Patient Effort, Rehab Treatment  good  -AM     Symptoms Noted During/After Treatment  fatigue  -AM     Precautions/Limitations  other (see comments)   WBAT LLE  -AM     Equipment Issued to Patient  gait belt  -AM     Recorded by [CS] DRAKE Segal/TONE [AM] Jaden Mtz PTA     Vital Signs    Pre Systolic BP Rehab  123  -AM     Pre Treatment Diastolic BP  60  -AM     Post Systolic BP Rehab  137  -AM     Post Treatment Diastolic BP  65  -AM     Pretreatment Heart Rate (beats/min) 109  -  -AM     Posttreatment Heart Rate (beats/min) 112  -CS 99  -AM     Pre SpO2 (%) 93  -CS 97  -AM     O2 Delivery Pre Treatment room air  -CS room air  -AM     Post SpO2 (%) 98  -CS 94  -AM     O2 Delivery Post Treatment room air  -CS room air  -AM     Pre Patient Position Supine  -CS Sitting  -AM     Intra Patient Position  Standing  -AM     Post Patient Position  Supine  -AM      Recorded by [CS] DRAKE Segal/TONE [AM] Jaden Mtz PTA     Pain Assessment    Pain Assessment 0-10  -CS 0-10  -AM     Pain Score 8  -CS 10  -AM     Post Pain Score 8  -CS 10  -AM     Pain Type Surgical pain  -CS Acute pain;Surgical pain  -AM     Pain Location Knee  -CS Knee  -AM     Pain Orientation Left  -CS Left  -AM     Pain Descriptors  Sore  -AM     Pain Frequency  Constant/continuous  -AM     Date Pain First Started  07/24/17  -AM     Clinical Progression  Gradually improving  -AM     Patient's Stated Pain Goal  No pain  -AM     Pain Intervention(s) Medication (See MAR)  -CS Medication (See MAR);Cold applied;Ambulation/increased activity  -AM     Result of Injury  No  -AM     Work-Related Injury  No  -AM     Multiple Pain Sites  No  -AM     Recorded by [CS] DRAKE Segal/TONE [AM] Jaden Mtz PTA     Vision Assessment/Intervention    Visual Impairment  --  -AM     Recorded by  [AM] Jaden Mtz PTA     Cognitive Assessment/Intervention    Current Cognitive/Communication Assessment functional  -CS functional  -AM     Orientation Status oriented x 4  -CS oriented x 4  -AM     Follows Commands/Answers Questions 100% of the time  -% of the time  -AM     Personal Safety Interventions  gait belt;nonskid shoes/slippers when out of bed;supervised activity  -AM     Recorded by [CS] DRAKE Segal/TONE [AM] Jaden Mtz PTA     ROM (Range of Motion)    General ROM  lower extremity range of motion deficits identified  -AM     Recorded by  [AM] Jaden Mtz PTA     General LE Assessment    ROM  knee, left: LE ROM deficit  -AM     Recorded by  [AM] Jaden Mtz PTA     Left Knee    Extension/Flexion AROM  within functional limits   16-74  -AM     Recorded by  [AM] Jaden Mtz PTA     Mobility Assessment/Training    Extremity Weight-Bearing Status  left lower extremity  -AM     Left Lower Extremity Weight-Bearing  weight-bearing as tolerated  -AM     Recorded by  [AM]  Jaden Mtz PTA     Bed Mobility, Assessment/Treatment    Bed Mobility, Assistive Device  head of bed elevated  -AM     Bed Mobility, Roll Left, [place]  not tested  -AM     Bed Mobility, Roll Right, [place]  not tested  -AM     Bed Mobility, Scoot/Bridge, [place]  not tested  -AM     Bed Mob, Supine to Sit, [place]  minimum assist (75% patient effort)  -AM     Bed Mob, Sit to Supine, [place]  not tested  -AM     Bed Mob, Sidelying to Sit, [place]  not tested  -AM     Bed Mob, Sit to Sidelying, [place]  not tested  -AM     Bed Mobility, Safety Issues  decreased use of arms for pushing/pulling;decreased use of legs for bridging/pushing  -AM     Bed Mobility, Impairments  ROM decreased;strength decreased;pain  -AM     Recorded by  [AM] Jaden Mtz PTA     Transfer Assessment/Treatment    Transfers, Bed-Chair [place]  contact guard assist  -AM     Transfers, Chair-Bed [place]  contact guard assist  -AM     Transfers, Bed-Chair-Bed, Assist Device  rolling walker  -AM     Transfers, Sit-Stand [place]  contact guard assist  -AM     Transfers, Stand-Sit [place]  contact guard assist  -AM     Transfers, Sit-Stand-Sit, Assist Device  rolling walker  -AM     Toilet Transfer, [place]  not tested  -AM     Walk-In Shower Transfer, [place]  not tested  -AM     Bathtub Transfer, [place]  not tested  -AM     Transfer, Maintain Weight Bearing Status  able to maintain weight bearing status  -AM     Transfer, Safety Issues  step length decreased  -AM     Transfer, Impairments  ROM decreased;strength decreased;pain  -AM     Recorded by  [AM] Jaden Mtz PTA     Gait Assessment/Treatment    Gait, [place] Level  contact guard assist  -AM     Gait, Assistive Device  rolling walker  -AM     Gait, Distance (Feet)  70  -AM     Gait, Gait Pattern Analysis  3-point gait  -AM     Gait, Gait Deviations  bilateral:;[place] decreased  -AM     Gait, Maintain  Weight Bearing Status  able to maintain weight bearing status  -AM     Gait, Safety Issues  step length decreased  -AM     Gait, Impairments  ROM decreased;strength decreased;pain  -AM     Recorded by  [AM] Jaden Mtz PTA     Stairs Assessment/Treatment    Stairs, [place] Level  not tested  -AM     Recorded by  [AM] Jaden Mtz PTA     Upper Body Bathing Assessment/Training    UB Bathing Assess/Train Assistive Device bath mitt  -CS      UB Bathing Assess/Train, Position long sitting  -CS      UB Bathing Assess/Train, [place] Level set up required  -CS      Recorded by [CS] CANDIS Segal      Lower Body Bathing Assessment/Training    LB Bathing Assess/Train Assistive Device bath mitt  -CS      LB Bathing Assess/Train, Position long sitting  -CS      LB Bathing Assess/Train, [place] Level minimum assist (75% patient effort)  -CS      Recorded by [CS] CANDIS Segal      Lower Body Dressing Assessment/Training    LB Dressing Assess/Train, Clothing Type doffing:;donning:;slipper socks  -CS      LB Dressing Assess/Train, Position long sitting  -CS      LB Dressing Assess/Train, [place] moderate assist (50% patient effort)  -CS      Recorded by [CS] CANDIS Segal      Grooming Assessment/Training    Grooming Assess/Train, Position long sitting  -CS      Grooming Assess/Train, Indepen Level set up required  -CS      Grooming Assess/Train, Comment shampoo cap, oral care, wash face, comb hair, apply deodorant/lotion  -CS      Recorded by [CS] CANDIS Segal      Therapy Exercises    Bilateral Lower Extremities  AROM:;20 reps;supine;sitting;ankle pumps/circles;glut sets;heel slides;quad sets;SLR  -AM     Bilateral Upper Extremity AROM:;20 reps;sitting;elbow flexion/extension;hand pumps;pronation/supination;shoulder extension/flexion;shoulder ER/IR  -CS      BUE Resistance manual resistance- minimal  -CS      Recorded by [CS] DRAKE Segal/TONE [AM]  Jaden Mtz PTA     Sensory Assessment/Intervention    Light Touch  --  -AM     LLE Light Touch  --  -AM     Recorded by  [AM] Jaden Mtz PTA     Positioning and Restraints    Pre-Treatment Position in bed  -CS sitting in chair/recliner  -AM     Post Treatment Position bed  -CS bed  -AM     In Bed supine;call light within reach;with family/caregiver  -CS supine;call light within reach;encouraged to call for assist;exit alarm on;with family/caregiver  -AM     Recorded by [CS] CANDIS Segal [AM] Jaden Mtz PTA       User Key  (r) = Recorded By, (t) = Taken By, (c) = Cosigned By    Initials Name Effective Dates    AM Jaden Mtz PTA 10/17/16 -     CS CANDIS Segal 10/17/16 -                 IP PT Goals       07/25/17 1310 07/24/17 1249       Bed Mobility PT STG    Bed Mobility PT STG, Date Established  07/24/17  -CB     Bed Mobility PT STG, Time to Achieve  2 - 3 days  -CB     Bed Mobility PT STG, Activity Type  supine to sit/sit to supine  -CB     Bed Mobility PT STG, [place] Level  independent  -CB     Transfer Training Goal, Assist Device  bed rails  -CB     Bed Mobility PT STG, Additional Goal  HOB up or down  -CB     Bed Mobility PT STG, Date Goal Reviewed 07/25/17  -AM      Bed Mobility PT STG, Outcome goal not met  -AM      Gait Training PT LTG    Gait Training Goal PT LTG, Date Established  07/24/17  -CB     Gait Training Goal PT LTG, Time to Achieve  by discharge  -CB     Gait Training Goal PT LTG, [place] Level  contact guard assist  -CB     Gait Training Goal PT LTG, Assist Device  walker, rolling  -CB     Gait Training Goal PT LTG, Distance to Achieve  200 feet  -CB     Gait Training Goal PT LTG, Date Goal Reviewed 07/25/17  -AM      Gait Training Goal PT LTG, Outcome goal not met  -AM      Strength Goal PT LTG    Strength Goal PT LTG, Date Established  07/24/17  -CB     Strength Goal PT LTG, Time to Achieve  by discharge  -CB     Strength Goal PT LTG,  Measure to Achieve  20 reps all ex BLE independently with no knee lag on L  -CB     Strength Goal PT LTG, Functional Goal  get legs up onto bed without using UE  -CB     Strength Goal PT LTG, Date Goal Reviewed 07/25/17  -AM      Strength Goal PT LTG, Outcome goal not met  -AM      Range of Motion PT LTG    Range of Motion Goal PT LTG, Date Established  07/24/17  -CB     Range of Motion Goal PT LTG, Time to Achieve  by discharge  -CB     Range fo Motion Goal PT LTG, Joint  L knee  -CB     Range of Motion Goal PT LTG, AROM Measure  0-90  -CB     Range of Motion Goal PT LTG, Date Goal Reviewed 07/25/17  -AM      Range of Motion Goal PT LTG, Outcome goal not met  -AM        User Key  (r) = Recorded By, (t) = Taken By, (c) = Cosigned By    Initials Name Provider Type    CB Ivonne Osborn, PT Physical Therapist    AM Jaden Mtz, TIARA Physical Therapy Assistant          Physical Therapy Education     Title: PT OT SLP Therapies (Active)     Topic: Physical Therapy (Resolved)     Point: Mobility training (Resolved)    Learning Progress Summary    Learner Readiness Method Response Comment Documented by Status   Patient Acceptance D NR   07/24/17 1403 Active               Point: Home exercise program (Resolved)    Learning Progress Summary    Learner Readiness Method Response Comment Documented by Status   Patient Eager E,TB,D,H VU,DU HEP given AM 07/25/17 0915 Done               Point: Precautions (Resolved)    Learning Progress Summary    Learner Readiness Method Response Comment Documented by Status   Patient Acceptance D NR   07/24/17 1403 Active                      User Key     Initials Effective Dates Name Provider Type Discipline     04/06/17 -  Ivonne Osborn, PT Physical Therapist PT    AM 10/17/16 -  Jaden Mtz PTA Physical Therapy Assistant PT                    PT Recommendation and Plan  Anticipated Discharge Disposition: home with home health  Planned Therapy Interventions: bed mobility  training, gait training, home exercise program, patient/family education, ROM (Range of Motion), strengthening, transfer training  PT Frequency: per priority policy (5-14)  Plan of Care Review  Plan Of Care Reviewed With: patient  Progress: progress toward functional goals as expected  Outcome Summary/Follow up Plan: Pt did not meet any PT goals this date. Pt able to get in/out bed Min A. Pt able to amb 70+125+15 ft w/RW CGA. ROM 10-64. PADD Score 8. Pt would benefit from  PT once discharged from this facility.           Outcome Measures       07/26/17 0800 07/25/17 1500 07/25/17 1310    How much help from another person do you currently need...    Turning from your back to your side while in flat bed without using bedrails? 3  -AM  3  -AM    Moving from lying on back to sitting on the side of a flat bed without bedrails? 3  -AM  3  -AM    Moving to and from a bed to a chair (including a wheelchair)? 3  -AM  3  -AM    Standing up from a chair using your arms (e.g., wheelchair, bedside chair)? 3  -AM  3  -AM    Climbing 3-5 steps with a railing? 1  -AM  1  -AM    To walk in hospital room? 3  -AM  3  -AM    AM-PAC 6 Clicks Score 16  -AM  16  -AM    How much help from another is currently needed...    Putting on and taking off regular lower body clothing?  2  -CS     Bathing (including washing, rinsing, and drying)  2  -CS     Toileting (which includes using toilet bed pan or urinal)  4  -CS     Putting on and taking off regular upper body clothing  3  -CS     Taking care of personal grooming (such as brushing teeth)  3  -CS     Eating meals  4  -CS     Score  18  -CS     Functional Assessment    Outcome Measure Options AM-PAC 6 Clicks Basic Mobility (PT)  -AM AM-PAC 6 Clicks Daily Activity (OT)  -CS AM-PAC 6 Clicks Basic Mobility (PT)  -AM      07/25/17 0915 07/24/17 1332 07/24/17 1249    How much help from another person do you currently need...    Turning from your back to your side while in flat bed without using  bedrails? 3  -AM  3  -CB    Moving from lying on back to sitting on the side of a flat bed without bedrails? 3  -AM  3  -CB    Moving to and from a bed to a chair (including a wheelchair)? 3  -AM  3  -CB    Standing up from a chair using your arms (e.g., wheelchair, bedside chair)? 3  -AM  3  -CB    Climbing 3-5 steps with a railing? 1  -AM  2  -CB    To walk in hospital room? 3  -AM  3  -CB    AM-PAC 6 Clicks Score 16  -AM  17  -CB    How much help from another is currently needed...    Putting on and taking off regular lower body clothing?  2  -RM     Bathing (including washing, rinsing, and drying)  2  -RM     Toileting (which includes using toilet bed pan or urinal)  4  -RM     Putting on and taking off regular upper body clothing  3  -RM     Taking care of personal grooming (such as brushing teeth)  3  -RM     Eating meals  4  -RM     Score  18  -RM     Other Outcome Measure Tool Used    Other Outcome Measure Tool Comments PADD SCORE   8  -AM      Functional Assessment    Outcome Measure Options AM-PAC 6 Clicks Basic Mobility (PT);Other Outcome Measure   PADD SCORE  -AM AM-PAC 6 Clicks Daily Activity (OT)  -RM AM-PAC 6 Clicks Basic Mobility (PT)  -CB      User Key  (r) = Recorded By, (t) = Taken By, (c) = Cosigned By    Initials Name Provider Type    CB Ivonne Osborn, PT Physical Therapist    EARL Mtz PTA Physical Therapy Assistant    DRAKE Hobbs/TONE Occupational Therapy Assistant    RM Keiry Escalera, OT Occupational Therapist           Time Calculation:         PT Charges       07/26/17 0800          Time Calculation    Start Time 0800  -AM      Stop Time 0900  -AM      Time Calculation (min) 60 min  -AM      PT Received On 07/26/17  -AM      PT - Next Appointment 07/26/17  -AM      Time Calculation- PT    Total Timed Code Minutes- PT 60 minute(s)  -AM        User Key  (r) = Recorded By, (t) = Taken By, (c) = Cosigned By    Initials Name Provider Type    AM Jaden Mtz PTA  Physical Therapy Assistant          Therapy Charges for Today     Code Description Service Date Service Provider Modifiers Qty    10176532221 HC GAIT TRAINING EA 15 MIN 7/25/2017 Jaden Mtz, PTA GP 2    13081187021 HC PT THER PROC EA 15 MIN 7/25/2017 Jaden Mtz, PTA GP 2    59537023117 HC GAIT TRAINING EA 15 MIN 7/25/2017 Jaden Mtz, PTA GP 1    92468541509 HC PT THER PROC EA 15 MIN 7/25/2017 Jaden Mtz, PTA GP 1    86282141638 HC PT SELF CARE/MGMT/TRAIN EA 15 MIN 7/25/2017 Jaden Mtz, PTA GP 1    95975139012 HC GAIT TRAINING EA 15 MIN 7/26/2017 Jaden Mtz, PTA GP 2    88931112280 HC PT THER PROC EA 15 MIN 7/26/2017 Jaden Mtz, PTA GP 2          PT G-Codes  PT Professional Judgement Used?: Yes  Outcome Measure Options: AM-PAC 6 Clicks Basic Mobility (PT)  Score: 17  Functional Limitation: Mobility: Walking and moving around  Mobility: Walking and Moving Around Current Status (): At least 40 percent but less than 60 percent impaired, limited or restricted  Mobility: Walking and Moving Around Goal Status (): At least 20 percent but less than 40 percent impaired, limited or restricted    Jaden Mtz PTA  7/26/2017

## 2017-07-26 NOTE — DISCHARGE PLACEMENT REQUEST
"Lata Ro (62 y.o. Female)     Date of Birth Social Security Number Address Home Phone MRN    1955  [address] [facility] [address] [phone] [id number]    [employer] Mar[employer] Status          Jew        Admission Date Admission Type Admitting Provider Attending Provider Department, Room/Bed    7/24/17 Elective Jose Ballesteros MD Dodds, James Carpenter, MD [facility], 361/1    Discharge Date Discharge Disposition Discharge Destination         Home-Health Care [facility]             Attending Provider: Jose Ballesteros MD     Allergies:  Bextra [Valdecoxib], Cephalosporins, Detrol La [Tolterodine Tartrate Er], Dicyclomine, Fluoxetine, Keflex [Cephalexin], Toprol Xl [Metoprolol], Chocolate Flavor, Other, Amoxicillin, Aspirin, Ciprofloxin Hcl [Ciprofloxacin], Claritin-d 12 Hour [Loratadine-pseudoephedrine Er], Codeine, Demerol [Meperidine], Levaquin [Levofloxacin], Lipitor [Atorvastatin], Macrobid [Nitrofurantoin], Morphine And Related, Paxil [Paroxetine Hcl], Sulfa Antibiotics, Tape, Tramadol, Vioxx [Rofecoxib], Zegerid [Omeprazole], Zoloft [Sertraline Hcl]    Isolation:  None   Infection:  None   Code Status:  FULL    Ht:  72\" (182.9 cm)   Wt:  225 lb 5 oz (102 kg)    Admission Cmt:  None   Principal Problem:  Primary osteoarthritis of left knee [M17.12]                 Active Insurance as of 7/24/2017     Primary Coverage     Payor Plan Insurance Group Employer/Plan Group    MEDICARE MEDICARE A & B      Payor Plan Address Payor Plan Phone Number Effective From Effective To    [address] [phone] 5/1/2003     [place] [address]       Subscriber Name Subscriber Birth Date Member ID       LATA RO 1955 [id number]           Secondary Coverage     Payor Plan Insurance Group Employer/Plan Group    ANTHEM BLUE CROSS ANTHEM [facility] SUPP [id number]     Payor Plan Address Payor Plan Phone Number Effective From Effective To    [address]  [address]     [place] [address]       Subscriber Name Subscriber Birth Date Member ID       LATA ANDERSEN 1955 [id number]                 Emergency Contacts      (Rel.) Home Phone Work Phone Mobile Phone    Carlos Eduardo Andersen (Spouse) -- -- [phone]          [address] [address]  Phone:  [phone]  Fax:   Date Ordered: 2017         Patient:  Lata Andersen MRN:  [id number]   [address]  [facility] [address] :  1955  SSN:    Phone: [phone] Sex:  F     Weight: 225 lb 5 oz (102 kg)         Ht Readings from Last 1 Encounters:   17 72\" (182.9 cm)         Walker                         (Order ID: [id number])    Diagnosis:  Chronic pain of left knee (M25.562,G89.29 [ICD-10-CM] 719.46,338.29 [ICD-9-CM])  Type 2 diabetes mellitus without complication, without long-term current use of insulin (E11.9 [ICD-10-CM] 250.00 [ICD-9-CM])  Acquired hypothyroidism (E03.9 [ICD-10-CM] 244.9 [ICD-9-CM])  History of DVT (deep vein thrombosis) (Z86.718 [ICD-10-CM] V12.51 [ICD-9-CM])  Impaired physical mobility (Z74.09 [ICD-10-CM] 781.99 [ICD-9-CM])  Impaired mobility and ADLs (Z74.09 [ICD-10-CM] 799.89 [ICD-9-CM])  Gastroesophageal reflux disease without esophagitis (K21.9 [ICD-10-CM] 530.81 [ICD-9-CM])  Obstructive sleep apnea syndrome (G47.33 [ICD-10-CM] 327.23 [ICD-9-CM])  Palpitations (R00.2 [ICD-10-CM] 785.1 [ICD-9-CM])   Quantity:  1     Equipment:  Walker Folding with Wheels  Length of Need (99 Months = Lifetime): 99 Months = Lifetime            Authorizing Provider:Jose Ballesteros MD  Authorizing Provider's NPI: [id number]  Order Entered By: Jose Ballesteros MD 2017 11:30 AM     Electronically signed by: Jose Ballesteros MD 2017 11:30 AM                History & Physical      EDUARDA Minor at 2017 10:40 AM          Lata Andersen is " a 62 y.o. female   Primary Care Provider Hawa Aquino MD     Chief Complaint   Patient presents with   • Left Knee - Pain   • Pre-op Exam   Pain scale today 8/10     HISTORY OF PRESENT ILLNESS:  Mini Andersen is here for followup of left knee pain and updated pre-op evaluation for left total knee arthroplasty. Medical and surgical history reviewed today. Home medication list reviewed today. The pain has not improved despite long term treatment with multiple conservative management trials.      Prior Arthritis treatments: [x]  PT    [x]  HEP      [x]  Attempt weight loss  [x]  NSAIDS      [x]  Cane   [x]  Intra-articular steroid inj      [x]  Viscosupplementation    Pain is chronic dull ache that is severe at times and worse with activity.  Difficulty with ADL's.  Patient is not happy with current quality of life and wants to discuss proceeding with surgical intervention.     CONCURRENT MEDICAL HISTORY:    Past Medical History:   Diagnosis Date   • Acute peritonitis    • Allergic rhinitis    • Bee sting    • Borderline glaucoma    • Chronic bronchitis    • Constipation     severe with an immotile colon      • Deep venous thrombosis    • Hypothyroidism    • Intestinal volvulus    • Muscle atrophy     O/E   • Nuclear senile cataract    • Nuclear senile cataract    • Polyneuropathy in diabetes    • Type 2 diabetes mellitus     NO BDR   • Type 2 diabetes mellitus without complications    • Unspecified disease of nail        Allergies   Allergen Reactions   • Bextra [Valdecoxib] Shortness Of Breath   • Cephalosporins Shortness Of Breath   • Detrol La [Tolterodine Tartrate Er] Shortness Of Breath   • Dicyclomine Shortness Of Breath   • Fluoxetine Shortness Of Breath   • Keflex [Cephalexin] Shortness Of Breath   • Toprol Xl [Metoprolol] Anaphylaxis   • Chocolate Flavor    • Tape    • Amoxicillin Rash   • Aspirin Rash   • Ciprofloxin Hcl [Ciprofloxacin] Rash   • Claritin-D 12 Hour [Loratadine-Pseudoephedrine Er]  "Rash   • Codeine Rash   • Macrobid [Nitrofurantoin] Rash   • Morphine And Related Rash   • Paxil [Paroxetine Hcl] Rash   • Prozac [Fluoxetine Hcl] Rash   • Sulfa Antibiotics Rash   • Tramadol Rash   • Vioxx [Rofecoxib] Rash   • Zegerid [Omeprazole] Rash   • Zoloft [Sertraline Hcl] Rash         Current Outpatient Prescriptions:   •  clonazePAM (KlonoPIN) 1 MG tablet, Take 1 mg by mouth 2 (Two) Times a Day As Needed for Seizures., Disp: , Rfl:   •  atorvastatin (LIPITOR) 20 MG tablet, Take 1 tablet by mouth Daily., Disp: 30 tablet, Rfl: 12  •  B-D 3CC LUER-AXEL SYR 19WN7-0/2 22G X 1-1/2\" 3 ML misc, , Disp: , Rfl: 12  •  clobetasol (TEMOVATE) 0.05 % cream, , Disp: , Rfl:   •  cyanocobalamin 1000 MCG/ML injection, , Disp: , Rfl: 12  •  diphenhydrAMINE (BENADRYL) 25 mg capsule, , Disp: , Rfl: 6  •  levothyroxine (SYNTHROID, LEVOTHROID) 50 MCG tablet, Take 50 mcg by mouth daily., Disp: , Rfl: 1  •  lidocaine (XYLOCAINE) 5 % ointment, , Disp: , Rfl:   •  metFORMIN (GLUCOPHAGE) 500 MG tablet, Take 500 mg by mouth 2 (two) times a day., Disp: , Rfl: 1  •  MONUROL 3 G pack, , Disp: , Rfl: 0  •  nystatin (MYCOSTATIN) 647971 UNIT/GM powder, , Disp: , Rfl: 2  •  ondansetron (ZOFRAN) 4 MG tablet, , Disp: , Rfl: 0  •  ONE TOUCH ULTRA TEST test strip, TEST BID, Disp: , Rfl: 5  •  oxyCODONE-acetaminophen (PERCOCET)  MG per tablet, 3 (Three) Times a Day As Needed., Disp: , Rfl: 0  •  vitamin D (ERGOCALCIFEROL) 36911 UNITS capsule capsule, Take 50,000 Units by mouth as needed., Disp: , Rfl: 1    Past Surgical History:   Procedure Laterality Date   • COLECTOMY PARTIAL / TOTAL  05/22/2014    Subtotal colectomy with ineo-rectostomy.   • INJECTION OF MEDICATION  12/07/2010    DEPO MEDROL   • OTHER SURGICAL HISTORY      Multiple gynecologic procedures       History reviewed. No pertinent family history.    Social History     Social History   • Marital status:      Spouse name: N/A   • Number of children: N/A   • Years of " "education: N/A     Occupational History   • Not on file.     Social History Main Topics   • Smoking status: Former Smoker   • Smokeless tobacco: Not on file   • Alcohol use No   • Drug use: Not on file   • Sexual activity: Not on file     Other Topics Concern   • Not on file     Social History Narrative        Review of Systems    PHYSICAL EXAMINATION:       Ht 72\" (182.9 cm)  Wt 223 lb (101 kg)  BMI 30.24 kg/m2    Physical Exam   Constitutional: She is oriented to person, place, and time. Vital signs are normal. She appears well-developed and well-nourished.   HENT:   Head: Normocephalic.   Cardiovascular: Normal heart sounds.    Pulmonary/Chest: Effort normal and breath sounds normal. No respiratory distress.   Abdominal: Soft. She exhibits no distension.   Musculoskeletal:        Right knee: She exhibits no effusion.        Left knee: She exhibits no effusion.   Neurological: She is alert and oriented to person, place, and time. GCS eye subscore is 4. GCS verbal subscore is 5. GCS motor subscore is 6.   Skin: Skin is warm, dry and intact.   Psychiatric: She has a normal mood and affect. Her speech is normal and behavior is normal. Judgment and thought content normal. Cognition and memory are normal.   Vitals reviewed.      GAIT:     []  Normal  [x]  Antalgic    Assistive device: [x]  None  []  Walker     []  Crutches  []  Cane     []  Wheelchair  []  Stretcher    Right Knee Exam     Tenderness   Right knee tenderness location: diffuse.    Range of Motion   Extension: abnormal   Flexion: abnormal     Muscle Strength     The patient has normal right knee strength.    Tests   Drawer:       Anterior - negative      Varus: negative  Valgus: negative    Other   Erythema: absent  Sensation: normal  Pulse: present  Swelling: none  Other tests: no effusion present    Comments:  Pain and crepitus with flexion and extension.       Left Knee Exam     Tenderness   Left knee tenderness location: diffuse.    Range of Motion "   Extension: abnormal   Flexion: abnormal     Muscle Strength     The patient has normal left knee strength.    Tests   Drawer:       Anterior - negative       Varus: negative  Valgus: negative    Other   Erythema: absent  Sensation: normal  Pulse: present  Swelling: none  Effusion: no effusion present    Comments:  Pain and crepitus with flexion and extension.                     PRIOR XRAYS FOR REVIEW:      Standing AP of both knees with lateral views of the left reveal severe medial compartmental joint degenerative changes with complete medial compartmental collapse and significant patellofemoral compartmental generative changes without any acute bony abnormality noted  01/20/17 at 2:45 PM by EDUARDA Nunez      Previous or Active DVT/PE: YES   SHE IS NOT A CANDIDATE FOR TXA    ASSESSMENT:    Diagnoses and all orders for this visit:    Osteoporosis    Localized osteoarthritis of left knee      PLAN    The patient voiced understanding of the risks, benefits, and alternative forms of treatment that were discussed and the patient consents to proceed with surgery.  All risks, benefits and alternatives were discussed.  Risks including to but not exclusive to anesthetic complications, including death, MI, CVA, infection, bleeding DVT, fracture, residual pain and need for future surgery.    High risk due to DM and prior DVT    Scheduled for left total knee arthroplasty by Dr. Ballesteros on 7/24/17     Continue HEP  Continue strength and conditioning     Patient has seen cardiology for pre-op clearance, Dr. Root.      Return to office for Post-operative evaluation.       EDUARDA Minor               Electronically signed by EDUARDA Minor at 7/12/2017  1:52 PM      Jose Ballesteros MD at 7/24/2017  6:48 AM          H&P reviewed. The patient was examined and there are no changes to the H&P.     The patient voiced understanding of the risks, benefits, and alternative forms of treatment that were  discussed and the patient consents to proceed with surgery.  All risks, benefits and alternatives were discussed.  Risks including to but not exclusive to anesthetic complications, including death, MI, CVA, infection, bleeding DVT, fracture, residual pain and need for future surgery.  This discussion was held with the patient by Jose Ballesteros MD and all questions were answered.       Electronically signed by Jose Ballesteros MD at 7/24/2017  6:51 AM    Source Note             Mini Andersen is a 62 y.o. female   Primary Care Provider Hawa Aquino MD     Chief Complaint   Patient presents with   • Left Knee - Pain   • Pre-op Exam   Pain scale today 8/10     HISTORY OF PRESENT ILLNESS:  Mini Andersen is here for followup of left knee pain and updated pre-op evaluation for left total knee arthroplasty. Medical and surgical history reviewed today. Home medication list reviewed today. The pain has not improved despite long term treatment with multiple conservative management trials.      Prior Arthritis treatments: [x]  PT    [x]  HEP      [x]  Attempt weight loss  [x]  NSAIDS      [x]  Cane   [x]  Intra-articular steroid inj      [x]  Viscosupplementation    Pain is chronic dull ache that is severe at times and worse with activity.  Difficulty with ADL's.  Patient is not happy with current quality of life and wants to discuss proceeding with surgical intervention.     CONCURRENT MEDICAL HISTORY:    Past Medical History:   Diagnosis Date   • Acute peritonitis    • Allergic rhinitis    • Bee sting    • Borderline glaucoma    • Chronic bronchitis    • Constipation     severe with an immotile colon      • Deep venous thrombosis    • Hypothyroidism    • Intestinal volvulus    • Muscle atrophy     O/E   • Nuclear senile cataract    • Nuclear senile cataract    • Polyneuropathy in diabetes    • Type 2 diabetes mellitus     NO BDR   • Type 2 diabetes mellitus without complications    • Unspecified disease  "of nail        Allergies   Allergen Reactions   • Bextra [Valdecoxib] Shortness Of Breath   • Cephalosporins Shortness Of Breath   • Detrol La [Tolterodine Tartrate Er] Shortness Of Breath   • Dicyclomine Shortness Of Breath   • Fluoxetine Shortness Of Breath   • Keflex [Cephalexin] Shortness Of Breath   • Toprol Xl [Metoprolol] Anaphylaxis   • Chocolate Flavor    • Tape    • Amoxicillin Rash   • Aspirin Rash   • Ciprofloxin Hcl [Ciprofloxacin] Rash   • Claritin-D 12 Hour [Loratadine-Pseudoephedrine Er] Rash   • Codeine Rash   • Macrobid [Nitrofurantoin] Rash   • Morphine And Related Rash   • Paxil [Paroxetine Hcl] Rash   • Prozac [Fluoxetine Hcl] Rash   • Sulfa Antibiotics Rash   • Tramadol Rash   • Vioxx [Rofecoxib] Rash   • Zegerid [Omeprazole] Rash   • Zoloft [Sertraline Hcl] Rash         Current Outpatient Prescriptions:   •  clonazePAM (KlonoPIN) 1 MG tablet, Take 1 mg by mouth 2 (Two) Times a Day As Needed for Seizures., Disp: , Rfl:   •  atorvastatin (LIPITOR) 20 MG tablet, Take 1 tablet by mouth Daily., Disp: 30 tablet, Rfl: 12  •  B-D 3CC LUER-AXEL SYR 85HR2-2/2 22G X 1-1/2\" 3 ML misc, , Disp: , Rfl: 12  •  clobetasol (TEMOVATE) 0.05 % cream, , Disp: , Rfl:   •  cyanocobalamin 1000 MCG/ML injection, , Disp: , Rfl: 12  •  diphenhydrAMINE (BENADRYL) 25 mg capsule, , Disp: , Rfl: 6  •  levothyroxine (SYNTHROID, LEVOTHROID) 50 MCG tablet, Take 50 mcg by mouth daily., Disp: , Rfl: 1  •  lidocaine (XYLOCAINE) 5 % ointment, , Disp: , Rfl:   •  metFORMIN (GLUCOPHAGE) 500 MG tablet, Take 500 mg by mouth 2 (two) times a day., Disp: , Rfl: 1  •  MONUROL 3 G pack, , Disp: , Rfl: 0  •  nystatin (MYCOSTATIN) 147036 UNIT/GM powder, , Disp: , Rfl: 2  •  ondansetron (ZOFRAN) 4 MG tablet, , Disp: , Rfl: 0  •  ONE TOUCH ULTRA TEST test strip, TEST BID, Disp: , Rfl: 5  •  oxyCODONE-acetaminophen (PERCOCET)  MG per tablet, 3 (Three) Times a Day As Needed., Disp: , Rfl: 0  •  vitamin D (ERGOCALCIFEROL) 24175 UNITS capsule " "capsule, Take 50,000 Units by mouth as needed., Disp: , Rfl: 1    Past Surgical History:   Procedure Laterality Date   • COLECTOMY PARTIAL / TOTAL  05/22/2014    Subtotal colectomy with ineo-rectostomy.   • INJECTION OF MEDICATION  12/07/2010    DEPO MEDROL   • OTHER SURGICAL HISTORY      Multiple gynecologic procedures       History reviewed. No pertinent family history.    Social History     Social History   • Marital status:      Spouse name: N/A   • Number of children: N/A   • Years of education: N/A     Occupational History   • Not on file.     Social History Main Topics   • Smoking status: Former Smoker   • Smokeless tobacco: Not on file   • Alcohol use No   • Drug use: Not on file   • Sexual activity: Not on file     Other Topics Concern   • Not on file     Social History Narrative        Review of Systems    PHYSICAL EXAMINATION:       Ht 72\" (182.9 cm)  Wt 223 lb (101 kg)  BMI 30.24 kg/m2    Physical Exam   Constitutional: She is oriented to person, place, and time. Vital signs are normal. She appears well-developed and well-nourished.   HENT:   Head: Normocephalic.   Cardiovascular: Normal heart sounds.    Pulmonary/Chest: Effort normal and breath sounds normal. No respiratory distress.   Abdominal: Soft. She exhibits no distension.   Musculoskeletal:        Right knee: She exhibits no effusion.        Left knee: She exhibits no effusion.   Neurological: She is alert and oriented to person, place, and time. GCS eye subscore is 4. GCS verbal subscore is 5. GCS motor subscore is 6.   Skin: Skin is warm, dry and intact.   Psychiatric: She has a normal mood and affect. Her speech is normal and behavior is normal. Judgment and thought content normal. Cognition and memory are normal.   Vitals reviewed.      GAIT:     []  Normal  [x]  Antalgic    Assistive device: [x]  None  []  Walker     []  Crutches  []  Cane     []  Wheelchair  []  Stretcher    Right Knee Exam     Tenderness   Right knee tenderness " location: diffuse.    Range of Motion   Extension: abnormal   Flexion: abnormal     Muscle Strength     The patient has normal right knee strength.    Tests   Drawer:       Anterior - negative      Varus: negative  Valgus: negative    Other   Erythema: absent  Sensation: normal  Pulse: present  Swelling: none  Other tests: no effusion present    Comments:  Pain and crepitus with flexion and extension.       Left Knee Exam     Tenderness   Left knee tenderness location: diffuse.    Range of Motion   Extension: abnormal   Flexion: abnormal     Muscle Strength     The patient has normal left knee strength.    Tests   Drawer:       Anterior - negative       Varus: negative  Valgus: negative    Other   Erythema: absent  Sensation: normal  Pulse: present  Swelling: none  Effusion: no effusion present    Comments:  Pain and crepitus with flexion and extension.                     PRIOR XRAYS FOR REVIEW:      Standing AP of both knees with lateral views of the left reveal severe medial compartmental joint degenerative changes with complete medial compartmental collapse and significant patellofemoral compartmental generative changes without any acute bony abnormality noted  01/20/17 at 2:45 PM by EDUARDA Nunez      Previous or Active DVT/PE: YES   SHE IS NOT A CANDIDATE FOR TXA    ASSESSMENT:    Diagnoses and all orders for this visit:    Osteoporosis    Localized osteoarthritis of left knee      PLAN    The patient voiced understanding of the risks, benefits, and alternative forms of treatment that were discussed and the patient consents to proceed with surgery.  All risks, benefits and alternatives were discussed.  Risks including to but not exclusive to anesthetic complications, including death, MI, CVA, infection, bleeding DVT, fracture, residual pain and need for future surgery.    High risk due to DM and prior DVT    Scheduled for left total knee arthroplasty by Dr. Ballesteros on 7/24/17     Continue HEP  Continue  strength and conditioning     Patient has seen cardiology for pre-op clearance, Dr. Root.      Return to office for Post-operative evaluation.       EDUARDA Minor                Electronically signed by EDUARDA Minor at 7/12/2017  1:52 PM                 Operative/Procedure Notes (all)      Jose Ballesteros MD at 7/24/2017 10:00 AM  Version 1 of 1          TOTAL KNEE ARTHROPLASTY ATTUNE  Procedure Note    Name:    Mini Andersen  YOB: 1955  Date of surgery:   7/24/2017    Pre-op Diagnosis:   Acquired hypothyroidism [E03.9]  History of DVT (deep vein thrombosis) [Z86.718]  Primary osteoarthritis of left knee [M17.12]  Chronic pain of left knee [M25.562, G89.29]  Localized osteoarthritis of left knee [M17.9]  Type 2 diabetes mellitus without complication, without long-term current use of insulin [E11.9]    Post-op Diagnosis:    Post-Op Diagnosis Codes:     * Acquired hypothyroidism [E03.9]     * History of DVT (deep vein thrombosis) [Z86.718]     * Primary osteoarthritis of left knee [M17.12]     * Chronic pain of left knee [M25.562, G89.29]     * Localized osteoarthritis of left knee [M17.9]     * Type 2 diabetes mellitus without complication, without long-term current use of insulin [E11.9]    Procedure:  Procedure(s):  TOTAL KNEE ARTHROPLASTY ATTUNE with adductor canal block - left    Surgeon(s):  Jose Ballesteros MD    SURGEON: Jose Ballesteros MD    ASSISTANT:  Beverley Abel CST    Anesthesia: Spinal    Staff:   Circulator: Eloise Ramírez RN  Scrub Person: Desirae Guardado  Assistant: Beverley Abel CSA    Estimated Blood Loss: 200 mL    Specimens:                  ID Type Source Tests Collected by Time Destination   A : bone and soft tissue right knee Tissue Knee, Right TISSUE EXAM Jose Ballesteros MD 7/24/2017 0848          Drains:   Drain/Device Site 07/24/17 0911 Left knee gravity (Active)       [REMOVED] Urethral Catheter  07/24/17 0950 100% silicone 16 (Removed)   Removed 07/24/17 0951       Findings:  End-stage osteoarthritis Left knee    Complications: None    IMPLANTS:     Implant Name Type Inv. Item Serial No.  Lot No. LRB No. Used   CMT BONE SMARTSET HV 40GM - [id number] - [id number] Implant CMT BONE SMARTSET HV 40GM [id number] [id number] Left 1   CMT BONE SMARTSET HV 40GM - [id number] - [id number] Implant CMT BONE SMARTSET HV 40GM [id number] [id number] Left 1   BASE TIB ATTUNE CMT RP SZ6 - [id number] - [id number] Implant BASE TIB ATTUNE CMT RP SZ6 [id number] [id number] Left 1   COMP PAT ATTUNE CMT MEDL/DOME 32MM - [id number] - [id number] Implant COMP PAT ATTUNE CMT MEDL/DOME 32MM [id number] DEPUY [id number] Left 1   COMP FEM ATTUNE CMT PS SZ6 LT - [id number] - [id number] Implant COMP FEM ATTUNE CMT PS SZ6 LT [id number] DEPUY [id number] Left 1   INSRT TIB ATTUNE PS RP SZ6 8MM - [id number] - [id number] Implant INSRT TIB ATTUNE PS RP SZ6 8MM [id number] DEPUY [id number] Left 1         PROCEDURE:    The patient was taken to the operating room and placed in the supine position. Preoperative antibiotics were administered. Surgical time out was performed. After adequate induction of anesthesia, the leg was prepped and draped in the usual sterile fashion, exsanguinated with an Ace bandage and the tourniquet inflated to 300 mmHg. A midline incision was performed followed by a medial parapatellar arthrotomy.    Attention was then placed to the patella. The patella was noted to track centrally through range of motion. The patella was then sized with the trials. The thickness of the patella was then measured and the cut was made in a free-hand technique. The patella protector was then placed and the surrounding osteophytes were removed.   The patella was subluxed laterally in a non-everted position.  A portion of the fat pad, ACL, and anterior horns of the meniscus were excised.     The drill hole was placed in the distal femur and the canal was  the irrigated and suctioned. The IM leigh was placed and a 5 degree distal valgus cut was performed on the femur. The femur was then sized with a sizing guide. The femoral cutting block was placed and all femoral cuts were performed. The proximal tibia was exposed. We used the extramedullary tibial cutting guide set for removal of an appropriate amount of proximal tibia. The tibial cut was performed. The posterior horns of the menisci were excised. The posterior osteophytes were removed. Flexion extension blocks were then used to balance the knee. The tibial cut surface was then sized with the sizing templates and the tibial and femoral trial were then placed. The knee was placed in full extension and then the patella was re-addressed. The patella was resurfaced and the preoperative thickness was reproduced. The patella tracked centrally through range of motion.     At this point all trial components were removed, the knee was copiously irrigated with pulsed lavage.. The cut surfaces were then dried with clean lap sponges, and the components were cemented tibia, followed by femur, then patella. The knee was held in full extension and all excess cement was removed. The knee was held still until the cement had completely hardened. We then placed the trial polyethylene spacer which resulted in full extension and excellent flexion-extension balance. We placed the final polyethylene spacer.    The knee was then copiously irrigated. The tourniquet was then released. There was excellent hemostasis. We placed a 10 [place] Hemovac drain. We closed the knee in multiple layers in standard fashion. Sterile dressing were applied. At the end of the case, the sponge and needle counts were reported as being correct. There were no known complications. The patient was then transported to the recovery room in satisfactory condition..      Jose Ballesteros MD     Date: 7/24/2017  Time: 10:01 AM       Electronically signed by Jose  Shivam Ballesteros MD at 7/24/2017 10:01 AM

## 2017-07-26 NOTE — THERAPY DISCHARGE NOTE
Acute Care - Physical Therapy Treatment Note/Discharge  [facility]     Patient Name: Mini Andersen  : 1955  MRN: [id number]  Today's Date: 2017  Onset of Illness/Injury or Date of Surgery Date: 17  Date of Referral to PT: 17  Referring Physician: Dr. Ballesteros    Admit Date: 2017    Visit Dx:    ICD-10-CM ICD-9-CM   1. Primary osteoarthritis of left knee M17.12 715.16   2. Chronic pain of left knee M25.562 719.46    G89.29 338.29   3. Localized osteoarthritis of left knee M17.9 715.36   4. Type 2 diabetes mellitus without complication, without long-term current use of insulin E11.9 250.00   5. Acquired hypothyroidism E03.9 244.9   6. History of DVT (deep vein thrombosis) Z86.718 V12.51   7. Impaired physical mobility Z74.09 781.99   8. Impaired mobility and ADLs Z74.09 799.89   9. Gastroesophageal reflux disease without esophagitis K21.9 530.81   10. Obstructive sleep apnea syndrome G47.33 327.23   11. Palpitations R00.2 785.1     Patient Active Problem List   Diagnosis   • Palpitations   • Type 2 diabetes mellitus   • Hypothyroidism   • Primary osteoarthritis of left knee   • Knee pain   • Diabetes mellitus without complication   • Borderline glaucoma   • Nuclear cataract   • Abdominal pain   • Cobalamin deficiency   • Cramp of limb   • Depression   • Gastroesophageal reflux disease   • Callus of foot   • Deformity of foot   • Foot pain   • History of colonoscopy   • Low back pain   • Mononeuritis   • Spasm   • Neuropathy   • Lung field abnormal   • Encounter for screening mammogram for malignant neoplasm of breast   • Cardiac arrhythmia   • Arthralgia of lower leg   • Arthralgia of shoulder   • Pure hypercholesterolemia   • Seasonal allergic rhinitis   • Disorder of hand   • Osteoporosis   • Pre-operative clearance   • Type 2 diabetes mellitus without complication, without long-term current use of insulin   • Angina pectoris    • SOB (shortness of breath)   • Obstructive sleep  apnea syndrome   • Chronic pain of left knee       Physical Therapy Education     Title: PT OT SLP Therapies (Done)     Topic: Physical Therapy (Resolved)     Point: Mobility training (Resolved)    Learning Progress Summary    Learner Readiness Method Response Comment Documented by Status   Patient Acceptance D NR  CB 07/24/17 1403 Active               Point: Home exercise program (Resolved)    Learning Progress Summary    Learner Readiness Method Response Comment Documented by Status   Patient Eager E,TB,D,H VU,DU HEP given AM 07/25/17 0915 Done               Point: Precautions (Resolved)    Learning Progress Summary    Learner Readiness Method Response Comment Documented by Status   Patient Acceptance D NR  CB 07/24/17 1403 Active                      User Key     Initials Effective Dates Name Provider Type Discipline     04/06/17 -  Ivonne Osborn, PT Physical Therapist PT    AM 10/17/16 -  Jaden Mtz, PTA Physical Therapy Assistant PT                    IP PT Goals       07/26/17 0800 07/25/17 1310 07/24/17 1249    Bed Mobility PT STG    Bed Mobility PT STG, Date Established   07/24/17  -CB    Bed Mobility PT STG, Time to Achieve   2 - 3 days  -CB    Bed Mobility PT STG, Activity Type   supine to sit/sit to supine  -CB    Bed Mobility PT STG, [place] Level   independent  -CB    Transfer Training Goal, Assist Device   bed rails  -CB    Bed Mobility PT STG, Additional Goal   HOB up or down  -CB    Bed Mobility PT STG, Date Goal Reviewed 07/26/17  -AM 07/25/17  -AM     Bed Mobility PT STG, Outcome goal met  -AM goal not met  -AM     Gait Training PT LTG    Gait Training Goal PT LTG, Date Established   07/24/17  -CB    Gait Training Goal PT LTG, Time to Achieve   by discharge  -CB    Gait Training Goal PT LTG, [place] Level   contact guard assist  -CB    Gait Training Goal PT LTG, Assist Device   walker, rolling  -CB    Gait Training Goal PT LTG, Distance to Achieve   200 feet  -CB    Gait Training  Goal PT LTG, Date Goal Reviewed 07/26/17  -AM 07/25/17  -AM     Gait Training Goal PT LTG, Outcome goal not met  -AM goal not met  -AM     Gait Training Goal PT LTG, Reason Goal Not Met discharged from facility  -AM      Strength Goal PT LTG    Strength Goal PT LTG, Date Established   07/24/17  -CB    Strength Goal PT LTG, Time to Achieve   by discharge  -CB    Strength Goal PT LTG, Measure to Achieve   20 reps all ex BLE independently with no knee lag on L  -CB    Strength Goal PT LTG, Functional Goal   get legs up onto bed without using UE  -CB    Strength Goal PT LTG, Date Goal Reviewed 07/26/17  -AM 07/25/17  -AM     Strength Goal PT LTG, Outcome goal not met  -AM goal not met  -AM     Strength Goal PT LTG, Reason Goal Not Met discharged from facility  -AM      Range of Motion PT LTG    Range of Motion Goal PT LTG, Date Established   07/24/17  -CB    Range of Motion Goal PT LTG, Time to Achieve   by discharge  -CB    Range fo Motion Goal PT LTG, Joint   L knee  -CB    Range of Motion Goal PT LTG, AROM Measure   0-90  -CB    Range of Motion Goal PT LTG, Date Goal Reviewed 07/26/17  -AM 07/25/17  -AM     Range of Motion Goal PT LTG, Outcome goal not met  -AM goal not met  -AM     Reason Goal Not Met (ROM) PT LTG discharged from facility  -AM        User Key  (r) = Recorded By, (t) = Taken By, (c) = Cosigned By    Initials Name Provider Type    MT Osborn, PT Physical Therapist    AM Jaden Mtz, TIARA Physical Therapy Assistant              Adult Rehabilitation Note       07/26/17 0926 07/26/17 0800 07/25/17 1310    Rehab Assessment/Intervention    Discipline occupational therapy assistant  -CS physical therapy assistant  -AM physical therapy assistant  -AM    Document Type therapy note (daily note)  -CS therapy note (daily note)  -AM therapy note (daily note)  -AM    Subjective Information agree to therapy  -CS agree to therapy;complains of;pain  -AM agree to therapy;complains of;pain  -AM    Patient  Effort, Rehab Treatment  good  -AM good  -AM    Symptoms Noted During/After Treatment  none  -AM increased pain  -AM    Precautions/Limitations  other (see comments)   WBAT LLE  -AM other (see comments)   WBAT LLE  -AM    Equipment Issued to Patient  gait belt  -AM gait belt  -AM    Recorded by [CS] DRAKE Segal/TONE [AM] Jaden Mtz PTA [AM] Jaden Mtz PTA    Vital Signs    Pre Systolic BP Rehab  142  -  -AM    Pre Treatment Diastolic BP  69  -AM 52  -AM    Post Systolic BP Rehab  137  -  -AM    Post Treatment Diastolic BP  67  -AM 65  -AM    Pretreatment Heart Rate (beats/min) 99  -  -  -AM    Posttreatment Heart Rate (beats/min) 109  -  -  -AM    Pre SpO2 (%) 96  -CS 95  -AM 93  -AM    O2 Delivery Pre Treatment room air  -CS room air  -AM room air  -AM    Post SpO2 (%) 92  -CS 96  -AM 94  -AM    O2 Delivery Post Treatment room air  -CS room air  -AM room air  -AM    Pre Patient Position Sitting  -CS Supine  -AM Supine  -AM    Intra Patient Position Standing  -CS Standing  -AM Standing  -AM    Post Patient Position Sitting  -CS Sitting  -AM Sitting  -AM    Recorded by [CS] DRAKE Segal/TONE [AM] Jaden Mtz PTA [AM] Jaden Mtz PTA    Pain Assessment    Pain Assessment 0-10  -CS 0-10  -AM 0-10  -AM    Pain Score 9  -CS 10  -AM 8  -AM    Post Pain Score 9  -CS 9  -AM 9  -AM    Pain Type Acute pain;Surgical pain  -CS Acute pain;Surgical pain  -AM Acute pain;Surgical pain  -AM    Pain Location Knee  -CS Knee  -AM Knee  -AM    Pain Orientation Left  -CS Left  -AM Left  -AM    Pain Descriptors  Sore  -AM Sore  -AM    Pain Frequency  Constant/continuous  -AM Constant/continuous  -AM    Date Pain First Started  07/24/17  -AM 07/24/17  -AM    Clinical Progression  Gradually improving  -AM Gradually improving  -AM    Patient's Stated Pain Goal  No pain  -AM No pain  -AM    Pain Intervention(s) Medication (See MAR)  -CS Medication (See MAR);Cold  applied;Ambulation/increased activity  -AM Medication (See MAR);Cold applied;Ambulation/increased activity  -AM    Result of Injury  No  -AM No  -AM    Work-Related Injury  No  -AM No  -AM    Multiple Pain Sites  No  -AM No  -AM    Recorded by [CS] DRAKE Segal/TONE [AM] Jaden Mtz PTA [AM] Jaden Mtz PTA    Cognitive Assessment/Intervention    Current Cognitive/Communication Assessment functional  -CS functional  -AM functional  -AM    Orientation Status oriented x 4  -CS oriented x 4  -AM oriented x 4  -AM    Follows Commands/Answers Questions 100% of the time  -% of the time  -% of the time  -AM    Personal Safety Interventions gait belt;nonskid shoes/slippers when out of bed  -CS gait belt;nonskid shoes/slippers when out of bed;supervised activity  -AM gait belt;nonskid shoes/slippers when out of bed;supervised activity  -AM    Recorded by [CS] DRAKE Segal/TONE [AM] Jaden Mtz PTA [AM] Jaden Mtz PTA    ROM (Range of Motion)    General ROM  lower extremity range of motion deficits identified  -AM lower extremity range of motion deficits identified  -AM    Recorded by  [AM] Jaden Mtz PTA [AM] Jaden Mtz PTA    General LE Assessment    ROM  knee, left: LE ROM deficit  -AM knee, left: LE ROM deficit  -AM    Recorded by  [AM] Jaden Mtz PTA [AM] Jaden Mtz PTA    Left Knee    Extension/Flexion AROM  within functional limits   10-74  -AM within functional limits   10-64  -AM    Recorded by  [AM] Jaden Mtz PTA [AM] Jaden Mtz PTA    Mobility Assessment/Training    Extremity Weight-Bearing Status  left lower extremity  -AM left lower extremity  -AM    Left Lower Extremity Weight-Bearing  weight-bearing as tolerated  -AM weight-bearing as tolerated  -AM    Recorded by  [AM] Jaden Mtz PTA [AM] Jaden Mtz PTA    Bed Mobility, Assessment/Treatment    Bed Mobility, Assistive Device  head of bed elevated  -AM head of bed  elevated  -AM    Bed Mobility, Roll Left, [place]  not tested  -AM not tested  -AM    Bed Mobility, Roll Right, [place]  not tested  -AM not tested  -AM    Bed Mobility, Scoot/Bridge, [place]  not tested  -AM not tested  -AM    Bed Mob, Supine to Sit, [place]  supervision required  -AM minimum assist (75% patient effort)  -AM    Bed Mob, Sit to Supine, [place]  supervision required  -AM not tested  -AM    Bed Mob, Sidelying to Sit, [place]  not tested  -AM not tested  -AM    Bed Mob, Sit to Sidelying, [place]  not tested  -AM not tested  -AM    Bed Mobility, Safety Issues  decreased use of arms for pushing/pulling;decreased use of legs for bridging/pushing  -AM decreased use of arms for pushing/pulling;decreased use of legs for bridging/pushing  -AM    Bed Mobility, Impairments  ROM decreased;strength decreased;pain  -AM ROM decreased;strength decreased;pain  -AM    Recorded by  [AM] Jaden Mtz, PTA [AM] Jaden Mtz, PTA    Transfer Assessment/Treatment    Transfers, Bed-Chair [place]  stand by assist  -AM contact guard assist  -AM    Transfers, Chair-Bed [place]  stand by assist  -AM contact guard assist  -AM    Transfers, Bed-Chair-Bed, Assist Device  rolling walker  -AM rolling walker  -AM    Transfers, Sit-Stand [place] supervision required  -CS stand by assist  -AM contact guard assist  -AM    Transfers, Stand-Sit [place] supervision required  -CS stand by assist  -AM contact guard assist  -AM    Transfers, Sit-Stand-Sit, Assist Device rolling walker  -CS rolling walker  -AM rolling walker  -AM    Toilet Transfer, [place]  supervision required  -AM contact guard assist  -AM    Toilet Transfer, Assistive Device  bedside commode without drop arms;rolling walker  -AM bedside commode without drop arms;rolling walker  -AM    Walk-In Shower Transfer, [place]  not tested  -AM not tested  -AM    Bathtub Transfer, [place]  not tested  -AM not  tested  -AM    Transfer, Maintain Weight Bearing Status  able to maintain weight bearing status  -AM able to maintain weight bearing status  -AM    Transfer, Safety Issues  step length decreased  -AM step length decreased  -AM    Transfer, Impairments  ROM decreased;strength decreased;pain  -AM ROM decreased;strength decreased;pain  -AM    Recorded by [CS] CANDIS Segal [AM] Jaden Mtz PTA [AM] Jaden Mtz PTA    Gait Assessment/Treatment    Gait, [place] Level  stand by assist  -AM contact guard assist  -AM    Gait, Assistive Device  rolling walker  -AM rolling walker  -AM    Gait, Distance (Feet)  175  -   125+15  -AM    Gait, Gait Pattern Analysis  3-point gait  -AM 3-point gait  -AM    Gait, Gait Deviations  bilateral:;[place] decreased  -AM bilateral:;l[place] decreased  -AM    Gait, Maintain Weight Bearing Status  able to maintain weight bearing status  -AM able to maintain weight bearing status  -AM    Gait, Safety Issues  step length decreased  -AM step length decreased  -AM    Gait, Impairments  ROM decreased;strength decreased;pain  -AM ROM decreased;strength decreased;pain  -AM    Recorded by  [AM] Jaden Mtz PTA [AM] Jaden Mtz PTA    Stairs Assessment/Treatment    Number of Stairs  Ramp  -AM     Stairs, Handrail Location  none  -AM     Stairs, [place] Level  contact guard assist  -AM not tested  -AM    Stairs, Assistive Device  walker  -AM     Stairs, Technique Used  step to step (ascending);step to step (descending)  -AM     Stairs, Maintain Weight Bearing Status  able to maintain weight bearing status  -AM     Stairs, Impairments  ROM decreased;strength decreased;pain  -AM     Recorded by  [AM] Jaden Mtz PTA [AM] Jaden Mtz PTA    Upper Body Bathing Assessment/Training    UB Bathing Assess/Train Assistive Device bath mitt  -      UB Bathing Assess/Train, Position sitting  -      UB Bathing Assess/Train, [place] Level set up  required;independent  -CS      Recorded by [CS] CANDIS Segal      Lower Body Bathing Assessment/Training    LB Bathing Assess/Train Assistive Device bath mitt  -CS      LB Bathing Assess/Train, Position sitting;standing  -CS      LB Bathing Assess/Train, [place] Level contact guard assist  -CS      Recorded by [CS] CANDIS Segal      Upper Body Dressing Assessment/Training    UB Dressing Assess/Train, Clothing Type doffing:;donning:;hospital gown;t-shirt  -CS      UB Dressing Assess/Train, Position sitting  -CS      UB Dressing Assess/Train, [place] independent  -CS      Recorded by [CS] CANDIS Segal      Lower Body Dressing Assessment/Training    LB Dressing Assess/Train, Clothing Type doffing:;donning:;pants;slipper socks   underware  -CS      LB Dressing Assess/Train, Assist Device dressing stick;reacher;sock-aid  -CS      LB Dressing Assess/Train, Position sitting;standing  -CS      LB Dressing Assess/Train, [place] minimum assist (75% patient effort)  -CS      Recorded by [CS] CANDIS Segal      Grooming Assessment/Training    Grooming Assess/Train, Position sitting  -CS      Grooming Assess/Train, [place] Level set up required  -CS      Recorded by [CS] CANDIS Segal      Therapy Exercises    Bilateral Lower Extremities  AROM:;20 reps;supine;sitting;ankle pumps/circles;glut sets;heel slides;quad sets;SLR  -AM AROM:;20 reps;supine;sitting;ankle pumps/circles;glut sets;heel slides;quad sets;SLR  -AM    Bilateral Upper Extremity AROM:;20 reps;sitting;elbow flexion/extension;hand pumps;pronation/supination;shoulder extension/flexion;shoulder ER/IR  -CS      BUE Resistance manual resistance- minimal  -CS      Recorded by [CS] CANDIS Segal [AM] Jaden Mtz, PTA [AM] Jaden Mtz, PTA    Positioning and Restraints    Pre-Treatment Position sitting in chair/recliner  -CS in bed  -AM in bed  -AM    Post Treatment Position chair   -CS chair  -AM chair  -AM    In Chair reclined;call light within reach  -CS reclined;call light within reach;encouraged to call for assist;legs elevated  -AM reclined;call light within reach;encouraged to call for assist;with family/caregiver;legs elevated  -AM    Recorded by [CS] DRAKE Segal/TONE [AM] Jaden Mtz PTA [AM] Jaden tMz PTA      07/25/17 1307 07/25/17 0915       Rehab Assessment/Intervention    Discipline occupational therapy assistant  -CS physical therapy assistant  -AM     Document Type therapy note (daily note)  -CS therapy note (daily note)  -AM     Subjective Information agree to therapy  -CS agree to therapy;complains of;pain  -AM     Patient Effort, Rehab Treatment  good  -AM     Symptoms Noted During/After Treatment  fatigue  -AM     Precautions/Limitations  other (see comments)   WBAT LLE  -AM     Equipment Issued to Patient  gait belt  -AM     Recorded by [CS] DRAKE Segal/TONE [AM] Jaden Mtz PTA     Vital Signs    Pre Systolic BP Rehab  123  -AM     Pre Treatment Diastolic BP  60  -AM     Post Systolic BP Rehab  137  -AM     Post Treatment Diastolic BP  65  -AM     Pretreatment Heart Rate (beats/min) 109  -  -AM     Posttreatment Heart Rate (beats/min) 112  -CS 99  -AM     Pre SpO2 (%) 93  -CS 97  -AM     O2 Delivery Pre Treatment room air  -CS room air  -AM     Post SpO2 (%) 98  -CS 94  -AM     O2 Delivery Post Treatment room air  -CS room air  -AM     Pre Patient Position Supine  -CS Sitting  -AM     Intra Patient Position  Standing  -AM     Post Patient Position  Supine  -AM     Recorded by [CS] DRAKE Segal/TONE [AM] Jaden Mtz PTA     Pain Assessment    Pain Assessment 0-10  -CS 0-10  -AM     Pain Score 8  -CS 10  -AM     Post Pain Score 8  -CS 10  -AM     Pain Type Surgical pain  -CS Acute pain;Surgical pain  -AM     Pain Location Knee  -CS Knee  -AM     Pain Orientation Left  -CS Left  -AM     Pain Descriptors  Sore  -AM     Pain  Frequency  Constant/continuous  -AM     Date Pain First Started  07/24/17  -AM     Clinical Progression  Gradually improving  -AM     Patient's Stated Pain Goal  No pain  -AM     Pain Intervention(s) Medication (See MAR)  -CS Medication (See MAR);Cold applied;Ambulation/increased activity  -AM     Result of Injury  No  -AM     Work-Related Injury  No  -AM     Multiple Pain Sites  No  -AM     Recorded by [CS] DRAKE Segal/TONE [AM] Jaden Mtz PTA     Vision Assessment/Intervention    Visual Impairment  --  -AM     Recorded by  [AM] Jaden Mtz PTA     Cognitive Assessment/Intervention    Current Cognitive/Communication Assessment functional  -CS functional  -AM     Orientation Status oriented x 4  -CS oriented x 4  -AM     Follows Commands/Answers Questions 100% of the time  -% of the time  -AM     Personal Safety Interventions  gait belt;nonskid shoes/slippers when out of bed;supervised activity  -AM     Recorded by [CS] DRAKE Segal/TONE [AM] Jaden Mtz PTA     ROM (Range of Motion)    General ROM  lower extremity range of motion deficits identified  -AM     Recorded by  [AM] Jaden Mtz PTA     General LE Assessment    ROM  knee, left: LE ROM deficit  -AM     Recorded by  [AM] Jaden Mtz PTA     Left Knee    Extension/Flexion AROM  within functional limits   16-74  -AM     Recorded by  [AM] Jaden Mtz PTA     Mobility Assessment/Training    Extremity Weight-Bearing Status  left lower extremity  -AM     Left Lower Extremity Weight-Bearing  weight-bearing as tolerated  -AM     Recorded by  [AM] Jaden Mtz PTA     Bed Mobility, Assessment/Treatment    Bed Mobility, Assistive Device  head of bed elevated  -AM     Bed Mobility, Roll Left, [place]  not tested  -AM     Bed Mobility, Roll Right, [place]  not tested  -AM     Bed Mobility, Scoot/Bridge, [place]  not tested  -AM     Bed Mob, Supine to Sit, [place]  minimum assist (75% patient  effort)  -AM     Bed Mob, Sit to Supine, [place]  not tested  -AM     Bed Mob, Sidelying to Sit, [place]  not tested  -AM     Bed Mob, Sit to Sidelying, [place]  not tested  -AM     Bed Mobility, Safety Issues  decreased use of arms for pushing/pulling;decreased use of legs for bridging/pushing  -AM     Bed Mobility, Impairments  ROM decreased;strength decreased;pain  -AM     Recorded by  [AM] Jaden Mtz PTA     Transfer Assessment/Treatment    Transfers, Bed-Chair [place]  contact guard assist  -AM     Transfers, Chair-Bed [place]  contact guard assist  -AM     Transfers, Bed-Chair-Bed, Assist Device  rolling walker  -AM     Transfers, Sit-Stand [place]  contact guard assist  -AM     Transfers, Stand-Sit [place]  contact guard assist  -AM     Transfers, Sit-Stand-Sit, Assist Device  rolling walker  -AM     Toilet Transfer, [place]  not tested  -AM     Walk-In Shower Transfer, [place]  not tested  -AM     Bathtub Transfer, [place]  not tested  -AM     Transfer, Maintain Weight Bearing Status  able to maintain weight bearing status  -AM     Transfer, Safety Issues  step length decreased  -AM     Transfer, Impairments  ROM decreased;strength decreased;pain  -AM     Recorded by  [AM] Jaden Mtz PTA     Gait Assessment/Treatment    Gait, [place] Level  contact guard assist  -AM     Gait, Assistive Device  rolling walker  -AM     Gait, Distance (Feet)  70  -AM     Gait, Gait Pattern Analysis  3-point gait  -AM     Gait, Gait Deviations  bilateral:;[place] decreased  -AM     Gait, Maintain Weight Bearing Status  able to maintain weight bearing status  -AM     Gait, Safety Issues  step length decreased  -AM     Gait, Impairments  ROM decreased;strength decreased;pain  -AM     Recorded by  [AM] Jaden Mtz PTA     Stairs Assessment/Treatment    Stairs, [place] Level  not tested  -AM     Recorded by  [AM] Jaden Mtz PTA     Upper Body Bathing  Assessment/Training    UB Bathing Assess/Train Assistive Device bath mitt  -CS      UB Bathing Assess/Train, Position long sitting  -CS      UB Bathing Assess/Train, [place] Level set up required  -CS      Recorded by [CS] CANDIS Segal      Lower Body Bathing Assessment/Training    LB Bathing Assess/Train Assistive Device bath mitt  -CS      LB Bathing Assess/Train, Position long sitting  -CS      LB Bathing Assess/Train, [place] Level minimum assist (75% patient effort)  -CS      Recorded by [CS] CANDIS Segal      Lower Body Dressing Assessment/Training    LB Dressing Assess/Train, Clothing Type doffing:;donning:;slipper socks  -CS      LB Dressing Assess/Train, Position long sitting  -CS      LB Dressing Assess/Train, [place] moderate assist (50% patient effort)  -CS      Recorded by [CS] CANDIS Segal      Grooming Assessment/Training    Grooming Assess/Train, Position long sitting  -CS      Grooming Assess/Train, [place] Level set up required  -CS      Grooming Assess/Train, Comment shampoo cap, oral care, wash face, comb hair, apply deodorant/lotion  -CS      Recorded by [CS] CANDIS Segal      Therapy Exercises    Bilateral Lower Extremities  AROM:;20 reps;supine;sitting;ankle pumps/circles;glut sets;heel slides;quad sets;SLR  -AM     Bilateral Upper Extremity AROM:;20 reps;sitting;elbow flexion/extension;hand pumps;pronation/supination;shoulder extension/flexion;shoulder ER/IR  -CS      BUE Resistance manual resistance- minimal  -CS      Recorded by [CS] CANDIS Segal [AM] Jaden Mtz PTA     Sensory Assessment/Intervention    Light Touch  --  -AM     LLE Light Touch  --  -AM     Recorded by  [AM] Jaden Mtz PTA     Positioning and Restraints    Pre-Treatment Position in bed  -CS sitting in chair/recliner  -AM     Post Treatment Position bed  -CS bed  -AM     In Bed supine;call light within reach;with family/caregiver  -CS  supine;call light within reach;encouraged to call for assist;exit alarm on;with family/caregiver  -AM     Recorded by [CS] DRAKE Segal/TONE [AM] Jaden Mtz PTA       User Key  (r) = Recorded By, (t) = Taken By, (c) = Cosigned By    Initials Name Effective Dates    AM Jaden Mtz PTA 10/17/16 -     CS CANDIS Segal 10/17/16 -           PT Recommendation and Plan  Anticipated Discharge Disposition: home with home health  Planned Therapy Interventions: bed mobility training, gait training, home exercise program, patient/family education, ROM (Range of Motion), strengthening, transfer training  PT Frequency: per priority policy (5-14)  Plan of Care Review  Plan Of Care Reviewed With: patient  Progress: progress toward functional goals as expected  Outcome Summary/Follow up Plan: Pt did not meet any PT goals this date. Pt able to get in/out bed Min A. Pt able to amb 70+125+15 ft w/RW CGA. ROM 10-64. PADD Score 8. Pt would benefit from  PT once discharged from this facility.           Outcome Measures       07/26/17 1100 07/26/17 0800 07/25/17 1500    How much help from another person do you currently need...    Turning from your back to your side while in flat bed without using bedrails?  3  -AM     Moving from lying on back to sitting on the side of a flat bed without bedrails?  3  -AM     Moving to and from a bed to a chair (including a wheelchair)?  3  -AM     Standing up from a chair using your arms (e.g., wheelchair, bedside chair)?  3  -AM     Climbing 3-5 steps with a railing?  1  -AM     To walk in hospital room?  3  -AM     AM-PAC 6 Clicks Score  16  -AM     How much help from another is currently needed...    Putting on and taking off regular lower body clothing? 2  -CS  2  -CS    Bathing (including washing, rinsing, and drying) 2  -CS  2  -CS    Toileting (which includes using toilet bed pan or urinal) 4  -CS  4  -CS    Putting on and taking off regular upper body clothing 3   -CS  3  -CS    Taking care of personal grooming (such as brushing teeth) 3  -CS  3  -CS    Eating meals 4  -CS  4  -CS    Score 18  -CS  18  -CS    Functional Assessment    Outcome Measure Options --  -CS AM-PAC 6 Clicks Basic Mobility (PT)  -AM AM-PAC 6 Clicks Daily Activity (OT)  -CS      07/25/17 1310 07/25/17 0915 07/24/17 1332    How much help from another person do you currently need...    Turning from your back to your side while in flat bed without using bedrails? 3  -AM 3  -AM     Moving from lying on back to sitting on the side of a flat bed without bedrails? 3  -AM 3  -AM     Moving to and from a bed to a chair (including a wheelchair)? 3  -AM 3  -AM     Standing up from a chair using your arms (e.g., wheelchair, bedside chair)? 3  -AM 3  -AM     Climbing 3-5 steps with a railing? 1  -AM 1  -AM     To walk in hospital room? 3  -AM 3  -AM     AM-PAC 6 Clicks Score 16  -AM 16  -AM     How much help from another is currently needed...    Putting on and taking off regular lower body clothing?   2  -RM    Bathing (including washing, rinsing, and drying)   2  -RM    Toileting (which includes using toilet bed pan or urinal)   4  -RM    Putting on and taking off regular upper body clothing   3  -RM    Taking care of personal grooming (such as brushing teeth)   3  -RM    Eating meals   4  -RM    Score   18  -RM    Other Outcome Measure Tool Used    Other Outcome Measure Tool Comments  PADD SCORE   8  -AM     Functional Assessment    Outcome Measure Options AM-PAC 6 Clicks Basic Mobility (PT)  -AM AM-PAC 6 Clicks Basic Mobility (PT);Other Outcome Measure   PADD SCORE  -AM AM-PAC 6 Clicks Daily Activity (OT)  -RM      07/24/17 1249          How much help from another person do you currently need...    Turning from your back to your side while in flat bed without using bedrails? 3  -CB      Moving from lying on back to sitting on the side of a flat bed without bedrails? 3  -CB      Moving to and from a bed to a chair  (including a wheelchair)? 3  -CB      Standing up from a chair using your arms (e.g., wheelchair, bedside chair)? 3  -CB      Climbing 3-5 steps with a railing? 2  -CB      To walk in hospital room? 3  -CB      AM-PAC 6 Clicks Score 17  -CB      Functional Assessment    Outcome Measure Options AM-PAC 6 Clicks Basic Mobility (PT)  -CB        User Key  (r) = Recorded By, (t) = Taken By, (c) = Cosigned By    Initials Name Provider Type    CB Ivonne Osborn, PT Physical Therapist    AM Jaden Mtz PTA Physical Therapy Assistant    NITA Greene, DRAKE/L Occupational Therapy Assistant    RM Keiry Escalera, OT Occupational Therapist           Time Calculation:         PT Charges       07/26/17 0800          Time Calculation    Start Time 0800  -AM      Stop Time 0900  -AM      Time Calculation (min) 60 min  -AM      PT Received On 07/26/17  -AM      PT - Next Appointment 07/26/17  -AM      Time Calculation- PT    Total Timed Code Minutes- PT 60 minute(s)  -AM        User Key  (r) = Recorded By, (t) = Taken By, (c) = Cosigned By    Initials Name Provider Type    AM Jaden Mtz PTA Physical Therapy Assistant          Therapy Charges for Today     Code Description Service Date Service Provider Modifiers Qty    98264147810 HC GAIT TRAINING EA 15 MIN 7/25/2017 Jaden Mtz PTA GP 2    32581738399 HC PT THER PROC EA 15 MIN 7/25/2017 Jaden Mtz PTA GP 2    71363882046 HC GAIT TRAINING EA 15 MIN 7/25/2017 Jaden Mtz PTA GP 1    85884471002 HC PT THER PROC EA 15 MIN 7/25/2017 Jaden Mtz PTA GP 1    96839906654 HC PT SELF CARE/MGMT/TRAIN EA 15 MIN 7/25/2017 Jaden Mtz PTA GP 1    28248632681 HC GAIT TRAINING EA 15 MIN 7/26/2017 Jaden Mtz PTA GP 2    78852590989 HC PT THER PROC EA 15 MIN 7/26/2017 Jaden Mtz PTA GP 2          PT G-Codes  PT Professional Judgement Used?: Yes  Outcome Measure Options: AM-PAC 6 Clicks Basic Mobility (PT)  Score: 17  Functional Limitation:  Mobility: Walking and moving around  Mobility: Walking and Moving Around Current Status (): At least 40 percent but less than 60 percent impaired, limited or restricted  Mobility: Walking and Moving Around Goal Status (): At least 20 percent but less than 40 percent impaired, limited or restricted    PT Discharge Summary  Anticipated Discharge Disposition: home with home health  Reason for Discharge: Discharge from facility, Per MD order  Outcomes Achieved: Unable to make functional progress toward goals at this time  Discharge Destination: Home with home health    Jaden Mtz, PTA  7/26/2017

## 2017-07-26 NOTE — PLAN OF CARE
Problem: Inpatient Occupational Therapy  Goal: Bed Mobility Goal LTG- OT  Outcome: Unable to achieve outcome(s) by discharge Date Met:  07/26/17 07/24/17 1332 07/26/17 1536   Bed Mobility OT LTG   Bed Mobility OT LTG, Time to Achieve by discharge --    Bed Mobility OT LTG, Activity Type all bed mobility --    Bed Mobility OT LTG, [place] Level supervision required --    Bed Mobility OT LTG, Date Goal Reviewed --  07/26/17   Bed Mobility OT LTG, Outcome --  goal not met   Bed Mobility OT LTG, Reason Goal Not Met --  discharged from facility       Goal: Transfer Training Goal 1 LTG- OT  Outcome: Unable to achieve outcome(s) by discharge Date Met:  07/26/17 07/24/17 1332 07/26/17 1536   Transfer Training OT LTG   Transfer Training OT LTG, Date Established 07/24/17 --    Transfer Training OT LTG, Time to Achieve by discharge --    Transfer Training OT LTG, Activity Type all transfers --    Transfer Training OT LTG, [place] Level supervision required --    Transfer Training OT LTG, Assist Device walker, rolling --    Transfer Training OT LTG, Date Goal Reviewed --  07/26/17   Transfer Training OT LTG, Outcome --  goal not met   Transfer Training OT LTG, Reason Goal Not Met --  discharged from facility       Goal: ADL Goal LTG- OT  Outcome: Unable to achieve outcome(s) by discharge Date Met:  07/26/17 07/24/17 1332 07/26/17 1536   ADL OT LTG   ADL OT LTG, Date Established 07/24/17 --    ADL OT LTG, Time to Achieve by discharge --    ADL OT LTG, Activity Type ADL skills  (bath, dress, toilet) --    ADL OT LTG, [place] Level standby assist --    ADL OT LTG, Date Goal Reviewed --  07/26/17   ADL OT LTG, Outcome --  goal not met   ADL OT LTG, Reason Goal Not Met --  discharged from facility       Goal: Functional Mobility Goal LTG- OT  Outcome: Unable to achieve outcome(s) by discharge Date Met:  07/26/17 07/24/17 1332 07/26/17 1536   Functional Mobility OT LTG   Functional Mobility Goal OT LTG,  Date Established 07/24/17 --    Functional Mobility Goal OT LTG, Time to Achieve by discharge --    Functional Mobility Goal OT LTG, [place] Level standby assist --    Functional Mobility Goal OT LTG, Assist Device rolling walker --    Functional Mobility Goal OT LTG, Distance to Achieve in hallway;to the sink;to the bathroom --    Functional Mobility Goal OT LTG, Date Goal Reviewed --  07/26/17   Functional Mobility Goal OT LTG, Outcome --  goal not met   Functional Mobility Goal OT LTG, Reason Goal Not Met --  discharged from facility

## 2017-07-26 NOTE — PLAN OF CARE
Problem: Patient Care Overview (Adult)  Goal: Plan of Care Review  Outcome: Ongoing (interventions implemented as appropriate)    07/26/17 0402   Coping/Psychosocial Response Interventions   Plan Of Care Reviewed With patient   Patient Care Overview   Progress improving   Outcome Evaluation   Outcome Summary/Follow up Plan Patient VSS, ambulated to bathroom walker and assist x1 several times       Goal: Adult Individualization and Mutuality  Outcome: Ongoing (interventions implemented as appropriate)  Goal: Discharge Needs Assessment  Outcome: Ongoing (interventions implemented as appropriate)    Problem: Fall Risk (Adult)  Goal: Absence of Falls  Outcome: Ongoing (interventions implemented as appropriate)    Problem: Knee Replacement, Total (Adult)  Goal: Signs and Symptoms of Listed Potential Problems Will be Absent or Manageable (Knee Replacement, Total)  Outcome: Ongoing (interventions implemented as appropriate)

## 2017-07-26 NOTE — THERAPY DISCHARGE NOTE
Acute Care - Occupational Therapy Discharge Summary  [facility]     Patient Name: Mini Andersen  : 1955  MRN: [id number]    Today's Date: 2017  Onset of Illness/Injury or Date of Surgery Date: 17    Date of Referral to OT: 17  Referring Physician: Dr. Ballesteros      Admit Date: 2017        OT Recommendation and Plan    Visit Dx:    ICD-10-CM ICD-9-CM   1. Primary osteoarthritis of left knee M17.12 715.16   2. Chronic pain of left knee M25.562 719.46    G89.29 338.29   3. Localized osteoarthritis of left knee M17.9 715.36   4. Type 2 diabetes mellitus without complication, without long-term current use of insulin E11.9 250.00   5. Acquired hypothyroidism E03.9 244.9   6. History of DVT (deep vein thrombosis) Z86.718 V12.51   7. Impaired physical mobility Z74.09 781.99   8. Impaired mobility and ADLs Z74.09 799.89   9. Gastroesophageal reflux disease without esophagitis K21.9 530.81   10. Obstructive sleep apnea syndrome G47.33 327.23   11. Palpitations R00.2 785.1               Time Calculation- OT       17 1140          Time Calculation- OT    OT Start Time 0926  -CS      OT Stop Time 1020  -CS      OT Time Calculation (min) 54 min  -CS      Total Timed Code Minutes- OT 54 minute(s)  -CS      OT Received On 17  -        User Key  (r) = Recorded By, (t) = Taken By, (c) = Cosigned By    Initials Name Provider Type    CANDIS Hobbs Occupational Therapy Assistant                  OT Goals       17 1536 17 1138 17 1536    Bed Mobility OT LTG    Bed Mobility OT LTG, Date Goal Reviewed 17  - 17  - 17  -    Bed Mobility OT LTG, Outcome goal not met  -  goal ongoing  -    Bed Mobility OT LTG, Reason Goal Not Met discharged from facility  -      Transfer Training OT LTG    Transfer Training OT LTG, Date Goal Reviewed 17  - 17  - 17  -CS    Transfer Training OT LTG, Outcome goal not met  -   goal ongoing  -    Transfer Training OT LTG, Reason Goal Not Met discharged from facility  -      ADL OT LTG    ADL OT LTG, Date Goal Reviewed 07/26/17  - 07/26/17  - 07/25/17  -    ADL OT LTG, Outcome goal not met  -  goal ongoing  -CS    ADL OT LTG, Reason Goal Not Met discharged from facility  -      Functional Mobility OT LTG    Functional Mobility Goal OT LTG, Date Goal Reviewed 07/26/17  - 07/26/17  - 07/25/17  -    Functional Mobility Goal OT LTG, Outcome goal not met  -  goal ongoing  -    Functional Mobility Goal OT LTG, Reason Goal Not Met discharged from facility  -        07/24/17 1332          Bed Mobility OT LTG    Bed Mobility OT LTG, Time to Achieve by discharge  -RM      Bed Mobility OT LTG, Activity Type all bed mobility  -RM      Bed Mobility OT LTG, [place] Level supervision required  -RM      Transfer Training OT LTG    Transfer Training OT LTG, Date Established 07/24/17  -RM      Transfer Training OT LTG, Time to Achieve by discharge  -RM      Transfer Training OT LTG, Activity Type all transfers  -RM      Transfer Training OT LTG, [place] Level supervision required  -RM      Transfer Training OT LTG, Assist Device walker, rolling  -RM      ADL OT LTG    ADL OT LTG, Date Established 07/24/17  -RM      ADL OT LTG, Time to Achieve by discharge  -RM      ADL OT LTG, Activity Type ADL skills   bath, dress, toilet  -RM      ADL OT LTG, [place] Level standby assist  -RM      Functional Mobility OT LTG    Functional Mobility Goal OT LTG, Date Established 07/24/17  -RM      Functional Mobility Goal OT LTG, Time to Achieve by discharge  -RM      Functional Mobility Goal OT LTG, [place] Level standby assist  -RM      Functional Mobility Goal OT LTG, Assist Device rolling walker  -RM      Functional Mobility Goal OT LTG, Distance to Achieve in hallway;to the sink;to the bathroom  -RM        User Key  (r) = Recorded By, (t) = Taken By, (c) = Cosigned By     Initials Name Provider Type     Kami Mendez OTR/L Occupational Therapist    DRAKE Hobbs/TONE Occupational Therapy Assistant    RM Keiry Escalera OT Occupational Therapist                Outcome Measures       07/26/17 1100 07/26/17 0800 07/25/17 1500    How much help from another person do you currently need...    Turning from your back to your side while in flat bed without using bedrails?  3  -AM     Moving from lying on back to sitting on the side of a flat bed without bedrails?  3  -AM     Moving to and from a bed to a chair (including a wheelchair)?  3  -AM     Standing up from a chair using your arms (e.g., wheelchair, bedside chair)?  3  -AM     Climbing 3-5 steps with a railing?  1  -AM     To walk in hospital room?  3  -AM     AM-PAC 6 Clicks Score  16  -AM     How much help from another is currently needed...    Putting on and taking off regular lower body clothing? 2  -CS  2  -CS    Bathing (including washing, rinsing, and drying) 2  -CS  2  -CS    Toileting (which includes using toilet bed pan or urinal) 4  -CS  4  -CS    Putting on and taking off regular upper body clothing 3  -CS  3  -CS    Taking care of personal grooming (such as brushing teeth) 3  -CS  3  -CS    Eating meals 4  -CS  4  -CS    Score 18  -CS  18  -CS    Functional Assessment    Outcome Measure Options --  -CS AM-PAC 6 Clicks Basic Mobility (PT)  -AM AM-PAC 6 Clicks Daily Activity (OT)  -CS      07/25/17 1310 07/25/17 0915 07/24/17 1332    How much help from another person do you currently need...    Turning from your back to your side while in flat bed without using bedrails? 3  -AM 3  -AM     Moving from lying on back to sitting on the side of a flat bed without bedrails? 3  -AM 3  -AM     Moving to and from a bed to a chair (including a wheelchair)? 3  -AM 3  -AM     Standing up from a chair using your arms (e.g., wheelchair, bedside chair)? 3  -AM 3  -AM     Climbing 3-5 steps with a railing? 1  -AM 1  -AM     To  walk in hospital room? 3  -AM 3  -AM     AM-PAC 6 Clicks Score 16  -AM 16  -AM     How much help from another is currently needed...    Putting on and taking off regular lower body clothing?   2  -RM    Bathing (including washing, rinsing, and drying)   2  -RM    Toileting (which includes using toilet bed pan or urinal)   4  -RM    Putting on and taking off regular upper body clothing   3  -RM    Taking care of personal grooming (such as brushing teeth)   3  -RM    Eating meals   4  -RM    Score   18  -RM    Other Outcome Measure Tool Used    Other Outcome Measure Tool Comments  PADD SCORE   8  -AM     Functional Assessment    Outcome Measure Options AM-PAC 6 Clicks Basic Mobility (PT)  -AM AM-PAC 6 Clicks Basic Mobility (PT);Other Outcome Measure   PADD SCORE  -AM AM-PAC 6 Clicks Daily Activity (OT)  -RM      07/24/17 1249          How much help from another person do you currently need...    Turning from your back to your side while in flat bed without using bedrails? 3  -CB      Moving from lying on back to sitting on the side of a flat bed without bedrails? 3  -CB      Moving to and from a bed to a chair (including a wheelchair)? 3  -CB      Standing up from a chair using your arms (e.g., wheelchair, bedside chair)? 3  -CB      Climbing 3-5 steps with a railing? 2  -CB      To walk in hospital room? 3  -CB      AM-PAC 6 Clicks Score 17  -CB      Functional Assessment    Outcome Measure Options AM-PAC 6 Clicks Basic Mobility (PT)  -CB        User Key  (r) = Recorded By, (t) = Taken By, (c) = Cosigned By    Initials Name Provider Type    CB Ivonne Osborn, PT Physical Therapist    AM Jaden Mtz, TIARA Physical Therapy Assistant    DRAKE Hobbs/TONE Occupational Therapy Assistant    RM Keiry Escalera, OT Occupational Therapist              OT Discharge Summary  Anticipated Discharge Disposition: home with home health  Reason for Discharge: Discharge from facility, Per MD order  Outcomes Achieved:  Patient able to partially acheive established goals  Discharge Destination: Home with home health      Kami Mendez OTR/TONE  7/26/2017

## 2017-07-26 NOTE — PATIENT INSTRUCTIONS
Take warfarin 5mg by mouth on 7/26  Take warfarin 5mg by mouth on 7/27  [facility] will recheck INR on 7/28 and pharmacy will inform you of any changes to warfarin schedule.

## 2017-07-26 NOTE — PROGRESS NOTES
Orthopedic Total Knee Progress Note      Patient: Mini Andersen  Date of Admission: 7/24/2017  YOB: 1955  Medical Record Number: [id number]    LOS: 2    Status Post: Procedure(s) (LRB):  TOTAL KNEE ARTHROPLASTY ATTUNE with adductor canal block (Left)        Systemic or Specific Complaints: No Complaints  Complications: None  Pain Relief: some relief    Physical Exam:  62 y.o. female alert and oriented  Temp:  [98.6 °F (37 °C)-100 °F (37.8 °C)] 99.4 °F (37.4 °C)  Heart Rate:  [] 105  Resp:  [18] 18  BP: (111-140)/(55-66) 112/55    Abd: Soft, NT, with BS +  Ext: NV intact. ROM appropriate. Calf is soft and nontender. Negative Homans Sn  Skin: Incision clean dry and intact w/out signs or  symptoms of infection.    Activity: Mobilizing Per P.T.   Weight Bearing: As Tolerated    Data Review  Admission on 07/24/2017   Component Date Value Ref Range Status   • ABO Type 07/24/2017 O   Final   • RH type 07/24/2017 Positive   Final   • Antibody Screen 07/24/2017 Negative   Final   • Previous History 07/24/2017 Previous Record on File   Final   • Glucose 07/24/2017 103  70 - 130 mg/dL Final    Meter: [id number]: [id number] BLACKBETTY LOJA   • Case Report 07/24/2017    Final                    Value:Surgical Pathology Report                         Case: [id number]                                  Authorizing Provider:  Jose Ballesteros MD  Collected:           07/24/2017 08:48 AM          Ordering Location:     [facility]             Received:            07/24/2017 10:14 AM                                 [place]                                                              Pathologist:           Xavier Montero MD                                                         Specimen:    Knee, Right, bone and soft tissue right knee                                              • Final Diagnosis 07/24/2017    Final                    Value:This result contains rich text  formatting which cannot be displayed here.   • Gross Description 07/24/2017    Final                    Value:This result contains rich text formatting which cannot be displayed here.   • Glucose 07/24/2017 91  70 - 130 mg/dL Final    Meter: [id number]: [id number] JIMMY ROGERS   • Hemoglobin 07/24/2017 10.5* 12.0 - 15.5 g/dL Final   • Hematocrit 07/24/2017 32.5* 35.0 - 45.0 % Final   • Glucose 07/24/2017 85  70 - 130 mg/dL Final    Meter: [id number]: [id number] KARMA NUNEZ   • Glucose 07/24/2017 141* 70 - 130 mg/dL Final    RN NotifiedMeter: [id number]: [id number] QUYNH SCHMITZ   • Glucose 07/24/2017 180* 70 - 130 mg/dL Final    Sliding Scale AdminMeter: [id number]: [id number] ROSIBEL GALLOWAY   • Protime 07/25/2017 13.9  11.1 - 15.3 Seconds Final   • INR 07/25/2017 1.08  0.80 - 1.20 Final   • Extra Tube 07/25/2017 hold for add-on   Final    Auto resulted   • Extra Tube 07/25/2017 Hold for add-ons.   Final    Auto resulted.   • Glucose 07/25/2017 114  70 - 130 mg/dL Final    RN NotifiedMeter: [id number]: [id number] DIANA DURBIN   • Hemoglobin A1C 07/25/2017 5.93* 4 - 5.6 % Final   • Glucose 07/25/2017 136* 70 - 130 mg/dL Final    Meter: [id number]: [id number] ROSALVA BRO   • Glucose 07/25/2017 166* 70 - 130 mg/dL Final    Sliding Scale AdminMeter: [id number]: [id number] ROSALVA DOVE   • Glucose 07/25/2017 163* 70 - 130 mg/dL Final    Sliding Scale AdminMeter: [id number]: [id number] BARONECHAR NEWTONI   • Protime 07/26/2017 14.9  11.1 - 15.3 Seconds Final   • INR 07/26/2017 1.17  0.80 - 1.20 Final   • Glucose 07/26/2017 123  70 - 130 mg/dL Final    RN NotifiedMeter: [id number]: [id number] ANDRE CORBIN       Xr Knee 1 Or 2 View Left    Result Date: 7/24/2017  Narrative: PROCEDURE: AP and lateral left knee COMPARISON: No comparison. HISTORY: Postop FINDINGS: A left knee prosthesis has been placed. Postsurgical air and  fluid are noted in the soft tissues. There are overlying cutaneous staples. A drain has been placed.     Impression: CONCLUSION:  Postoperative left knee. Electronically signed by:  Ramana Barger MD  7/24/2017 10:49 AM CDT Workstation: [id number]      Medications    acetaminophen 1,000 mg Oral 4x Daily   docusate sodium 100 mg Oral BID   famotidine 40 mg Oral Daily   ferrous sulfate 324 mg Oral Daily With Breakfast   insulin aspart 0-9 Units Subcutaneous 4x Daily AC & at Bedtime   levothyroxine 50 mcg Oral Q AM   oxyCODONE 10 mg Oral Q12H   Scopolamine 1 patch Transdermal Once   warfarin 5 mg Oral Daily     acetaminophen  •  bacitracin  •  bisacodyl  •  clonazePAM  •  dextrose  •  dextrose  •  diphenhydrAMINE  •  glucagon (human recombinant)  •  HYDROmorphone **AND** naloxone  •  metFORMIN  •  ondansetron **OR** ondansetron ODT **OR** ondansetron  •  oxyCODONE  •  Pharmacy to dose warfarin  •  sodium chloride  •  sodium chloride    Assessment:  Doing well following total knee replacement  Patient Active Problem List   Diagnosis   • Palpitations   • Type 2 diabetes mellitus   • Hypothyroidism   • Primary osteoarthritis of left knee   • Knee pain   • Diabetes mellitus without complication   • Borderline glaucoma   • Nuclear cataract   • Abdominal pain   • Cobalamin deficiency   • Cramp of limb   • Depression   • Gastroesophageal reflux disease   • Callus of foot   • Deformity of foot   • Foot pain   • History of colonoscopy   • Low back pain   • Mononeuritis   • Spasm   • Neuropathy   • Lung field abnormal   • Encounter for screening mammogram for malignant neoplasm of breast   • Cardiac arrhythmia   • Arthralgia of lower leg   • Arthralgia of shoulder   • Pure hypercholesterolemia   • Seasonal allergic rhinitis   • Disorder of hand   • Osteoporosis   • Pre-operative clearance   • Type 2 diabetes mellitus without complication, without long-term current use of insulin   • Angina pectoris    • SOB (shortness of breath)    • Obstructive sleep apnea syndrome   • Chronic pain of left knee         Plan:   Continue efforts to mobilize  Continue Pain Control Measures  Continue incisional Care  DVT prophylaxis    Discharge Plan:Home today with home health      Jose Ballesteros MD    Date: 7/26/2017   Time: 7:29 AM

## 2017-07-26 NOTE — PLAN OF CARE
Problem: Patient Care Overview (Adult)  Goal: Plan of Care Review  Outcome: Ongoing (interventions implemented as appropriate)    07/26/17 1138   Coping/Psychosocial Response Interventions   Plan Of Care Reviewed With patient   Outcome Evaluation   Outcome Summary/Follow up Plan Pt tolerated tx well this date. Pt completed an ADL. Pt was educated on LH AE(sock aid, reacher, dressing stick, and etc.). No goals met this date. Continue POC.         Problem: Inpatient Occupational Therapy  Goal: Bed Mobility Goal LTG- OT  Outcome: Ongoing (interventions implemented as appropriate)    07/24/17 1332 07/25/17 1536 07/26/17 1138   Bed Mobility OT LTG   Bed Mobility OT LTG, Time to Achieve by discharge --  --    Bed Mobility OT LTG, Activity Type all bed mobility --  --    Bed Mobility OT LTG, [place] Level supervision required --  --    Bed Mobility OT LTG, Date Goal Reviewed --  --  07/26/17   Bed Mobility OT LTG, Outcome --  goal ongoing --        Goal: Transfer Training Goal 1 LTG- OT  Outcome: Ongoing (interventions implemented as appropriate)    07/24/17 1332 07/25/17 1536 07/26/17 1138   Transfer Training OT LTG   Transfer Training OT LTG, Date Established 07/24/17 --  --    Transfer Training OT LTG, Time to Achieve by discharge --  --    Transfer Training OT LTG, Activity Type all transfers --  --    Transfer Training OT LTG, [place] Level supervision required --  --    Transfer Training OT LTG, Assist Device walker, rolling --  --    Transfer Training OT LTG, Date Goal Reviewed --  --  07/26/17   Transfer Training OT LTG, Outcome --  goal ongoing --        Goal: ADL Goal LTG- OT  Outcome: Ongoing (interventions implemented as appropriate)    07/24/17 1332 07/25/17 1536 07/26/17 1138   ADL OT LTG   ADL OT LTG, Date Established 07/24/17 --  --    ADL OT LTG, Time to Achieve by discharge --  --    ADL OT LTG, Activity Type ADL skills  (bath, dress, toilet) --  --    ADL OT LTG, [place] Level standby  assist --  --    ADL OT LTG, Date Goal Reviewed --  --  07/26/17   ADL OT LTG, Outcome --  goal ongoing --        Goal: Functional Mobility Goal LTG- OT  Outcome: Ongoing (interventions implemented as appropriate)    07/24/17 1332 07/25/17 1536 07/26/17 1138   Functional Mobility OT LTG   Functional Mobility Goal OT LTG, Date Established 07/24/17 --  --    Functional Mobility Goal OT LTG, Time to Achieve by discharge --  --    Functional Mobility Goal OT LTG, [place] Level standby assist --  --    Functional Mobility Goal OT LTG, Assist Device rolling walker --  --    Functional Mobility Goal OT LTG, Distance to Achieve in hallway;to the sink;to the bathroom --  --    Functional Mobility Goal OT LTG, Date Goal Reviewed --  --  07/26/17   Functional Mobility Goal OT LTG, Outcome --  goal ongoing --

## 2017-07-26 NOTE — DISCHARGE SUMMARY
Patient Name: Mini Andersen  Patient YOB: 1955    Date of Admission:  7/24/2017  Date of Discharge:  7/26/2017  Discharge Diagnosis: TOTAL KNEE ARTHROPLASTY ATTUNE with adductor canal block  Presenting Problem/History of Present Illness: Acquired hypothyroidism [E03.9]  History of DVT (deep vein thrombosis) [Z86.718]  Primary osteoarthritis of left knee [M17.12]  Chronic pain of left knee [M25.562, G89.29]  Localized osteoarthritis of left knee [M17.9]  Type 2 diabetes mellitus without complication, without long-term current use of insulin [E11.9]  Chronic pain of left knee [M25.562, G89.29]  Admitting Physician:  Jose Ballesteros MD    Consults:   Consults     No orders found from 6/25/2017 to 7/25/2017.          DETAILS OF HOSPITAL STAY:  Patient is a 62 y.o. female was admitted to the floor following the above procedure and underwent an uncomplicated hospital stay.  Patient did well with physical therapy and was ambulating well without problems.  On the day of discharge the wound was clean, dry and intact and calf was soft and non tender and Homans sign was negative.  Patient was tolerating  without problems.  Patient was discharged home.    Condition on Discharge:  Stable    Vital Signs  Temp:  [99.2 °F (37.3 °C)-100 °F (37.8 °C)] 99.4 °F (37.4 °C)  Heart Rate:  [] 105  Resp:  [18] 18  BP: (111-142)/(55-69) 142/69    LABS:      Admission on 07/24/2017, Discharged on 07/26/2017   Component Date Value Ref Range Status   • ABO Type 07/24/2017 O   Final   • RH type 07/24/2017 Positive   Final   • Antibody Screen 07/24/2017 Negative   Final   • Previous History 07/24/2017 Previous Record on File   Final   • Glucose 07/24/2017 103  70 - 130 mg/dL Final    Meter: [id number]: [id number] HAILE LOAJ   • Case Report 07/24/2017    Final                    Value:Surgical Pathology Report                         Case: [id number]                                  Authorizing  Provider:  Jose Ballesteros MD  Collected:           07/24/2017 08:48 AM          Ordering Location:     [facility]             Received:            07/24/2017 10:14 AM                                 [place]                                                              Pathologist:           Xavier Montero MD                                                         Specimen:    Knee, Right, bone and soft tissue right knee                                              • Final Diagnosis 07/24/2017    Final                    Value:This result contains rich text formatting which cannot be displayed here.   • Gross Description 07/24/2017    Final                    Value:This result contains rich text formatting which cannot be displayed here.   • Glucose 07/24/2017 91  70 - 130 mg/dL Final    Meter: [id number]: [id number] JIMMY ROGERS   • Hemoglobin 07/24/2017 10.5* 12.0 - 15.5 g/dL Final   • Hematocrit 07/24/2017 32.5* 35.0 - 45.0 % Final   • Glucose 07/24/2017 85  70 - 130 mg/dL Final    Meter: [id number]: [id number] KARMA NUNEZ   • Glucose 07/24/2017 141* 70 - 130 mg/dL Final    RN NotifiedMeter: [id number]: [id number] QUYNHJUAN SCHMITZ   • Glucose 07/24/2017 180* 70 - 130 mg/dL Final    Sliding Scale AdminMeter: [id number]: [id number] ROSIBEL GALLOWAY   • Protime 07/25/2017 13.9  11.1 - 15.3 Seconds Final   • INR 07/25/2017 1.08  0.80 - 1.20 Final   • Extra Tube 07/25/2017 hold for add-on   Final    Auto resulted   • Extra Tube 07/25/2017 Hold for add-ons.   Final    Auto resulted.   • Glucose 07/25/2017 114  70 - 130 mg/dL Final    RN NotifiedMeter: [id number]: [id number] DIANA DURBIN   • Hemoglobin A1C 07/25/2017 5.93* 4 - 5.6 % Final   • Glucose 07/25/2017 136* 70 - 130 mg/dL Final    Meter: [id number]: [id number] ROSALVA BRO   • Glucose 07/25/2017 166* 70 - 130 mg/dL Final    Sliding Scale AdminMeter: [id number]:  [id number] ROSALVA DOVE   • Glucose 07/25/2017 163* 70 - 130 mg/dL Final    Sliding Scale AdminMeter: [id number]: [id number] LIZABETH TRAVIS   • Protime 07/26/2017 14.9  11.1 - 15.3 Seconds Final   • INR 07/26/2017 1.17  0.80 - 1.20 Final   • Glucose 07/26/2017 123  70 - 130 mg/dL Final    RN NotifiedMeter: [id number]: [id number] HOOK SHAQUILLE       Xr Knee 1 Or 2 View Left    Result Date: 7/24/2017  Narrative: PROCEDURE: AP and lateral left knee COMPARISON: No comparison. HISTORY: Postop FINDINGS: A left knee prosthesis has been placed. Postsurgical air and fluid are noted in the soft tissues. There are overlying cutaneous staples. A drain has been placed.     Impression: CONCLUSION:  Postoperative left knee. Electronically signed by:  Ramana Barger MD  7/24/2017 10:49 AM CDT Workstation: [id number]      Discharge Medications   [facility] Medication Instructions CORY:[id number]    Printed on:07/26/17 5171   Medication Information                      Calcium Citrate (CITRACAL PO)  Take 2 tablets by mouth Daily.             carboxymethylcellulose (REFRESH PLUS) 0.5 % solution  Administer 1 drop to both eyes Daily As Needed for Dry Eyes.             clonazePAM (KlonoPIN) 1 MG tablet  Take 1 mg by mouth 3 (Three) Times a Day As Needed for Seizures.             cyanocobalamin 1000 MCG/ML injection  Inject  into the shoulder, thigh, or buttocks Every 28 (Twenty-Eight) Days.             diphenhydrAMINE (BENADRYL) 25 mg capsule  Take 25 mg by mouth Every 6 (Six) Hours As Needed.             docusate sodium (COLACE) 100 MG capsule  Take 100 mg by mouth 2 (Two) Times a Day. 2 tabs             HYDROcodone-acetaminophen (NORCO)  MG per tablet  Take 1 tablet by mouth Every 4 (Four) Hours As Needed for Moderate Pain (4-6).             levothyroxine (SYNTHROID, LEVOTHROID) 50 MCG tablet  Take 50 mcg by mouth daily.             lidocaine (XYLOCAINE) 5 % ointment  Apply  topically  Daily As Needed.             metFORMIN (GLUCOPHAGE) 500 MG tablet  Take 500 mg by mouth Daily As Needed (For FSBS > 140).             omeprazole (priLOSEC) 20 MG capsule  Take 20 mg by mouth Daily.             ondansetron (ZOFRAN) 4 MG tablet  Take 4 mg by mouth Every 8 (Eight) Hours As Needed.             ONE TOUCH ULTRA TEST test strip  TEST BID             vitamin D (ERGOCALCIFEROL) 45194 UNITS capsule capsule  Take 50,000 Units by mouth Every 7 (Seven) Days.             warfarin (COUMADIN) 5 MG tablet  Take 1 tablet by mouth Daily for 40 days. Take 1 tablet by mouth daily or as directed by [facility] pharmacy                 Activity at Discharge:     Discharge Instructions: Patient is to continue with physical therapy exercises daily and continue working with the physical therapist as ordered. Patient may weight bear as tolerated. Continue ADIN hose daily and ice regularly. Patient instructed on frequent calf pumping exercises.  Patient also instructed on incentive spirometer during hospitalization and encouraged to continue to use at home regularly. Patient may shower on POD#2. The wound should be gently cleaned with antibacterial soap then allowed to dry.  If there is any drainage then it can be covered with a dry sterile dressing.  If there is drainage, redness or swelling, then the physician should be notified. Follow up appointment in 2 weeks - patient to call the office at [phone] to schedule.  Coumadin management per hospital pharmacy.  All other meds per the discharge med/rec    Discharge Diagnosis:    Patient Active Problem List   Diagnosis   • Palpitations   • Type 2 diabetes mellitus   • Hypothyroidism   • Primary osteoarthritis of left knee   • Knee pain   • Diabetes mellitus without complication   • Borderline glaucoma   • Nuclear cataract   • Abdominal pain   • Cobalamin deficiency   • Cramp of limb   • Depression   • Gastroesophageal reflux disease   • Callus of foot   • Deformity of foot    • Foot pain   • History of colonoscopy   • Low back pain   • Mononeuritis   • Spasm   • Neuropathy   • Lung field abnormal   • Encounter for screening mammogram for malignant neoplasm of breast   • Cardiac arrhythmia   • Arthralgia of lower leg   • Arthralgia of shoulder   • Pure hypercholesterolemia   • Seasonal allergic rhinitis   • Disorder of hand   • Osteoporosis   • Pre-operative clearance   • Type 2 diabetes mellitus without complication, without long-term current use of insulin   • Angina pectoris    • SOB (shortness of breath)   • Obstructive sleep apnea syndrome   • Chronic pain of left knee       Follow-up Appointments  No future appointments.  Additional Instructions for the Follow-ups that You Need to Schedule     Discharge Follow-Up With Specified Provider    As directed    To:  tez england r peyton   Follow Up:  2 Weeks   Follow Up Details:  staples out, repeat xrays       Referral to Home Health    As directed    Face to Face Visit Date:  7/26/2017   Follow-up Provider for Plan of Care?:  I will be treating the patient on an ongoing basis.  Please send me the Plan of Care for signature.   Follow-up Provider:  MARVA BALLESTEROS   Reason/Clinical Findings:  tka   Describe mobility limitations that make leaving home difficult:  limited mobility and transportation   Nursing/Therapeutic Services Requested:   Physical Therapy  Occupational Therapy      PT orders:   Total joint pathway  Transfer training  Strengthening  Home safety assessment      Occupational orders:   Activities of daily living  Strengthening  Home safety assessment      Frequency:  1 Week 1                    Marva Ballesteros MD  07/26/17  3:13 PM

## 2017-07-26 NOTE — PROGRESS NOTES
Anticoagulation- Warfarin     Completed Discharge Warfarin Counseling    Discussed the followin. Reason for Medication and Length of therapy  2. Drug-Drug Interactions  3. Drug-Diet Interactions  4. Drug- Alcohol Interactions  5. Signs and Symptoms of Bleeding  6. Fall risk- go to the Emergency Room  7. Home procedures:  Patient is going home with 5mg.  Patient has [facility].  8. Sent the Rx to: [facility] ([phone]) for warfarin 5mg , Number: 30  Si tablet every night or As Directed + 1 refills  9. Gave Warfarin booklet  10. Answered all Questions  11. Called in lab orders to [facility]  12.  Informed patient of recheck INR on  by [facility]    Philippe Dickson [facility]  17  10:56 AM

## 2017-07-26 NOTE — THERAPY TREATMENT NOTE
Acute Care - Occupational Therapy Treatment Note  [facility]     Patient Name: Mini Andersen  : 1955  MRN: [id number]  Today's Date: 2017  Onset of Illness/Injury or Date of Surgery Date: 17  Date of Referral to OT: 17  Referring Physician: Dr. Ballesteros      Admit Date: 2017    Visit Dx:     ICD-10-CM ICD-9-CM   1. Primary osteoarthritis of left knee M17.12 715.16   2. Chronic pain of left knee M25.562 719.46    G89.29 338.29   3. Localized osteoarthritis of left knee M17.9 715.36   4. Type 2 diabetes mellitus without complication, without long-term current use of insulin E11.9 250.00   5. Acquired hypothyroidism E03.9 244.9   6. History of DVT (deep vein thrombosis) Z86.718 V12.51   7. Impaired physical mobility Z74.09 781.99   8. Impaired mobility and ADLs Z74.09 799.89   9. Gastroesophageal reflux disease without esophagitis K21.9 530.81   10. Obstructive sleep apnea syndrome G47.33 327.23   11. Palpitations R00.2 785.1     Patient Active Problem List   Diagnosis   • Palpitations   • Type 2 diabetes mellitus   • Hypothyroidism   • Primary osteoarthritis of left knee   • Knee pain   • Diabetes mellitus without complication   • Borderline glaucoma   • Nuclear cataract   • Abdominal pain   • Cobalamin deficiency   • Cramp of limb   • Depression   • Gastroesophageal reflux disease   • Callus of foot   • Deformity of foot   • Foot pain   • History of colonoscopy   • Low back pain   • Mononeuritis   • Spasm   • Neuropathy   • Lung field abnormal   • Encounter for screening mammogram for malignant neoplasm of breast   • Cardiac arrhythmia   • Arthralgia of lower leg   • Arthralgia of shoulder   • Pure hypercholesterolemia   • Seasonal allergic rhinitis   • Disorder of hand   • Osteoporosis   • Pre-operative clearance   • Type 2 diabetes mellitus without complication, without long-term current use of insulin   • Angina pectoris    • SOB (shortness of breath)   • Obstructive sleep apnea  syndrome   • Chronic pain of left knee             Adult Rehabilitation Note       07/26/17 0926 07/26/17 0800 07/25/17 1310    Rehab Assessment/Intervention    Discipline occupational therapy assistant  -CS physical therapy assistant  -AM physical therapy assistant  -AM    Document Type therapy note (daily note)  -CS therapy note (daily note)  -AM therapy note (daily note)  -AM    Subjective Information agree to therapy  -CS agree to therapy;complains of;pain  -AM agree to therapy;complains of;pain  -AM    Patient Effort, Rehab Treatment  good  -AM good  -AM    Symptoms Noted During/After Treatment  none  -AM increased pain  -AM    Precautions/Limitations  other (see comments)   WBAT LLE  -AM other (see comments)   WBAT LLE  -AM    Equipment Issued to Patient  gait belt  -AM gait belt  -AM    Recorded by [CS] DRAKE Segal/TONE [AM] Jaden Mtz PTA [AM] Jaden Mtz PTA    Vital Signs    Pre Systolic BP Rehab  142  -  -AM    Pre Treatment Diastolic BP  69  -AM 52  -AM    Post Systolic BP Rehab  137  -  -AM    Post Treatment Diastolic BP  67  -AM 65  -AM    Pretreatment Heart Rate (beats/min) 99  -  -  -AM    Posttreatment Heart Rate (beats/min) 109  -  -  -AM    Pre SpO2 (%) 96  -CS 95  -AM 93  -AM    O2 Delivery Pre Treatment room air  -CS room air  -AM room air  -AM    Post SpO2 (%) 92  -CS 96  -AM 94  -AM    O2 Delivery Post Treatment room air  -CS room air  -AM room air  -AM    Pre Patient Position Sitting  -CS Supine  -AM Supine  -AM    Intra Patient Position Standing  -CS Standing  -AM Standing  -AM    Post Patient Position Sitting  -CS Sitting  -AM Sitting  -AM    Recorded by [CS] DRAKE Segal/TONE [AM] Jaden Mtz PTA [AM] Jaden Mtz PTA    Pain Assessment    Pain Assessment 0-10  -CS 0-10  -AM 0-10  -AM    Pain Score 9  -CS 10  -AM 8  -AM    Post Pain Score 9  -CS 9  -AM 9  -AM    Pain Type Acute pain;Surgical pain  -CS Acute  pain;Surgical pain  -AM Acute pain;Surgical pain  -AM    Pain Location Knee  -CS Knee  -AM Knee  -AM    Pain Orientation Left  -CS Left  -AM Left  -AM    Pain Descriptors  Sore  -AM Sore  -AM    Pain Frequency  Constant/continuous  -AM Constant/continuous  -AM    Date Pain First Started  07/24/17  -AM 07/24/17  -AM    Clinical Progression  Gradually improving  -AM Gradually improving  -AM    Patient's Stated Pain Goal  No pain  -AM No pain  -AM    Pain Intervention(s) Medication (See MAR)  -CS Medication (See MAR);Cold applied;Ambulation/increased activity  -AM Medication (See MAR);Cold applied;Ambulation/increased activity  -AM    Result of Injury  No  -AM No  -AM    Work-Related Injury  No  -AM No  -AM    Multiple Pain Sites  No  -AM No  -AM    Recorded by [CS] DRAKE Segal/TONE [AM] Jaden Mtz PTA [AM] Jaden Mtz PTA    Cognitive Assessment/Intervention    Current Cognitive/Communication Assessment functional  -CS functional  -AM functional  -AM    Orientation Status oriented x 4  -CS oriented x 4  -AM oriented x 4  -AM    Follows Commands/Answers Questions 100% of the time  -% of the time  -% of the time  -AM    Personal Safety Interventions gait belt;nonskid shoes/slippers when out of bed  -CS gait belt;nonskid shoes/slippers when out of bed;supervised activity  -AM gait belt;nonskid shoes/slippers when out of bed;supervised activity  -AM    Recorded by [CS] DRAKE Segal/TONE [AM] Jaden Mtz PTA [AM] Jaden Mtz PTA    ROM (Range of Motion)    General ROM  lower extremity range of motion deficits identified  -AM lower extremity range of motion deficits identified  -AM    Recorded by  [AM] Jaden Mtz PTA [AM] Jaden Mtz PTA    General LE Assessment    ROM  knee, left: LE ROM deficit  -AM knee, left: LE ROM deficit  -AM    Recorded by  [AM] Jaden Mtz PTA [AM] Jaden Mtz PTA    Left Knee    Extension/Flexion AROM  within functional limits    10-74  -AM within functional limits   10-64  -AM    Recorded by  [AM] Jaden Mtz PTA [AM] Jaden Mtz PTA    Mobility Assessment/Training    Extremity Weight-Bearing Status  left lower extremity  -AM left lower extremity  -AM    Left Lower Extremity Weight-Bearing  weight-bearing as tolerated  -AM weight-bearing as tolerated  -AM    Recorded by  [AM] Jaden Mtz PTA [AM] Jaden Mtz PTA    Bed Mobility, Assessment/Treatment    Bed Mobility, Assistive Device  head of bed elevated  -AM head of bed elevated  -AM    Bed Mobility, Roll Left, [place]  not tested  -AM not tested  -AM    Bed Mobility, Roll Right, [place]  not tested  -AM not tested  -AM    Bed Mobility, Scoot/Bridge, [place]  not tested  -AM not tested  -AM    Bed Mob, Supine to Sit, [place]  supervision required  -AM minimum assist (75% patient effort)  -AM    Bed Mob, Sit to Supine, [place]  supervision required  -AM not tested  -AM    Bed Mob, Sidelying to Sit, [place]  not tested  -AM not tested  -AM    Bed Mob, Sit to Sidelying, [place]  not tested  -AM not tested  -AM    Bed Mobility, Safety Issues  decreased use of arms for pushing/pulling;decreased use of legs for bridging/pushing  -AM decreased use of arms for pushing/pulling;decreased use of legs for bridging/pushing  -AM    Bed Mobility, Impairments  ROM decreased;strength decreased;pain  -AM ROM decreased;strength decreased;pain  -AM    Recorded by  [AM] Jaden Mtz PTA [AM] Jaden Mtz PTA    Transfer Assessment/Treatment    Transfers, Bed-Chair [place]  stand by assist  -AM contact guard assist  -AM    Transfers, Chair-Bed [place]  stand by assist  -AM contact guard assist  -AM    Transfers, Bed-Chair-Bed, Assist Device  rolling walker  -AM rolling walker  -AM    Transfers, Sit-Stand [place] supervision required  -CS stand by assist  -AM contact guard assist  -AM    Transfers, Stand-Sit [place] supervision  required  -CS stand by assist  -AM contact guard assist  -AM    Transfers, Sit-Stand-Sit, Assist Device rolling walker  -CS rolling walker  -AM rolling walker  -AM    Toilet Transfer, [place]  supervision required  -AM contact guard assist  -AM    Toilet Transfer, Assistive Device  bedside commode without drop arms;rolling walker  -AM bedside commode without drop arms;rolling walker  -AM    Walk-In Shower Transfer, [place]  not tested  -AM not tested  -AM    Bathtub Transfer, [place]  not tested  -AM not tested  -AM    Transfer, Maintain Weight Bearing Status  able to maintain weight bearing status  -AM able to maintain weight bearing status  -AM    Transfer, Safety Issues  step length decreased  -AM step length decreased  -AM    Transfer, Impairments  ROM decreased;strength decreased;pain  -AM ROM decreased;strength decreased;pain  -AM    Recorded by [CS] CANDIS Segal [AM] Jaden Mtz PTA [AM] Jaden Mtz PTA    Gait Assessment/Treatment    Gait, [place] Level  stand by assist  -AM contact guard assist  -AM    Gait, Assistive Device  rolling walker  -AM rolling walker  -AM    Gait, Distance (Feet)  175  -   125+15  -AM    Gait, Gait Pattern Analysis  3-point gait  -AM 3-point gait  -AM    Gait, Gait Deviations  bilateral:;[place] decreased  -AM bilateral:;[place] decreased  -AM    Gait, Maintain Weight Bearing Status  able to maintain weight bearing status  -AM able to maintain weight bearing status  -AM    Gait, Safety Issues  step length decreased  -AM step length decreased  -AM    Gait, Impairments  ROM decreased;strength decreased;pain  -AM ROM decreased;strength decreased;pain  -AM    Recorded by  [AM] Jaden Mtz PTA [AM] Jaden Mtz PTA    Stairs Assessment/Treatment    Number of Stairs  Ramp  -AM     Stairs, Handrail Location  none  -AM     Stairs, [place] Level  contact guard assist  -AM not tested  -AM    Stairs, Assistive Device  walker  -AM      Stairs, Technique Used  step to step (ascending);step to step (descending)  -AM     Stairs, Maintain Weight Bearing Status  able to maintain weight bearing status  -AM     Stairs, Impairments  ROM decreased;strength decreased;pain  -AM     Recorded by  [AM] Jaden Mtz PTA [AM] Jaden Mtz PTA    Upper Body Bathing Assessment/Training    UB Bathing Assess/Train Assistive Device bath mitt  -CS      UB Bathing Assess/Train, Position sitting  -CS      UB Bathing Assess/Train, [place] Level set up required;independent  -CS      Recorded by [CS] CANDIS Segal      Lower Body Bathing Assessment/Training    LB Bathing Assess/Train Assistive Device bath mitt  -CS      LB Bathing Assess/Train, Position sitting;standing  -CS      LB Bathing Assess/Train, [place] Level contact guard assist  -CS      Recorded by [CS] CANDIS Segal      Upper Body Dressing Assessment/Training    UB Dressing Assess/Train, Clothing Type doffing:;donning:;hospital gown;t-shirt  -CS      UB Dressing Assess/Train, Position sitting  -CS      UB Dressing Assess/Train, [place] independent  -CS      Recorded by [CS] CANDIS Segal      Lower Body Dressing Assessment/Training    LB Dressing Assess/Train, Clothing Type doffing:;donning:;pants;slipper socks   underware  -CS      LB Dressing Assess/Train, Assist Device dressing stick;reacher;sock-aid  -CS      LB Dressing Assess/Train, Position sitting;standing  -CS      LB Dressing Assess/Train, [place] minimum assist (75% patient effort)  -CS      Recorded by [CS] CANDIS Segal      Grooming Assessment/Training    Grooming Assess/Train, Position sitting  -CS      Grooming Assess/Train, [place] Level set up required  -CS      Recorded by [CS] CANDIS Segal      Therapy Exercises    Bilateral Lower Extremities  AROM:;20 reps;supine;sitting;ankle pumps/circles;glut sets;heel slides;quad sets;SLR  -AM AROM:;20  reps;supine;sitting;ankle pumps/circles;glut sets;heel slides;quad sets;SLR  -AM    Bilateral Upper Extremity AROM:;20 reps;sitting;elbow flexion/extension;hand pumps;pronation/supination;shoulder extension/flexion;shoulder ER/IR  -CS      BUE Resistance manual resistance- minimal  -CS      Recorded by [CS] DRAKE Segal/TONE [AM] Jaden Mtz PTA [AM] Jaden Mtz PTA    Positioning and Restraints    Pre-Treatment Position sitting in chair/recliner  -CS in bed  -AM in bed  -AM    Post Treatment Position chair  -CS chair  -AM chair  -AM    In Chair reclined;call light within reach  -CS reclined;call light within reach;encouraged to call for assist;legs elevated  -AM reclined;call light within reach;encouraged to call for assist;with family/caregiver;legs elevated  -AM    Recorded by [CS] DRAKE Segal/TONE [AM] Jaden Mtz PTA [AM] Jaden Mtz PTA      07/25/17 1307 07/25/17 0915       Rehab Assessment/Intervention    Discipline occupational therapy assistant  - physical therapy assistant  -AM     Document Type therapy note (daily note)  -CS therapy note (daily note)  -AM     Subjective Information agree to therapy  -CS agree to therapy;complains of;pain  -AM     Patient Effort, Rehab Treatment  good  -AM     Symptoms Noted During/After Treatment  fatigue  -AM     Precautions/Limitations  other (see comments)   WBAT LLE  -AM     Equipment Issued to Patient  gait belt  -AM     Recorded by [CS] DRAKE Segal/TONE [AM] Jaden Mtz PTA     Vital Signs    Pre Systolic BP Rehab  123  -AM     Pre Treatment Diastolic BP  60  -AM     Post Systolic BP Rehab  137  -AM     Post Treatment Diastolic BP  65  -AM     Pretreatment Heart Rate (beats/min) 109  -  -AM     Posttreatment Heart Rate (beats/min) 112  -CS 99  -AM     Pre SpO2 (%) 93  -CS 97  -AM     O2 Delivery Pre Treatment room air  -CS room air  -AM     Post SpO2 (%) 98  -CS 94  -AM     O2 Delivery Post Treatment room air   -CS room air  -AM     Pre Patient Position Supine  -CS Sitting  -AM     Intra Patient Position  Standing  -AM     Post Patient Position  Supine  -AM     Recorded by [CS] DRAKE Segal/TONE [AM] Jaden Mtz PTA     Pain Assessment    Pain Assessment 0-10  -CS 0-10  -AM     Pain Score 8  -CS 10  -AM     Post Pain Score 8  -CS 10  -AM     Pain Type Surgical pain  -CS Acute pain;Surgical pain  -AM     Pain Location Knee  -CS Knee  -AM     Pain Orientation Left  -CS Left  -AM     Pain Descriptors  Sore  -AM     Pain Frequency  Constant/continuous  -AM     Date Pain First Started  07/24/17  -AM     Clinical Progression  Gradually improving  -AM     Patient's Stated Pain Goal  No pain  -AM     Pain Intervention(s) Medication (See MAR)  -CS Medication (See MAR);Cold applied;Ambulation/increased activity  -AM     Result of Injury  No  -AM     Work-Related Injury  No  -AM     Multiple Pain Sites  No  -AM     Recorded by [CS] DRAKE Segal/TONE [AM] Jaden Mtz PTA     Vision Assessment/Intervention    Visual Impairment  --  -AM     Recorded by  [AM] Jaden Mtz PTA     Cognitive Assessment/Intervention    Current Cognitive/Communication Assessment functional  -CS functional  -AM     Orientation Status oriented x 4  -CS oriented x 4  -AM     Follows Commands/Answers Questions 100% of the time  -% of the time  -AM     Personal Safety Interventions  gait belt;nonskid shoes/slippers when out of bed;supervised activity  -AM     Recorded by [CS] DRAKE Segal/TONE [AM] Jaden Mtz PTA     ROM (Range of Motion)    General ROM  lower extremity range of motion deficits identified  -AM     Recorded by  [AM] Jaden Mtz PTA     General LE Assessment    ROM  knee, left: LE ROM deficit  -AM     Recorded by  [AM] Jaden Mtz PTA     Left Knee    Extension/Flexion AROM  within functional limits   16-74  -AM     Recorded by  [AM] Jaden Mtz PTA     Mobility Assessment/Training     Extremity Weight-Bearing Status  left lower extremity  -AM     Left Lower Extremity Weight-Bearing  weight-bearing as tolerated  -AM     Recorded by  [AM] Jaden Mtz PTA     Bed Mobility, Assessment/Treatment    Bed Mobility, Assistive Device  head of bed elevated  -AM     Bed Mobility, Roll Left, [place]  not tested  -AM     Bed Mobility, Roll Right, [place]  not tested  -AM     Bed Mobility, Scoot/Bridge, [place]  not tested  -AM     Bed Mob, Supine to Sit, [place]  minimum assist (75% patient effort)  -AM     Bed Mob, Sit to Supine, [place]  not tested  -AM     Bed Mob, Sidelying to Sit, [place]  not tested  -AM     Bed Mob, Sit to Sidelying, [place]  not tested  -AM     Bed Mobility, Safety Issues  decreased use of arms for pushing/pulling;decreased use of legs for bridging/pushing  -AM     Bed Mobility, Impairments  ROM decreased;strength decreased;pain  -AM     Recorded by  [AM] Jaden Mtz PTA     Transfer Assessment/Treatment    Transfers, Bed-Chair [place]  contact guard assist  -AM     Transfers, Chair-Bed [place]  contact guard assist  -AM     Transfers, Bed-Chair-Bed, Assist Device  rolling walker  -AM     Transfers, Sit-Stand [place]  contact guard assist  -AM     Transfers, Stand-Sit [place]  contact guard assist  -AM     Transfers, Sit-Stand-Sit, Assist Device  rolling walker  -AM     Toilet Transfer, [place]  not tested  -AM     Walk-In Shower Transfer, [place]  not tested  -AM     Bathtub Transfer, [place]  not tested  -AM     Transfer, Maintain Weight Bearing Status  able to maintain weight bearing status  -AM     Transfer, Safety Issues  step length decreased  -AM     Transfer, Impairments  ROM decreased;strength decreased;pain  -AM     Recorded by  [AM] Jaden Mtz PTA     Gait Assessment/Treatment    Gait, [place] Level  contact guard assist  -AM     Gait, Assistive Device  rolling walker  -AM     Gait,  Distance (Feet)  70  -AM     Gait, Gait Pattern Analysis  3-point gait  -AM     Gait, Gait Deviations  bilateral:;[place] decreased  -AM     Gait, Maintain Weight Bearing Status  able to maintain weight bearing status  -AM     Gait, Safety Issues  step length decreased  -AM     Gait, Impairments  ROM decreased;strength decreased;pain  -AM     Recorded by  [AM] Jaden Mtz PTA     Stairs Assessment/Treatment    Stairs, [place] Level  not tested  -AM     Recorded by  [AM] Jaden Mtz PTA     Upper Body Bathing Assessment/Training    UB Bathing Assess/Train Assistive Device bath mitt  -CS      UB Bathing Assess/Train, Position long sitting  -CS      UB Bathing Assess/Train, [place] Level set up required  -CS      Recorded by [CS] CANDIS Segal      Lower Body Bathing Assessment/Training    LB Bathing Assess/Train Assistive Device bath mitt  -CS      LB Bathing Assess/Train, Position long sitting  -CS      LB Bathing Assess/Train, [place] Level minimum assist (75% patient effort)  -CS      Recorded by [CS] CANDIS Segal      Lower Body Dressing Assessment/Training    LB Dressing Assess/Train, Clothing Type doffing:;donning:;slipper socks  -CS      LB Dressing Assess/Train, Position long sitting  -CS      LB Dressing Assess/Train, [place] moderate assist (50% patient effort)  -CS      Recorded by [CS] CANDIS Segal      Grooming Assessment/Training    Grooming Assess/Train, Position long sitting  -CS      Grooming Assess/Train, [place] Level set up required  -CS      Grooming Assess/Train, Comment shampoo cap, oral care, wash face, comb hair, apply deodorant/lotion  -CS      Recorded by [CS] CANDIS Segal      Therapy Exercises    Bilateral Lower Extremities  AROM:;20 reps;supine;sitting;ankle pumps/circles;glut sets;heel slides;quad sets;SLR  -AM     Bilateral Upper Extremity AROM:;20 reps;sitting;elbow flexion/extension;hand  pumps;pronation/supination;shoulder extension/flexion;shoulder ER/IR  -CS      BUE Resistance manual resistance- minimal  -CS      Recorded by [CS] DRAKE Segal/TONE [AM] Jaden Mtz PTA     Sensory Assessment/Intervention    Light Touch  --  -AM     LLE Light Touch  --  -AM     Recorded by  [AM] Jaden Mtz PTA     Positioning and Restraints    Pre-Treatment Position in bed  -CS sitting in chair/recliner  -AM     Post Treatment Position bed  -CS bed  -AM     In Bed supine;call light within reach;with family/caregiver  -CS supine;call light within reach;encouraged to call for assist;exit alarm on;with family/caregiver  -AM     Recorded by [CS] DRAKE Segal/TONE [AM] Jaden Mtz PTA       User Key  (r) = Recorded By, (t) = Taken By, (c) = Cosigned By    Initials Name Effective Dates    AM Jaden Mtz PTA 10/17/16 -     CS CANDIS Segal 10/17/16 -                 OT Goals       07/26/17 1138 07/25/17 1536 07/24/17 1332    Bed Mobility OT LTG    Bed Mobility OT LTG, Time to Achieve   by discharge  -RM    Bed Mobility OT LTG, Activity Type   all bed mobility  -RM    Bed Mobility OT LTG, [place] Level   supervision required  -RM    Bed Mobility OT LTG, Date Goal Reviewed 07/26/17  -CS 07/25/17  -CS     Bed Mobility OT LTG, Outcome  goal ongoing  -CS     Transfer Training OT LTG    Transfer Training OT LTG, Date Established   07/24/17  -RM    Transfer Training OT LTG, Time to Achieve   by discharge  -RM    Transfer Training OT LTG, Activity Type   all transfers  -RM    Transfer Training OT LTG, [place] Level   supervision required  -RM    Transfer Training OT LTG, Assist Device   walker, rolling  -RM    Transfer Training OT LTG, Date Goal Reviewed 07/26/17  -CS 07/25/17  -CS     Transfer Training OT LTG, Outcome  goal ongoing  -CS     ADL OT LTG    ADL OT LTG, Date Established   07/24/17  -RM    ADL OT LTG, Time to Achieve   by discharge  -RM    ADL OT LTG, Activity  Type   ADL skills   bath, dress, toilet  -RM    ADL OT LTG, [place] Level   standby assist  -RM    ADL OT LTG, Date Goal Reviewed 07/26/17  -CS 07/25/17  -CS     ADL OT LTG, Outcome  goal ongoing  -CS     Functional Mobility OT LTG    Functional Mobility Goal OT LTG, Date Established   07/24/17  -RM    Functional Mobility Goal OT LTG, Time to Achieve   by discharge  -RM    Functional Mobility Goal OT LTG, [place] Level   standby assist  -RM    Functional Mobility Goal OT LTG, Assist Device   rolling walker  -RM    Functional Mobility Goal OT LTG, Distance to Achieve   in hallway;to the sink;to the bathroom  -RM    Functional Mobility Goal OT LTG, Date Goal Reviewed 07/26/17  -CS 07/25/17  -CS     Functional Mobility Goal OT LTG, Outcome  goal ongoing  -CS       User Key  (r) = Recorded By, (t) = Taken By, (c) = Cosigned By    Initials Name Provider Type    DRAKE Hobbs/TONE Occupational Therapy Assistant    RM Keiry Escalera, OT Occupational Therapist          Occupational Therapy Education     Title: PT OT SLP Therapies (Done)     Topic: Occupational Therapy (Done)     Point: ADL training (Done)    Description: Instruct learner(s) on proper safety adaptation and remediation techniques during self care or transfers.   Instruct in proper use of assistive devices.    Learning Progress Summary    Learner Readiness Method Response Comment Documented by Status   Patient Acceptance E,TB,D VU   07/26/17 1137 Done    Acceptance E,TB,D NR   07/25/17 1534 Active    Acceptance E NR   07/24/17 1423 Active               Point: Home exercise program (Done)    Description: Instruct learner(s) on appropriate technique for monitoring, assisting and/or progressing therapeutic exercises/activities.    Learning Progress Summary    Learner Readiness Method Response Comment Documented by Status   Patient Acceptance E,TB,D VU   07/26/17 1137 Done    Acceptance E,TB,D NR   07/25/17 1534 Active                Point: Precautions (Done)    Description: Instruct learner(s) on prescribed precautions during self-care and functional transfers.    Learning Progress Summary    Learner Readiness Method Response Comment Documented by Status   Patient Acceptance E,TB,D VU   07/26/17 1137 Done    Acceptance E,TB,D NR   07/25/17 1534 Active    Acceptance E NR   07/24/17 1423 Active               Point: Body mechanics (Done)    Description: Instruct learner(s) on proper positioning and spine alignment during self-care, functional mobility activities and/or exercises.    Learning Progress Summary    Learner Readiness Method Response Comment Documented by Status   Patient Acceptance E,TB,D VU   07/26/17 1137 Done    Acceptance E,TB,D NR   07/25/17 1534 Active    Acceptance E NR   07/24/17 1423 Active                      User Key     Initials Effective Dates Name Provider Type Discipline     10/17/16 -  CANDIS Segal Occupational Therapy Assistant OT     03/22/17 -  Keiry Escalera, OT Occupational Therapist OT                  OT Recommendation and Plan  Anticipated Discharge Disposition: home with home health  Planned Therapy Interventions: activity intolerance, adaptive equipment training, ADL retraining, IADL retraining, balance training, bed mobility training, energy conservation, home exercise program, neuromuscular re-education, joint mobilization, ROM (Range of Motion), strengthening, stretching, transfer training  Therapy Frequency: other (see comments) (3-14x/wk)  Plan of Care Review  Plan Of Care Reviewed With: patient  Progress: progress toward functional goals as expected  Outcome Summary/Follow up Plan: Pt tolerated tx well this date. Pt completed an ADL. Pt was educated on LH AE(sock aid, reacher, dressing stick, and etc.). No goals met this date. Continue POC.        Outcome Measures       07/26/17 1100 07/26/17 0800 07/25/17 1500    How much help from another person do you currently need...     Turning from your back to your side while in flat bed without using bedrails?  3  -AM     Moving from lying on back to sitting on the side of a flat bed without bedrails?  3  -AM     Moving to and from a bed to a chair (including a wheelchair)?  3  -AM     Standing up from a chair using your arms (e.g., wheelchair, bedside chair)?  3  -AM     Climbing 3-5 steps with a railing?  1  -AM     To walk in hospital room?  3  -AM     AM-PAC 6 Clicks Score  16  -AM     How much help from another is currently needed...    Putting on and taking off regular lower body clothing? 2  -CS  2  -CS    Bathing (including washing, rinsing, and drying) 2  -CS  2  -CS    Toileting (which includes using toilet bed pan or urinal) 4  -CS  4  -CS    Putting on and taking off regular upper body clothing 3  -CS  3  -CS    Taking care of personal grooming (such as brushing teeth) 3  -CS  3  -CS    Eating meals 4  -CS  4  -CS    Score 18  -CS  18  -CS    Functional Assessment    Outcome Measure Options --  -CS AM-PAC 6 Clicks Basic Mobility (PT)  -AM AM-PAC 6 Clicks Daily Activity (OT)  -CS      07/25/17 1310 07/25/17 0915 07/24/17 1332    How much help from another person do you currently need...    Turning from your back to your side while in flat bed without using bedrails? 3  -AM 3  -AM     Moving from lying on back to sitting on the side of a flat bed without bedrails? 3  -AM 3  -AM     Moving to and from a bed to a chair (including a wheelchair)? 3  -AM 3  -AM     Standing up from a chair using your arms (e.g., wheelchair, bedside chair)? 3  -AM 3  -AM     Climbing 3-5 steps with a railing? 1  -AM 1  -AM     To walk in hospital room? 3  -AM 3  -AM     AM-PAC 6 Clicks Score 16  -AM 16  -AM     How much help from another is currently needed...    Putting on and taking off regular lower body clothing?   2  -RM    Bathing (including washing, rinsing, and drying)   2  -RM    Toileting (which includes using toilet bed pan or urinal)   4  -RM     Putting on and taking off regular upper body clothing   3  -RM    Taking care of personal grooming (such as brushing teeth)   3  -RM    Eating meals   4  -RM    Score   18  -RM    Other Outcome Measure Tool Used    Other Outcome Measure Tool Comments  PADD SCORE   8  -AM     Functional Assessment    Outcome Measure Options AM-PAC 6 Clicks Basic Mobility (PT)  -AM AM-PAC 6 Clicks Basic Mobility (PT);Other Outcome Measure   PADD SCORE  -AM AM-PAC 6 Clicks Daily Activity (OT)  -RM      07/24/17 1249          How much help from another person do you currently need...    Turning from your back to your side while in flat bed without using bedrails? 3  -CB      Moving from lying on back to sitting on the side of a flat bed without bedrails? 3  -CB      Moving to and from a bed to a chair (including a wheelchair)? 3  -CB      Standing up from a chair using your arms (e.g., wheelchair, bedside chair)? 3  -CB      Climbing 3-5 steps with a railing? 2  -CB      To walk in hospital room? 3  -CB      AM-PAC 6 Clicks Score 17  -CB      Functional Assessment    Outcome Measure Options AM-PAC 6 Clicks Basic Mobility (PT)  -CB        User Key  (r) = Recorded By, (t) = Taken By, (c) = Cosigned By    Initials Name Provider Type    CB Ivonne Osborn, PT Physical Therapist    EARL Mtz, PTA Physical Therapy Assistant    CANDIS Hobbs Occupational Therapy Assistant    IMELDA Escalera, OT Occupational Therapist           Time Calculation:         Time Calculation- OT       07/26/17 1140          Time Calculation- OT    OT Start Time 0926  -CS      OT Stop Time 1020  -CS      OT Time Calculation (min) 54 min  -CS      Total Timed Code Minutes- OT 54 minute(s)  -CS      OT Received On 07/26/17  -CS        User Key  (r) = Recorded By, (t) = Taken By, (c) = Cosigned By    Initials Name Provider Type    CANDIS Hobbs Occupational Therapy Assistant           Therapy Charges for Today     Code Description  Service Date Service Provider Modifiers Qty    11215840988 HC OT SELF CARE/MGMT/TRAIN EA 15 MIN 7/25/2017 Yelena Greene JUAN/L GO 3    84840897054 HC OT THER PROC EA 15 MIN 7/25/2017 Yelena Greene JUAN/L GO 1    75114588044 HC OT SELF CARE/MGMT/TRAIN EA 15 MIN 7/26/2017 Yelena Greene JUAN/L GO 3    42425597862 HC OT THER PROC EA 15 MIN 7/26/2017 ZEN SegalA/L GO 1          OT G-codes  OT Professional Judgement Used?: Yes  OT Functional Scales Options: AM-PAC 6 Clicks Daily Activity (OT)  Score: 18  Functional Limitation: Self care  Self Care Current Status (): At least 40 percent but less than 60 percent impaired, limited or restricted  Self Care Goal Status (): At least 1 percent but less than 20 percent impaired, limited or restricted    Yelena Greene JUAN/L  7/26/2017

## 2017-07-26 NOTE — PLAN OF CARE
Problem: Inpatient Physical Therapy  Goal: Bed Mobility Goal STG- PT  Outcome: Unable to achieve outcome(s) by discharge Date Met:  07/26/17 07/24/17 1249 07/26/17 0800   Bed Mobility PT STG   Bed Mobility PT STG, Date Established 07/24/17 --    Bed Mobility PT STG, Time to Achieve 2 - 3 days --    Bed Mobility PT STG, Activity Type supine to sit/sit to supine --    Bed Mobility PT STG, [place] Level independent --    Transfer Training Goal, Assist Device bed rails --    Bed Mobility PT STG, Additional Goal HOB up or down --    Bed Mobility PT STG, Date Goal Reviewed --  07/26/17   Bed Mobility PT STG, Outcome --  goal met       Goal: Gait Training Goal LTG- PT  Outcome: Unable to achieve outcome(s) by discharge Date Met:  07/26/17 07/24/17 1249 07/26/17 0800   Gait Training PT LTG   Gait Training Goal PT LTG, Date Established 07/24/17 --    Gait Training Goal PT LTG, Time to Achieve by discharge --    Gait Training Goal PT LTG, [place] Level contact guard assist --    Gait Training Goal PT LTG, Assist Device walker, rolling --    Gait Training Goal PT LTG, Distance to Achieve 200 feet --    Gait Training Goal PT LTG, Date Goal Reviewed --  07/26/17   Gait Training Goal PT LTG, Outcome --  goal not met   Gait Training Goal PT LTG, Reason Goal Not Met --  discharged from facility       Goal: Strength Goal LTG- PT  Outcome: Unable to achieve outcome(s) by discharge Date Met:  07/26/17 07/24/17 1249 07/26/17 0800   Strength Goal PT LTG   Strength Goal PT LTG, Date Established 07/24/17 --    Strength Goal PT LTG, Time to Achieve by discharge --    Strength Goal PT LTG, Measure to Achieve 20 reps all ex BLE independently with no knee lag on L --    Strength Goal PT LTG, Functional Goal get legs up onto bed without using UE --    Strength Goal PT LTG, Date Goal Reviewed --  07/26/17   Strength Goal PT LTG, Outcome --  goal not met   Strength Goal PT LTG, Reason Goal Not Met --  discharged from facility        Goal: Range of Motion Goal LTG- PT  Outcome: Unable to achieve outcome(s) by discharge Date Met:  07/26/17 07/24/17 1249 07/26/17 0800   Range of Motion PT LTG   Range of Motion Goal PT LTG, Date Established 07/24/17 --    Range of Motion Goal PT LTG, Time to Achieve by discharge --    Range fo Motion Goal PT LTG, Joint L knee --    Range of Motion Goal PT LTG, AROM Measure 0-90 --    Range of Motion Goal PT LTG, Date Goal Reviewed --  07/26/17   Range of Motion Goal PT LTG, Outcome --  goal not met   Reason Goal Not Met (ROM) PT LTG --  discharged from facility

## 2017-07-28 ENCOUNTER — ANTICOAGULATION VISIT (OUTPATIENT)
Dept: PHARMACY | Facility: HOSPITAL | Age: 62
End: 2017-07-28

## 2017-07-28 LAB — INR PPP: 1.2

## 2017-07-28 NOTE — PROGRESS NOTES
Spoke with Ms. Andersen. Reviewed current lab.  No s/s of bleeding. No new meds. No missed doses. Reviewed schedule for follow week. Pt read back regimen and verbalized understanding. All questions answered. Next INR on 8/2 provided by [facility]   (called and gave order to Yi).    Tomasz Hernandez III, [facility]  07/28/17  3:25 PM

## 2017-08-02 ENCOUNTER — ANTICOAGULATION VISIT (OUTPATIENT)
Dept: PHARMACY | Facility: HOSPITAL | Age: 62
End: 2017-08-02

## 2017-08-02 LAB — INR PPP: 1.4

## 2017-08-02 NOTE — PROGRESS NOTES
Spoke with LUCIO Shearer, [facility]. Patient doing well. Missed dose on Sunday. Will continue alternating 7.5 mg and 5 mg dose. Next INR Wednesday 8/9.

## 2017-08-03 RX ORDER — HYDROCODONE BITARTRATE AND ACETAMINOPHEN 10; 325 MG/1; MG/1
1 TABLET ORAL EVERY 6 HOURS PRN
Qty: 40 TABLET | Refills: 0 | Status: SHIPPED | OUTPATIENT
Start: 2017-08-03 | End: 2017-08-24 | Stop reason: SDUPTHER

## 2017-08-09 ENCOUNTER — ANTICOAGULATION VISIT (OUTPATIENT)
Dept: PHARMACY | Facility: HOSPITAL | Age: 62
End: 2017-08-09

## 2017-08-09 LAB — INR PPP: 2.2 (ref 1.7–2.5)

## 2017-08-09 PROCEDURE — 85610 PROTHROMBIN TIME: CPT | Performed by: ORTHOPAEDIC SURGERY

## 2017-08-09 NOTE — PROGRESS NOTES
Spoke with [facility] RN while with Mini on the home visit. Reviewed current lab.  No s/s of bleeding. No new meds. No missed doses. Reviewed schedule for follow week. Pt read back regimen and verbalized understanding. All questions answered. Next INR on 8/16 provided by [facility].    Everette Mackay RPH  08/09/17  2:52 PM

## 2017-08-10 ENCOUNTER — OFFICE VISIT (OUTPATIENT)
Dept: ORTHOPEDIC SURGERY | Facility: CLINIC | Age: 62
End: 2017-08-10

## 2017-08-10 VITALS — BODY MASS INDEX: 30.34 KG/M2 | HEIGHT: 72 IN | WEIGHT: 224 LBS

## 2017-08-10 DIAGNOSIS — M25.562 CHRONIC PAIN OF LEFT KNEE: Primary | ICD-10-CM

## 2017-08-10 DIAGNOSIS — Z96.652 PRESENCE OF TOTAL LEFT KNEE JOINT PROSTHESIS: ICD-10-CM

## 2017-08-10 DIAGNOSIS — M17.12 LOCALIZED OSTEOARTHRITIS OF LEFT KNEE: ICD-10-CM

## 2017-08-10 DIAGNOSIS — G89.29 CHRONIC PAIN OF LEFT KNEE: Primary | ICD-10-CM

## 2017-08-10 PROCEDURE — 99024 POSTOP FOLLOW-UP VISIT: CPT | Performed by: ORTHOPAEDIC SURGERY

## 2017-08-10 NOTE — PROGRESS NOTES
"Mini Andersen : 1955 MRN: [id number] DATE: 2017    Chief Complaint:  Chief Complaint   Patient presents with   • Left Knee - Post-op       SUBJECTIVE:Patient returns today for 2 week follow up of left total knee replacement. Patient reports doing well with no unusual complaints. Appears to be progressing appropriately.    OBJECTIVE:   Vitals:    08/10/17 1330   Weight: 224 lb (102 kg)   Height: 72\" (182.9 cm)       Exam:. The incision is healing appropriately. No sign of infection.   Range of motion:   Flexion:  95  Extension: 0  The calf is soft and nontender with a negative Homans sign.      DIAGNOSTIC STUDIES  Xrays:    Xr Knee 1 Or 2 View Left    Result Date: 8/10/2017  Narrative: AP bilateral standing with lateral of the left knee shows acceptable position and alignment of a left total knee arthroplasty.  No sign of implant loosening or failure is noted.  The right knee shows mild medial joint space narrowing with mild irregularity of the articular cartilage.  Appropriate implant sizing is noted on both views of the left knee. 08/10/17 at 4:52 PM by Jose Ballesteros MD     Xr Knee 1 Or 2 View Left    Result Date: 2017  Narrative: PROCEDURE: AP and lateral left knee COMPARISON: No comparison. HISTORY: Postop FINDINGS: A left knee prosthesis has been placed. Postsurgical air and fluid are noted in the soft tissues. There are overlying cutaneous staples. A drain has been placed.     Impression: CONCLUSION:  Postoperative left knee. Electronically signed by:  Ramana Barger MD  2017 10:49 AM CDT Workstation: [id number]    Xr Knee Bilateral Ap Standing    Result Date: 8/10/2017  Narrative: AP bilateral standing with lateral of the left knee shows acceptable position and alignment of a left total knee arthroplasty.  No sign of implant loosening or failure is noted.  The right knee shows mild medial joint space narrowing with mild irregularity of the articular cartilage.  Appropriate " implant sizing is noted on both views of the left knee. 08/10/17 at 4:52 PM by Jose Ballesteros MD         ASSESSMENT: 2 week status post left knee replacement.    PLAN: 1) Staples removed and steri strips applied   2) Continue PT   3) Discontinue ADIN hose   4) Continue ice PRN   5) warfarin for 6 weeks total postop   6) Follow up in 4 weeks       08/12/17 at 1:29 PM by Jose Ballesteros MD

## 2017-08-11 ENCOUNTER — LAB REQUISITION (OUTPATIENT)
Dept: LAB | Facility: HOSPITAL | Age: 62
End: 2017-08-11

## 2017-08-11 DIAGNOSIS — Z79.01 LONG TERM CURRENT USE OF ANTICOAGULANT: ICD-10-CM

## 2017-08-11 LAB
INR PPP: 2.2 (ref 0.8–1.2)
PROTHROMBIN TIME: 26 SECONDS (ref 11–15)

## 2017-08-14 DIAGNOSIS — Z96.652 PRESENCE OF TOTAL LEFT KNEE JOINT PROSTHESIS: Primary | ICD-10-CM

## 2017-08-15 RX ORDER — HYDROCODONE BITARTRATE AND ACETAMINOPHEN 10; 325 MG/1; MG/1
1 TABLET ORAL EVERY 6 HOURS PRN
Qty: 40 TABLET | Refills: 0 | OUTPATIENT
Start: 2017-08-15

## 2017-08-15 NOTE — TELEPHONE ENCOUNTER
I thought she was going to get one prescription from me and then resume her regimen with pain management.

## 2017-08-16 ENCOUNTER — ANTICOAGULATION VISIT (OUTPATIENT)
Dept: PHARMACY | Facility: HOSPITAL | Age: 62
End: 2017-08-16

## 2017-08-16 DIAGNOSIS — Z79.01 LONG TERM (CURRENT) USE OF ANTICOAGULANTS: Primary | ICD-10-CM

## 2017-08-16 LAB — INR PPP: 2.4

## 2017-08-16 NOTE — PROGRESS NOTES
Spoke with Meredith VALDES [facility].  Read back new regimen.  Patient will be discharged from [facility] and will go to [facility] for future INR's.  I will send order to lab through [facility].  No questions, no problems.

## 2017-08-23 ENCOUNTER — LAB (OUTPATIENT)
Dept: LAB | Facility: HOSPITAL | Age: 62
End: 2017-08-23

## 2017-08-23 ENCOUNTER — ANTICOAGULATION VISIT (OUTPATIENT)
Dept: PHARMACY | Facility: HOSPITAL | Age: 62
End: 2017-08-23

## 2017-08-23 ENCOUNTER — HOSPITAL ENCOUNTER (OUTPATIENT)
Dept: PHYSICAL THERAPY | Facility: HOSPITAL | Age: 62
Setting detail: THERAPIES SERIES
Discharge: HOME | End: 2017-08-23
Attending: ORTHOPAEDIC SURGERY

## 2017-08-23 DIAGNOSIS — Z79.01 LONG TERM (CURRENT) USE OF ANTICOAGULANTS: ICD-10-CM

## 2017-08-23 DIAGNOSIS — M25.562 CHRONIC PAIN OF LEFT KNEE: ICD-10-CM

## 2017-08-23 DIAGNOSIS — Z96.652 PRESENCE OF TOTAL LEFT KNEE JOINT PROSTHESIS: Primary | ICD-10-CM

## 2017-08-23 DIAGNOSIS — G89.29 CHRONIC PAIN OF LEFT KNEE: ICD-10-CM

## 2017-08-23 DIAGNOSIS — M17.12 PRIMARY OSTEOARTHRITIS OF LEFT KNEE: ICD-10-CM

## 2017-08-23 LAB — INR PPP: 2 (ref 0.8–1.2)

## 2017-08-23 PROCEDURE — 97162 PT EVAL MOD COMPLEX 30 MIN: CPT | Performed by: PHYSICAL THERAPIST

## 2017-08-23 PROCEDURE — G8979 MOBILITY GOAL STATUS: HCPCS | Performed by: PHYSICAL THERAPIST

## 2017-08-23 PROCEDURE — 85610 PROTHROMBIN TIME: CPT

## 2017-08-23 PROCEDURE — 97110 THERAPEUTIC EXERCISES: CPT | Performed by: PHYSICAL THERAPIST

## 2017-08-23 PROCEDURE — G8978 MOBILITY CURRENT STATUS: HCPCS | Performed by: PHYSICAL THERAPIST

## 2017-08-24 RX ORDER — HYDROCODONE BITARTRATE AND ACETAMINOPHEN 10; 325 MG/1; MG/1
1 TABLET ORAL EVERY 8 HOURS PRN
Qty: 40 TABLET | Refills: 0 | Status: SHIPPED | OUTPATIENT
Start: 2017-08-24 | End: 2017-11-07

## 2017-08-24 NOTE — THERAPY EVALUATION
Outpatient Physical Therapy Ortho Initial Evaluation  [facility]     Patient Name: Mini Andersen  : 1955  MRN: [id number]  Today's Date: 2017      Visit Date: 2017  Attendance:  (Medicare)  Subjective Improvement: n/a  Next MD Appt: 17  Recert Date: 17    Therapy Diagnosis: L total knee arthroplasty, 17    Patient Active Problem List   Diagnosis   • Palpitations   • Type 2 diabetes mellitus   • Hypothyroidism   • Primary osteoarthritis of left knee   • Knee pain   • Diabetes mellitus without complication   • Borderline glaucoma   • Nuclear cataract   • Abdominal pain   • Cobalamin deficiency   • Cramp of limb   • Depression   • Gastroesophageal reflux disease   • Callus of foot   • Deformity of foot   • Foot pain   • History of colonoscopy   • Low back pain   • Mononeuritis   • Spasm   • Neuropathy   • Lung field abnormal   • Encounter for screening mammogram for malignant neoplasm of breast   • Cardiac arrhythmia   • Arthralgia of lower leg   • Arthralgia of shoulder   • Pure hypercholesterolemia   • Seasonal allergic rhinitis   • Disorder of hand   • Osteoporosis   • Pre-operative clearance   • Type 2 diabetes mellitus without complication, without long-term current use of insulin   • Angina pectoris    • SOB (shortness of breath)   • Obstructive sleep apnea syndrome   • Chronic pain of left knee        Past Medical History:   Diagnosis Date   • Acid reflux    • Acute peritonitis    • Allergic rhinitis    • Arthritis    • Bee sting    • Borderline glaucoma    • Chronic bronchitis    • Constipation     severe with an immotile colon      • Deep venous thrombosis    • High cholesterol    • Hypothyroidism    • Intestinal volvulus    • Muscle atrophy     O/E   • Nuclear senile cataract    • Nuclear senile cataract    • Polyneuropathy in diabetes    • PONV (postoperative nausea and vomiting)    • Type 2 diabetes mellitus     NO BDR   • Type 2 diabetes mellitus without  "complications    • Unspecified disease of nail         Past Surgical History:   Procedure Laterality Date   • APPENDECTOMY     • BLADDER SUSPENSION     • COLECTOMY PARTIAL / TOTAL  05/22/2014    Subtotal colectomy with ineo-rectostomy.   • HERNIA REPAIR      multiple   • HYSTERECTOMY     • INJECTION OF MEDICATION  12/07/2010    DEPO MEDROL   • KNEE ARTHROSCOPY Left    • LAPAROSCOPIC CHOLECYSTECTOMY     • SALPINGO OOPHORECTOMY Left    • SALPINGO OOPHORECTOMY Right    • TOTAL KNEE ARTHROPLASTY Left 7/24/2017    Procedure: TOTAL KNEE ARTHROPLASTY ATTUNE with adductor canal block;  Surgeon: Jose Ballesteros MD;  Location: [facility];  Service:        Visit Dx:     ICD-10-CM ICD-9-CM   1. Presence of total left knee joint prosthesis Z96.659 V43.65   2. Primary osteoarthritis of left knee M17.12 715.16   3. Chronic pain of left knee M25.562 719.46    G89.29 338.29             Patient History       08/23/17 1300          History    Chief Complaint Difficulty Walking;Difficulty with daily activities;Pain  -SS      Type of Pain Knee pain   left  -      Date Current Problem(s) Began 07/24/17  -      Brief Description of Current Complaint Patient underwent a L total knee arthroplasty on 7/24/17. She was inpatient for 1.5 days prior to discharge to home with home health. Home Health discharged her Friday 7/18/17. Has been working aston her HEP 3x/day. She reports that she only uses her cane when she is outside of the house. She does not use any assistive device in the home. Had not had pain the past week or 2 until this morning. States that she twisted her knee somehow this morning. She reports sensitivity of the knee to things touching it.  female with 1 child. Lives in a single house with 3 steps or a ramp to enter. Takes the steps 1 at a time.   -SS      Patient/Caregiver Goals --   \"I just want my knee to get better.\"  -SS      Current Tobacco Use no  -SS      Smoking Status former  -SS      Patient's Rating " of General Health Good  -SS      Hand Dominance right-handed  -SS      Occupation/sports/leisure activities Disabled. Hobbies: color  -SS      Related/Recent Hospitalizations Yes  -SS      Date of Hospitalization 07/24/17  -SS      Surgery Date 1 07/24/17  -SS      Surgery/Hospitalization L TKA  -SS      Pain     Pain Location Knee   left  -SS      Pain at Present 5  -SS      Pain at Best 0  -SS      Pain at Worst 8  -SS      Pain Frequency Intermittent  -SS      Pain Description Aching  -SS      What Performance Factors Make the Current Problem(s) WORSE? twisting  -SS      What Performance Factors Make the Current Problem(s) BETTER? pain medication  -SS      Is your sleep disturbed? Yes  -SS      Difficulties at work? n/a  -SS      Difficulties with ADL's? decreased  -SS      Difficulties with recreational activities? no  -SS      Fall Risk Assessment    Any falls in the past year: No  -SS      Does patient have a fear of falling No  -SS      Daily Activities    Primary Language English  -      Safety    Are you being hurt, hit, or frightened by anyone at home or in your life? No  -SS      Are you being neglected by a caregiver No  -SS        User Key  (r) = Recorded By, (t) = Taken By, (c) = Cosigned By    Initials Name Provider Type    SS Tye Bello, PT DPT Physical Therapist                PT Ortho       08/23/17 1400    Subjective Comments    Subjective Comments see Therapy Patient History  -SS    Precautions and Contraindications    Precautions/Limitations no known precautions/limitations  -SS    Precautions 31 days post-op  -SS    Contraindications none  -SS    Subjective Pain    Able to rate subjective pain? yes  -SS    Pre-Treatment Pain Level 5  -SS    Post-Treatment Pain Level 3  -SS    Subjective Pain Comment patient declines modalities  -SS    Posture/Observations    Posture/Observations Comments Presents ambulating with a cane in the right hand. Slight decrease knee extension during stance  phase of gait. Steristripped incision anterior knee that is healing well. Decreased sensation to light touch distal to the L knee. Slight hypersensitivity anterior L knee.  -SS    Left Knee    Extension/Flexion AROM Deficit 0-5-95  -SS    MMT (Manual Muscle Testing)    General MMT Assessment Detail Independent SLR with 6 degree extension lag.  -SS      User Key  (r) = Recorded By, (t) = Taken By, (c) = Cosigned By    Initials Name Provider Type    NUBIA Bello, PT DPT Physical Therapist                            Therapy Education       08/23/17 2000          Therapy Education    Given HEP;Other (comment)   therapy goals & POC; continue HEP  -SS      Program Reinforced  -SS      How Provided Verbal  -SS      Provided to Patient  -SS      Level of Understanding Verbalized  -SS        User Key  (r) = Recorded By, (t) = Taken By, (c) = Cosigned By    Initials Name Provider Type    NUBIA Bello, PT DPT Physical Therapist                PT OP Goals       08/23/17 1300       PT Short Term Goals    STG Date to Achieve --   deferred  -SS     Long Term Goals    LTG Date to Achieve 09/20/17  -SS     LTG 1 Independent with HEP  -SS     LTG 2 LEFS score to be >/= 60/80  -SS     LTG 3 L knee active extension to 0 deg  -SS     LTG 4 L knee active flexion to >/= 120 deg  -SS     LTG 5 Minimal deviations with gait.   -SS     LTG 6 Demonstrate ability to ascend 3 steps reciprocally  -     Time Calculation    PT Goal Re-Cert Due Date 09/13/17  -SS       User Key  (r) = Recorded By, (t) = Taken By, (c) = Cosigned By    Initials Name Provider Type    NUBIA Bello, JUANPABLO DPT Physical Therapist                PT Assessment/Plan       08/23/17 2000 08/23/17 1400    PT Assessment    Functional Limitations  Impaired gait;Limitation in home management;Limitations in community activities  -SS    Impairments  Gait;Pain;Range of motion;Muscle strength  -SS    Assessment Comments Patient call the clinic this  date at 1658 and left message for me. I returned her call approximately 5-10 minutes later. She had an increase in her lateral knee pain after getting home and asked what she needed to do. I recommended that she ice the knee. I also recommended that she continue her ROM and other HEP from home health.  -SS Patient is 30 days s/p L total knee.   -    Rehab Potential  Good  -SS    Patient/caregiver participated in establishment of treatment plan and goals  Yes  -SS    Patient would benefit from skilled therapy intervention  Yes  -SS    PT Plan    PT Frequency  2x/week  -SS    Predicted Duration of Therapy Intervention (days/wks)  3-4 weeks  -    Planned CPT's?  PT EVAL MOD COMPLEXITY: 36658;PT THER PROC EA 15 MIN: 10985;PT MANUAL THERAPY EA 15 MIN: 73806;PT HOT OR COLD PACK TREAT MCARE;PT ELECTRICAL STIM UNATTEND: ;PT THER SUPP EA 15 MIN  -    Physical Therapy Interventions (Optional Details)  balance training;home exercise program;joint mobilization;manual therapy techniques;modalities;patient/family education;ROM (Range of Motion);stair training;strengthening;stretching  -    PT Plan Comments  A/AAROM, stretching, strengthening, stair training, scar care, desensitization, ice with or without IFC estim as needed for pain.  -      User Key  (r) = Recorded By, (t) = Taken By, (c) = Cosigned By    Initials Name Provider Type    SS Tye Bello, PT DPT Physical Therapist                  Exercises       08/23/17 1400          Subjective Comments    Subjective Comments see Therapy Patient History  -      Subjective Pain    Able to rate subjective pain? yes  -SS      Pre-Treatment Pain Level 5  -SS      Post-Treatment Pain Level 3  -SS      Subjective Pain Comment patient declines modalities  -      Exercise 1    Exercise Name 1 Pro2, Seat 15, Pedal 2, LE ROM  -      Cueing 1 Verbal  -      Time (Minutes) 1 8  -SS      Additional Comments Level 2  -      Exercise 2    Exercise Name 2  Incline calf stretch  -SS      Cueing 2 Demo  -SS      Sets 2 3  -SS      Time (Seconds) 2 30 sec hold  -SS      Exercise 3    Exercise Name 3 Standing HS stretch  -SS      Cueing 3 Demo  -SS      Sets 3 3  -SS      Time (Seconds) 3 30 sec hold  -SS      Exercise 4    Exercise Name 4 Lunge stretch for flexion  -SS      Cueing 4 Demo  -SS      Sets 4 3  -SS      Time (Seconds) 4 30 sec hold  -SS        User Key  (r) = Recorded By, (t) = Taken By, (c) = Cosigned By    Initials Name Provider Type    SS Tye Bello, PT DPT Physical Therapist                              Outcome Measures       08/23/17 1400          Lower Extremity Functional Index    Any of your usual work, housework or school activities 3  -SS      Your usual hobbies, recreational or sporting activities 4  -SS      Getting into or out of the bath 4  -SS      Walking between rooms 4  -SS      Putting on your shoes or socks 3  -SS      Squatting 3  -SS      Lifting an object, like a bag of groceries from the floor 4  -SS      Performing light activities around your home 4  -SS      Performing heavy activities around your home 3  -SS      Getting into or out of a car 4  -SS      Walking 2 blocks 3  -SS      Walking a mile 0  -SS      Going up or down 10 stairs (about 1 flight of stairs) 3  -SS      Standing for 1 hour 0  -SS      Sitting for 1 hour 4  -SS      Running on even ground 0  -SS      Running on uneven ground 0  -SS      Making sharp turns while running fast 0  -SS      Hopping 0  -SS      Rolling over in bed 4  -SS      Total 50  -SS      Functional Assessment    Outcome Measure Options Lower Extremity Functional Scale (LEFS)  -SS        User Key  (r) = Recorded By, (t) = Taken By, (c) = Cosigned By    Initials Name Provider Type    SS Tye Bello, PT DPT Physical Therapist            Time Calculation:   Start Time: 1350  Stop Time: 1431  Time Calculation (min): 41 min  Total Timed Code Minutes- PT: 15 minute(s)     Therapy  Charges for Today     Code Description Service Date Service Provider Modifiers Qty    04287983017 HC PT MOBILITY CURRENT 8/23/2017 Tye Bello, PT DPT GP, CJ 1    89330002288 HC PT MOBILITY PROJECTED 8/23/2017 Tye Bello PT DPT GP, CI 1    81780994788 HC PT EVAL MOD COMPLEXITY 2 8/23/2017 Tye Bello, PT DPT GP 1    54014805258 HC PT THER PROC EA 15 MIN 8/23/2017 Tye Bello, PT DPT GP 1          PT G-Codes  Outcome Measure Options: Lower Extremity Functional Scale (LEFS)  Score: 50/80  Functional Limitation: Mobility: Walking and moving around  Mobility: Walking and Moving Around Current Status (): At least 20 percent but less than 40 percent impaired, limited or restricted  Mobility: Walking and Moving Around Goal Status (): At least 1 percent but less than 20 percent impaired, limited or restricted         Tye Bello, PT, DPT, CHT  8/23/2017

## 2017-08-28 ENCOUNTER — HOSPITAL ENCOUNTER (OUTPATIENT)
Dept: PHYSICAL THERAPY | Facility: HOSPITAL | Age: 62
Setting detail: THERAPIES SERIES
Discharge: HOME | End: 2017-08-28

## 2017-08-28 DIAGNOSIS — Z96.652 PRESENCE OF TOTAL LEFT KNEE JOINT PROSTHESIS: Primary | ICD-10-CM

## 2017-08-28 PROCEDURE — 97110 THERAPEUTIC EXERCISES: CPT

## 2017-08-28 PROCEDURE — G0283 ELEC STIM OTHER THAN WOUND: HCPCS

## 2017-08-28 PROCEDURE — 97140 MANUAL THERAPY 1/> REGIONS: CPT

## 2017-08-28 NOTE — THERAPY TREATMENT NOTE
Outpatient Physical Therapy Ortho Treatment Note  [facility]     Patient Name: Mini Andersen  : 1955  MRN: [id number]  Today's Date: 2017      Visit Date: 2017  Pt. Has attended 2/2 visits  MD 17  Galindo 17 No subjective improvement  Visit Dx:    ICD-10-CM ICD-9-CM   1. Presence of total left knee joint prosthesis Z96.659 V43.65       Patient Active Problem List   Diagnosis   • Palpitations   • Type 2 diabetes mellitus   • Hypothyroidism   • Primary osteoarthritis of left knee   • Knee pain   • Diabetes mellitus without complication   • Borderline glaucoma   • Nuclear cataract   • Abdominal pain   • Cobalamin deficiency   • Cramp of limb   • Depression   • Gastroesophageal reflux disease   • Callus of foot   • Deformity of foot   • Foot pain   • History of colonoscopy   • Low back pain   • Mononeuritis   • Spasm   • Neuropathy   • Lung field abnormal   • Encounter for screening mammogram for malignant neoplasm of breast   • Cardiac arrhythmia   • Arthralgia of lower leg   • Arthralgia of shoulder   • Pure hypercholesterolemia   • Seasonal allergic rhinitis   • Disorder of hand   • Osteoporosis   • Pre-operative clearance   • Type 2 diabetes mellitus without complication, without long-term current use of insulin   • Angina pectoris    • SOB (shortness of breath)   • Obstructive sleep apnea syndrome   • Chronic pain of left knee        Past Medical History:   Diagnosis Date   • Acid reflux    • Acute peritonitis    • Allergic rhinitis    • Arthritis    • Bee sting    • Borderline glaucoma    • Chronic bronchitis    • Constipation     severe with an immotile colon      • Deep venous thrombosis    • High cholesterol    • Hypothyroidism    • Intestinal volvulus    • Muscle atrophy     O/E   • Nuclear senile cataract    • Nuclear senile cataract    • Polyneuropathy in diabetes    • PONV (postoperative nausea and vomiting)    • Type 2 diabetes mellitus     NO BDR   • Type 2  diabetes mellitus without complications    • Unspecified disease of nail         Past Surgical History:   Procedure Laterality Date   • APPENDECTOMY     • BLADDER SUSPENSION     • COLECTOMY PARTIAL / TOTAL  05/22/2014    Subtotal colectomy with ineo-rectostomy.   • HERNIA REPAIR      multiple   • HYSTERECTOMY     • INJECTION OF MEDICATION  12/07/2010    DEPO MEDROL   • KNEE ARTHROSCOPY Left    • LAPAROSCOPIC CHOLECYSTECTOMY     • SALPINGO OOPHORECTOMY Left    • SALPINGO OOPHORECTOMY Right    • TOTAL KNEE ARTHROPLASTY Left 7/24/2017    Procedure: TOTAL KNEE ARTHROPLASTY ATTUNE with adductor canal block;  Surgeon: Jose Ballesteros MD;  Location: [facility];  Service:                              PT Assessment/Plan       08/28/17 1446       PT Assessment    Assessment Comments Tolerates bike well and ambulates with cane with guarded antalgic gait pattern Ext somewhat improved -3 ext   -SP     PT Plan    PT Frequency 2x/week  -SP     PT Plan Comments continue with POC  -SP       User Key  (r) = Recorded By, (t) = Taken By, (c) = Cosigned By    Initials Name Provider Type    TITI Feldman PTA Physical Therapy Assistant                Modalities       08/28/17 1300          Ice    Ice Applied Yes  -SP      Location Left knee  -SP      Rx Minutes --   20 min  -SP      Ice S/P Rx Yes  -SP      ELECTRICAL STIMULATION    Attended/Unattended Unattended  -SP      Stimulation Type IFC  -SP      Location/Electrode Placement/Other Left knee  -SP      Rx Minutes 20 mins  -SP        User Key  (r) = Recorded By, (t) = Taken By, (c) = Cosigned By    Initials Name Provider Type    TITI Feldman PTA Physical Therapy Assistant                Exercises       08/28/17 1300          Subjective Comments    Subjective Comments Notes she is still sore  -SP      Subjective Pain    Able to rate subjective pain? yes  -SP      Pre-Treatment Pain Level 2  -SP      Post-Treatment Pain Level 8  -SP      Subjective Pain Comment Notes  she does HEP and uses ice at home  -SP      Aquatics    Aquatics performed? No  -SP      Exercise 1    Exercise Name 1 PrO 2 seat 14 Level 2  -SP      Time (Minutes) 1 10 min   10 min  -SP      Exercise 2    Exercise Name 2 Incline S HS S  -SP      Reps 2 3  -SP      Time (Seconds) 2 30 sec  -SP      Exercise 3    Exercise Name 3 knee flexion S  -SP      Reps 3 3  -SP      Time (Seconds) 3 30 sec  -SP      Exercise 4    Exercise Name 4 step ups 4 inch  -SP      Sets 4 2  -SP      Reps 4 10  -SP      Exercise 5    Exercise Name 5 tball ham curls  -SP      Sets 5 2  -SP      Reps 5 10  -SP      Exercise 6    Exercise Name 6 SLR   -SP      Reps 6 20  -SP        User Key  (r) = Recorded By, (t) = Taken By, (c) = Cosigned By    Initials Name Provider Type    TITI Feldman PTA Physical Therapy Assistant                        Manual Rx (last 36 hours)      Manual Treatments       08/28/17 1300          Manual Rx 1    Manual Rx 1 Location light scar massage and patellar mobs inferior/superior and medial/lateral  -SP      Manual Rx 1 Duration 10 min  -SP        User Key  (r) = Recorded By, (t) = Taken By, (c) = Cosigned By    Initials Name Provider Type    TITI Feldman PTA Physical Therapy Assistant                PT OP Goals       08/28/17 1450 08/28/17 1400    PT Short Term Goals    STG Date to Achieve  --   deferred  -SP    Long Term Goals    LTG Date to Achieve  09/20/17  -SP    LTG 1  Independent with HEP  -SP    LTG 1 Progress  Ongoing  -SP    LTG 2  LEFS score to be >/= 60/80  -SP    LTG 2 Progress  Ongoing  -SP    LTG 3  L knee active extension to 0 deg  -SP    LTG 3 Progress  Ongoing  -SP    LTG 4  L knee active flexion to >/= 120 deg  -SP    LTG 4 Progress  Ongoing  -SP    LTG 5  Minimal deviations with gait.   -SP    LTG 5 Progress  Ongoing  -SP    LTG 6  Demonstrate ability to ascend 3 steps reciprocally  -SP    LTG 6 Progress  Ongoing  -SP    Time Calculation    PT Goal Re-Cert Due Date 09/13/17   -TITI 09/13/17  -TITI      User Key  (r) = Recorded By, (t) = Taken By, (c) = Cosigned By    Initials Name Provider Type    TITI Feldman PTA Physical Therapy Assistant                    Time Calculation:   Start Time: 1347  Stop Time: 1454  Time Calculation (min): 67 min  Total Timed Code Minutes- PT: 47 minute(s)    Therapy Charges for Today     Code Description Service Date Service Provider Modifiers Qty    67673330253 HC PT THER SUPP EA 15 MIN 8/28/2017 Sheri Feldman PTA GP 1    02728692646 HC PT ELECTRICAL STIM UNATTENDED 8/28/2017 Sheri Feldman PTA  1    38284249013 HC PT THER PROC EA 15 MIN 8/28/2017 Sheri Feldman PTA GP 2    85510832655 HC PT MANUAL THERAPY EA 15 MIN 8/28/2017 Sheri Feldman, TIARA GP 1     MI TENS SUPPL 2 LEAD PER MONTH 8/28/2017 Sheri Feldman PTA  1                    Sheri Feldman PTA  8/28/2017

## 2017-08-30 ENCOUNTER — ANTICOAGULATION VISIT (OUTPATIENT)
Dept: PHARMACY | Facility: HOSPITAL | Age: 62
End: 2017-08-30

## 2017-08-30 ENCOUNTER — LAB (OUTPATIENT)
Dept: LAB | Facility: HOSPITAL | Age: 62
End: 2017-08-30

## 2017-08-30 ENCOUNTER — HOSPITAL ENCOUNTER (OUTPATIENT)
Dept: PHYSICAL THERAPY | Facility: HOSPITAL | Age: 62
Setting detail: THERAPIES SERIES
Discharge: HOME | End: 2017-08-30

## 2017-08-30 DIAGNOSIS — Z96.652 PRESENCE OF TOTAL LEFT KNEE JOINT PROSTHESIS: Primary | ICD-10-CM

## 2017-08-30 DIAGNOSIS — Z79.01 LONG TERM (CURRENT) USE OF ANTICOAGULANTS: Primary | ICD-10-CM

## 2017-08-30 LAB — INR PPP: 1.8 (ref 0.8–1.2)

## 2017-08-30 PROCEDURE — 85610 PROTHROMBIN TIME: CPT

## 2017-08-30 NOTE — PROGRESS NOTES
Spoke with Ms. Andersen. Reviewed current lab.  No s/s of bleeding. No new meds. No missed doses. Reviewed schedule for follow week. Pt read back regimen and verbalized understanding. All questions answered. Will call to stop on 9/4.    Tomasz Hernandez III, [facility]  08/30/17  5:18 PM

## 2017-09-04 ENCOUNTER — ANTICOAGULATION VISIT (OUTPATIENT)
Dept: PHARMACY | Facility: HOSPITAL | Age: 62
End: 2017-09-04

## 2017-09-04 ENCOUNTER — TELEPHONE (OUTPATIENT)
Dept: PHARMACY | Facility: HOSPITAL | Age: 62
End: 2017-09-04

## 2017-09-04 NOTE — TELEPHONE ENCOUNTER
Spoke with Mini Andersen - verbalized understanding that today was her last day for warfarin.  No more blood work.  No c/o or problems.

## 2017-09-04 NOTE — PROGRESS NOTES
Spoke with Mini Andersen - verbalized understanding that today was last day for warfarin.  No more blood work.  No c/o or problems.

## 2017-09-05 ENCOUNTER — HOSPITAL ENCOUNTER (OUTPATIENT)
Dept: PHYSICAL THERAPY | Facility: HOSPITAL | Age: 62
Setting detail: THERAPIES SERIES
Discharge: HOME | End: 2017-09-05

## 2017-09-05 DIAGNOSIS — M17.12 PRIMARY OSTEOARTHRITIS OF LEFT KNEE: ICD-10-CM

## 2017-09-05 DIAGNOSIS — M25.562 CHRONIC PAIN OF LEFT KNEE: ICD-10-CM

## 2017-09-05 DIAGNOSIS — G89.29 CHRONIC PAIN OF LEFT KNEE: ICD-10-CM

## 2017-09-05 DIAGNOSIS — Z96.652 PRESENCE OF TOTAL LEFT KNEE JOINT PROSTHESIS: Primary | ICD-10-CM

## 2017-09-05 PROCEDURE — 97110 THERAPEUTIC EXERCISES: CPT | Performed by: PHYSICAL THERAPIST

## 2017-09-05 NOTE — THERAPY TREATMENT NOTE
Outpatient Physical Therapy Ortho Treatment Note  [facility]     Patient Name: Mini Andersen  : 1955  MRN: [id number]  Today's Date: 2017      Visit Date: 2017  Attendance:  (Medicare)  Subjective Improvement: 80%  Next MD Appt: 17  Recert Date: 17    Therapy Diagnosis: L total knee arthroplasty, 17    Visit Dx:    ICD-10-CM ICD-9-CM   1. Presence of total left knee joint prosthesis Z96.659 V43.65   2. Primary osteoarthritis of left knee M17.12 715.16   3. Chronic pain of left knee M25.562 719.46    G89.29 338.29       Patient Active Problem List   Diagnosis   • Palpitations   • Type 2 diabetes mellitus   • Hypothyroidism   • Primary osteoarthritis of left knee   • Knee pain   • Diabetes mellitus without complication   • Borderline glaucoma   • Nuclear cataract   • Abdominal pain   • Cobalamin deficiency   • Cramp of limb   • Depression   • Gastroesophageal reflux disease   • Callus of foot   • Deformity of foot   • Foot pain   • History of colonoscopy   • Low back pain   • Mononeuritis   • Spasm   • Neuropathy   • Lung field abnormal   • Encounter for screening mammogram for malignant neoplasm of breast   • Cardiac arrhythmia   • Arthralgia of lower leg   • Arthralgia of shoulder   • Pure hypercholesterolemia   • Seasonal allergic rhinitis   • Disorder of hand   • Osteoporosis   • Pre-operative clearance   • Type 2 diabetes mellitus without complication, without long-term current use of insulin   • Angina pectoris    • SOB (shortness of breath)   • Obstructive sleep apnea syndrome   • Chronic pain of left knee        Past Medical History:   Diagnosis Date   • Acid reflux    • Acute peritonitis    • Allergic rhinitis    • Arthritis    • Bee sting    • Borderline glaucoma    • Chronic bronchitis    • Constipation     severe with an immotile colon      • Deep venous thrombosis    • High cholesterol    • Hypothyroidism    • Intestinal volvulus    • Muscle atrophy     O/E    • Nuclear senile cataract    • Nuclear senile cataract    • Polyneuropathy in diabetes    • PONV (postoperative nausea and vomiting)    • Type 2 diabetes mellitus     NO BDR   • Type 2 diabetes mellitus without complications    • Unspecified disease of nail         Past Surgical History:   Procedure Laterality Date   • APPENDECTOMY     • BLADDER SUSPENSION     • COLECTOMY PARTIAL / TOTAL  05/22/2014    Subtotal colectomy with ineo-rectostomy.   • HERNIA REPAIR      multiple   • HYSTERECTOMY     • INJECTION OF MEDICATION  12/07/2010    DEPO MEDROL   • KNEE ARTHROSCOPY Left    • LAPAROSCOPIC CHOLECYSTECTOMY     • SALPINGO OOPHORECTOMY Left    • SALPINGO OOPHORECTOMY Right    • TOTAL KNEE ARTHROPLASTY Left 7/24/2017    Procedure: TOTAL KNEE ARTHROPLASTY ATTUNE with adductor canal block;  Surgeon: Jose Ballesteros MD;  Location: [facility];  Service:              PT Ortho       09/05/17 1300    Subjective Pain    Post-Treatment Pain Level 0  -SS    Posture/Observations    Posture/Observations Comments Presents ambulating without assistive device. Slight decreased knee ROM noted during gait.  -SS    Left Knee    Extension/Flexion AROM Deficit 0-107  -SS    MMT (Manual Muscle Testing)    General MMT Assessment Detail Independent SLR with 5 degree lag  -      User Key  (r) = Recorded By, (t) = Taken By, (c) = Cosigned By    Initials Name Provider Type     Tye Bello, PT DPT Physical Therapist                            PT Assessment/Plan       09/05/17 1300       PT Assessment    Functional Limitations Impaired gait;Limitation in home management;Limitations in community activities  -     Impairments Gait;Pain;Range of motion;Muscle strength  -     Assessment Comments Patient arrived to clinic approximately 10 minutes late. MD Progress Note sent with patient. Patient deferred estim or ice in the clinic. ROM is progressing.  -SS     Rehab Potential Good  -SS     Patient/caregiver participated in  "establishment of treatment plan and goals Yes  -SS     Patient would benefit from skilled therapy intervention Yes  -SS     PT Plan    PT Frequency 2x/week  -SS     Predicted Duration of Therapy Intervention (days/wks) 3-4 weeks  -     PT Plan Comments Continue POC. Recheck next week.   -SS       User Key  (r) = Recorded By, (t) = Taken By, (c) = Cosigned By    Initials Name Provider Type     Tye Bello, PT DPT Physical Therapist                Modalities       09/05/17 1300          Ice    Ice Applied No  -SS      Location --   took ice to go  -SS      Patient denies application of Ice Yes   ice to go for use at home  -        User Key  (r) = Recorded By, (t) = Taken By, (c) = Cosigned By    Initials Name Provider Type     Tye Bello, PT DPT Physical Therapist                Exercises       09/05/17 1300          Subjective Comments    Subjective Comments Doing well. Lateral knee pain after working on therex and HEP. \"Crunched and popped\" yesterday. Cleaned house the other day. Things around the house are getting easier. Started driving this past Friday (9-1-17).  -SS      Subjective Pain    Able to rate subjective pain? yes  -SS      Pre-Treatment Pain Level 0  -SS      Post-Treatment Pain Level 0  -SS      Exercise 1    Exercise Name 1 Pro2, Seat 13, ROM/strength/endurance  -SS      Cueing 1 Verbal  -SS      Time (Minutes) 1 10 min  -SS      Additional Comments Level 3  -SS      Exercise 2    Exercise Name 2 Incline calf stretch  -SS      Cueing 2 Verbal  -SS      Sets 2 3  -SS      Time (Seconds) 2 30 sec  -SS      Exercise 3    Exercise Name 3 Standing HS stretch  -SS      Cueing 3 Verbal  -SS      Sets 3 3  -SS      Time (Seconds) 3 30 sec  -SS      Exercise 4    Exercise Name 4 Lunge stretch for flexion  -SS      Cueing 4 Verbal  -SS      Sets 4 3  -SS      Time (Seconds) 4 30 sec  -SS      Exercise 5    Exercise Name 5 Marching  -SS      Cueing 5 Demo  -SS      Sets 5 1  -SS   "    Reps 5 4  -SS      Additional Comments 15 feet  -SS      Exercise 6    Exercise Name 6 SLS  -SS      Cueing 6 Demo  -SS      Sets 6 1  -SS      Reps 6 10  -SS      Exercise 7    Exercise Name 7 QS with heel prop  -SS      Cueing 7 Verbal  -SS      Sets 7 1  -SS      Reps 7 10  -SS      Additional Comments 5 sec hold  -SS      Exercise 8    Exercise Name 8 Active heel slide  -SS      Sets 8 1  -SS      Reps 8 10  -SS      Exercise 9    Exercise Name 9 SAQ  -SS      Cueing 9 Verbal  -SS      Sets 9 3  -SS      Reps 9 10  -SS      Additional Comments 2# AW; 2 sec hold  -SS        User Key  (r) = Recorded By, (t) = Taken By, (c) = Cosigned By    Initials Name Provider Type     Tye Bello, PT DPT Physical Therapist                               PT OP Goals       09/05/17 1300       PT Short Term Goals    STG Date to Achieve --   deferred  -SS     Long Term Goals    LTG Date to Achieve 09/20/17  -SS     LTG 1 Independent with HEP  -SS     LTG 1 Progress Progressing  -SS     LTG 2 LEFS score to be >/= 60/80  -SS     LTG 2 Progress Progressing  -SS     LTG 3 L knee active extension to 0 deg  -SS     LTG 3 Progress Met  -SS     LTG 4 L knee active flexion to >/= 120 deg  -SS     LTG 4 Progress Progressing  -SS     LTG 5 Minimal deviations with gait.   -SS     LTG 5 Progress Progressing  -SS     LTG 6 Demonstrate ability to ascend 3 steps reciprocally  -SS     LTG 6 Progress Progressing  -SS     Time Calculation    PT Goal Re-Cert Due Date 09/13/17  -SS       User Key  (r) = Recorded By, (t) = Taken By, (c) = Cosigned By    Initials Name Provider Type     Tye Bello, PT DPT Physical Therapist                Therapy Education       09/05/17 1500          Therapy Education    Given HEP  -SS      Program Reinforced  -SS      How Provided Verbal  -SS      Provided to Patient  -SS      Level of Understanding Verbalized  -SS        User Key  (r) = Recorded By, (t) = Taken By, (c) = Cosigned By     Initials Name Provider Type    SS Tye Bello, PT DPT Physical Therapist                Time Calculation:   Start Time: 1312  Stop Time: 1350  Time Calculation (min): 38 min  Total Timed Code Minutes- PT: 38 minute(s)    Therapy Charges for Today     Code Description Service Date Service Provider Modifiers Qty    28761020752 HC PT THER PROC EA 15 MIN 9/5/2017 Tye Bello, PT DPT GP 3                    Tye Bello, PT, DPT, CHT  9/5/2017

## 2017-09-07 ENCOUNTER — OFFICE VISIT (OUTPATIENT)
Dept: ORTHOPEDIC SURGERY | Facility: CLINIC | Age: 62
End: 2017-09-07

## 2017-09-07 ENCOUNTER — HOSPITAL ENCOUNTER (OUTPATIENT)
Dept: PHYSICAL THERAPY | Facility: HOSPITAL | Age: 62
Setting detail: THERAPIES SERIES
Discharge: HOME | End: 2017-09-07

## 2017-09-07 VITALS — WEIGHT: 224 LBS | BODY MASS INDEX: 30.34 KG/M2 | HEIGHT: 72 IN

## 2017-09-07 DIAGNOSIS — Z96.652 PRESENCE OF TOTAL LEFT KNEE JOINT PROSTHESIS: Primary | ICD-10-CM

## 2017-09-07 DIAGNOSIS — Z96.652 PRESENCE OF LEFT ARTIFICIAL KNEE JOINT: Primary | ICD-10-CM

## 2017-09-07 PROCEDURE — 99024 POSTOP FOLLOW-UP VISIT: CPT | Performed by: ORTHOPAEDIC SURGERY

## 2017-09-07 PROCEDURE — 97110 THERAPEUTIC EXERCISES: CPT

## 2017-09-07 PROCEDURE — G0283 ELEC STIM OTHER THAN WOUND: HCPCS

## 2017-09-07 NOTE — THERAPY TREATMENT NOTE
Outpatient Physical Therapy Ortho Treatment Note  [facility]     Patient Name: Mini Andersen  : 1955  MRN: [id number]  Today's Date: 2017      Visit Date: 2017     Subjective Improvement: 80%  Attendance:   (Medicare)  Next MD Visit : 17  Recert Date:  17      Therapy Diagnosis:  L Total Knee arthoplasty 17        Visit Dx:    ICD-10-CM ICD-9-CM   1. Presence of total left knee joint prosthesis Z96.659 V43.65       Patient Active Problem List   Diagnosis   • Palpitations   • Type 2 diabetes mellitus   • Hypothyroidism   • Primary osteoarthritis of left knee   • Knee pain   • Diabetes mellitus without complication   • Borderline glaucoma   • Nuclear cataract   • Abdominal pain   • Cobalamin deficiency   • Cramp of limb   • Depression   • Gastroesophageal reflux disease   • Callus of foot   • Deformity of foot   • Foot pain   • History of colonoscopy   • Low back pain   • Mononeuritis   • Spasm   • Neuropathy   • Lung field abnormal   • Encounter for screening mammogram for malignant neoplasm of breast   • Cardiac arrhythmia   • Arthralgia of lower leg   • Arthralgia of shoulder   • Pure hypercholesterolemia   • Seasonal allergic rhinitis   • Disorder of hand   • Osteoporosis   • Pre-operative clearance   • Type 2 diabetes mellitus without complication, without long-term current use of insulin   • Angina pectoris    • SOB (shortness of breath)   • Obstructive sleep apnea syndrome   • Chronic pain of left knee   • Presence of left artificial knee joint        Past Medical History:   Diagnosis Date   • Acid reflux    • Acute peritonitis    • Allergic rhinitis    • Arthritis    • Bee sting    • Borderline glaucoma    • Chronic bronchitis    • Constipation     severe with an immotile colon      • Deep venous thrombosis    • High cholesterol    • Hypothyroidism    • Intestinal volvulus    • Muscle atrophy     O/E   • Nuclear senile cataract    • Nuclear senile cataract     • Polyneuropathy in diabetes    • PONV (postoperative nausea and vomiting)    • Type 2 diabetes mellitus     NO BDR   • Type 2 diabetes mellitus without complications    • Unspecified disease of nail         Past Surgical History:   Procedure Laterality Date   • APPENDECTOMY     • BLADDER SUSPENSION     • COLECTOMY PARTIAL / TOTAL  05/22/2014    Subtotal colectomy with ineo-rectostomy.   • HERNIA REPAIR      multiple   • HYSTERECTOMY     • INJECTION OF MEDICATION  12/07/2010    DEPO MEDROL   • KNEE ARTHROSCOPY Left    • LAPAROSCOPIC CHOLECYSTECTOMY     • SALPINGO OOPHORECTOMY Left    • SALPINGO OOPHORECTOMY Right    • TOTAL KNEE ARTHROPLASTY Left 7/24/2017    Procedure: TOTAL KNEE ARTHROPLASTY ATTUNE with adductor canal block;  Surgeon: Jose Ballesteros MD;  Location: [facility];  Service:              PT Ortho       09/07/17 1300    Posture/Observations    Posture/Observations Comments Presents ambulating without assistive device. Slight decreased knee ROM noted during gait.  -    Left Knee    Extension/Flexion AROM Deficit 0-110  -      09/05/17 1300    Subjective Pain    Post-Treatment Pain Level 0  -SS    Posture/Observations    Posture/Observations Comments Presents ambulating without assistive device. Slight decreased knee ROM noted during gait.  -    Left Knee    Extension/Flexion AROM Deficit 0-107  -    MMT (Manual Muscle Testing)    General MMT Assessment Detail Independent SLR with 5 degress lag  -      User Key  (r) = Recorded By, (t) = Taken By, (c) = Cosigned By    Initials Name Provider Type    SS Tye Bello, PT DPT Physical Therapist    GEETA Leal PTA Physical Therapy Assistant                            PT Assessment/Plan       09/07/17 1300       PT Assessment    Assessment Comments improved ROM this date. Progressing slowly but fatigues quickly in standing.   -     PT Plan    PT Frequency 2x/week  -     Predicted Duration of Therapy Intervention (days/wks) 3-4  "weeks  -KH     PT Plan Comments Continue to increase ROm and strength as able. Standing TKE's next  -KH       User Key  (r) = Recorded By, (t) = Taken By, (c) = Cosigned By    Initials Name Provider Type    GEETA Leal PTA Physical Therapy Assistant                Modalities       09/07/17 1300          Ice    Ice Applied Yes  -KH      Location left knee  -KH      Rx Minutes --   20 min  -KH      Ice S/P Rx Yes  -KH      ELECTRICAL STIMULATION    Attended/Unattended Unattended  -KH      Stimulation Type IFC  -KH      Location/Electrode Placement/Other Left knee  -KH      Rx Minutes 20 mins  -KH        User Key  (r) = Recorded By, (t) = Taken By, (c) = Cosigned By    Initials Name Provider Type    GEETA Leal PTA Physical Therapy Assistant                Exercises       09/07/17 1300          Subjective Comments    Subjective Comments Pt reports that she saw the MD.  States he told her that the lateral knee pain may not go away for a year.  Believes it is due to the swelling.   -KH      Subjective Pain    Able to rate subjective pain? yes  -KH      Pre-Treatment Pain Level 2  -KH      Post-Treatment Pain Level 2  -KH      Exercise 1    Exercise Name 1 pro ll seat 12 LE strength  -KH      Time (Minutes) 1 10'  -KH      Additional Comments level 4  -KH      Exercise 2    Exercise Name 2 incline stretch  -KH      Sets 2 3  -KH      Time (Seconds) 2 30\"  -KH      Exercise 3    Exercise Name 3 standing hamstring stretch  -KH      Sets 3 3  -KH      Time (Seconds) 3 30\"  -KH      Exercise 4    Exercise Name 4 Lunge stretch for flexion  -KH      Cueing 4 Verbal  -KH      Sets 4 3  -KH      Time (Seconds) 4 30 sec  -KH      Exercise 5    Exercise Name 5 SAQ lg bolster  -KH      Sets 5 3  -KH      Reps 5 10  -KH      Exercise 6    Exercise Name 6 step ups fwd/lat  -KH      Sets 6 2  -KH      Reps 6 10  -KH      Additional Comments 6 inch  -KH      Exercise 7    Exercise Name 7 QS w/ heel prop  -KH      Sets 7 3  -KH   "    Reps 7 10  -KH      Exercise 8    Exercise Name 8 heel slides   -      Sets 8 2  -KH      Reps 8 10  -KH        User Key  (r) = Recorded By, (t) = Taken By, (c) = Cosigned By    Initials Name Provider Type    GEETA Leal PTA Physical Therapy Assistant                               PT OP Goals       09/07/17 1300       PT Short Term Goals    STG Date to Achieve --   deferred  -KH     Long Term Goals    LTG Date to Achieve 09/20/17  -     LTG 1 Independent with HEP  -KH     LTG 1 Progress Progressing  -KH     LTG 2 LEFS score to be >/= 60/80  -KH     LTG 2 Progress Progressing  -KH     LTG 3 L knee active extension to 0 deg  -     LTG 3 Progress Met  -KH     LTG 4 L knee active flexion to >/= 120 deg  -     LTG 4 Progress Progressing  -KH     LTG 5 Minimal deviations with gait.   -     LTG 5 Progress Progressing  -     LTG 6 Demonstrate ability to ascend 3 steps reciprocally  -     LTG 6 Progress Progressing  -     Time Calculation    PT Goal Re-Cert Due Date 09/13/17  -       User Key  (r) = Recorded By, (t) = Taken By, (c) = Cosigned By    Initials Name Provider Type    GEETA Leal PTA Physical Therapy Assistant                    Time Calculation:   Start Time: 1300  Stop Time: 1400  Time Calculation (min): 60 min  Total Timed Code Minutes- PT: 40 minute(s)    Therapy Charges for Today     Code Description Service Date Service Provider Modifiers Qty    66563005772 HC PT THER SUPP EA 15 MIN 9/7/2017 Kylah Leal PTA GP 1    57102860446 HC PT ELECTRICAL STIM UNATTENDED 9/7/2017 Kylah Leal PTA  1    47497517322 HC PT THER PROC EA 15 MIN 9/7/2017 Kylah Leal PTA GP 3                    Kylah Leal PTA  9/7/2017

## 2017-09-07 NOTE — PROGRESS NOTES
Postop Follow-up    Name:  Mini Andersen  Date:  2017  :  1955    Chief Complaint:    Chief Complaint   Patient presents with   • Left Knee - Follow-up     Date of surgery:  17    Procedure: Left total knee arthroplasty    History of Present Illness: Patient is doing well. She continues with PT. She does have some pain with her exercises.          Current Outpatient Prescriptions:   •  Calcium Citrate (CITRACAL PO), Take 2 tablets by mouth Daily., Disp: , Rfl:   •  carboxymethylcellulose (REFRESH PLUS) 0.5 % solution, Administer 1 drop to both eyes Daily As Needed for Dry Eyes., Disp: , Rfl:   •  cyanocobalamin 1000 MCG/ML injection, Inject  into the shoulder, thigh, or buttocks Every 28 (Twenty-Eight) Days., Disp: , Rfl: 12  •  diphenhydrAMINE (BENADRYL) 25 mg capsule, Take 25 mg by mouth Every 6 (Six) Hours As Needed., Disp: , Rfl: 6  •  docusate sodium (COLACE) 100 MG capsule, Take 100 mg by mouth 2 (Two) Times a Day. 2 tabs, Disp: , Rfl:   •  HYDROcodone-acetaminophen (NORCO)  MG per tablet, Take 1 tablet by mouth Every 8 (Eight) Hours As Needed for Moderate Pain ., Disp: 40 tablet, Rfl: 0  •  levothyroxine (SYNTHROID, LEVOTHROID) 50 MCG tablet, Take 50 mcg by mouth daily., Disp: , Rfl: 1  •  lidocaine (XYLOCAINE) 5 % ointment, Apply  topically Daily As Needed., Disp: , Rfl:   •  metFORMIN (GLUCOPHAGE) 500 MG tablet, Take 500 mg by mouth Daily As Needed (For FSBS > 140)., Disp: , Rfl: 1  •  omeprazole (priLOSEC) 20 MG capsule, Take 20 mg by mouth Daily., Disp: , Rfl:   •  ondansetron (ZOFRAN) 4 MG tablet, Take 4 mg by mouth Every 8 (Eight) Hours As Needed., Disp: , Rfl: 0  •  ONE TOUCH ULTRA TEST test strip, TEST BID, Disp: , Rfl: 5  •  vitamin D (ERGOCALCIFEROL) 20832 UNITS capsule capsule, Take 50,000 Units by mouth Every 7 (Seven) Days., Disp: , Rfl: 1    Allergies   Allergen Reactions   • Bextra [Valdecoxib] Shortness Of Breath   • Cephalosporins Shortness Of Breath   • Detrol La  "[Tolterodine Tartrate Er] Shortness Of Breath   • Dicyclomine Shortness Of Breath   • Fluoxetine Shortness Of Breath   • Keflex [Cephalexin] Shortness Of Breath   • Toprol Xl [Metoprolol] Anaphylaxis   • Chocolate Flavor    • Other      Fresh flowers causes throat swelling   • Amoxicillin Rash   • Aspirin Rash   • Ciprofloxin Hcl [Ciprofloxacin] Rash   • Claritin-D 12 Hour [Loratadine-Pseudoephedrine Er] Rash   • Codeine Rash   • Demerol [Meperidine] Rash   • Levaquin [Levofloxacin] Rash   • Lipitor [Atorvastatin] Other (See Comments)     cramping   • Macrobid [Nitrofurantoin] Rash   • Morphine And Related Rash   • Paxil [Paroxetine Hcl] Rash   • Sulfa Antibiotics Rash   • Tape Rash   • Tramadol Rash   • Vioxx [Rofecoxib] Rash   • Zegerid [Omeprazole] Rash   • Zoloft [Sertraline Hcl] Rash         Exam:  Vitals:    09/07/17 0801   Weight: 224 lb (102 kg)   Height: 72\" (182.9 cm)       The patient is awake, alert, and oriented and in no apparent distress.      Left lower extremity:  Incision healed  No erythema  Extension 0 degrees  Flexion 105  Stable exam    Xr Knee 1 Or 2 View Left    Result Date: 8/10/2017  Narrative: AP bilateral standing with lateral of the left knee shows acceptable position and alignment of a left total knee arthroplasty.  No sign of implant loosening or failure is noted.  The right knee shows mild medial joint space narrowing with mild irregularity of the articular cartilage.  Appropriate implant sizing is noted on both views of the left knee. 08/10/17 at 4:52 PM by Jose Ballesteros MD     Xr Knee Bilateral Ap Standing    Result Date: 8/10/2017  Narrative: AP bilateral standing with lateral of the left knee shows acceptable position and alignment of a left total knee arthroplasty.  No sign of implant loosening or failure is noted.  The right knee shows mild medial joint space narrowing with mild irregularity of the articular cartilage.  Appropriate implant sizing is noted on both views " of the left knee. 08/10/17 at 4:52 PM by Jose Ballesteros MD         Assessment:  Diagnoses and all orders for this visit:    Presence of left artificial knee joint          Plan:     progress motion and activity as tolerated  No restrictions  Continue with PT for strength and conditioning  Continue HEP as well  Recheck xrays next visit    Return in about 8 weeks (around 11/2/2017) for Recheck with repeat xrays.      09/07/17 at 8:02 AM by Jose Ballesteros MD

## 2017-09-11 ENCOUNTER — HOSPITAL ENCOUNTER (OUTPATIENT)
Dept: PHYSICAL THERAPY | Facility: HOSPITAL | Age: 62
Setting detail: THERAPIES SERIES
Discharge: HOME | End: 2017-09-11

## 2017-09-11 DIAGNOSIS — Z96.652 PRESENCE OF TOTAL LEFT KNEE JOINT PROSTHESIS: Primary | ICD-10-CM

## 2017-09-11 DIAGNOSIS — M17.12 PRIMARY OSTEOARTHRITIS OF LEFT KNEE: ICD-10-CM

## 2017-09-11 DIAGNOSIS — M25.562 CHRONIC PAIN OF LEFT KNEE: ICD-10-CM

## 2017-09-11 DIAGNOSIS — G89.29 CHRONIC PAIN OF LEFT KNEE: ICD-10-CM

## 2017-09-11 PROCEDURE — 97110 THERAPEUTIC EXERCISES: CPT | Performed by: PHYSICAL THERAPIST

## 2017-09-11 PROCEDURE — G8978 MOBILITY CURRENT STATUS: HCPCS | Performed by: PHYSICAL THERAPIST

## 2017-09-11 PROCEDURE — G8980 MOBILITY D/C STATUS: HCPCS | Performed by: PHYSICAL THERAPIST

## 2017-09-11 PROCEDURE — G8979 MOBILITY GOAL STATUS: HCPCS | Performed by: PHYSICAL THERAPIST

## 2017-09-11 NOTE — THERAPY DISCHARGE NOTE
Outpatient Physical Therapy Ortho Progress Note/Discharge Summary  [facility]     Patient Name: Mini Andersen  : 1955  MRN: [id number]  Today's Date: 2017      Visit Date: 2017  Attendance:   Subjective Improvement: 80-85%  Next MD Appt: 17    Therapy Diagnosis: L Total Knee Arthroplasty 17    Visit Dx:    ICD-10-CM ICD-9-CM   1. Presence of total left knee joint prosthesis Z96.659 V43.65   2. Primary osteoarthritis of left knee M17.12 715.16   3. Chronic pain of left knee M25.562 719.46    G89.29 338.29       Patient Active Problem List   Diagnosis   • Palpitations   • Type 2 diabetes mellitus   • Hypothyroidism   • Primary osteoarthritis of left knee   • Knee pain   • Diabetes mellitus without complication   • Borderline glaucoma   • Nuclear cataract   • Abdominal pain   • Cobalamin deficiency   • Cramp of limb   • Depression   • Gastroesophageal reflux disease   • Callus of foot   • Deformity of foot   • Foot pain   • History of colonoscopy   • Low back pain   • Mononeuritis   • Spasm   • Neuropathy   • Lung field abnormal   • Encounter for screening mammogram for malignant neoplasm of breast   • Cardiac arrhythmia   • Arthralgia of lower leg   • Arthralgia of shoulder   • Pure hypercholesterolemia   • Seasonal allergic rhinitis   • Disorder of hand   • Osteoporosis   • Pre-operative clearance   • Type 2 diabetes mellitus without complication, without long-term current use of insulin   • Angina pectoris    • SOB (shortness of breath)   • Obstructive sleep apnea syndrome   • Chronic pain of left knee   • Presence of left artificial knee joint        Past Medical History:   Diagnosis Date   • Acid reflux    • Acute peritonitis    • Allergic rhinitis    • Arthritis    • Bee sting    • Borderline glaucoma    • Chronic bronchitis    • Constipation     severe with an immotile colon      • Deep venous thrombosis    • High cholesterol    • Hypothyroidism    • Intestinal  "volvulus    • Muscle atrophy     O/E   • Nuclear senile cataract    • Nuclear senile cataract    • Polyneuropathy in diabetes    • PONV (postoperative nausea and vomiting)    • Type 2 diabetes mellitus     NO BDR   • Type 2 diabetes mellitus without complications    • Unspecified disease of nail         Past Surgical History:   Procedure Laterality Date   • APPENDECTOMY     • BLADDER SUSPENSION     • COLECTOMY PARTIAL / TOTAL  05/22/2014    Subtotal colectomy with ineo-rectostomy.   • HERNIA REPAIR      multiple   • HYSTERECTOMY     • INJECTION OF MEDICATION  12/07/2010    DEPO MEDROL   • KNEE ARTHROSCOPY Left    • LAPAROSCOPIC CHOLECYSTECTOMY     • SALPINGO OOPHORECTOMY Left    • SALPINGO OOPHORECTOMY Right    • TOTAL KNEE ARTHROPLASTY Left 7/24/2017    Procedure: TOTAL KNEE ARTHROPLASTY ATTUNE with adductor canal block;  Surgeon: Jose Ballesteros MD;  Location: [facility];  Service:      Changes in Medications: finished Coumadin  Changes in MD Orders: none noted  Number of Work Days Lost: n/a            PT Ortho       09/11/17 1300    Subjective Comments    Subjective Comments Doing well. Able to all of her normal activities. Fatigues faster now than before surgery. \"About the only thing that bothers this knee is the exercise.\" Continued lateral knee pain with therex. Has finished Coumadin. 80-85% subjective improvement. Patient reports that she thinks she can do her rehab on her own.  -SS    Precautions and Contraindications    Precautions/Limitations no known precautions/limitations  -SS    Precautions 7 weeks post-op today  -SS    Contraindications none  -SS    Subjective Pain    Able to rate subjective pain? yes  -SS    Pre-Treatment Pain Level 0  -SS    Post-Treatment Pain Level 0  -SS    Posture/Observations    Posture/Observations Comments Presents to clinic ambulating without assistive device. Slight decreased heel strike and terminal knee extension B LE during gait.  -SS    Left Knee    " "Extension/Flexion AROM Deficit 0-112   R active flexion 115  -SS    MMT (Manual Muscle Testing)    General MMT Assessment Detail Independent SLR without lag  -SS    Left Hip    Hip Flexion Gross Movement (5/5) normal  -SS    Hip Extension Gluteus Moy (5/5) normal  -SS    Hip ABduction Gross Movement (5/5) normal  -SS    Hip ADduction Gross Movement (5/5) normal  -SS    Left Knee    Knee Extension Gross Movement (5/5) normal  -SS    Knee Flexion Gross Movement (5/5) normal  -SS    Gait Assessment/Treatment    Gait, Comment gait is symmetrical bilaterally  -    Stairs Assessment/Treatment    Stairs, Comment 5 stairs, up reciprocally, down 1 at a time  -      User Key  (r) = Recorded By, (t) = Taken By, (c) = Cosigned By    Initials Name Provider Type     Tye Belol, PT DPT Physical Therapist                            PT Assessment/Plan       09/11/17 1300       PT Assessment    Functional Limitations Impaired gait;Limitation in home management;Limitations in community activities  -     Impairments Gait;Pain;Range of motion;Muscle strength  -     Assessment Comments Gait is symmetrical. Her knee motion is within 3 degrees of each other. She is fully functional at home. Patient was dispensed a free 1-month membership to [employer].  -     Rehab Potential Good  -     Patient/caregiver participated in establishment of treatment plan and goals Yes  -     Patient would benefit from skilled therapy intervention No  -SS     PT Plan    PT Frequency Other (comment)   D/C P.T.  -     PT Plan Comments D/C P.T.  -       User Key  (r) = Recorded By, (t) = Taken By, (c) = Cosigned By    Initials Name Provider Type    NUBIA Bello, PT DPT Physical Therapist                    Exercises       09/11/17 1300          Subjective Comments    Subjective Comments Doing well. Able to all of her normal activities. Fatigues faster now than before surgery. \"About the only thing that bothers " "this knee is the exercise.\" Continued lateral knee pain with therex. Has finished Coumadin. 80-85% subjective improvement. Patient reports that she thinks she can do her rehab on her own.  -SS      Subjective Pain    Able to rate subjective pain? yes  -SS      Pre-Treatment Pain Level 0  -SS      Post-Treatment Pain Level 0  -SS      Exercise 1    Exercise Name 1 Pro2, Seat 13, LE strength/endurance  -SS      Time (Minutes) 1 10 min  -SS      Additional Comments Level 5  -SS      Exercise 2    Exercise Name 2 standing HS stretch  -SS      Cueing 2 Verbal  -SS      Sets 2 3  -SS      Time (Seconds) 2 30 sec  -SS      Exercise 3    Exercise Name 3 lunge stretch for flexion  -SS      Cueing 3 Verbal  -SS      Sets 3 3  -SS      Time (Seconds) 3 30 sec  -SS      Exercise 4    Exercise Name 4 Stairs - up reciprocally, down 1 at a time  -SS      Cueing 4 Verbal  -SS      Sets 4 3  -SS      Additional Comments 5 steps  -SS      Exercise 5    Exercise Name 5 Heel slide with strap  -SS      Cueing 5 Verbal  -SS      Sets 5 1  -SS      Reps 5 15  -SS      Additional Comments 3 sec  -SS      Exercise 6    Exercise Name 6 MH TKEs  -SS      Cueing 6 Demo  -SS      Sets 6 3  -SS      Reps 6 10  -SS      Additional Comments 3 plates  -SS      Exercise 7    Exercise Name 7  Hip Ext  -SS      Cueing 7 Demo  -SS      Sets 7 3  -SS      Reps 7 10  -SS      Additional Comments 3 plates  -SS      Exercise 8    Exercise Name 8  Hip Flex  -SS      Sets 8 3  -SS      Reps 8 10  -SS      Additional Comments 2 plates  -SS        User Key  (r) = Recorded By, (t) = Taken By, (c) = Cosigned By    Initials Name Provider Type    SS Tye Bello, PT DPT Physical Therapist                               PT OP Goals       09/11/17 1300       PT Short Term Goals    STG Date to Achieve --   deferred  -SS     Long Term Goals    LTG Date to Achieve 09/20/17  -SS     LTG 1 Independent with HEP  -SS     LTG 1 Progress Met  -SS     LTG 2 LEFS " score to be >/= 60/80  -SS     LTG 2 Progress Not Met  -SS     LTG 3 L knee active extension to 0 deg  -SS     LTG 3 Progress Met  -SS     LTG 4 L knee active flexion to >/= 120 deg  -SS     LTG 4 Progress Not Met  -SS     LTG 4 Progress Comments L 115 deg  -SS     LTG 5 Minimal deviations with gait.   -SS     LTG 5 Progress Met  -SS     LTG 6 Demonstrate ability to ascend 3 steps reciprocally  -SS     LTG 6 Progress Met  -SS     Time Calculation    PT Goal Re-Cert Due Date 09/25/17  -SS       User Key  (r) = Recorded By, (t) = Taken By, (c) = Cosigned By    Initials Name Provider Type    NUBIA Bello, PT DPT Physical Therapist                Therapy Education       09/11/17 1300          Therapy Education    Given HEP  -SS      Program Reinforced  -SS      How Provided Verbal  -SS      Provided to Patient  -SS      Level of Understanding Verbalized  -SS        User Key  (r) = Recorded By, (t) = Taken By, (c) = Cosigned By    Initials Name Provider Type     Tye Bello, PT DPT Physical Therapist                Outcome Measures       09/11/17 1300          Lower Extremity Functional Index    Any of your usual work, housework or school activities 4  -SS      Your usual hobbies, recreational or sporting activities 4  -SS      Getting into or out of the bath 4  -SS      Walking between rooms 4  -SS      Putting on your shoes or socks 4  -SS      Squatting 4  -SS      Lifting an object, like a bag of groceries from the floor 4  -SS      Performing light activities around your home 4  -SS      Performing heavy activities around your home 4  -SS      Getting into or out of a car 4  -SS      Walking 2 blocks 3  -SS      Walking a mile 0  -SS      Going up or down 10 stairs (about 1 flight of stairs) 4  -SS      Standing for 1 hour 4  -SS      Sitting for 1 hour 4  -SS      Running on even ground 0  -SS      Running on uneven ground 0  -SS      Making sharp turns while running fast 0  -SS      Hopping  0  -SS      Rolling over in bed 4  -SS      Total 59  -SS      Functional Assessment    Outcome Measure Options Lower Extremity Functional Scale (LEFS)  -SS        User Key  (r) = Recorded By, (t) = Taken By, (c) = Cosigned By    Initials Name Provider Type    SS Tye Bello, PT DPT Physical Therapist            Time Calculation:   Start Time: 1303  Stop Time: 1345  Time Calculation (min): 42 min  Total Timed Code Minutes- PT: 42 minute(s)    Therapy Charges for Today     Code Description Service Date Service Provider Modifiers Qty    51599098104 HC PT THER PROC EA 15 MIN 9/11/2017 Tye Bello, PT DPT GP 3    39874937135 HC PT MOBILITY DISCHARGE 9/11/2017 Tye Bello PT DPT GP, CI 1    84297436189 HC PT MOBILITY PROJECTED 9/11/2017 Tye Bello, PT DPT GP, CI 1          PT G-Codes  PT Professional Judgement Used?: Yes  Outcome Measure Options: Lower Extremity Functional Scale (LEFS)  Score: 59/80  Functional Limitation: Mobility: Walking and moving around  Mobility: Walking and Moving Around Goal Status (): At least 1 percent but less than 20 percent impaired, limited or restricted  Mobility: Walking and Moving Around Discharge Status (): At least 1 percent but less than 20 percent impaired, limited or restricted     OP PT Discharge Summary  Date of Discharge: 09/11/17  Reason for Discharge: At baseline function  Outcomes Achieved: Patient able to partially achieve established goals  Discharge Destination: Home with home program  Discharge Instructions: Patient to contact P.T. clinic if questions or problems arise. Continue HEP.      Tye Bello, PT, DPT, CHT  9/11/2017

## 2017-09-13 ENCOUNTER — APPOINTMENT (OUTPATIENT)
Dept: PHYSICAL THERAPY | Facility: HOSPITAL | Age: 62
End: 2017-09-13

## 2017-09-16 ENCOUNTER — APPOINTMENT (OUTPATIENT)
Dept: CT IMAGING | Facility: HOSPITAL | Age: 62
End: 2017-09-16

## 2017-09-16 ENCOUNTER — APPOINTMENT (OUTPATIENT)
Dept: ULTRASOUND IMAGING | Facility: HOSPITAL | Age: 62
End: 2017-09-16

## 2017-09-16 ENCOUNTER — HOSPITAL ENCOUNTER (EMERGENCY)
Facility: HOSPITAL | Age: 62
Discharge: HOME | End: 2017-09-16
Attending: EMERGENCY MEDICINE | Admitting: EMERGENCY MEDICINE

## 2017-09-16 VITALS
BODY MASS INDEX: 30.34 KG/M2 | RESPIRATION RATE: 18 BRPM | WEIGHT: 224 LBS | OXYGEN SATURATION: 96 % | DIASTOLIC BLOOD PRESSURE: 69 MMHG | SYSTOLIC BLOOD PRESSURE: 157 MMHG | HEART RATE: 84 BPM | HEIGHT: 72 IN | TEMPERATURE: 96.8 F

## 2017-09-16 DIAGNOSIS — I80.01 THROMBOPHLEBITIS OF SUPERFICIAL VEINS OF RIGHT LOWER EXTREMITY: Primary | ICD-10-CM

## 2017-09-16 LAB
ANION GAP SERPL CALCULATED.3IONS-SCNC: 9 MMOL/L (ref 5–15)
APTT PPP: 31.5 SECONDS (ref 20–40.3)
BASOPHILS # BLD AUTO: 0.02 10*3/MM3 (ref 0–0.2)
BASOPHILS NFR BLD AUTO: 0.4 % (ref 0–2)
BUN BLD-MCNC: 13 MG/DL (ref 7–21)
BUN/CREAT SERPL: 16.7 (ref 7–25)
CALCIUM SPEC-SCNC: 9.5 MG/DL (ref 8.4–10.2)
CHLORIDE SERPL-SCNC: 104 MMOL/L (ref 95–110)
CO2 SERPL-SCNC: 28 MMOL/L (ref 22–31)
CREAT BLD-MCNC: 0.78 MG/DL (ref 0.5–1)
DEPRECATED RDW RBC AUTO: 43.3 FL (ref 36.4–46.3)
EOSINOPHIL # BLD AUTO: 0.37 10*3/MM3 (ref 0–0.7)
EOSINOPHIL NFR BLD AUTO: 6.6 % (ref 0–7)
ERYTHROCYTE [DISTWIDTH] IN BLOOD BY AUTOMATED COUNT: 13.7 % (ref 11.5–14.5)
GFR SERPL CREATININE-BSD FRML MDRD: 75 ML/MIN/1.73 (ref 45–104)
GLUCOSE BLD-MCNC: 105 MG/DL (ref 60–100)
HCT VFR BLD AUTO: 34.6 % (ref 35–45)
HGB BLD-MCNC: 10.7 G/DL (ref 12–15.5)
IMM GRANULOCYTES # BLD: 0.02 10*3/MM3 (ref 0–0.02)
IMM GRANULOCYTES NFR BLD: 0.4 % (ref 0–0.5)
INR PPP: 0.97 (ref 0.8–1.2)
LYMPHOCYTES # BLD AUTO: 1.6 10*3/MM3 (ref 0.6–4.2)
LYMPHOCYTES NFR BLD AUTO: 28.5 % (ref 10–50)
MCH RBC QN AUTO: 26.5 PG (ref 26.5–34)
MCHC RBC AUTO-ENTMCNC: 30.9 G/DL (ref 31.4–36)
MCV RBC AUTO: 85.6 FL (ref 80–98)
MONOCYTES # BLD AUTO: 0.51 10*3/MM3 (ref 0–0.9)
MONOCYTES NFR BLD AUTO: 9.1 % (ref 0–12)
NEUTROPHILS # BLD AUTO: 3.1 10*3/MM3 (ref 2–8.6)
NEUTROPHILS NFR BLD AUTO: 55 % (ref 37–80)
PLATELET # BLD AUTO: 164 10*3/MM3 (ref 150–450)
PMV BLD AUTO: 10.4 FL (ref 8–12)
POTASSIUM BLD-SCNC: 4.1 MMOL/L (ref 3.5–5.1)
PROTHROMBIN TIME: 12.8 SECONDS (ref 11.1–15.3)
RBC # BLD AUTO: 4.04 10*6/MM3 (ref 3.77–5.16)
SODIUM BLD-SCNC: 141 MMOL/L (ref 137–145)
WBC NRBC COR # BLD: 5.62 10*3/MM3 (ref 3.2–9.8)

## 2017-09-16 PROCEDURE — 85610 PROTHROMBIN TIME: CPT | Performed by: EMERGENCY MEDICINE

## 2017-09-16 PROCEDURE — 85730 THROMBOPLASTIN TIME PARTIAL: CPT | Performed by: EMERGENCY MEDICINE

## 2017-09-16 PROCEDURE — 85025 COMPLETE CBC W/AUTO DIFF WBC: CPT | Performed by: EMERGENCY MEDICINE

## 2017-09-16 PROCEDURE — 71275 CT ANGIOGRAPHY CHEST: CPT

## 2017-09-16 PROCEDURE — 99284 EMERGENCY DEPT VISIT MOD MDM: CPT

## 2017-09-16 PROCEDURE — 80048 BASIC METABOLIC PNL TOTAL CA: CPT | Performed by: EMERGENCY MEDICINE

## 2017-09-16 PROCEDURE — 93005 ELECTROCARDIOGRAM TRACING: CPT | Performed by: EMERGENCY MEDICINE

## 2017-09-16 PROCEDURE — 0 IOPAMIDOL PER 1 ML: Performed by: EMERGENCY MEDICINE

## 2017-09-16 PROCEDURE — 93010 ELECTROCARDIOGRAM REPORT: CPT | Performed by: INTERNAL MEDICINE

## 2017-09-16 PROCEDURE — 93971 EXTREMITY STUDY: CPT

## 2017-09-16 RX ORDER — OXYCODONE HYDROCHLORIDE AND ACETAMINOPHEN 5; 325 MG/1; MG/1
1 TABLET ORAL ONCE
Status: COMPLETED | OUTPATIENT
Start: 2017-09-16 | End: 2017-09-16

## 2017-09-16 RX ORDER — SODIUM CHLORIDE 0.9 % (FLUSH) 0.9 %
10 SYRINGE (ML) INJECTION AS NEEDED
Status: DISCONTINUED | OUTPATIENT
Start: 2017-09-16 | End: 2017-09-16 | Stop reason: HOSPADM

## 2017-09-16 RX ADMIN — Medication 10 ML: at 15:08

## 2017-09-16 RX ADMIN — OXYCODONE HYDROCHLORIDE AND ACETAMINOPHEN 1 TABLET: 5; 325 TABLET ORAL at 16:06

## 2017-09-16 RX ADMIN — IOPAMIDOL 59 ML: 755 INJECTION, SOLUTION INTRAVENOUS at 16:50

## 2017-09-16 NOTE — ED PROVIDER NOTES
Subjective   History of Present Illness  8-year-old female with a history of venous thromboembolism comes into the emergency department because of right leg pain started yesterday and worsened throughout the day today.  She states it feels similar to previous DVTs.  She denies having any chest pain or shortness of breath today.  She has no swelling of the leg.  She does state that she recently got off of anticoagulation after having a left knee replacement back in July.  She was placed on anticoagulation because she is prone to get venous thromboembolus embolisms.  Review of Systems   Constitutional: Negative for chills and fever.   HENT: Negative for rhinorrhea, sinus pressure and sneezing.    Eyes: Negative for pain and redness.   Respiratory: Negative for cough, chest tightness and shortness of breath.    Cardiovascular: Positive for leg swelling. Negative for chest pain and palpitations.   Gastrointestinal: Negative for abdominal pain, diarrhea, nausea and vomiting.   Genitourinary: Negative for dysuria, flank pain, menstrual problem, pelvic pain, vaginal bleeding, vaginal discharge and vaginal pain.   Musculoskeletal: Positive for myalgias. Negative for arthralgias, back pain and joint swelling.   Skin: Negative for rash.   Neurological: Negative for dizziness, syncope, weakness, numbness and headaches.   Hematological: Negative.    Psychiatric/Behavioral: Negative for self-injury and suicidal ideas.       Past Medical History:   Diagnosis Date   • Acid reflux    • Acute peritonitis    • Allergic rhinitis    • Arthritis    • Bee sting    • Borderline glaucoma    • Chronic bronchitis    • Constipation     severe with an immotile colon      • Deep venous thrombosis    • High cholesterol    • Hypothyroidism    • Intestinal volvulus    • Muscle atrophy     O/E   • Nuclear senile cataract    • Nuclear senile cataract    • Polyneuropathy in diabetes    • PONV (postoperative nausea and vomiting)    • Type 2 diabetes  mellitus     NO BDR   • Type 2 diabetes mellitus without complications    • Unspecified disease of nail        Allergies   Allergen Reactions   • Bextra [Valdecoxib] Shortness Of Breath   • Cephalosporins Shortness Of Breath   • Detrol La [Tolterodine Tartrate Er] Shortness Of Breath   • Dicyclomine Shortness Of Breath   • Fluoxetine Shortness Of Breath   • Keflex [Cephalexin] Shortness Of Breath   • Toprol Xl [Metoprolol] Anaphylaxis   • Chocolate Flavor    • Other      Fresh flowers causes throat swelling   • Amoxicillin Rash   • Aspirin Rash   • Ciprofloxin Hcl [Ciprofloxacin] Rash   • Claritin-D 12 Hour [Loratadine-Pseudoephedrine Er] Rash   • Codeine Rash   • Demerol [Meperidine] Rash   • Levaquin [Levofloxacin] Rash   • Lipitor [Atorvastatin] Other (See Comments)     cramping   • Macrobid [Nitrofurantoin] Rash   • Morphine And Related Rash   • Paxil [Paroxetine Hcl] Rash   • Sulfa Antibiotics Rash   • Tape Rash   • Tramadol Rash   • Vioxx [Rofecoxib] Rash   • Zegerid [Omeprazole] Rash   • Zoloft [Sertraline Hcl] Rash       Past Surgical History:   Procedure Laterality Date   • APPENDECTOMY     • BLADDER SUSPENSION     • COLECTOMY PARTIAL / TOTAL  05/22/2014    Subtotal colectomy with ineo-rectostomy.   • HERNIA REPAIR      multiple   • HYSTERECTOMY     • INJECTION OF MEDICATION  12/07/2010    DEPO MEDROL   • KNEE ARTHROSCOPY Left    • LAPAROSCOPIC CHOLECYSTECTOMY     • SALPINGO OOPHORECTOMY Left    • SALPINGO OOPHORECTOMY Right    • TOTAL KNEE ARTHROPLASTY Left 7/24/2017    Procedure: TOTAL KNEE ARTHROPLASTY ATTUNE with adductor canal block;  Surgeon: Jose Ballesteros MD;  Location: [facility];  Service:        History reviewed. No pertinent family history.    Social History     Social History   • Marital status:      Spouse name: N/A   • Number of children: N/A   • Years of education: N/A     Social History Main Topics   • Smoking status: Former Smoker     Quit date: 1980   • Smokeless tobacco:  Never Used   • Alcohol use No   • Drug use: No   • Sexual activity: Defer     Other Topics Concern   • None     Social History Narrative           Objective   Physical Exam   Constitutional: She is oriented to person, place, and time. She appears well-developed and well-nourished.   HENT:   Head: Normocephalic and atraumatic.   Eyes: EOM are normal. Pupils are equal, round, and reactive to light.   Neck: Normal range of motion. Neck supple.   Cardiovascular: Regular rhythm.    Tachycardia   Pulmonary/Chest: Effort normal and breath sounds normal.   Abdominal: Soft. Bowel sounds are normal.   Musculoskeletal: Normal range of motion.   Neurological: She is alert and oriented to person, place, and time.   Skin: Skin is warm and dry.   Psychiatric: She has a normal mood and affect.   Nursing note and vitals reviewed.      Procedures         ED Course  ED Course        Labs Reviewed   BASIC METABOLIC PANEL - Abnormal; Notable for the following:        Result Value    Glucose 105 (*)     All other components within normal limits   CBC WITH AUTO DIFFERENTIAL - Abnormal; Notable for the following:     Hemoglobin 10.7 (*)     Hematocrit 34.6 (*)     MCHC 30.9 (*)     All other components within normal limits   APTT - Normal    Narrative:     The recommended Heparin therapeutic range is 68-97 seconds.   PROTIME-INR - Normal    Narrative:     Therapeutic range for most indications is 2.0-3.0 INR,  or 2.5-3.5 for mechanical heart valves.   CBC AND DIFFERENTIAL    Narrative:     The following orders were created for panel order CBC & Differential.  Procedure                               Abnormality         Status                     ---------                               -----------         ------                     CBC Auto Differential[[id number]]        Abnormal            Final result                 Please view results for these tests on the individual orders.   \  CT Angiogram Chest With Contrast   Final Result    Conclusion:   No Pulmonary Embolism   1.2 cm nodule or mass medial posterior left lower lobe abutting   the posterior pleural surface.    Consider PET/CT scan or tissue sampling for further evaluation.      [id number]      Electronically signed by:  Tye Kolb MD  9/16/2017 5:13 PM CDT   Workstation: [email or web address]      US Venous Doppler Lower Extremity Right (duplex)   Final Result   CONCLUSION:   No ultrasound evidence of deep venous thrombosis of the right   lower extremity.   Focal occlusive superficial venous thrombosis within a tributary   of the greater saphenous vein at the level of patient's pain.      [id number]      Electronically signed by:  Tye Kolb MD  9/16/2017 4:05 PM CDT   Workstation: [email or web address]                  [facility]  Number of Diagnoses or Management Options  Thrombophlebitis of superficial veins of right lower extremity:   Diagnosis management comments: Workup for DVT and PE negative.  Discussed with patient.  She does have superficial thrombophlebitis.  Discussed warm compresses.  She states she has the allergy to aspirin so she cannot take aspirin.  Discharge home to follow with the primary care doctor       Amount and/or Complexity of Data Reviewed  Clinical lab tests: reviewed        Final diagnoses:   Thrombophlebitis of superficial veins of right lower extremity            Arley Nolen MD  09/16/17 [id number]  Please note the patient is a 62-year-old female not a 8-year-old female.     Arley Nolne MD  09/27/17 0156

## 2017-09-18 ENCOUNTER — APPOINTMENT (OUTPATIENT)
Dept: GENERAL RADIOLOGY | Facility: HOSPITAL | Age: 62
End: 2017-09-18

## 2017-09-18 ENCOUNTER — HOSPITAL ENCOUNTER (EMERGENCY)
Facility: HOSPITAL | Age: 62
Discharge: HOME | End: 2017-09-18
Attending: FAMILY MEDICINE | Admitting: FAMILY MEDICINE

## 2017-09-18 ENCOUNTER — APPOINTMENT (OUTPATIENT)
Dept: CT IMAGING | Facility: HOSPITAL | Age: 62
End: 2017-09-18

## 2017-09-18 ENCOUNTER — APPOINTMENT (OUTPATIENT)
Dept: ULTRASOUND IMAGING | Facility: HOSPITAL | Age: 62
End: 2017-09-18

## 2017-09-18 VITALS
RESPIRATION RATE: 18 BRPM | DIASTOLIC BLOOD PRESSURE: 59 MMHG | HEART RATE: 78 BPM | SYSTOLIC BLOOD PRESSURE: 112 MMHG | TEMPERATURE: 98.2 F | HEIGHT: 72 IN | BODY MASS INDEX: 30.34 KG/M2 | WEIGHT: 224 LBS | OXYGEN SATURATION: 97 %

## 2017-09-18 DIAGNOSIS — L03.115 CELLULITIS OF RIGHT LOWER EXTREMITY: ICD-10-CM

## 2017-09-18 DIAGNOSIS — M79.604 RIGHT LEG PAIN: Primary | ICD-10-CM

## 2017-09-18 LAB
ALBUMIN SERPL-MCNC: 4 G/DL (ref 3.4–4.8)
ALBUMIN/GLOB SERPL: 1.2 G/DL (ref 1.1–1.8)
ALP SERPL-CCNC: 102 U/L (ref 38–126)
ALT SERPL W P-5'-P-CCNC: 33 U/L (ref 9–52)
ANION GAP SERPL CALCULATED.3IONS-SCNC: 10 MMOL/L (ref 5–15)
AST SERPL-CCNC: 30 U/L (ref 14–36)
BASOPHILS # BLD AUTO: 0.02 10*3/MM3 (ref 0–0.2)
BASOPHILS NFR BLD AUTO: 0.3 % (ref 0–2)
BILIRUB SERPL-MCNC: 0.5 MG/DL (ref 0.2–1.3)
BUN BLD-MCNC: 13 MG/DL (ref 7–21)
BUN/CREAT SERPL: 17.3 (ref 7–25)
CALCIUM SPEC-SCNC: 9 MG/DL (ref 8.4–10.2)
CHLORIDE SERPL-SCNC: 102 MMOL/L (ref 95–110)
CK MB SERPL-CCNC: 0.96 NG/ML (ref 0–5)
CK SERPL-CCNC: 74 U/L (ref 30–135)
CK SERPL-CCNC: 75 U/L (ref 30–135)
CO2 SERPL-SCNC: 28 MMOL/L (ref 22–31)
CREAT BLD-MCNC: 0.75 MG/DL (ref 0.5–1)
D-DIMER, QUANTITATIVE (MAD,POW, STR): 2506 NG/ML (FEU) (ref 0–470)
DEPRECATED RDW RBC AUTO: 43.8 FL (ref 36.4–46.3)
EOSINOPHIL # BLD AUTO: 0.42 10*3/MM3 (ref 0–0.7)
EOSINOPHIL NFR BLD AUTO: 6.6 % (ref 0–7)
ERYTHROCYTE [DISTWIDTH] IN BLOOD BY AUTOMATED COUNT: 14 % (ref 11.5–14.5)
GFR SERPL CREATININE-BSD FRML MDRD: 79 ML/MIN/1.73 (ref 45–104)
GLOBULIN UR ELPH-MCNC: 3.3 GM/DL (ref 2.3–3.5)
GLUCOSE BLD-MCNC: 95 MG/DL (ref 60–100)
HCT VFR BLD AUTO: 31.4 % (ref 35–45)
HGB BLD-MCNC: 10 G/DL (ref 12–15.5)
HOLD SPECIMEN: NORMAL
IMM GRANULOCYTES # BLD: 0.01 10*3/MM3 (ref 0–0.02)
IMM GRANULOCYTES NFR BLD: 0.2 % (ref 0–0.5)
INR PPP: 1.03 (ref 0.8–1.2)
LYMPHOCYTES # BLD AUTO: 1.56 10*3/MM3 (ref 0.6–4.2)
LYMPHOCYTES NFR BLD AUTO: 24.5 % (ref 10–50)
MCH RBC QN AUTO: 27.2 PG (ref 26.5–34)
MCHC RBC AUTO-ENTMCNC: 31.8 G/DL (ref 31.4–36)
MCV RBC AUTO: 85.6 FL (ref 80–98)
MONOCYTES # BLD AUTO: 0.54 10*3/MM3 (ref 0–0.9)
MONOCYTES NFR BLD AUTO: 8.5 % (ref 0–12)
NEUTROPHILS # BLD AUTO: 3.81 10*3/MM3 (ref 2–8.6)
NEUTROPHILS NFR BLD AUTO: 59.9 % (ref 37–80)
NT-PROBNP SERPL-MCNC: 30.4 PG/ML (ref 0–900)
PLATELET # BLD AUTO: 163 10*3/MM3 (ref 150–450)
PMV BLD AUTO: 9.6 FL (ref 8–12)
POTASSIUM BLD-SCNC: 3.9 MMOL/L (ref 3.5–5.1)
PROT SERPL-MCNC: 7.3 G/DL (ref 6.3–8.6)
PROTHROMBIN TIME: 13.4 SECONDS (ref 11.1–15.3)
RBC # BLD AUTO: 3.67 10*6/MM3 (ref 3.77–5.16)
SODIUM BLD-SCNC: 140 MMOL/L (ref 137–145)
TROPONIN I SERPL-MCNC: <0.012 NG/ML
WBC NRBC COR # BLD: 6.36 10*3/MM3 (ref 3.2–9.8)

## 2017-09-18 PROCEDURE — 83880 ASSAY OF NATRIURETIC PEPTIDE: CPT | Performed by: FAMILY MEDICINE

## 2017-09-18 PROCEDURE — 84484 ASSAY OF TROPONIN QUANT: CPT | Performed by: FAMILY MEDICINE

## 2017-09-18 PROCEDURE — 80053 COMPREHEN METABOLIC PANEL: CPT | Performed by: FAMILY MEDICINE

## 2017-09-18 PROCEDURE — 85025 COMPLETE CBC W/AUTO DIFF WBC: CPT | Performed by: FAMILY MEDICINE

## 2017-09-18 PROCEDURE — 85379 FIBRIN DEGRADATION QUANT: CPT | Performed by: FAMILY MEDICINE

## 2017-09-18 PROCEDURE — 93010 ELECTROCARDIOGRAM REPORT: CPT | Performed by: INTERNAL MEDICINE

## 2017-09-18 PROCEDURE — 82553 CREATINE MB FRACTION: CPT | Performed by: FAMILY MEDICINE

## 2017-09-18 PROCEDURE — 96374 THER/PROPH/DIAG INJ IV PUSH: CPT

## 2017-09-18 PROCEDURE — 99284 EMERGENCY DEPT VISIT MOD MDM: CPT

## 2017-09-18 PROCEDURE — 82550 ASSAY OF CK (CPK): CPT | Performed by: FAMILY MEDICINE

## 2017-09-18 PROCEDURE — 72170 X-RAY EXAM OF PELVIS: CPT

## 2017-09-18 PROCEDURE — 93971 EXTREMITY STUDY: CPT

## 2017-09-18 PROCEDURE — 85610 PROTHROMBIN TIME: CPT | Performed by: FAMILY MEDICINE

## 2017-09-18 PROCEDURE — 25010000002 ONDANSETRON PER 1 MG: Performed by: FAMILY MEDICINE

## 2017-09-18 PROCEDURE — 72100 X-RAY EXAM L-S SPINE 2/3 VWS: CPT

## 2017-09-18 PROCEDURE — 93005 ELECTROCARDIOGRAM TRACING: CPT | Performed by: FAMILY MEDICINE

## 2017-09-18 RX ORDER — HYDROCODONE BITARTRATE AND ACETAMINOPHEN 7.5; 325 MG/1; MG/1
1 TABLET ORAL ONCE
Status: COMPLETED | OUTPATIENT
Start: 2017-09-18 | End: 2017-09-18

## 2017-09-18 RX ORDER — HYDROCODONE BITARTRATE AND ACETAMINOPHEN 5; 325 MG/1; MG/1
1 TABLET ORAL EVERY 6 HOURS PRN
Qty: 15 TABLET | Refills: 0 | Status: SHIPPED | OUTPATIENT
Start: 2017-09-18 | End: 2017-11-07

## 2017-09-18 RX ORDER — DOXYCYCLINE 100 MG/1
100 CAPSULE ORAL 2 TIMES DAILY
Qty: 20 CAPSULE | Refills: 0 | Status: SHIPPED | OUTPATIENT
Start: 2017-09-18 | End: 2018-02-23

## 2017-09-18 RX ORDER — ONDANSETRON 2 MG/ML
4 INJECTION INTRAMUSCULAR; INTRAVENOUS ONCE
Status: COMPLETED | OUTPATIENT
Start: 2017-09-18 | End: 2017-09-18

## 2017-09-18 RX ADMIN — ONDANSETRON 4 MG: 2 INJECTION INTRAMUSCULAR; INTRAVENOUS at 12:16

## 2017-09-18 RX ADMIN — HYDROCODONE BITARTRATE AND ACETAMINOPHEN 1 TABLET: 7.5; 325 TABLET ORAL at 16:29

## 2017-09-18 RX ADMIN — HYDROCODONE BITARTRATE AND ACETAMINOPHEN 1 TABLET: 7.5; 325 TABLET ORAL at 09:57

## 2017-09-18 NOTE — ED PROVIDER NOTES
"Subjective   HPI Comments: PT with h/o DVT \"everytime I have surgery,\" and was placed on coumadin for knee surgery and stopped coumadin on 9/7/17. Pt states that she was diagnosed with superficial thrombophlebitis 2 days ago. No current anticoagulation. Pt does have a milton filter.     Patient is a 62 y.o. female presenting with lower extremity pain.   Lower Extremity Issue   Location:  Leg  Time since incident:  2 days  Injury: no    Leg location:  R leg  Pain details:     Quality:  Aching    Severity:  Moderate    Onset quality:  Gradual    Duration:  2 days    Timing:  Constant    Progression:  Worsening  Chronicity:  Recurrent  Dislocation: no    Prior injury to area:  No  Relieved by:  Nothing  Worsened by:  Bearing weight  Associated symptoms: muscle weakness and swelling    Associated symptoms: no fatigue, no fever and no neck pain        Review of Systems   Constitutional: Negative for appetite change, chills, diaphoresis, fatigue and fever.   HENT: Negative for congestion, ear discharge, ear pain, nosebleeds, rhinorrhea, sinus pressure, sore throat and trouble swallowing.    Eyes: Negative for discharge and redness.   Respiratory: Positive for shortness of breath (with minimal ambulation). Negative for apnea, cough, chest tightness and wheezing.    Cardiovascular: Negative for chest pain.   Gastrointestinal: Positive for diarrhea (chronic). Negative for abdominal pain, nausea and vomiting.   Endocrine: Negative for polyuria.   Genitourinary: Negative for dysuria, frequency and urgency.   Musculoskeletal: Negative for myalgias and neck pain.   Skin: Negative for color change and rash.   Allergic/Immunologic: Negative for immunocompromised state.   Neurological: Negative for dizziness, seizures, syncope, weakness, light-headedness and headaches.   Hematological: Negative for adenopathy. Does not bruise/bleed easily.   Psychiatric/Behavioral: Negative for behavioral problems and confusion.   All other " systems reviewed and are negative.      Past Medical History:   Diagnosis Date   • Acid reflux    • Acute peritonitis    • Allergic rhinitis    • Arthritis    • Bee sting    • Borderline glaucoma    • Chronic bronchitis    • Constipation     severe with an immotile colon      • Deep venous thrombosis    • High cholesterol    • Hypothyroidism    • Intestinal volvulus    • Muscle atrophy     O/E   • Nuclear senile cataract    • Nuclear senile cataract    • Polyneuropathy in diabetes    • PONV (postoperative nausea and vomiting)    • Type 2 diabetes mellitus     NO BDR   • Type 2 diabetes mellitus without complications    • Unspecified disease of nail        Allergies   Allergen Reactions   • Bextra [Valdecoxib] Shortness Of Breath   • Cephalosporins Shortness Of Breath   • Detrol La [Tolterodine Tartrate Er] Shortness Of Breath   • Dicyclomine Shortness Of Breath   • Fluoxetine Shortness Of Breath   • Keflex [Cephalexin] Shortness Of Breath   • Toprol Xl [Metoprolol] Anaphylaxis   • Chocolate Flavor    • Other      Fresh flowers causes throat swelling   • Amoxicillin Rash   • Aspirin Rash   • Ciprofloxin Hcl [Ciprofloxacin] Rash   • Claritin-D 12 Hour [Loratadine-Pseudoephedrine Er] Rash   • Codeine Rash   • Demerol [Meperidine] Rash   • Levaquin [Levofloxacin] Rash   • Lipitor [Atorvastatin] Other (See Comments)     cramping   • Macrobid [Nitrofurantoin] Rash   • Morphine And Related Rash   • Paxil [Paroxetine Hcl] Rash   • Sulfa Antibiotics Rash   • Tape Rash   • Tramadol Rash   • Vioxx [Rofecoxib] Rash   • Zegerid [Omeprazole] Rash   • Zoloft [Sertraline Hcl] Rash       Past Surgical History:   Procedure Laterality Date   • APPENDECTOMY     • BLADDER SUSPENSION     • COLECTOMY PARTIAL / TOTAL  05/22/2014    Subtotal colectomy with ineo-rectostomy.   • HERNIA REPAIR      multiple   • HYSTERECTOMY     • INJECTION OF MEDICATION  12/07/2010    DEPO MEDROL   • KNEE ARTHROSCOPY Left    • LAPAROSCOPIC CHOLECYSTECTOMY      • SALPINGO OOPHORECTOMY Left    • SALPINGO OOPHORECTOMY Right    • TOTAL KNEE ARTHROPLASTY Left 7/24/2017    Procedure: TOTAL KNEE ARTHROPLASTY ATTUNE with adductor canal block;  Surgeon: Jose Ballesteros MD;  Location: [facility];  Service:        History reviewed. No pertinent family history.    Social History     Social History   • Marital status:      Spouse name: N/A   • Number of children: N/A   • Years of education: N/A     Social History Main Topics   • Smoking status: Former Smoker     Quit date: 1980   • Smokeless tobacco: Never Used   • Alcohol use No   • Drug use: No   • Sexual activity: Defer     Other Topics Concern   • None     Social History Narrative           Objective   Physical Exam   Constitutional: She is oriented to person, place, and time. She appears well-developed and well-nourished.   HENT:   Head: Normocephalic and atraumatic.   Nose: Nose normal.   Mouth/Throat: Oropharynx is clear and moist.   Eyes: Conjunctivae and EOM are normal. Pupils are equal, round, and reactive to light. Right eye exhibits no discharge. Left eye exhibits no discharge. No scleral icterus.   Neck: Normal range of motion. Neck supple. No tracheal deviation present.   Cardiovascular: Normal rate, regular rhythm and normal heart sounds.    No murmur heard.  Pulmonary/Chest: Effort normal and breath sounds normal. No stridor. No respiratory distress. She has no wheezes. She has no rales.   Abdominal: Soft. Bowel sounds are normal. She exhibits no distension and no mass. There is no tenderness. There is no rebound and no guarding.   Musculoskeletal: She exhibits no edema.        Right knee: She exhibits decreased range of motion, swelling, laceration and erythema. She exhibits no ecchymosis and no deformity. Tenderness found.        Legs:  Neurological: She is alert and oriented to person, place, and time. Coordination normal.   Skin: Skin is warm and dry. No rash noted. No erythema.   Psychiatric: She  has a normal mood and affect. Her behavior is normal. Thought content normal.   Nursing note and vitals reviewed.      ECG 12 Lead    Date/Time: 9/18/2017 3:54 PM  Performed by: KAREN MILLARD  Authorized by: KAREN MILLARD   Interpreted by physician  Rhythm: sinus rhythm  Rate: normal  BPM: 72  ST Segments: ST segments normal                 ED Course  ED Course                  MDM    Final diagnoses:   Right leg pain   Cellulitis of right lower extremity            Karen Millard MD  09/18/17 1552       Karen Millard MD  09/18/17 1555       Karen Millard MD  09/18/17 1623

## 2017-09-18 NOTE — ED NOTES
"Pt is presented to Ed with c/o right leg pain that radiates to her right groin and right buttock.  Pt states she was seen here Saturday for same and dx with a \"superficial right blood clot\" in her right upper thigh.  Pt states her pain has become worse, especially with walking.     Raquel Guzman RN  09/18/17 0736    "

## 2017-11-06 DIAGNOSIS — Z96.652 PRESENCE OF LEFT ARTIFICIAL KNEE JOINT: Primary | ICD-10-CM

## 2017-11-07 ENCOUNTER — OFFICE VISIT (OUTPATIENT)
Dept: ORTHOPEDIC SURGERY | Facility: CLINIC | Age: 62
End: 2017-11-07

## 2017-11-07 VITALS — BODY MASS INDEX: 29.39 KG/M2 | WEIGHT: 217 LBS | HEIGHT: 72 IN

## 2017-11-07 DIAGNOSIS — E11.9 TYPE 2 DIABETES MELLITUS WITHOUT COMPLICATION, WITHOUT LONG-TERM CURRENT USE OF INSULIN (HCC): ICD-10-CM

## 2017-11-07 DIAGNOSIS — G47.33 OBSTRUCTIVE SLEEP APNEA SYNDROME: ICD-10-CM

## 2017-11-07 DIAGNOSIS — Z96.652 PRESENCE OF LEFT ARTIFICIAL KNEE JOINT: Primary | ICD-10-CM

## 2017-11-07 PROCEDURE — 99213 OFFICE O/P EST LOW 20 MIN: CPT | Performed by: ORTHOPAEDIC SURGERY

## 2017-11-07 RX ORDER — CLONAZEPAM 0.5 MG/1
0.5 TABLET ORAL 2 TIMES DAILY PRN
COMMUNITY

## 2017-11-07 NOTE — PROGRESS NOTES
Mini Andersen is a 62 y.o. female returns for     Chief Complaint   Patient presents with   • Left Knee - Follow-up       HISTORY OF PRESENT ILLNESS: TOTAL KNEE ARTHROPLASTY  Done on 7/24/2017. xrays done today. Patient has some popping in knee when bending.  Patient was treated for blood clot in right knee was treated in [place].   No fevers or chills       CONCURRENT MEDICAL HISTORY:    Past Medical History:   Diagnosis Date   • Acid reflux    • Acute peritonitis    • Allergic rhinitis    • Arthritis    • Bee sting    • Borderline glaucoma    • Chronic bronchitis    • Constipation     severe with an immotile colon      • Deep venous thrombosis    • High cholesterol    • Hypothyroidism    • Intestinal volvulus    • Muscle atrophy     O/E   • Nuclear senile cataract    • Nuclear senile cataract    • Polyneuropathy in diabetes    • PONV (postoperative nausea and vomiting)    • Type 2 diabetes mellitus     NO BDR   • Type 2 diabetes mellitus without complications    • Unspecified disease of nail        Allergies   Allergen Reactions   • Bextra [Valdecoxib] Shortness Of Breath   • Cephalosporins Shortness Of Breath   • Detrol La [Tolterodine Tartrate Er] Shortness Of Breath   • Dicyclomine Shortness Of Breath   • Fluoxetine Shortness Of Breath   • Keflex [Cephalexin] Shortness Of Breath   • Toprol Xl [Metoprolol] Anaphylaxis   • Chocolate Flavor    • Other      Fresh flowers causes throat swelling   • Amoxicillin Rash   • Aspirin Rash   • Ciprofloxin Hcl [Ciprofloxacin] Rash   • Claritin-D 12 Hour [Loratadine-Pseudoephedrine Er] Rash   • Codeine Rash   • Demerol [Meperidine] Rash   • Levaquin [Levofloxacin] Rash   • Lipitor [Atorvastatin] Other (See Comments)     cramping   • Macrobid [Nitrofurantoin] Rash   • Morphine And Related Rash   • Paxil [Paroxetine Hcl] Rash   • Sulfa Antibiotics Rash   • Tape Rash   • Tramadol Rash   • Vioxx [Rofecoxib] Rash   • Zegerid [Omeprazole] Rash   • Zoloft [Sertraline Hcl]  Rash         Current Outpatient Prescriptions:   •  apixaban (ELIQUIS) 5 MG tablet tablet, Take 5 mg by mouth 2 (Two) Times a Day., Disp: , Rfl:   •  Calcium Citrate (CITRACAL PO), Take 2 tablets by mouth Daily., Disp: , Rfl:   •  carboxymethylcellulose (REFRESH PLUS) 0.5 % solution, Administer 1 drop to both eyes Daily As Needed for Dry Eyes., Disp: , Rfl:   •  clonazePAM (KlonoPIN) 0.5 MG tablet, Take 0.5 mg by mouth 2 (Two) Times a Day As Needed for Seizures., Disp: , Rfl:   •  cyanocobalamin 1000 MCG/ML injection, Inject  into the shoulder, thigh, or buttocks Every 28 (Twenty-Eight) Days., Disp: , Rfl: 12  •  diphenhydrAMINE (BENADRYL) 25 mg capsule, Take 25 mg by mouth Every 6 (Six) Hours As Needed., Disp: , Rfl: 6  •  docusate sodium (COLACE) 100 MG capsule, Take 100 mg by mouth 2 (Two) Times a Day. 2 tabs, Disp: , Rfl:   •  doxycycline (MONODOX) 100 MG capsule, Take 1 capsule by mouth 2 (Two) Times a Day., Disp: 20 capsule, Rfl: 0  •  levothyroxine (SYNTHROID, LEVOTHROID) 50 MCG tablet, Take 50 mcg by mouth daily., Disp: , Rfl: 1  •  lidocaine (XYLOCAINE) 5 % ointment, Apply  topically Daily As Needed., Disp: , Rfl:   •  metFORMIN (GLUCOPHAGE) 500 MG tablet, Take 500 mg by mouth Daily As Needed (For FSBS > 140)., Disp: , Rfl: 1  •  omeprazole (priLOSEC) 20 MG capsule, Take 20 mg by mouth Daily., Disp: , Rfl:   •  ondansetron (ZOFRAN) 4 MG tablet, Take 4 mg by mouth Every 8 (Eight) Hours As Needed., Disp: , Rfl: 0  •  ONE TOUCH ULTRA TEST test strip, TEST BID, Disp: , Rfl: 5  •  vitamin D (ERGOCALCIFEROL) 82907 UNITS capsule capsule, Take 50,000 Units by mouth Every 7 (Seven) Days., Disp: , Rfl: 1    Past Surgical History:   Procedure Laterality Date   • APPENDECTOMY     • BLADDER SUSPENSION     • COLECTOMY PARTIAL / TOTAL  05/22/2014    Subtotal colectomy with ineo-rectostomy.   • HERNIA REPAIR      multiple   • HYSTERECTOMY     • INJECTION OF MEDICATION  12/07/2010    DEPO MEDROL   • KNEE ARTHROSCOPY Left   "  • LAPAROSCOPIC CHOLECYSTECTOMY     • SALPINGO OOPHORECTOMY Left    • SALPINGO OOPHORECTOMY Right    • TOTAL KNEE ARTHROPLASTY Left 7/24/2017    Procedure: TOTAL KNEE ARTHROPLASTY ATTUNE with adductor canal block;  Surgeon: Jose Ballesteros MD;  Location: [facility];  Service:        ROS  No fevers or chills.  No chest pain or shortness of air.  No GI or  disturbances.    PHYSICAL EXAMINATION:       Ht 72\" (182.9 cm)  Wt 217 lb (98.4 kg)  BMI 29.43 kg/m2    Physical Exam   Constitutional: She is oriented to person, place, and time. She appears well-developed and well-nourished.   Neurological: She is alert and oriented to person, place, and time.   Psychiatric: She has a normal mood and affect. Her behavior is normal. Judgment and thought content normal.       GAIT:     [x]  Normal  []  Antalgic    Assistive device: [x]  None  []  Walker     []  Crutches  []  Cane     []  Wheelchair  []  Stretcher    Left Knee Exam     Tenderness   The patient is experiencing no tenderness.         Range of Motion   Extension: 0   Flexion: 120     Muscle Strength     The patient has normal left knee strength.    Tests   Drawer:       Anterior - negative       Varus: negative  Valgus: negative    Other   Erythema: absent  Sensation: normal  Pulse: present  Swelling: none              Xr Knee 1 Or 2 View Left    Result Date: 11/7/2017  Narrative: AP bilateral standing with lateral of the left knee shows acceptable position and alignment of a stable left total knee arthroplasty.  No sign of implant loosening or failure is noted.  No other acute bony abnormality is noted.  The right side shows mild medial joint space narrowing with no acute findings. 11/07/17 at 12:32 PM by Jose Ballesteros MD      Xr Knee Bilateral Ap Standing    Result Date: 11/7/2017  Narrative: AP bilateral standing with lateral of the left knee shows acceptable position and alignment of a stable left total knee arthroplasty.  No sign of implant " loosening or failure is noted.  No other acute bony abnormality is noted.  The right side shows mild medial joint space narrowing with no acute findings. 11/07/17 at 12:33 PM by Jose Ballesteros MD              ASSESSMENT:    Diagnoses and all orders for this visit:    Presence of left artificial knee joint    Obstructive sleep apnea syndrome    Type 2 diabetes mellitus without complication, without long-term current use of insulin    Other orders  -     apixaban (ELIQUIS) 5 MG tablet tablet; Take 5 mg by mouth 2 (Two) Times a Day.  -     clonazePAM (KlonoPIN) 0.5 MG tablet; Take 0.5 mg by mouth 2 (Two) Times a Day As Needed for Seizures.          PLAN    Continue ROM/STR exercises  No restrictions  Continue with exercises.  Continue DVT treatment per other physicians    Return in about 1 year (around 11/7/2018) for Recheck with repeat xrays.    Jose Ballesteros MD

## 2018-01-03 DIAGNOSIS — Z96.652 PRESENCE OF LEFT ARTIFICIAL KNEE JOINT: Primary | ICD-10-CM

## 2018-02-22 ENCOUNTER — DOCUMENTATION (OUTPATIENT)
Dept: CARDIOLOGY | Facility: CLINIC | Age: 63
End: 2018-02-22

## 2018-02-23 ENCOUNTER — OFFICE VISIT (OUTPATIENT)
Dept: CARDIOLOGY | Facility: CLINIC | Age: 63
End: 2018-02-23

## 2018-02-23 VITALS
BODY MASS INDEX: 32.21 KG/M2 | WEIGHT: 230.06 LBS | OXYGEN SATURATION: 95 % | DIASTOLIC BLOOD PRESSURE: 80 MMHG | HEIGHT: 71 IN | SYSTOLIC BLOOD PRESSURE: 136 MMHG | HEART RATE: 112 BPM

## 2018-02-23 DIAGNOSIS — E11.9 TYPE 2 DIABETES MELLITUS WITHOUT COMPLICATION, WITHOUT LONG-TERM CURRENT USE OF INSULIN (HCC): ICD-10-CM

## 2018-02-23 DIAGNOSIS — E78.00 PURE HYPERCHOLESTEROLEMIA: ICD-10-CM

## 2018-02-23 DIAGNOSIS — I82.413 DEEP VEIN THROMBOSIS (DVT) OF FEMORAL VEIN OF BOTH LOWER EXTREMITIES, UNSPECIFIED CHRONICITY (HCC): ICD-10-CM

## 2018-02-23 DIAGNOSIS — R06.02 SOB (SHORTNESS OF BREATH): ICD-10-CM

## 2018-02-23 DIAGNOSIS — R00.2 PALPITATIONS: Primary | ICD-10-CM

## 2018-02-23 PROCEDURE — 93000 ELECTROCARDIOGRAM COMPLETE: CPT | Performed by: INTERNAL MEDICINE

## 2018-02-23 PROCEDURE — 99214 OFFICE O/P EST MOD 30 MIN: CPT | Performed by: INTERNAL MEDICINE

## 2018-02-23 RX ORDER — GRANULES FOR ORAL 3 G/1
POWDER ORAL
COMMUNITY
Start: 2018-01-10 | End: 2018-04-13

## 2018-02-23 RX ORDER — CLOPIDOGREL BISULFATE 75 MG/1
75 TABLET ORAL DAILY
COMMUNITY
Start: 2018-02-22 | End: 2018-02-23

## 2018-02-23 NOTE — PROGRESS NOTES
[facility]  OFFICE NOTE    Mini Andersen  62 y.o. female    02/23/2018  1. Palpitations    2. SOB (shortness of breath)    3. Pure hypercholesterolemia    4. Deep vein thrombosis (DVT) of femoral vein of both lower extremities, unspecified chronicity    5. Type 2 diabetes mellitus without complication, without long-term current use of insulin        Chief complaint -Palpitations      History of present Illness- 62-year-old lady with history of diabetes, hypertension and hyperlipidemia had bilateral knee replacement surgery and subsequently she developed DVT and she had DVT before and after surgery and she is going to be on chronic anticoagulation.  I stopped the Plavix as she has no history of CAD.  She has been having palpitations and heart rate going fast she cannot tolerate beta blockers, so I started her on verapamil  mg daily.  EKG showed normal sinus rhythm.  She had a negative stress test last year before the knee replacement surgery as she is a diabetic.        Allergies   Allergen Reactions   • Bextra [Valdecoxib] Shortness Of Breath     Shortness of breath   • Cephalosporins Shortness Of Breath   • Detrol La [Tolterodine Tartrate Er] Shortness Of Breath   • Dicyclomine Shortness Of Breath     Shortness of breath   • Fluoxetine Shortness Of Breath   • Keflex [Cephalexin] Shortness Of Breath   • Toprol Xl [Metoprolol] Anaphylaxis   • Chocolate Flavor    • Omeprazole-Sodium Bicarbonate Unknown (See Comments)   • Other      Fresh flowers causes throat swelling   • Amoxicillin Rash   • Aspirin Rash   • Ciprofloxin Hcl [Ciprofloxacin] Rash   • Claritin-D 12 Hour [Loratadine-Pseudoephedrine Er] Rash   • Codeine Rash   • Demerol [Meperidine] Rash   • Levaquin [Levofloxacin] Rash   • Lipitor [Atorvastatin] Other (See Comments)     cramping   • Macrobid [Nitrofurantoin] Rash   • Morphine And Related Rash   • Paxil [Paroxetine Hcl] Rash   • Sulfa Antibiotics Rash   • Tape Rash    • Tramadol Rash   • Vioxx [Rofecoxib] Rash   • Zegerid [Omeprazole] Rash   • Zoloft [Sertraline Hcl] Rash         Past Medical History:   Diagnosis Date   • Acid reflux    • Acute peritonitis    • Allergic rhinitis    • Arthritis    • Bee sting    • Borderline glaucoma    • Chronic bronchitis    • Constipation     severe with an immotile colon      • Deep venous thrombosis    • High cholesterol    • Hypothyroidism    • Intestinal volvulus    • Muscle atrophy     O/E   • Nuclear senile cataract    • Nuclear senile cataract    • Polyneuropathy in diabetes    • PONV (postoperative nausea and vomiting)    • Type 2 diabetes mellitus     NO BDR   • Type 2 diabetes mellitus without complications    • Unspecified disease of nail          Past Surgical History:   Procedure Laterality Date   • APPENDECTOMY     • BLADDER SUSPENSION     • COLECTOMY PARTIAL / TOTAL  05/22/2014    Subtotal colectomy with ineo-rectostomy.   • HERNIA REPAIR      multiple   • HYSTERECTOMY     • INJECTION OF MEDICATION  12/07/2010    DEPO MEDROL   • KNEE ARTHROSCOPY Left    • LAPAROSCOPIC CHOLECYSTECTOMY     • SALPINGO OOPHORECTOMY Left    • SALPINGO OOPHORECTOMY Right    • TOTAL KNEE ARTHROPLASTY Left 7/24/2017    Procedure: TOTAL KNEE ARTHROPLASTY ATTUNE with adductor canal block;  Surgeon: Jose Ballesteros MD;  Location: [facility];  Service:          No family history on file.      Social History     Social History   • Marital status:      Spouse name: N/A   • Number of children: N/A   • Years of education: N/A     Occupational History   • Not on file.     Social History Main Topics   • Smoking status: Former Smoker     Packs/day: 1.50     Years: 10.00     Start date: 1970     Quit date: 1980   • Smokeless tobacco: Never Used   • Alcohol use No   • Drug use: No   • Sexual activity: Defer     Other Topics Concern   • Not on file     Social History Narrative         Current Outpatient Prescriptions   Medication Sig Dispense Refill   •  apixaban (ELIQUIS) 5 MG tablet tablet Take 5 mg by mouth 2 (Two) Times a Day.     • Calcium Citrate (CITRACAL PO) Take 2 tablets by mouth Daily.     • carboxymethylcellulose (REFRESH PLUS) 0.5 % solution Administer 1 drop to both eyes Daily As Needed for Dry Eyes.     • clonazePAM (KlonoPIN) 0.5 MG tablet Take 0.5 mg by mouth 2 (Two) Times a Day As Needed for Seizures.     • cyanocobalamin 1000 MCG/ML injection Inject  into the shoulder, thigh, or buttocks Every 28 (Twenty-Eight) Days.  12   • diphenhydrAMINE (BENADRYL) 25 mg capsule Take 25 mg by mouth Every 6 (Six) Hours As Needed.  6   • docusate sodium (COLACE) 100 MG capsule Take 100 mg by mouth 2 (Two) Times a Day. 2 tabs     • fosfomycin (MONUROL) 3 g pack Take as directed     • levothyroxine (SYNTHROID, LEVOTHROID) 50 MCG tablet Take 50 mcg by mouth daily.  1   • metFORMIN (GLUCOPHAGE) 500 MG tablet Take 500 mg by mouth Daily As Needed (For FSBS > 140).  1   • omeprazole (priLOSEC) 20 MG capsule Take 20 mg by mouth Daily.     • ONE TOUCH ULTRA TEST test strip TEST BID  5   • vitamin D (ERGOCALCIFEROL) 76438 UNITS capsule capsule Take 50,000 Units by mouth Every 7 (Seven) Days.  1   • verapamil SR (CALAN-SR) 180 MG CR tablet Take 1 tablet by mouth Every Night. 30 tablet 12     No current facility-administered medications for this visit.          Review of Systems     Constitution: Denies any fatigue, fever or chills    HENT: Denies any headache, hearing impairment,     Eyes: Denies any blurring of vision, or photophobia     Cardivascular - As per history of present illness     Respiratory system- The separate NYHA class II with sleep apnea       Endocrine:   history of hyperlipidemia, diabetes,                      Hypothyrodism       Musculoskeletal:  Severe  debilitating arthritis of the left knee That is post knee replacement    Gastrointestinal: No nausea, vomiting, or melena    Genitourinary: No dysuria or hematuria    Neurological:   No history of  "seizure disorder, stroke, memory problems    Psychiatric/Behavioral:        No history of depression,     Hematological- History of recurrent DVT            OBJECTIVE    /80 (BP Location: Left arm, Patient Position: Sitting, Cuff Size: Adult)  Pulse 112  Ht 180.3 cm (71\")  Wt 104 kg (230 lb 1 oz)  LMP 02/23/1998 (Within Months) Comment: Hysterectomy  SpO2 95%  Breastfeeding? No  BMI 32.09 kg/m2      Physical Exam     Constitutional: is oriented to person, place, and time.     Skin-warm and dry, ezema on both hands    Well developed and nourished in no acute distress      Head: Normocephalic and atraumatic.     Eyes: Pupils are equal, round, and reactive to light.     Neck: Neck supple. No bruit in the carotids,    Cardiovascular: [place] in the fifth intercostal space   Regular rate, and  Rhythm,    S1 greater than S2, no S3 or S4, no gallop     Pulmonary/Chest:   Air  Entry is equal on both sides  No wheezing or crackles,      Abdominal: Soft.  No hepatosplenomegaly,     Musculoskeletal: No kyphoscoliosis, swollen left knee    Neurological: is alert and oriented to person, place, and time.    cranial nerve are intact .   No motor or sensory deficit    Extremities-no edema, no radial femoral delay      Psychiatric: He has a normal mood and affect.                  His behavior is normal.             ECG 12 Lead  Date/Time: 2/23/2018 11:24 AM  Performed by: ACE RIZZO  Authorized by: ACE RIZZO   Comparison: not compared with previous ECG   Rhythm: sinus rhythm  Comments: Sinus rhythm with nonspecific ST-T changes, borderline LVH              A/P    Palpitations started her on Verapamil  mg daily.  Recurrent DVT post surgery on chronic anticoagulation with Eliquis twice a day.    Diabetes on metformin and she is doing okay and follows with Dr. Aquino    Hyperlipidemia mild started on Lipitor she is a diabetic.    Osteoarthritis status post knee replacement doing " okay    Hypothyroidism on Synthroid supplements.            This document has been electronically signed by Des Root MD on February 23, 2018 11:24 AM       EMR Dragon/Transcription disclaimer:   Some of this note may be an electronic transcription/translation of spoken language to printed text. The electronic translation of spoken language may permit erroneous, or at times, nonsensical words or phrases to be inadvertently transcribed; Although I have reviewed the note for such errors, some may still exist.

## 2018-04-13 ENCOUNTER — OFFICE VISIT (OUTPATIENT)
Dept: ORTHOPEDIC SURGERY | Facility: CLINIC | Age: 63
End: 2018-04-13

## 2018-04-13 VITALS — WEIGHT: 234 LBS | HEIGHT: 71 IN | BODY MASS INDEX: 32.76 KG/M2

## 2018-04-13 DIAGNOSIS — M25.562 CHRONIC PAIN OF LEFT KNEE: ICD-10-CM

## 2018-04-13 DIAGNOSIS — G89.29 CHRONIC PAIN OF LEFT KNEE: ICD-10-CM

## 2018-04-13 DIAGNOSIS — M17.11 PRIMARY OSTEOARTHRITIS OF RIGHT KNEE: ICD-10-CM
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
## 2018-04-13 DIAGNOSIS — Z96.652 PRESENCE OF LEFT ARTIFICIAL KNEE JOINT: Primary | ICD-10-CM

## 2018-04-13 DIAGNOSIS — M25.561 CHRONIC PAIN OF RIGHT KNEE: ICD-10-CM

## 2018-04-13 DIAGNOSIS — G89.29 CHRONIC PAIN OF RIGHT KNEE: ICD-10-CM

## 2018-04-13 DIAGNOSIS — M17.12 PRIMARY OSTEOARTHRITIS OF LEFT KNEE: ICD-10-CM

## 2018-04-13 PROBLEM — Z86.718 HISTORY OF DEEP VENOUS THROMBOSIS (DVT) OF DISTAL VEIN OF RIGHT LOWER EXTREMITY: Status: ACTIVE | Noted: 2017-11-15

## 2018-04-13 PROBLEM — I82.4Y1 DVT, LOWER EXTREMITY, PROXIMAL, ACUTE, RIGHT: Status: ACTIVE | Noted: 2017-09-01

## 2018-04-13 PROCEDURE — 99214 OFFICE O/P EST MOD 30 MIN: CPT | Performed by: NURSE PRACTITIONER

## 2018-04-13 RX ORDER — FERROUS SULFATE TAB EC 324 MG (65 MG FE EQUIVALENT) 324 (65 FE) MG
324 TABLET DELAYED RESPONSE ORAL
COMMUNITY

## 2018-04-13 NOTE — PROGRESS NOTES
Mini Andersen is a 63 y.o. female returns for     Chief Complaint   Patient presents with   • Left Knee - Follow-up   • Right Knee - Follow-up   • Results     xray done today in office.        HISTORY OF PRESENT ILLNESS: Patient presents to office for evaluation of bilateral knee pain after sustaining a fall about 2 weeks ago. Patient reports she fell on 3/21/2018 after slipping on a wet ramp coming from her porch. Patient reports she fell backwards, landing on her back and striking her head. She was seen at [facility] for complaints of back, head, right rib and right elbow pain. Patient states she initially didn't notice any knee pain until her right arm and back started to feel better. Patient had a left total knee arthroplasty per Dr. Ballesteros in July 2017 and wants to make sure she didn't injure her knee or prosthesis.      CONCURRENT MEDICAL HISTORY:    Past Medical History:   Diagnosis Date   • Acid reflux    • Acute peritonitis    • Allergic rhinitis    • Arthritis    • Bee sting    • Borderline glaucoma    • Chronic bronchitis    • Constipation     severe with an immotile colon      • Deep venous thrombosis    • High cholesterol    • Hypothyroidism    • Intestinal volvulus    • Muscle atrophy     O/E   • Nuclear senile cataract    • Nuclear senile cataract    • Polyneuropathy in diabetes    • PONV (postoperative nausea and vomiting)    • Type 2 diabetes mellitus     NO BDR   • Type 2 diabetes mellitus without complications    • Unspecified disease of nail        Allergies   Allergen Reactions   • Bextra [Valdecoxib] Shortness Of Breath     Shortness of breath   • Cephalosporins Shortness Of Breath   • Detrol La [Tolterodine Tartrate Er] Shortness Of Breath   • Dicyclomine Shortness Of Breath     Shortness of breath   • Fluoxetine Shortness Of Breath   • Keflex [Cephalexin] Shortness Of Breath   • Toprol Xl [Metoprolol] Anaphylaxis   • Chocolate Flavor    • Omeprazole-Sodium Bicarbonate Unknown  (See Comments)   • Other      Fresh flowers causes throat swelling   • Amoxicillin Rash   • Aspirin Rash   • Ciprofloxin Hcl [Ciprofloxacin] Rash   • Claritin-D 12 Hour [Loratadine-Pseudoephedrine Er] Rash   • Codeine Rash   • Demerol [Meperidine] Rash   • Levaquin [Levofloxacin] Rash   • Lipitor [Atorvastatin] Other (See Comments)     cramping   • Macrobid [Nitrofurantoin] Rash   • Morphine And Related Rash   • Paxil [Paroxetine Hcl] Rash   • Sulfa Antibiotics Rash   • Tape Rash   • Tramadol Rash   • Vioxx [Rofecoxib] Rash   • Zegerid [Omeprazole] Rash   • Zoloft [Sertraline Hcl] Rash         Current Outpatient Prescriptions:   •  apixaban (ELIQUIS) 5 MG tablet tablet, Take 5 mg by mouth 2 (Two) Times a Day., Disp: , Rfl:   •  Calcium Citrate (CITRACAL PO), Take 2 tablets by mouth Daily., Disp: , Rfl:   •  carboxymethylcellulose (REFRESH PLUS) 0.5 % solution, Administer 1 drop to both eyes Daily As Needed for Dry Eyes., Disp: , Rfl:   •  clonazePAM (KlonoPIN) 0.5 MG tablet, Take 0.5 mg by mouth 2 (Two) Times a Day As Needed for Seizures., Disp: , Rfl:   •  cyanocobalamin 1000 MCG/ML injection, Inject  into the shoulder, thigh, or buttocks Every 28 (Twenty-Eight) Days., Disp: , Rfl: 12  •  diphenhydrAMINE (BENADRYL) 25 mg capsule, Take 25 mg by mouth Every 6 (Six) Hours As Needed., Disp: , Rfl: 6  •  ferrous sulfate 324 (65 Fe) MG tablet delayed-release EC tablet, Take 324 mg by mouth Daily With Breakfast., Disp: , Rfl:   •  levothyroxine (SYNTHROID, LEVOTHROID) 50 MCG tablet, Take 50 mcg by mouth daily., Disp: , Rfl: 1  •  metFORMIN (GLUCOPHAGE) 500 MG tablet, Take 500 mg by mouth Daily As Needed (For FSBS > 140)., Disp: , Rfl: 1  •  omeprazole (priLOSEC) 20 MG capsule, Take 20 mg by mouth Daily., Disp: , Rfl:   •  ONE TOUCH ULTRA TEST test strip, TEST BID, Disp: , Rfl: 5  •  verapamil SR (CALAN-SR) 180 MG CR tablet, Take 1 tablet by mouth Every Night., Disp: 30 tablet, Rfl: 12  •  vitamin D (ERGOCALCIFEROL) 01865  "UNITS capsule capsule, Take 50,000 Units by mouth Every 7 (Seven) Days., Disp: , Rfl: 1    Past Surgical History:   Procedure Laterality Date   • APPENDECTOMY     • BLADDER SUSPENSION     • COLECTOMY PARTIAL / TOTAL  05/22/2014    Subtotal colectomy with ineo-rectostomy.   • HERNIA REPAIR      multiple   • HYSTERECTOMY     • INJECTION OF MEDICATION  12/07/2010    DEPO MEDROL   • KNEE ARTHROSCOPY Left    • LAPAROSCOPIC CHOLECYSTECTOMY     • SALPINGO OOPHORECTOMY Left    • SALPINGO OOPHORECTOMY Right    • TOTAL KNEE ARTHROPLASTY Left 7/24/2017    Procedure: TOTAL KNEE ARTHROPLASTY ATTUNE with adductor canal block;  Surgeon: Jose Ballesteros MD;  Location: [facility];  Service:        ROS  No fevers or chills.  No chest pain or shortness of air.  No GI or  disturbances. Right knee pain. Left knee pain.     PHYSICAL EXAMINATION:       Ht 180.3 cm (71\")   Wt 106 kg (234 lb)   LMP 02/23/1998 (Within Months) Comment: Hysterectomy  BMI 32.64 kg/m²     Physical Exam   Constitutional: She is oriented to person, place, and time. Vital signs are normal. She appears well-developed and well-nourished. She is active and cooperative. She does not appear ill. No distress.   HENT:   Head: Normocephalic.   Pulmonary/Chest: Effort normal. No respiratory distress.   Abdominal: Soft. She exhibits no distension.   Musculoskeletal: She exhibits edema (Left knee) and tenderness (Left knee, Right knee). She exhibits no deformity.        Right knee: She exhibits no effusion.        Left knee: She exhibits no effusion.   Neurological: She is alert and oriented to person, place, and time. GCS eye subscore is 4. GCS verbal subscore is 5. GCS motor subscore is 6.   Skin: Skin is warm, dry and intact. Capillary refill takes less than 2 seconds.   Psychiatric: She has a normal mood and affect. Her speech is normal and behavior is normal. Judgment and thought content normal. Cognition and memory are normal.   Vitals reviewed.      GAIT:  "    []  Normal  []  Antalgic    Assistive device: []  None  []  Walker     []  Crutches  []  Cane     []  Wheelchair  []  Stretcher    Right Knee Exam     Tenderness   Right knee tenderness location: Mild, diffuse, posterior knee.    Range of Motion   Extension: 5   Flexion: 120     Other   Erythema: absent  Sensation: normal  Pulse: present  Swelling: mild  Other tests: no effusion present    Comments:  Mild pain with range of motion.       Left Knee Exam     Tenderness   The patient is experiencing tenderness in the lateral joint line.    Range of Motion   Extension: 0   Flexion: 120     Other   Erythema: absent  Sensation: normal  Pulse: present  Swelling: moderate  Effusion: no effusion present    Comments:  Mild pain with range of motion.             Xr Knee 1 Or 2 View Left    Result Date: 4/13/2018  Narrative: Lateral view of the left knee reveals no evidence of fracture or dislocation.  There are postsurgical changes post total knee arthroplasty.  Prostheses are well positioned with no evidence of hardware failure or loosening noted.  No acute radiologic abnormalities are noted at this time.  No changes when compared with prior images from 11/7/2017.04/13/18 at 10:55 AM by EDUARDA Minor     Xr Knee Bilateral Ap Standing    Result Date: 4/13/2018  Narrative: AP standing view of bilateral knees reveals postsurgical changes in the left knee post total knee arthroplasty.  Prostheses are well positioned with no evidence of hardware failure or loosening noted.  There are degenerative changes noted in the right knee with moderate loss of medial compartmental joint spacing noted.  There are small osteophyte formations noted in the medial compartment of the right knee.  No evidence of fracture or dislocation.  No acute radiologic abnormalities are noted at this time.  No significant changes are noted when compared with prior images from 11/7/2017.04/13/18 at 10:56 AM by EDUARDA Minor          ASSESSMENT:    Diagnoses and all orders for this visit:    Presence of left artificial knee joint    Chronic pain of left knee    Primary osteoarthritis of left knee    Chronic pain of right knee    Primary osteoarthritis of right knee    PLAN    Lateral x-ray of left knee and AP standing x-ray of bilateral knees reviewed today.  Patient reports that she sustained a fall on 3/21/2018, initially injuring her right elbow/arm, head, right ribs and back.  Patient was evaluated at [facility] at that time.  Patient has had gradual improvement in her pain but then noticed that she was having increased bilateral knee pain.  Patient reports she was just concerned that she may have injured her right knee or the left knee prosthesis.  Patient is reassured that there is no fracture, dislocation or change in either knee or the left knee prosthesis on x-ray today.  Patient is doing better overall, but continues to have various complaints of joint pain since her fall. Recommend IM injection of Kenalog today for pain.  Recommend ice therapy and elevation intermittently of the knees as needed for pain/swelling.  Recommend Tylenol as needed for pain.  Follow-up in 4 weeks for recheck as needed for any new, worsening or persistent symptoms.    Return in about 4 weeks (around 5/11/2018), or if symptoms worsen or fail to improve, for Recheck.      This document has been electronically signed by EDUARDA Minor on April 13, 2018 11:00 AM      EDUARDA Minor

## 2018-05-01 ENCOUNTER — HOSPITAL ENCOUNTER (OUTPATIENT)
Facility: HOSPITAL | Age: 63
Setting detail: HOSPITAL OUTPATIENT SURGERY
Discharge: HOME | End: 2018-05-01
Attending: INTERNAL MEDICINE | Admitting: INTERNAL MEDICINE

## 2018-05-01 ENCOUNTER — ANESTHESIA (OUTPATIENT)
Dept: GASTROENTEROLOGY | Facility: HOSPITAL | Age: 63
End: 2018-05-01

## 2018-05-01 ENCOUNTER — ANESTHESIA EVENT (OUTPATIENT)
Dept: GASTROENTEROLOGY | Facility: HOSPITAL | Age: 63
End: 2018-05-01

## 2018-05-01 VITALS
DIASTOLIC BLOOD PRESSURE: 63 MMHG | HEART RATE: 71 BPM | SYSTOLIC BLOOD PRESSURE: 113 MMHG | WEIGHT: 229.28 LBS | TEMPERATURE: 96.8 F | BODY MASS INDEX: 31.05 KG/M2 | OXYGEN SATURATION: 96 % | HEIGHT: 72 IN | RESPIRATION RATE: 16 BRPM

## 2018-05-01 DIAGNOSIS — K22.2 ESOPHAGEAL STRICTURE: ICD-10-CM

## 2018-05-01 LAB — GLUCOSE BLDC GLUCOMTR-MCNC: 98 MG/DL (ref 70–130)

## 2018-05-01 PROCEDURE — 88305 TISSUE EXAM BY PATHOLOGIST: CPT | Performed by: PATHOLOGY

## 2018-05-01 PROCEDURE — 88305 TISSUE EXAM BY PATHOLOGIST: CPT | Performed by: INTERNAL MEDICINE

## 2018-05-01 PROCEDURE — 82962 GLUCOSE BLOOD TEST: CPT

## 2018-05-01 PROCEDURE — 25010000002 AZITHROMYCIN PER 500 MG: Performed by: INTERNAL MEDICINE

## 2018-05-01 PROCEDURE — 25010000002 PROPOFOL 10 MG/ML EMULSION: Performed by: NURSE ANESTHETIST, CERTIFIED REGISTERED

## 2018-05-01 RX ORDER — ONDANSETRON 4 MG/1
4 TABLET, FILM COATED ORAL EVERY 8 HOURS PRN
COMMUNITY
End: 2022-05-11 | Stop reason: SDUPTHER

## 2018-05-01 RX ORDER — PROMETHAZINE HYDROCHLORIDE 25 MG/ML
12.5 INJECTION, SOLUTION INTRAMUSCULAR; INTRAVENOUS ONCE AS NEEDED
Status: DISCONTINUED | OUTPATIENT
Start: 2018-05-01 | End: 2018-05-01 | Stop reason: HOSPADM

## 2018-05-01 RX ORDER — DEXTROSE AND SODIUM CHLORIDE 5; .45 G/100ML; G/100ML
20 INJECTION, SOLUTION INTRAVENOUS CONTINUOUS
Status: DISCONTINUED | OUTPATIENT
Start: 2018-05-01 | End: 2018-05-01 | Stop reason: HOSPADM

## 2018-05-01 RX ORDER — PROMETHAZINE HYDROCHLORIDE 25 MG/1
25 TABLET ORAL ONCE AS NEEDED
Status: DISCONTINUED | OUTPATIENT
Start: 2018-05-01 | End: 2018-05-01 | Stop reason: HOSPADM

## 2018-05-01 RX ORDER — PROPOFOL 10 MG/ML
VIAL (ML) INTRAVENOUS AS NEEDED
Status: DISCONTINUED | OUTPATIENT
Start: 2018-05-01 | End: 2018-05-01 | Stop reason: SURG

## 2018-05-01 RX ORDER — LIDOCAINE HYDROCHLORIDE 10 MG/ML
INJECTION, SOLUTION INFILTRATION; PERINEURAL AS NEEDED
Status: DISCONTINUED | OUTPATIENT
Start: 2018-05-01 | End: 2018-05-01 | Stop reason: SURG

## 2018-05-01 RX ORDER — ONDANSETRON 2 MG/ML
4 INJECTION INTRAMUSCULAR; INTRAVENOUS ONCE AS NEEDED
Status: DISCONTINUED | OUTPATIENT
Start: 2018-05-01 | End: 2018-05-01 | Stop reason: HOSPADM

## 2018-05-01 RX ORDER — DEXAMETHASONE SODIUM PHOSPHATE 4 MG/ML
8 INJECTION, SOLUTION INTRA-ARTICULAR; INTRALESIONAL; INTRAMUSCULAR; INTRAVENOUS; SOFT TISSUE ONCE AS NEEDED
Status: DISCONTINUED | OUTPATIENT
Start: 2018-05-01 | End: 2018-05-01 | Stop reason: HOSPADM

## 2018-05-01 RX ORDER — PROMETHAZINE HYDROCHLORIDE 25 MG/1
25 SUPPOSITORY RECTAL ONCE AS NEEDED
Status: DISCONTINUED | OUTPATIENT
Start: 2018-05-01 | End: 2018-05-01 | Stop reason: HOSPADM

## 2018-05-01 RX ORDER — ACETAMINOPHEN 500 MG
1000 TABLET ORAL EVERY 6 HOURS PRN
Status: ON HOLD | COMMUNITY
End: 2020-05-05

## 2018-05-01 RX ADMIN — DEXTROSE AND SODIUM CHLORIDE: 5; 450 INJECTION, SOLUTION INTRAVENOUS at 09:43

## 2018-05-01 RX ADMIN — PROPOFOL 20 MG: 10 INJECTION, EMULSION INTRAVENOUS at 10:06

## 2018-05-01 RX ADMIN — PROPOFOL 100 MG: 10 INJECTION, EMULSION INTRAVENOUS at 10:01

## 2018-05-01 RX ADMIN — LIDOCAINE HYDROCHLORIDE 100 MG: 10 INJECTION, SOLUTION INFILTRATION; PERINEURAL at 10:00

## 2018-05-01 RX ADMIN — PROPOFOL 50 MG: 10 INJECTION, EMULSION INTRAVENOUS at 10:04

## 2018-05-01 RX ADMIN — AZITHROMYCIN 250 MG: 500 INJECTION, POWDER, LYOPHILIZED, FOR SOLUTION INTRAVENOUS at 09:50

## 2018-05-01 NOTE — ANESTHESIA PREPROCEDURE EVALUATION
Anesthesia Evaluation     NPO Solid Status: > 8 hours  NPO Liquid Status: > 8 hours           Airway   Mallampati: III  TM distance: <3 FB  Neck ROM: full  Possible difficult intubation  Dental    (+) lower dentures and upper dentures    Pulmonary - normal exam   Cardiovascular - normal exam  Exercise tolerance: poor (<4 METS)        Neuro/Psych  GI/Hepatic/Renal/Endo      Musculoskeletal     Abdominal   (+) obese,    Substance History      OB/GYN          Other                        Anesthesia Plan    ASA 3     MAC     intravenous induction   Anesthetic plan and risks discussed with patient.

## 2018-05-01 NOTE — ANESTHESIA POSTPROCEDURE EVALUATION
Patient: Mini Andersen    Procedure Summary     Date:  05/01/18 Room / Location:  [facility] ENDOSCOPY 2 / [facility] ENDOSCOPY    Anesthesia Start:  0946 Anesthesia Stop:  1008    Procedure:  ESOPHAGOGASTRODUODENOSCOPY (N/A Esophagus) Diagnosis:       Esophageal stricture      (Esophageal stricture [K22.2])    Surgeon:  Carlos Eduardo Cyr DO Provider:  Ariana Rushing CRNA    Anesthesia Type:  MAC ASA Status:  3          Anesthesia Type: MAC  Last vitals  BP   147/76 (05/01/18 0914)   Temp       Pulse   80 (05/01/18 0914)   Resp   18 (05/01/18 0914)     SpO2   98 % (05/01/18 0914)     Post Anesthesia Care and Evaluation    Patient location during evaluation: bedside  Patient participation: complete - patient participated  Level of consciousness: responsive to verbal stimuli  Pain management: adequate  Airway patency: patent  Anesthetic complications: No anesthetic complications    Cardiovascular status: acceptable  Respiratory status: acceptable  Hydration status: acceptable

## 2018-05-01 NOTE — H&P
Carl Herzog DO,[facility]  Gastroenterology  Hepatology  Endoscopy  Board Certified in Internal Medicine and gastroenterology  [address], [address]  [place]. [address]  - [phone]   F - [phone]     GASTROENTEROLOGY HISTORY AND PHYSICAL  NOTE   CARL HERZOG DO.         SUBJECTIVE:   5/1/2018    Name: Mini Andersen  DOD: 1955        Chief Complaint:       Subjective : Gastroparesis with a thickened stomach on imaging in [place].  Recommended to have this repeated.    Patient is 63 y.o. female presents with desire for elective EGD.      ROS/HISTORY/ CURRENT MEDICATIONS/OBJECTIVE/VS/PE:   Review of Systems:   Review of Systems    History:     Past Medical History:   Diagnosis Date   • Acid reflux    • Acute peritonitis    • Allergic rhinitis    • Arthritis    • Bee sting    • Blood clot in vein, right leg    • Borderline glaucoma    • Chronic bronchitis    • Constipation     severe with an immotile colon      • Deep venous thrombosis    • High cholesterol    • Hypothyroidism    • Intestinal volvulus    • Muscle atrophy     O/E   • Nuclear senile cataract    • Nuclear senile cataract    • Polyneuropathy in diabetes    • PONV (postoperative nausea and vomiting)    • Type 2 diabetes mellitus     NO BDR   • Type 2 diabetes mellitus without complications    • Unspecified disease of nail      Past Surgical History:   Procedure Laterality Date   • APPENDECTOMY     • BLADDER SUSPENSION     • COLECTOMY PARTIAL / TOTAL  05/22/2014    Subtotal colectomy with ineo-rectostomy.   • HERNIA REPAIR      multiple   • HYSTERECTOMY     • INJECTION OF MEDICATION  12/07/2010    DEPO MEDROL   • JOINT REPLACEMENT Left 07/01/2017    knee   • KNEE ARTHROSCOPY Left    • LAPAROSCOPIC CHOLECYSTECTOMY     • SALPINGO OOPHORECTOMY Left    • SALPINGO OOPHORECTOMY Right    • TOTAL KNEE ARTHROPLASTY Left 7/24/2017    Procedure: TOTAL KNEE ARTHROPLASTY ATTUNE with adductor canal block;  Surgeon: Jose Langley  MD Favian;  Location: [facility];  Service:      History reviewed. No pertinent family history.  Social History   Substance Use Topics   • Smoking status: Former Smoker     Packs/day: 1.50     Years: 10.00     Start date: 1970     Quit date: 1980   • Smokeless tobacco: Never Used   • Alcohol use No     Prescriptions Prior to Admission   Medication Sig Dispense Refill Last Dose   • acetaminophen (TYLENOL) 500 MG tablet Take 1,000 mg by mouth Every 6 (Six) Hours As Needed for Mild Pain .   Past Month at Unknown time   • Calcium Citrate (CITRACAL PO) Take 2 tablets by mouth Daily.   Past Week at Unknown time   • carboxymethylcellulose (REFRESH PLUS) 0.5 % solution Administer 1 drop to both eyes Daily As Needed for Dry Eyes.   Past Month at Unknown time   • clonazePAM (KlonoPIN) 0.5 MG tablet Take 0.5 mg by mouth 2 (Two) Times a Day As Needed for Anxiety.   4/30/2018 at Unknown time   • diphenhydrAMINE (BENADRYL) 25 mg capsule Take 25 mg by mouth Every 6 (Six) Hours As Needed.  6 4/30/2018 at Unknown time   • ferrous sulfate 324 (65 Fe) MG tablet delayed-release EC tablet Take 324 mg by mouth Daily With Breakfast.   4/30/2018 at Unknown time   • levothyroxine (SYNTHROID, LEVOTHROID) 50 MCG tablet Take 50 mcg by mouth daily.  1 4/30/2018 at Unknown time   • metFORMIN (GLUCOPHAGE) 500 MG tablet Take 500 mg by mouth Daily As Needed (For FSBS > 140).  1 Past Week at Unknown time   • omeprazole (priLOSEC) 20 MG capsule Take 20 mg by mouth Daily.   4/30/2018 at Unknown time   • ondansetron (ZOFRAN) 4 MG tablet Take 4 mg by mouth Every 8 (Eight) Hours As Needed for Nausea or Vomiting.   4/30/2018 at Unknown time   • ONE TOUCH ULTRA TEST test strip TEST BID  5 4/30/2018 at Unknown time   • verapamil SR (CALAN-SR) 180 MG CR tablet Take 1 tablet by mouth Every Night. 30 tablet 12 4/30/2018 at Unknown time   • apixaban (ELIQUIS) 5 MG tablet tablet Take 5 mg by mouth 2 (Two) Times a Day.   4/27/2018   • cyanocobalamin 1000 MCG/ML  injection Inject  into the shoulder, thigh, or buttocks Every 28 (Twenty-Eight) Days.  12 More than a month at Unknown time   • vitamin D (ERGOCALCIFEROL) 74443 UNITS capsule capsule Take 50,000 Units by mouth Every 7 (Seven) Days.  1 4/27/2018     Allergies:  Bextra [valdecoxib]; Cephalosporins; Detrol la [tolterodine tartrate er]; Dicyclomine; Fluoxetine; Keflex [cephalexin]; Toprol xl [metoprolol]; Chocolate flavor; Omeprazole-sodium bicarbonate; Other; Amoxicillin; Aspirin; Ciprofloxin hcl [ciprofloxacin]; Claritin-d 12 hour [loratadine-pseudoephedrine er]; Codeine; Demerol [meperidine]; Levaquin [levofloxacin]; Lipitor [atorvastatin]; Macrobid [nitrofurantoin]; Morphine and related; Paxil [paroxetine hcl]; Sulfa antibiotics; Tape; Tramadol; Vioxx [rofecoxib]; Zegerid [omeprazole]; and Zoloft [sertraline hcl]    I have reviewed the patients medical history, surgical history and family history in the available medical record system.     Current Medications:     Current Facility-Administered Medications   Medication Dose Route Frequency Provider Last Rate Last Dose   • azithromycin (ZITHROMAX) 250 mg in sodium chloride 0.9 % 100 mL IVPB  250 mg Intravenous Once Carlos Eduardo Cyr DO       • dextrose 5 % and sodium chloride 0.45 % infusion  20 mL/hr Intravenous Continuous Carlos Eduardo Cyr DO           Objective     Physical Exam:   Heart Rate:  [80] 80  Resp:  [18] 18  BP: (147)/(76) 147/76    Physical Exam:  General Appearance:    Alert, cooperative, in no acute distress   Head:    Normocephalic, without obvious abnormality, atraumatic   Eyes:            Lids and lashes normal, conjunctivae and sclerae normal, no   icterus, no pallor, corneas clear, PERRLA   Ears:    Ears appear intact with no abnormalities noted   Throat:   No oral lesions, no thrush, oral mucosa moist   Neck:   No adenopathy, supple, trachea midline, no thyromegaly, no     carotid bruit, no JVD   Back:     No kyphosis present, no scoliosis  present, no skin lesions,       erythema or scars, no tenderness to percussion or                   palpation,   range of motion normal   Lungs:     Clear to auscultation,respirations regular, even and                   unlabored    Heart:    Regular rhythm and normal rate, normal S1 and S2, no            murmur, no gallop, no rub, no click   Breast Exam:    Deferred   Abdomen:     Normal bowel sounds, no masses, no organomegaly, soft        non-tender, non-distended, no guarding, no rebound                 tenderness   Genitalia:    Deferred   Extremities:   Moves all extremities well, no edema, no cyanosis, no              redness   Pulses:   Pulses palpable and equal bilaterally   Skin:   No bleeding, bruising or rash   Lymph nodes:   No palpable adenopathy   Neurologic:   Cranial nerves 2 - 12 grossly intact, sensation intact, DTR        present and equal bilaterally      Results Review:     Lab Results   Component Value Date    WBC 6.36 09/18/2017    WBC 5.62 09/16/2017    WBC 5.97 07/12/2017    HGB 10.0 (L) 09/18/2017    HGB 10.7 (L) 09/16/2017    HGB 10.5 (L) 07/24/2017    HCT 31.4 (L) 09/18/2017    HCT 34.6 (L) 09/16/2017    HCT 32.5 (L) 07/24/2017     09/18/2017     09/16/2017     07/12/2017             No results found for: LIPASE  Lab Results   Component Value Date    INR 1.03 09/18/2017    INR 0.97 09/16/2017    INR 1.80 (H) 08/30/2017          Radiology Review:  Imaging Results (last 72 hours)     ** No results found for the last 72 hours. **           I reviewed the patient's new clinical results.  I reviewed the patient's new imaging results and agree with the interpretation.     ASSESSMENT/PLAN:   ASSESSMENT:   1.  Abnormal thickening of the stomach on previous endoscopy  2.  Gastroparesis    PLAN:   1.  Esophagogastroduodenoscopy with biopsies    Risk and benefits associated with the procedure are reviewed with the patient.  The patient wishes to proceed      Carlos Eduardo Cyr    05/01/18  9:48 AM

## 2018-05-02 LAB
LAB AP CASE REPORT: NORMAL
Lab: NORMAL
PATH REPORT.FINAL DX SPEC: NORMAL
PATH REPORT.GROSS SPEC: NORMAL

## 2018-06-12 ENCOUNTER — LAB (OUTPATIENT)
Dept: LAB | Facility: HOSPITAL | Age: 63
End: 2018-06-12

## 2018-06-12 ENCOUNTER — OFFICE VISIT (OUTPATIENT)
Dept: ORTHOPEDIC SURGERY | Facility: CLINIC | Age: 63
End: 2018-06-12

## 2018-06-12 DIAGNOSIS — M25.062 HEMARTHROSIS OF LEFT KNEE: ICD-10-CM

## 2018-06-12 DIAGNOSIS — M25.562 CHRONIC PAIN OF LEFT KNEE: ICD-10-CM

## 2018-06-12 DIAGNOSIS — Z96.652 PRESENCE OF LEFT ARTIFICIAL KNEE JOINT: Primary | ICD-10-CM

## 2018-06-12 DIAGNOSIS — Z96.652 PRESENCE OF LEFT ARTIFICIAL KNEE JOINT: ICD-10-CM

## 2018-06-12 DIAGNOSIS — M17.12 PRIMARY OSTEOARTHRITIS OF LEFT KNEE: ICD-10-CM

## 2018-06-12 DIAGNOSIS — G89.29 CHRONIC PAIN OF LEFT KNEE: ICD-10-CM

## 2018-06-12 LAB
BASOPHILS # BLD AUTO: 0.01 10*3/MM3 (ref 0–0.2)
BASOPHILS NFR BLD AUTO: 0.2 % (ref 0–2)
CRP SERPL-MCNC: 1.7 MG/DL (ref 0–1)
DEPRECATED RDW RBC AUTO: 47.2 FL (ref 36.4–46.3)
EOSINOPHIL # BLD AUTO: 0.17 10*3/MM3 (ref 0–0.7)
EOSINOPHIL NFR BLD AUTO: 3.4 % (ref 0–7)
ERYTHROCYTE [DISTWIDTH] IN BLOOD BY AUTOMATED COUNT: 14.9 % (ref 11.5–14.5)
ERYTHROCYTE [SEDIMENTATION RATE] IN BLOOD: 38 MM/HR (ref 0–20)
HCT VFR BLD AUTO: 40.4 % (ref 35–45)
HGB BLD-MCNC: 12.6 G/DL (ref 12–15.5)
IMM GRANULOCYTES # BLD: 0.01 10*3/MM3 (ref 0–0.02)
IMM GRANULOCYTES NFR BLD: 0.2 % (ref 0–0.5)
LYMPHOCYTES # BLD AUTO: 1.42 10*3/MM3 (ref 0.6–4.2)
LYMPHOCYTES NFR BLD AUTO: 28.5 % (ref 10–50)
MCH RBC QN AUTO: 27 PG (ref 26.5–34)
MCHC RBC AUTO-ENTMCNC: 31.2 G/DL (ref 31.4–36)
MCV RBC AUTO: 86.7 FL (ref 80–98)
MONOCYTES # BLD AUTO: 0.37 10*3/MM3 (ref 0–0.9)
MONOCYTES NFR BLD AUTO: 7.4 % (ref 0–12)
NEUTROPHILS # BLD AUTO: 3.01 10*3/MM3 (ref 2–8.6)
NEUTROPHILS NFR BLD AUTO: 60.3 % (ref 37–80)
NRBC BLD MANUAL-RTO: 0 /100 WBC (ref 0–0)
PLATELET # BLD AUTO: 198 10*3/MM3 (ref 150–450)
PMV BLD AUTO: 10.1 FL (ref 8–12)
RBC # BLD AUTO: 4.66 10*6/MM3 (ref 3.77–5.16)
WBC NRBC COR # BLD: 4.99 10*3/MM3 (ref 3.2–9.8)

## 2018-06-12 PROCEDURE — 99214 OFFICE O/P EST MOD 30 MIN: CPT | Performed by: NURSE PRACTITIONER

## 2018-06-12 PROCEDURE — 86140 C-REACTIVE PROTEIN: CPT

## 2018-06-12 PROCEDURE — 36415 COLL VENOUS BLD VENIPUNCTURE: CPT

## 2018-06-12 PROCEDURE — 85651 RBC SED RATE NONAUTOMATED: CPT

## 2018-06-12 PROCEDURE — 20610 DRAIN/INJ JOINT/BURSA W/O US: CPT | Performed by: NURSE PRACTITIONER

## 2018-06-12 PROCEDURE — 85025 COMPLETE CBC W/AUTO DIFF WBC: CPT

## 2018-06-12 RX ORDER — HYDROCODONE BITARTRATE AND ACETAMINOPHEN 5; 325 MG/1; MG/1
1 TABLET ORAL EVERY 8 HOURS PRN
Qty: 30 TABLET | Refills: 0 | Status: SHIPPED | OUTPATIENT
Start: 2018-06-12 | End: 2018-06-22

## 2018-06-12 RX ADMIN — LIDOCAINE HYDROCHLORIDE 2 ML: 10 INJECTION, SOLUTION EPIDURAL; INFILTRATION; INTRACAUDAL; PERINEURAL at 08:28

## 2018-06-12 NOTE — PROGRESS NOTES
Mini Andersen is a 63 y.o. female returns for     Chief Complaint   Patient presents with   • Left Knee - Follow-up     Xray today       HISTORY OF PRESENT ILLNESS: Patient presents to office for evaluation of left knee pain and swelling. Sudden onset of symptoms yesterday. Denies any known injury. Patient underwent a left total knee arthroplasty on 7/24/2017 per Dr. Ballesteros and has done well postoperatively. Patient is having difficulty with range of motion in the left knee. Pain level 9/10 today. Pain is described as constant and severe. Pain is described as stabbing in nature with associated popping sensations and swelling of the left knee. Pain is worse with weight-bearing, standing, walking and sitting. Pain improves mildly with rest and pain medication. X-rays repeated today.     CONCURRENT MEDICAL HISTORY:    Past Medical History:   Diagnosis Date   • Acid reflux    • Acute peritonitis    • Allergic rhinitis    • Arthritis    • Bee sting    • Blood clot in vein, right leg    • Borderline glaucoma    • Chronic bronchitis    • Constipation     severe with an immotile colon      • Deep venous thrombosis    • High cholesterol    • Hypothyroidism    • Intestinal volvulus    • Muscle atrophy     O/E   • Nuclear senile cataract    • Nuclear senile cataract    • Polyneuropathy in diabetes    • PONV (postoperative nausea and vomiting)    • Type 2 diabetes mellitus     NO BDR   • Type 2 diabetes mellitus without complications    • Unspecified disease of nail        Allergies   Allergen Reactions   • Bextra [Valdecoxib] Shortness Of Breath     Shortness of breath   • Cephalosporins Shortness Of Breath   • Detrol La [Tolterodine Tartrate Er] Shortness Of Breath   • Dicyclomine Shortness Of Breath     Shortness of breath   • Fluoxetine Shortness Of Breath   • Keflex [Cephalexin] Shortness Of Breath   • Toprol Xl [Metoprolol] Anaphylaxis   • Chocolate Flavor    • Omeprazole-Sodium Bicarbonate Unknown (See Comments)   •  Other      Fresh flowers causes throat swelling   • Amoxicillin Rash   • Aspirin Rash   • Ciprofloxin Hcl [Ciprofloxacin] Rash   • Claritin-D 12 Hour [Loratadine-Pseudoephedrine Er] Rash   • Codeine Rash   • Demerol [Meperidine] Rash   • Levaquin [Levofloxacin] Rash   • Lipitor [Atorvastatin] Other (See Comments)     cramping   • Macrobid [Nitrofurantoin] Rash   • Morphine And Related Rash   • Paxil [Paroxetine Hcl] Rash   • Sulfa Antibiotics Rash   • Tape Rash   • Tramadol Rash   • Vioxx [Rofecoxib] Rash   • Zegerid [Omeprazole] Rash   • Zoloft [Sertraline Hcl] Rash         Current Outpatient Prescriptions:   •  acetaminophen (TYLENOL) 500 MG tablet, Take 1,000 mg by mouth Every 6 (Six) Hours As Needed for Mild Pain ., Disp: , Rfl:   •  apixaban (ELIQUIS) 5 MG tablet tablet, Take 5 mg by mouth 2 (Two) Times a Day., Disp: , Rfl:   •  Calcium Citrate (CITRACAL PO), Take 2 tablets by mouth Daily., Disp: , Rfl:   •  carboxymethylcellulose (REFRESH PLUS) 0.5 % solution, Administer 1 drop to both eyes Daily As Needed for Dry Eyes., Disp: , Rfl:   •  clonazePAM (KlonoPIN) 0.5 MG tablet, Take 0.5 mg by mouth 2 (Two) Times a Day As Needed for Anxiety., Disp: , Rfl:   •  cyanocobalamin 1000 MCG/ML injection, Inject  into the shoulder, thigh, or buttocks Every 28 (Twenty-Eight) Days., Disp: , Rfl: 12  •  diphenhydrAMINE (BENADRYL) 25 mg capsule, Take 25 mg by mouth Every 6 (Six) Hours As Needed., Disp: , Rfl: 6  •  ferrous sulfate 324 (65 Fe) MG tablet delayed-release EC tablet, Take 324 mg by mouth Daily With Breakfast., Disp: , Rfl:   •  levothyroxine (SYNTHROID, LEVOTHROID) 50 MCG tablet, Take 50 mcg by mouth daily., Disp: , Rfl: 1  •  metFORMIN (GLUCOPHAGE) 500 MG tablet, Take 500 mg by mouth Daily As Needed (For FSBS > 140)., Disp: , Rfl: 1  •  omeprazole (priLOSEC) 20 MG capsule, Take 20 mg by mouth Daily., Disp: , Rfl:   •  ondansetron (ZOFRAN) 4 MG tablet, Take 4 mg by mouth Every 8 (Eight) Hours As Needed for Nausea  or Vomiting., Disp: , Rfl:   •  ONE TOUCH ULTRA TEST test strip, TEST BID, Disp: , Rfl: 5  •  verapamil SR (CALAN-SR) 180 MG CR tablet, Take 1 tablet by mouth Every Night., Disp: 30 tablet, Rfl: 12  •  vitamin D (ERGOCALCIFEROL) 09765 UNITS capsule capsule, Take 50,000 Units by mouth Every 7 (Seven) Days., Disp: , Rfl: 1  •  HYDROcodone-acetaminophen (NORCO) 5-325 MG per tablet, Take 1 tablet by mouth Every 8 (Eight) Hours As Needed (PRN) for up to 10 days., Disp: 30 tablet, Rfl: 0    Past Surgical History:   Procedure Laterality Date   • APPENDECTOMY     • BLADDER SUSPENSION     • COLECTOMY PARTIAL / TOTAL  05/22/2014    Subtotal colectomy with ineo-rectostomy.   • ENDOSCOPY N/A 5/1/2018    Procedure: ESOPHAGOGASTRODUODENOSCOPY;  Surgeon: Carlso Eduardo Cyr DO;  Location: [facility] ENDOSCOPY;  Service: Gastroenterology   • HERNIA REPAIR      multiple   • HYSTERECTOMY     • INJECTION OF MEDICATION  12/07/2010    DEPO MEDROL   • JOINT REPLACEMENT Left 07/01/2017    knee   • KNEE ARTHROSCOPY Left    • LAPAROSCOPIC CHOLECYSTECTOMY     • SALPINGO OOPHORECTOMY Left    • SALPINGO OOPHORECTOMY Right    • TOTAL KNEE ARTHROPLASTY Left 7/24/2017    Procedure: TOTAL KNEE ARTHROPLASTY ATTUNE with adductor canal block;  Surgeon: Jose Ballesteros MD;  Location: [facility] OR;  Service:        ROS  No fevers or chills.  No chest pain or shortness of air.  No GI or  disturbances. Left knee pain/swelling.     PHYSICAL EXAMINATION:       LMP 02/23/1998 (Within Months) Comment: Hysterectomy    Physical Exam   Constitutional: She is oriented to person, place, and time. Vital signs are normal. She appears well-developed and well-nourished. She is active and cooperative. She does not appear ill. No distress.   HENT:   Head: Normocephalic.   Pulmonary/Chest: Effort normal. No respiratory distress.   Abdominal: Soft. She exhibits no distension.   Musculoskeletal: She exhibits edema (Left knee) and tenderness (Left knee). She exhibits no  deformity.        Right knee: She exhibits no effusion.        Left knee: She exhibits effusion.   Neurological: She is alert and oriented to person, place, and time. GCS eye subscore is 4. GCS verbal subscore is 5. GCS motor subscore is 6.   Skin: Skin is warm, dry and intact. Capillary refill takes less than 2 seconds. No erythema.   Psychiatric: She has a normal mood and affect. Her speech is normal and behavior is normal. Judgment and thought content normal. Cognition and memory are normal.   Vitals reviewed.      GAIT:     []  Normal  [x]  Antalgic    Assistive device: []  None  []  Walker     []  Crutches  [x]  Cane     []  Wheelchair  []  Stretcher    Right Knee Exam     Tenderness   The patient is experiencing no tenderness.         Range of Motion   Extension: 0   Flexion: 120     Tests   Viktoria:  Medial - negative Lateral - negative  Varus: negative  Valgus: negative    Other   Erythema: absent  Sensation: normal  Pulse: present  Swelling: none  Other tests: no effusion present      Left Knee Exam     Tenderness   Left knee tenderness location: Diffuse.    Range of Motion   Extension: 5   Flexion: 80     Other   Erythema: absent  Left knee scars: well-healed surgical scar post arthroplasty.  Sensation: normal  Pulse: present  Swelling: moderate  Effusion: effusion present    Comments:  Pain and limitations with range of motion. Swelling/effusion present. No erythema. No warmth. No ecchymosis. Skin is intact.             Xr Knee 1 Or 2 View Left    Result Date: 6/12/2018  Narrative: Lateral view of the left knee reveals no evidence of acute fracture or dislocation.  Postsurgical changes are noted post total knee arthroplasty.  Prostheses are well positioned with no evidence of hardware failure or loosening noted.  No significant changes are noted when compared with prior images from 4/13/2018.  No acute radiologic abnormalities are noted at this time.06/12/18 at 4:40 PM by EDUARDA Minor     Xr Knee  Bilateral Ap Standing    Result Date: 6/12/2018  Narrative: AP standing view of bilateral knees reveals postsurgical changes in the left knee post total knee arthroplasty.  Prostheses are well positioned with no evidence of hardware failure or loosening noted.  There are degenerative changes present in the right knee with moderate loss of medial compartmental joint spacing and small osteophyte formations noted in the medial compartment.  No evidence of acute fracture or dislocation.  No significant changes are noted when compared with prior images from 4/13/2018.  No acute radiologic abnormalities are noted at this time.06/12/18 at 4:42 PM by EDUARDA Minor       Large Joint Arthrocentesis  Date/Time: 6/12/2018 8:28 AM  Consent given by: patient  Site marked: site marked  Timeout: Immediately prior to procedure a time out was called to verify the correct patient, procedure, equipment, support staff and site/side marked as required   Supporting Documentation  Indications: joint swelling   Procedure Details  Location: knee - L knee  Preparation: Patient was prepped and draped in the usual sterile fashion  Needle size: 18 G  Approach: lateral  Medications administered: 2 mL lidocaine PF 1% 1 %  Aspirate amount: 15 mL  Aspirate: bloody  Patient tolerance: patient tolerated the procedure well with no immediate complications            ASSESSMENT:    Diagnoses and all orders for this visit:    Presence of left artificial knee joint  -     XR Knee 1 or 2 View Left  -     XR Knee Bilateral AP Standing  -     Large Joint Arthrocentesis  -     CBC & Differential; Future  -     C-reactive Protein; Future  -     Sedimentation Rate; Future  -     NM Bone Scan 3 Phase; Future    Chronic pain of left knee  -     XR Knee 1 or 2 View Left  -     XR Knee Bilateral AP Standing  -     Large Joint Arthrocentesis  -     CBC & Differential; Future  -     C-reactive Protein; Future  -     Sedimentation Rate; Future  -     NM Bone Scan  3 Phase; Future    Primary osteoarthritis of left knee  -     XR Knee 1 or 2 View Left  -     XR Knee Bilateral AP Standing  -     Large Joint Arthrocentesis  -     NM Bone Scan 3 Phase; Future    Hemarthrosis of left knee  -     CBC & Differential; Future  -     C-reactive Protein; Future  -     Sedimentation Rate; Future  -     NM Bone Scan 3 Phase; Future    Other orders  -     HYDROcodone-acetaminophen (NORCO) 5-325 MG per tablet; Take 1 tablet by mouth Every 8 (Eight) Hours As Needed (PRN) for up to 10 days.    PLAN    AP standing x-ray of bilateral knees with a lateral view of the left knee are reviewed today.  Patient had a sudden onset of severe left knee pain with swelling and difficulty with range of motion.  Denies any known injury.  Aspiration today returned 15 cc of winston blood.  Patient is noted to be anticoagulated with Eliquis.  Aspiration contents are also sent off for Synovasure evaluation.  Recommend serum labs today to evaluate inflammatory markers for any signs of infection.  Also recommend bone scan to evaluate the left knee prosthetic implant for loosening, surrounding stress fracture and/or signs of infection.  Recommend elevation of the left knee above the heart to minimize swelling.  Recommend ice therapy to left knee intermittently 3-4 times daily for 20 minutes at a time to minimize pain/swelling.  Recommend rest and modified weight-bearing with use of a cane as tolerated and based on her pain.  Patient is unable to take oral NSAIDs due to her current use of Eliquis.  Recommend Norco as needed for moderate to severe pain.  Follow-up after bone scan.    This patient has tried and failed a course of multiple treatment modalities which include but are not limited to the use of acetaminophen, physical therapy/home exercises, rest, ice, elevation and modified weight-bearing. Therefore, I will recommend a course of narcotic pain medication for this patient until their pain has been sufficiently  reduced to a level that I deem acceptable to be treated with alternative treatment options. [facility] reviewed # [id number].     Return for after bone scan.      This document has been electronically signed by EDUARDA Minor on June 13, 2018 2:00 PM      EDUARDA Minor

## 2018-06-13 RX ORDER — LIDOCAINE HYDROCHLORIDE 10 MG/ML
2 INJECTION, SOLUTION EPIDURAL; INFILTRATION; INTRACAUDAL; PERINEURAL
Status: COMPLETED | OUTPATIENT
Start: 2018-06-12 | End: 2018-06-12

## 2018-06-21 ENCOUNTER — HOSPITAL ENCOUNTER (OUTPATIENT)
Dept: NUCLEAR MEDICINE | Facility: HOSPITAL | Age: 63
Discharge: HOME | End: 2018-06-21

## 2018-06-21 DIAGNOSIS — M25.062 HEMARTHROSIS OF LEFT KNEE: ICD-10-CM

## 2018-06-21 DIAGNOSIS — M17.12 PRIMARY OSTEOARTHRITIS OF LEFT KNEE: ICD-10-CM

## 2018-06-21 DIAGNOSIS — Z96.652 PRESENCE OF LEFT ARTIFICIAL KNEE JOINT: ICD-10-CM

## 2018-06-21 DIAGNOSIS — M25.562 CHRONIC PAIN OF LEFT KNEE: ICD-10-CM

## 2018-06-21 DIAGNOSIS — G89.29 CHRONIC PAIN OF LEFT KNEE: ICD-10-CM

## 2018-06-21 PROCEDURE — A9503 TC99M MEDRONATE: HCPCS | Performed by: NURSE PRACTITIONER

## 2018-06-21 PROCEDURE — 78315 BONE IMAGING 3 PHASE: CPT

## 2018-06-21 PROCEDURE — 0 TECHNETIUM MEDRONATE KIT: Performed by: NURSE PRACTITIONER

## 2018-06-21 RX ORDER — TC 99M MEDRONATE 20 MG/10ML
25.8 INJECTION, POWDER, LYOPHILIZED, FOR SOLUTION INTRAVENOUS
Status: COMPLETED | OUTPATIENT
Start: 2018-06-21 | End: 2018-06-21

## 2018-06-21 RX ADMIN — Medication 25.8 MILLICURIE: at 10:14

## 2018-06-26 ENCOUNTER — LAB (OUTPATIENT)
Dept: LAB | Facility: HOSPITAL | Age: 63
End: 2018-06-26

## 2018-06-26 ENCOUNTER — OFFICE VISIT (OUTPATIENT)
Dept: ORTHOPEDIC SURGERY | Facility: CLINIC | Age: 63
End: 2018-06-26

## 2018-06-26 VITALS — BODY MASS INDEX: 31.83 KG/M2 | WEIGHT: 235 LBS | HEIGHT: 72 IN

## 2018-06-26 DIAGNOSIS — Z96.652 PRESENCE OF LEFT ARTIFICIAL KNEE JOINT: Primary | ICD-10-CM

## 2018-06-26 DIAGNOSIS — G89.29 CHRONIC PAIN OF LEFT KNEE: ICD-10-CM

## 2018-06-26 DIAGNOSIS — M25.562 CHRONIC PAIN OF LEFT KNEE: ICD-10-CM

## 2018-06-26 DIAGNOSIS — M17.12 PRIMARY OSTEOARTHRITIS OF LEFT KNEE: ICD-10-CM

## 2018-06-26 DIAGNOSIS — M25.062 HEMARTHROSIS OF LEFT KNEE: ICD-10-CM

## 2018-06-26 DIAGNOSIS — Z96.652 PRESENCE OF LEFT ARTIFICIAL KNEE JOINT: ICD-10-CM

## 2018-06-26 LAB
BASOPHILS # BLD AUTO: 0.02 10*3/MM3 (ref 0–0.2)
BASOPHILS NFR BLD AUTO: 0.4 % (ref 0–2)
CRP SERPL-MCNC: 4 MG/DL (ref 0–1)
DEPRECATED RDW RBC AUTO: 46.4 FL (ref 36.4–46.3)
EOSINOPHIL # BLD AUTO: 0.13 10*3/MM3 (ref 0–0.7)
EOSINOPHIL NFR BLD AUTO: 2.3 % (ref 0–7)
ERYTHROCYTE [DISTWIDTH] IN BLOOD BY AUTOMATED COUNT: 14.7 % (ref 11.5–14.5)
ERYTHROCYTE [SEDIMENTATION RATE] IN BLOOD: 50 MM/HR (ref 0–20)
HCT VFR BLD AUTO: 36.2 % (ref 35–45)
HGB BLD-MCNC: 11.3 G/DL (ref 12–15.5)
IMM GRANULOCYTES # BLD: 0.01 10*3/MM3 (ref 0–0.02)
IMM GRANULOCYTES NFR BLD: 0.2 % (ref 0–0.5)
LYMPHOCYTES # BLD AUTO: 1.39 10*3/MM3 (ref 0.6–4.2)
LYMPHOCYTES NFR BLD AUTO: 24.7 % (ref 10–50)
MCH RBC QN AUTO: 27 PG (ref 26.5–34)
MCHC RBC AUTO-ENTMCNC: 31.2 G/DL (ref 31.4–36)
MCV RBC AUTO: 86.6 FL (ref 80–98)
MONOCYTES # BLD AUTO: 0.4 10*3/MM3 (ref 0–0.9)
MONOCYTES NFR BLD AUTO: 7.1 % (ref 0–12)
NEUTROPHILS # BLD AUTO: 3.67 10*3/MM3 (ref 2–8.6)
NEUTROPHILS NFR BLD AUTO: 65.3 % (ref 37–80)
NRBC BLD MANUAL-RTO: 0 /100 WBC (ref 0–0)
PLATELET # BLD AUTO: 201 10*3/MM3 (ref 150–450)
PMV BLD AUTO: 9.4 FL (ref 8–12)
RBC # BLD AUTO: 4.18 10*6/MM3 (ref 3.77–5.16)
WBC NRBC COR # BLD: 5.62 10*3/MM3 (ref 3.2–9.8)

## 2018-06-26 PROCEDURE — 86140 C-REACTIVE PROTEIN: CPT

## 2018-06-26 PROCEDURE — 36415 COLL VENOUS BLD VENIPUNCTURE: CPT

## 2018-06-26 PROCEDURE — 99214 OFFICE O/P EST MOD 30 MIN: CPT | Performed by: NURSE PRACTITIONER

## 2018-06-26 PROCEDURE — 85025 COMPLETE CBC W/AUTO DIFF WBC: CPT

## 2018-06-26 PROCEDURE — 85651 RBC SED RATE NONAUTOMATED: CPT

## 2018-06-26 RX ORDER — LOSARTAN POTASSIUM 25 MG/1
25 TABLET ORAL DAILY
COMMUNITY

## 2018-06-26 RX ORDER — HYDROCODONE BITARTRATE AND ACETAMINOPHEN 5; 325 MG/1; MG/1
1 TABLET ORAL EVERY 8 HOURS PRN
Qty: 40 TABLET | Refills: 0 | Status: SHIPPED | OUTPATIENT
Start: 2018-06-26 | End: 2018-07-06

## 2018-06-26 NOTE — PROGRESS NOTES
Mini Andersen is a 63 y.o. female returns for     Chief Complaint   Patient presents with   • Left Knee - Follow-up   • Results     Bone Scan       HISTORY OF PRESENT ILLNESS: Patient presents to office for follow up of left knee pain/swelling and bone scan results. Patient had a left total knee arthroplasty on 7/24/2017 per Dr. Ballesteros with a normal postoperative recovery. Patient does report she has always had some mild pain since the surgery to the lateral aspect of her knee. Patient had sudden onset of worsening pain and swelling on 6/11/2018 with no known injury/incident. She was having difficulty ambulating at that time. Patient had aspiration of her knee on 6/12/2018 in office. Pain is slightly improved from last visit. She has been taking Norco on occasion for pain. She is ambulatory in office today with no cane, which is improved from last exam. She continues to have increased pain with weight-bearing, standing, walking and getting up from a sitting position.      CONCURRENT MEDICAL HISTORY:    Past Medical History:   Diagnosis Date   • Acid reflux    • Acute peritonitis    • Allergic rhinitis    • Arthritis    • Bee sting    • Blood clot in vein, right leg    • Borderline glaucoma    • Chronic bronchitis    • Constipation     severe with an immotile colon      • Deep venous thrombosis    • High cholesterol    • Hypothyroidism    • Intestinal volvulus    • Muscle atrophy     O/E   • Nuclear senile cataract    • Nuclear senile cataract    • Polyneuropathy in diabetes    • PONV (postoperative nausea and vomiting)    • Type 2 diabetes mellitus     NO BDR   • Type 2 diabetes mellitus without complications    • Unspecified disease of nail        Allergies   Allergen Reactions   • Bextra [Valdecoxib] Shortness Of Breath     Shortness of breath   • Cephalosporins Shortness Of Breath   • Detrol La [Tolterodine Tartrate Er] Shortness Of Breath   • Dicyclomine Shortness Of Breath     Shortness of breath   •  Fluoxetine Shortness Of Breath   • Keflex [Cephalexin] Shortness Of Breath   • Toprol Xl [Metoprolol] Anaphylaxis   • Chocolate Flavor    • Omeprazole-Sodium Bicarbonate Unknown (See Comments)   • Other      Fresh flowers causes throat swelling   • Amoxicillin Rash   • Aspirin Rash   • Ciprofloxin Hcl [Ciprofloxacin] Rash   • Claritin-D 12 Hour [Loratadine-Pseudoephedrine Er] Rash   • Codeine Rash   • Demerol [Meperidine] Rash   • Levaquin [Levofloxacin] Rash   • Lipitor [Atorvastatin] Other (See Comments)     cramping   • Macrobid [Nitrofurantoin] Rash   • Morphine And Related Rash   • Paxil [Paroxetine Hcl] Rash   • Sulfa Antibiotics Rash   • Tape Rash   • Tramadol Rash   • Vioxx [Rofecoxib] Rash   • Zegerid [Omeprazole] Rash   • Zoloft [Sertraline Hcl] Rash         Current Outpatient Prescriptions:   •  acetaminophen (TYLENOL) 500 MG tablet, Take 1,000 mg by mouth Every 6 (Six) Hours As Needed for Mild Pain ., Disp: , Rfl:   •  apixaban (ELIQUIS) 5 MG tablet tablet, Take 5 mg by mouth 2 (Two) Times a Day., Disp: , Rfl:   •  Calcium Citrate (CITRACAL PO), Take 2 tablets by mouth Daily., Disp: , Rfl:   •  carboxymethylcellulose (REFRESH PLUS) 0.5 % solution, Administer 1 drop to both eyes Daily As Needed for Dry Eyes., Disp: , Rfl:   •  clonazePAM (KlonoPIN) 0.5 MG tablet, Take 0.5 mg by mouth 2 (Two) Times a Day As Needed for Anxiety., Disp: , Rfl:   •  cyanocobalamin 1000 MCG/ML injection, Inject  into the shoulder, thigh, or buttocks Every 28 (Twenty-Eight) Days., Disp: , Rfl: 12  •  diphenhydrAMINE (BENADRYL) 25 mg capsule, Take 25 mg by mouth Every 6 (Six) Hours As Needed., Disp: , Rfl: 6  •  ferrous sulfate 324 (65 Fe) MG tablet delayed-release EC tablet, Take 324 mg by mouth Daily With Breakfast., Disp: , Rfl:   •  levothyroxine (SYNTHROID, LEVOTHROID) 50 MCG tablet, Take 50 mcg by mouth daily., Disp: , Rfl: 1  •  losartan (COZAAR) 25 MG tablet, Take 25 mg by mouth Daily., Disp: , Rfl:   •  metFORMIN  "(GLUCOPHAGE) 500 MG tablet, Take 500 mg by mouth Daily As Needed (For FSBS > 140)., Disp: , Rfl: 1  •  omeprazole (priLOSEC) 20 MG capsule, Take 20 mg by mouth Daily., Disp: , Rfl:   •  ondansetron (ZOFRAN) 4 MG tablet, Take 4 mg by mouth Every 8 (Eight) Hours As Needed for Nausea or Vomiting., Disp: , Rfl:   •  ONE TOUCH ULTRA TEST test strip, TEST BID, Disp: , Rfl: 5  •  verapamil SR (CALAN-SR) 180 MG CR tablet, Take 1 tablet by mouth Every Night. (Patient taking differently: Take 120 mg by mouth Every Night.), Disp: 30 tablet, Rfl: 12  •  vitamin D (ERGOCALCIFEROL) 46173 UNITS capsule capsule, Take 50,000 Units by mouth Every 7 (Seven) Days., Disp: , Rfl: 1  •  HYDROcodone-acetaminophen (NORCO) 5-325 MG per tablet, Take 1 tablet by mouth Every 8 (Eight) Hours As Needed (PRN) for up to 10 days., Disp: 40 tablet, Rfl: 0    Past Surgical History:   Procedure Laterality Date   • APPENDECTOMY     • BLADDER SUSPENSION     • COLECTOMY PARTIAL / TOTAL  05/22/2014    Subtotal colectomy with ineo-rectostomy.   • ENDOSCOPY N/A 5/1/2018    Procedure: ESOPHAGOGASTRODUODENOSCOPY;  Surgeon: Carlos Eduardo Cyr DO;  Location: [facility] ENDOSCOPY;  Service: Gastroenterology   • HERNIA REPAIR      multiple   • HYSTERECTOMY     • INJECTION OF MEDICATION  12/07/2010    DEPO MEDROL   • JOINT REPLACEMENT Left 07/01/2017    knee   • KNEE ARTHROSCOPY Left    • LAPAROSCOPIC CHOLECYSTECTOMY     • SALPINGO OOPHORECTOMY Left    • SALPINGO OOPHORECTOMY Right    • TOTAL KNEE ARTHROPLASTY Left 7/24/2017    Procedure: TOTAL KNEE ARTHROPLASTY ATTUNE with adductor canal block;  Surgeon: Jose Ballesteros MD;  Location: [facility] OR;  Service:        ROS  No fevers or chills.  No chest pain or shortness of air.  No GI or  disturbances. Left knee pain.     PHYSICAL EXAMINATION:       Ht 182.9 cm (72\")   Wt 107 kg (235 lb)   LMP 02/23/1998 (Within Months) Comment: Hysterectomy  BMI 31.87 kg/m²     Physical Exam   Constitutional: She is oriented to " person, place, and time. Vital signs are normal. She appears well-developed and well-nourished. She is active and cooperative. She does not appear ill. No distress.   HENT:   Head: Normocephalic.   Pulmonary/Chest: Effort normal. No respiratory distress.   Abdominal: Soft. She exhibits no distension.   Musculoskeletal: She exhibits edema (Left knee) and tenderness (Left knee). She exhibits no deformity.        Right knee: She exhibits no effusion.        Left knee: She exhibits effusion.   Neurological: She is alert and oriented to person, place, and time. GCS eye subscore is 4. GCS verbal subscore is 5. GCS motor subscore is 6.   Skin: Skin is warm, dry and intact. Capillary refill takes less than 2 seconds. No erythema.   Psychiatric: She has a normal mood and affect. Her speech is normal and behavior is normal. Judgment and thought content normal. Cognition and memory are normal.   Vitals reviewed.      GAIT:     []  Normal  [x]  Antalgic    Assistive device: [x]  None  []  Walker     []  Crutches  []  Cane     []  Wheelchair  []  Stretcher    Right Knee Exam     Range of Motion   Extension: 0   Flexion: 120     Other   Erythema: absent  Sensation: normal  Pulse: present  Swelling: none  Other tests: no effusion present      Left Knee Exam     Tenderness   Left knee tenderness location: Diffuse, more tender to lateral aspect of knee/proximal tibia.    Range of Motion   Extension: 0   Flexion: 90     Other   Erythema: absent  Scars: present (well-healed surgical scar post arthroplasty)  Sensation: normal  Pulse: present  Swelling: mild  Effusion: effusion present    Comments:  Pain and limitations with range of motion. Swelling/effusion present, improved from prior exam. No erythema. Slight warmth compared to right knee. No ecchymosis. Skin is intact.             Xr Knee 1 Or 2 View Left    Result Date: 6/12/2018  Narrative: Lateral view of the left knee reveals no evidence of acute fracture or dislocation.   Postsurgical changes are noted post total knee arthroplasty.  Prostheses are well positioned with no evidence of hardware failure or loosening noted.  No significant changes are noted when compared with prior images from 4/13/2018.  No acute radiologic abnormalities are noted at this time.06/12/18 at 4:40 PM by EDUARDA Minor     Xr Knee Bilateral Ap Standing    Result Date: 6/12/2018  Narrative: AP standing view of bilateral knees reveals postsurgical changes in the left knee post total knee arthroplasty.  Prostheses are well positioned with no evidence of hardware failure or loosening noted.  There are degenerative changes present in the right knee with moderate loss of medial compartmental joint spacing and small osteophyte formations noted in the medial compartment.  No evidence of acute fracture or dislocation.  No significant changes are noted when compared with prior images from 4/13/2018.  No acute radiologic abnormalities are noted at this time.06/12/18 at 4:42 PM by EDUARDA Minor     Nm Bone Scan 3 Phase    Result Date: 6/21/2018  Narrative: . EXAMINATION:  Triple phase bone imaging examination INDICATION: Left knee pain/swelling with hemarthrosis, post left knee arthroplasty July 2017, Z96.652 Presence of left artificial knee joint M25.562 Pain in left knee G89.29 Other chronic pain M17.12 Unilateral primary osteoarthritis, left knee M25.062 Hemarthrosis, left knee CLINICAL HISTORY: COMPARISON EXAMINATIONS:   Knee radiograph 6/12/18    DOSE:  26 mCi of technetium labeled MDP (I.V.) TECHNIQUE:  Triple Phase study, LANA imaging        FINDINGS:       Phase I, dynamic flow images of the mid lower extremities demonstrate differentially increased radiotracer activity on the left, involving the femoral component primarily laterally.     Phase II, blood pool images of the mid lower extremities demonstrate differentially increased activity on the left primarily laterally, with mild linear increased  activity along the medial border of the prosthesis.     Phase III, delayed static images of the mid lower extremities demonstrate in the posterior projection, persistent increased activity involving the lateral border of the femoral component, as well as focal activity involving the tibial plateau posteriorly.  There is focal activity in the patella. .      Impression: CONCLUSION:  1.  In the delayed images, posterior projection is persistent focally increased activity involving the distal femur laterally along the border with the prosthesis, in the border with the prosthesis involving the lateral tibial plateau. Correlation with plain film radiographs demonstrates marked bony thinning along the lateral border of the femoral component and mild periprosthetic lucency along the medial border of the femoral component. The evaluation of the lateral tibial plateau is radiographically unremarkable. Summary: The constellation of findings suggests possible early prosthesis loosening along the lateral tibial plateau. The scintigraphic activity involving the lateral femoral component corresponds to irregular and markedly thinned bone formation of uncertain clinical significance. However, the periprosthetic lucency along the medial border of the femoral component is suspicious for the presence of early prosthesis loosening.      Electronically signed by:  IRON Panda MD  6/21/2018 3:04 PM CDT Workstation: [id number]        ASSESSMENT:    Diagnoses and all orders for this visit:    Presence of left artificial knee joint    Chronic pain of left knee    Primary osteoarthritis of left knee    Hemarthrosis of left knee    PLAN    Bone scan reviewed and results discussed with patient today. Patient is somewhat improved from prior exam. She has improved range of motion and is able to ambulate without her cane, which she states is in the car today. She continues to have pain in the left knee. Recommend repeat inflammatory markers  today to see if they are trending up or down, or remaining stable. Her CRP and sed rate were elevated 2 weeks ago. Continue with elevation of the left knee to minimize swelling. Continue with ice therapy intermittently to the left knee 3 to 4 times daily for 20 minutes at a time to minimize pain/swelling. Recommend rest and modified weight-bearing with use of a cane as tolerated and based on her pain.  Patient is unable to take oral NSAIDs due to her current use of Eliquis. Continue Norco as needed for moderate to severe pain. This is refilled today for patient. Follow up in 4 weeks for recheck and further treatment recommendations with Dr. Ballesteros.    This patient has tried and failed a course of multiple treatment modalities which include the use of acetaminophen, physical therapy/home exercises, rest, ice, elevation, joint aspiration and modified weight-bearing with use of a cane. Therefore, I will recommend a course of narcotic pain medication for this patient until their pain has been sufficiently reduced to a level that I deem acceptable to be treated with alternative treatment options. [facility] reviewed # [id number].     Return in about 4 weeks (around 7/24/2018) for follow up with Dr. Ballesteros.      This document has been electronically signed by EDUARDA Minor on June 26, 2018 1:35 PM      EDUARDA Minor

## 2018-07-26 ENCOUNTER — OFFICE VISIT (OUTPATIENT)
Dept: ORTHOPEDIC SURGERY | Facility: CLINIC | Age: 63
End: 2018-07-26

## 2018-07-26 ENCOUNTER — LAB (OUTPATIENT)
Dept: LAB | Facility: HOSPITAL | Age: 63
End: 2018-07-26

## 2018-07-26 VITALS — BODY MASS INDEX: 32.23 KG/M2 | HEIGHT: 72 IN | WEIGHT: 238 LBS

## 2018-07-26 DIAGNOSIS — G47.33 OBSTRUCTIVE SLEEP APNEA SYNDROME: ICD-10-CM

## 2018-07-26 DIAGNOSIS — G89.29 CHRONIC PAIN OF LEFT KNEE: ICD-10-CM

## 2018-07-26 DIAGNOSIS — E11.9 TYPE 2 DIABETES MELLITUS WITHOUT COMPLICATION, WITHOUT LONG-TERM CURRENT USE OF INSULIN (HCC): ICD-10-CM

## 2018-07-26 DIAGNOSIS — M25.562 CHRONIC PAIN OF LEFT KNEE: ICD-10-CM

## 2018-07-26 DIAGNOSIS — Z96.652 PRESENCE OF LEFT ARTIFICIAL KNEE JOINT: ICD-10-CM

## 2018-07-26 DIAGNOSIS — Z96.652 PRESENCE OF LEFT ARTIFICIAL KNEE JOINT: Primary | ICD-10-CM

## 2018-07-26 LAB
CRP SERPL-MCNC: 1.7 MG/DL (ref 0–1)
ERYTHROCYTE [SEDIMENTATION RATE] IN BLOOD: 38 MM/HR (ref 0–20)

## 2018-07-26 PROCEDURE — 85651 RBC SED RATE NONAUTOMATED: CPT

## 2018-07-26 PROCEDURE — 86140 C-REACTIVE PROTEIN: CPT

## 2018-07-26 PROCEDURE — 36415 COLL VENOUS BLD VENIPUNCTURE: CPT

## 2018-07-26 PROCEDURE — 99213 OFFICE O/P EST LOW 20 MIN: CPT | Performed by: ORTHOPAEDIC SURGERY

## 2018-07-26 NOTE — PROGRESS NOTES
Mini Andersen is a 63 y.o. female returns for     Chief Complaint   Patient presents with   • Left Knee - Follow-up, Pain       HISTORY OF PRESENT ILLNESS: f/u left knee, patient has been seeing zhane for pain. Zhane referred patient for further treatment options.   She reports her pain is much improved.  She is having no fevers or chills.  She is not using a cane.  She is not excited about the prospect of potentially having surgery     CONCURRENT MEDICAL HISTORY:    Past Medical History:   Diagnosis Date   • Acid reflux    • Acute peritonitis (CMS/HCC)    • Allergic rhinitis    • Arthritis    • Bee sting    • Blood clot in vein, right leg    • Borderline glaucoma    • Chronic bronchitis (CMS/HCC)    • Constipation     severe with an immotile colon      • Deep venous thrombosis (CMS/HCC)    • High cholesterol    • Hypothyroidism    • Intestinal volvulus (CMS/HCC)    • Muscle atrophy     O/E   • Nuclear senile cataract    • Nuclear senile cataract    • Polyneuropathy in diabetes (CMS/HCC)    • PONV (postoperative nausea and vomiting)    • Type 2 diabetes mellitus (CMS/HCC)     NO BDR   • Type 2 diabetes mellitus without complications (CMS/HCC)    • Unspecified disease of nail        Allergies   Allergen Reactions   • Bextra [Valdecoxib] Shortness Of Breath     Shortness of breath   • Cephalosporins Shortness Of Breath   • Detrol La [Tolterodine Tartrate Er] Shortness Of Breath   • Dicyclomine Shortness Of Breath     Shortness of breath   • Fluoxetine Shortness Of Breath   • Keflex [Cephalexin] Shortness Of Breath   • Toprol Xl [Metoprolol] Anaphylaxis   • Chocolate Flavor    • Omeprazole-Sodium Bicarbonate Unknown (See Comments)   • Other      Fresh flowers causes throat swelling   • Amoxicillin Rash   • Aspirin Rash   • Ciprofloxin Hcl [Ciprofloxacin] Rash   • Claritin-D 12 Hour [Loratadine-Pseudoephedrine Er] Rash   • Codeine Rash   • Demerol [Meperidine] Rash   • Levaquin [Levofloxacin] Rash   • Lipitor  [Atorvastatin] Other (See Comments)     cramping   • Macrobid [Nitrofurantoin] Rash   • Morphine And Related Rash   • Paxil [Paroxetine Hcl] Rash   • Sulfa Antibiotics Rash   • Tape Rash   • Tramadol Rash   • Vioxx [Rofecoxib] Rash   • Zegerid [Omeprazole] Rash   • Zoloft [Sertraline Hcl] Rash         Current Outpatient Prescriptions:   •  acetaminophen (TYLENOL) 500 MG tablet, Take 1,000 mg by mouth Every 6 (Six) Hours As Needed for Mild Pain ., Disp: , Rfl:   •  apixaban (ELIQUIS) 5 MG tablet tablet, Take 5 mg by mouth 2 (Two) Times a Day., Disp: , Rfl:   •  Calcium Citrate (CITRACAL PO), Take 2 tablets by mouth Daily., Disp: , Rfl:   •  carboxymethylcellulose (REFRESH PLUS) 0.5 % solution, Administer 1 drop to both eyes Daily As Needed for Dry Eyes., Disp: , Rfl:   •  clonazePAM (KlonoPIN) 0.5 MG tablet, Take 0.5 mg by mouth 2 (Two) Times a Day As Needed for Anxiety., Disp: , Rfl:   •  cyanocobalamin 1000 MCG/ML injection, Inject  into the shoulder, thigh, or buttocks Every 28 (Twenty-Eight) Days., Disp: , Rfl: 12  •  diphenhydrAMINE (BENADRYL) 25 mg capsule, Take 25 mg by mouth Every 6 (Six) Hours As Needed., Disp: , Rfl: 6  •  ferrous sulfate 324 (65 Fe) MG tablet delayed-release EC tablet, Take 324 mg by mouth Daily With Breakfast., Disp: , Rfl:   •  levothyroxine (SYNTHROID, LEVOTHROID) 50 MCG tablet, Take 50 mcg by mouth daily., Disp: , Rfl: 1  •  losartan (COZAAR) 25 MG tablet, Take 25 mg by mouth Daily., Disp: , Rfl:   •  metFORMIN (GLUCOPHAGE) 500 MG tablet, Take 500 mg by mouth Daily As Needed (For FSBS > 140)., Disp: , Rfl: 1  •  omeprazole (priLOSEC) 20 MG capsule, Take 20 mg by mouth Daily., Disp: , Rfl:   •  ondansetron (ZOFRAN) 4 MG tablet, Take 4 mg by mouth Every 8 (Eight) Hours As Needed for Nausea or Vomiting., Disp: , Rfl:   •  ONE TOUCH ULTRA TEST test strip, TEST BID, Disp: , Rfl: 5  •  verapamil SR (CALAN-SR) 180 MG CR tablet, Take 1 tablet by mouth Every Night. (Patient taking differently:  "Take 120 mg by mouth Every Night.), Disp: 30 tablet, Rfl: 12  •  vitamin D (ERGOCALCIFEROL) 38713 UNITS capsule capsule, Take 50,000 Units by mouth Every 7 (Seven) Days., Disp: , Rfl: 1    Past Surgical History:   Procedure Laterality Date   • APPENDECTOMY     • BLADDER SUSPENSION     • COLECTOMY PARTIAL / TOTAL  05/22/2014    Subtotal colectomy with ineo-rectostomy.   • ENDOSCOPY N/A 5/1/2018    Procedure: ESOPHAGOGASTRODUODENOSCOPY;  Surgeon: Carlos Eduardo Cyr DO;  Location: [facility] ENDOSCOPY;  Service: Gastroenterology   • HERNIA REPAIR      multiple   • HYSTERECTOMY     • INJECTION OF MEDICATION  12/07/2010    DEPO MEDROL   • JOINT REPLACEMENT Left 07/01/2017    knee   • KNEE ARTHROSCOPY Left    • LAPAROSCOPIC CHOLECYSTECTOMY     • SALPINGO OOPHORECTOMY Left    • SALPINGO OOPHORECTOMY Right    • TOTAL KNEE ARTHROPLASTY Left 7/24/2017    Procedure: TOTAL KNEE ARTHROPLASTY ATTUNE with adductor canal block;  Surgeon: Jose Ballesteros MD;  Location: [facility] OR;  Service:        ROS  No fevers or chills.  No chest pain or shortness of air.  No GI or  disturbances.    PHYSICAL EXAMINATION:       Ht 182.9 cm (72\")   Wt 108 kg (238 lb)   LMP 02/23/1998 (Within Months) Comment: Hysterectomy  BMI 32.28 kg/m²     Physical Exam   Constitutional: She is oriented to person, place, and time. She appears well-developed and well-nourished.   Neurological: She is alert and oriented to person, place, and time.   Psychiatric: She has a normal mood and affect. Her behavior is normal. Judgment and thought content normal.       GAIT:     [x]  Normal  []  Antalgic    Assistive device: [x]  None  []  Walker     []  Crutches  []  Cane     []  Wheelchair  []  Stretcher    Left Knee Exam     Tenderness   The patient is experiencing no tenderness.         Range of Motion   Extension: 0   Flexion: 120     Muscle Strength     The patient has normal left knee strength.    Tests   Drawer:       Anterior - negative       Varus: " negative  Valgus: negative    Other   Erythema: absent  Sensation: normal  Pulse: present  Swelling: none                  Lateral view of the left knee reveals no evidence of acute fracture or dislocation.  Postsurgical changes are noted post total knee arthroplasty.  Prostheses are well positioned with no evidence of hardware failure or loosening noted.  No significant changes are noted when compared with prior images from 4/13/2018.  No acute radiologic abnormalities are noted at this time.06/12/18 at 4:40 PM by EDUARDA Minor      ASSESSMENT:    Diagnoses and all orders for this visit:    Presence of left artificial knee joint  -     C-reactive Protein; Future  -     Sedimentation Rate; Future    Chronic pain of left knee  -     C-reactive Protein; Future  -     Sedimentation Rate; Future    Obstructive sleep apnea syndrome    Type 2 diabetes mellitus without complication, without long-term current use of insulin (CMS/[facility])          PLAN    Her alpha defensens test was negative.  Her cell count was slightly elevated on her synovasure.  Her last CRP and sedimentation rate were elevated as well.  Clinically, she has done much better.  Her pain is improving, her swelling has improved, she has no effusion, and she is walking without a limp.  Her range of motion is good.  We discussed continued observation.  We would repeat a CRP and sedimentation rate today.  Recheck in 3 months and less she starts having pain again at which time she is to call for further evaluation.        Patient's Body mass index is 32.28 kg/m². BMI is above normal parameters. Recommendations include: exercise counseling and nutrition counseling.    Procedures      Return in about 3 months (around 10/26/2018) for recheck.    Jose Ballesteros MD

## 2018-10-30 ENCOUNTER — OFFICE VISIT (OUTPATIENT)
Dept: ORTHOPEDIC SURGERY | Facility: CLINIC | Age: 63
End: 2018-10-30

## 2018-10-30 VITALS — BODY MASS INDEX: 31.56 KG/M2 | HEIGHT: 72 IN | WEIGHT: 233 LBS

## 2018-10-30 DIAGNOSIS — M25.562 CHRONIC PAIN OF LEFT KNEE: ICD-10-CM

## 2018-10-30 DIAGNOSIS — G89.29 CHRONIC PAIN OF LEFT KNEE: ICD-10-CM

## 2018-10-30 DIAGNOSIS — Z96.652 PRESENCE OF LEFT ARTIFICIAL KNEE JOINT: Primary | ICD-10-CM

## 2018-10-30 DIAGNOSIS — M25.062 HEMARTHROSIS OF LEFT KNEE: ICD-10-CM

## 2018-10-30 DIAGNOSIS — G47.33 OBSTRUCTIVE SLEEP APNEA SYNDROME: ICD-10-CM

## 2018-10-30 PROCEDURE — 99213 OFFICE O/P EST LOW 20 MIN: CPT | Performed by: ORTHOPAEDIC SURGERY

## 2018-12-19 DIAGNOSIS — Z96.652 PRESENCE OF LEFT ARTIFICIAL KNEE JOINT: Primary | ICD-10-CM

## 2018-12-20 ENCOUNTER — LAB (OUTPATIENT)
Dept: LAB | Facility: HOSPITAL | Age: 63
End: 2018-12-20

## 2018-12-20 ENCOUNTER — OFFICE VISIT (OUTPATIENT)
Dept: ORTHOPEDIC SURGERY | Facility: CLINIC | Age: 63
End: 2018-12-20

## 2018-12-20 VITALS — WEIGHT: 234.4 LBS | HEIGHT: 72 IN | BODY MASS INDEX: 31.75 KG/M2

## 2018-12-20 DIAGNOSIS — Z96.652 PRESENCE OF LEFT ARTIFICIAL KNEE JOINT: ICD-10-CM

## 2018-12-20 DIAGNOSIS — M25.562 CHRONIC PAIN OF LEFT KNEE: ICD-10-CM

## 2018-12-20 DIAGNOSIS — G89.29 CHRONIC PAIN OF LEFT KNEE: ICD-10-CM

## 2018-12-20 DIAGNOSIS — Z96.652 PRESENCE OF LEFT ARTIFICIAL KNEE JOINT: Primary | ICD-10-CM

## 2018-12-20 DIAGNOSIS — M17.12 PRIMARY OSTEOARTHRITIS OF LEFT KNEE: ICD-10-CM

## 2018-12-20 LAB — CRP SERPL-MCNC: 2.1 MG/DL (ref 0–1)

## 2018-12-20 PROCEDURE — 36415 COLL VENOUS BLD VENIPUNCTURE: CPT

## 2018-12-20 PROCEDURE — 85651 RBC SED RATE NONAUTOMATED: CPT

## 2018-12-20 PROCEDURE — 86140 C-REACTIVE PROTEIN: CPT

## 2018-12-20 PROCEDURE — 99214 OFFICE O/P EST MOD 30 MIN: CPT | Performed by: NURSE PRACTITIONER

## 2018-12-20 RX ORDER — HYDROCODONE BITARTRATE AND ACETAMINOPHEN 5; 325 MG/1; MG/1
1 TABLET ORAL EVERY 8 HOURS PRN
Qty: 30 TABLET | Refills: 0 | Status: SHIPPED | OUTPATIENT
Start: 2018-12-20 | End: 2019-01-14 | Stop reason: SDUPTHER

## 2018-12-20 NOTE — PROGRESS NOTES
"Mini Andersen is a 63 y.o. female returns for     Chief Complaint   Patient presents with   • Left Knee - Follow-up       HISTORY OF PRESENT ILLNESS: Patient presents to office for follow-up of chronic left knee pain that has increased recently.  No known injury or incident associated with the increase of pain.  Patient had a left total knee arthroplasty on 7/24/2017 per Dr. Ballesteros with a normal postoperative recovery.  Patient has had persistent and recurrent left knee pain, primarily to the lateral aspect of her knee.  Patient was last seen in office per Dr. Ballesteros on 10/30/2018 with improved left knee pain.  Patient had previous Synovasure examination and a bone scan.      CONCURRENT MEDICAL HISTORY:    The following portions of the patient's history were reviewed and updated as appropriate: allergies, current medications, past family history, past medical history, past social history, past surgical history and problem list.     ROS  No fevers or chills.  No chest pain or shortness of air.  No GI or  disturbances. Left knee pain.     PHYSICAL EXAMINATION:       Ht 182.9 cm (72\")   Wt 106 kg (234 lb 6.4 oz)   LMP 02/23/1998 (Within Months) Comment: Hysterectomy  BMI 31.79 kg/m²     Physical Exam   Constitutional: She is oriented to person, place, and time. Vital signs are normal. She appears well-developed and well-nourished. She is active and cooperative. She does not appear ill. No distress.   HENT:   Head: Normocephalic.   Pulmonary/Chest: Effort normal. No respiratory distress.   Abdominal: Soft. She exhibits no distension.   Musculoskeletal: She exhibits edema (Mild, left knee) and tenderness (Mild, left knee). She exhibits no deformity.        Right knee: She exhibits no effusion.        Left knee: She exhibits no effusion.   Neurological: She is alert and oriented to person, place, and time. GCS eye subscore is 4. GCS verbal subscore is 5. GCS motor subscore is 6.   Skin: Skin is warm, dry and " intact. Capillary refill takes less than 2 seconds. No erythema.   Psychiatric: She has a normal mood and affect. Her speech is normal and behavior is normal. Judgment and thought content normal. Cognition and memory are normal.   Vitals reviewed.      GAIT:     []  Normal  [x]  Antalgic    Assistive device: [x]  None  []  Walker     []  Crutches  []  Cane     []  Wheelchair  []  Stretcher    Right Knee Exam     Range of Motion   Extension: 0   Flexion: 120     Other   Erythema: absent  Sensation: normal  Pulse: present  Swelling: none  Effusion: no effusion present      Left Knee Exam     Tenderness   Left knee tenderness location: Mild, diffuse, more tender to lateral aspect of knee/proximal tibia.    Range of Motion   Extension: 0   Flexion: 120     Other   Erythema: absent  Scars: present (well-healed surgical scar post arthroplasty)  Sensation: normal  Pulse: present  Swelling: mild  Effusion: no effusion present    Comments:  Mild pain and limitations with range of motion. Mild, generalized swelling present, improved from prior exam. No definitive effusion. No erythema. Slight warmth compared to right knee. No overt signs of infection noted. No ecchymosis. Skin is intact. Stable joint exam.             EXAMINATION:  Triple phase bone imaging examination     INDICATION: Left knee pain/swelling with hemarthrosis, post left  knee arthroplasty July 2017, Z96.652 Presence of left artificial  knee joint M25.562 Pain in left knee G89.29 Other chronic pain  M17.12 Unilateral primary osteoarthritis, left knee M25.062  Hemarthrosis, left knee  CLINICAL HISTORY:  COMPARISON EXAMINATIONS:   Knee radiograph 6/12/18      DOSE:  26 mCi of technetium labeled MDP (I.V.)   TECHNIQUE:  Triple Phase study, LANA imaging            FINDINGS:           Phase I, dynamic flow images of the mid lower extremities  demonstrate differentially increased radiotracer activity on the  left, involving the femoral component primarily laterally.           Phase II, blood pool images of the mid lower extremities  demonstrate differentially increased activity on the left  primarily laterally, with mild linear increased activity along  the medial border of the prosthesis.          Phase III, delayed static images of the mid lower extremities  demonstrate in the posterior projection, persistent increased  activity involving the lateral border of the femoral component,  as well as focal activity involving the tibial plateau  posteriorly.  There is focal activity in the patella.     IMPRESSION:  CONCLUSION:       1.  In the delayed images, posterior projection is persistent  focally increased activity involving the distal femur laterally  along the border with the prosthesis, in the border with the  prosthesis involving the lateral tibial plateau.   Correlation with plain film radiographs demonstrates marked bony  thinning along the lateral border of the femoral component and  mild periprosthetic lucency along the medial border of the  femoral component. The evaluation of the lateral tibial plateau  is radiographically unremarkable.     Summary:  The constellation of findings suggests possible early prosthesis  loosening along the lateral tibial plateau.  The scintigraphic activity involving the lateral femoral  component corresponds to irregular and markedly thinned bone  formation of uncertain clinical significance. However, the  periprosthetic lucency along the medial border of the femoral  component is suspicious for the presence of early prosthesis  loosening.     Electronically signed by:  IRON Panda MD  6/21/2018 3:04  PM CDT Workstation: [id number]    Xr Knee 1 Or 2 View Left    Result Date: 12/21/2018  Narrative: Lateral view of the left knee reveals no evidence of acute fracture or dislocation.  Postsurgical changes are noted post total knee arthroplasty.  Prostheses are well-positioned and well-aligned with no evidence of hardware failure or  loosening noted.  No significant changes are noted when compared with prior images from 6/12/2018.  No acute bony radiologic abnormalities are noted at this time. 12/21/18 at 2:31 PM by EDUARDA Minor     Xr Knee Bilateral Ap Standing    Result Date: 12/21/2018  Narrative: AP standing views of bilateral knees reveals postsurgical changes in the left knee post total knee arthroplasty.  Prostheses are well-positioned and well-aligned with no evidence of hardware failure or loosening noted.  There are degenerative changes present in the right knee with moderate loss of medial compartmental joint spacing and small marginal osteophyte formations noted in the medial compartment.  No evidence of acute fracture or dislocation.  No significant changes are noted when compared with prior images from 6/12/2018.  No acute bony radiologic abnormalities are noted at this time.12/21/18 at 2:28 PM by EDUARDA Minor        ASSESSMENT:    Diagnoses and all orders for this visit:    Presence of left artificial knee joint  -     C-reactive Protein; Future  -     Sedimentation Rate; Future    Chronic pain of left knee  -     C-reactive Protein; Future  -     Sedimentation Rate; Future    Primary osteoarthritis of left knee    Other orders  -     HYDROcodone-acetaminophen (NORCO) 5-325 MG per tablet; Take 1 tablet by mouth Every 8 (Eight) Hours As Needed for Moderate Pain  (PRN) for up to 10 days.    PLAN    AP standing x-ray of bilateral knees and a lateral x-ray of the left knee repeated in office today and reviewed with no acute findings noted.  Patient complains of increased left knee pain recently.  This has been a persistent/recurrent problem since undergoing left total knee arthroplasty on 7/24/2017.  Patient was last seen in office per Dr. Ballesteros on 10/30/2018 with improved left knee pain at that time.  Left knee pain has increased in recent weeks with no known incident or new injury.  Patient had previous elevation of  her inflammatory markers.  Recommend a repeat of these labs today to evaluate trends.  Previous bone scan indicated a possible early prosthetic loosening along the lateral tibial plateau, which correlates with her pain.  Recommend rest and activity modification until pain improves with gradual progression of activity as tolerated.  Recommend going back to using a cane for modified weight-bearing until her pain improves with gradual progression of weightbearing as tolerated.  Recommend elevation and ice therapy to the left knee as needed to minimize pain/swelling.  Patient is requesting pain medication to help with her pain.  Norco is prescribed to take as needed for moderate to severe pain.  Patient is unable to take oral NSAIDs due to her current use of Eliquis.  Follow-up in 2 weeks for recheck after some efforts for modified weight-bearing and rest of the left knee.  Patient indicates today that she would like to avoid a revision of the left knee if at all possible.    This patient has tried and failed a course of multiple treatment modalities which include the use of acetaminophen, physical therapy/home exercises, rest, ice, elevation, joint aspiration and modified weight-bearing with use of a cane. Therefore, I will recommend a course of narcotic pain medication for this patient until their pain has been sufficiently reduced to a level that I deem acceptable to be treated with alternative treatment options. [facility] reviewed # [id number].     Return in about 2 weeks (around 1/3/2019) for Recheck.      This document has been electronically signed by EDUARDA Minor on December 24, 2018 8:22 AM      EDUARDA Minor

## 2018-12-21 ENCOUNTER — OFFICE VISIT (OUTPATIENT)
Dept: CARDIOLOGY | Facility: CLINIC | Age: 63
End: 2018-12-21

## 2018-12-21 VITALS
DIASTOLIC BLOOD PRESSURE: 70 MMHG | BODY MASS INDEX: 31.83 KG/M2 | HEART RATE: 85 BPM | SYSTOLIC BLOOD PRESSURE: 120 MMHG | HEIGHT: 72 IN | WEIGHT: 235 LBS

## 2018-12-21 DIAGNOSIS — R00.2 PALPITATIONS: Primary | ICD-10-CM

## 2018-12-21 DIAGNOSIS — E78.00 PURE HYPERCHOLESTEROLEMIA: ICD-10-CM

## 2018-12-21 DIAGNOSIS — I82.413 DEEP VEIN THROMBOSIS (DVT) OF FEMORAL VEIN OF BOTH LOWER EXTREMITIES, UNSPECIFIED CHRONICITY (HCC): ICD-10-CM

## 2018-12-21 DIAGNOSIS — G47.33 OBSTRUCTIVE SLEEP APNEA SYNDROME: ICD-10-CM

## 2018-12-21 DIAGNOSIS — E11.9 TYPE 2 DIABETES MELLITUS WITHOUT COMPLICATION, WITHOUT LONG-TERM CURRENT USE OF INSULIN (HCC): ICD-10-CM

## 2018-12-21 LAB — ERYTHROCYTE [SEDIMENTATION RATE] IN BLOOD: 32 MM/HR (ref 0–20)

## 2018-12-21 PROCEDURE — 99214 OFFICE O/P EST MOD 30 MIN: CPT | Performed by: INTERNAL MEDICINE

## 2018-12-21 NOTE — PROGRESS NOTES
[facility] Cardiology  OFFICE NOTE    Mini Andersen  63 y.o. female    12/21/2018  1. Palpitations    2. Pure hypercholesterolemia    3. Obstructive sleep apnea syndrome    4. Deep vein thrombosis (DVT) of femoral vein of both lower extremities, unspecified chronicity (CMS/HCC)    5. Type 2 diabetes mellitus without complication, without long-term current use of insulin (CMS/HCC)        Chief complaint -Palpitations      History of present Illness- 63-year-old lady with history of diabetes, hypertension and hyperlipidemia had bilateral knee replacement surgery and subsequently she developed DVT and she had DVT before and after surgery and she is going to be on chronic anticoagulation.    She has been having palpitations and heart rate going fast she cannot tolerate beta blockers, so I started her on verapamil  mg daily.  EKG showed normal sinus rhythm.  She had a negative stress test in 2017  before the knee replacement surgery as she is a diabetic.  She probably may need revision of the left knee and she can proceed with surgery with moderate risk by ACC AHA guidelines and she does not need any further cardiac workup        Allergies   Allergen Reactions   • Bextra [Valdecoxib] Shortness Of Breath     Shortness of breath   • Cephalosporins Shortness Of Breath   • Detrol La [Tolterodine Tartrate Er] Shortness Of Breath   • Dicyclomine Shortness Of Breath     Shortness of breath   • Fluoxetine Shortness Of Breath   • Keflex [Cephalexin] Shortness Of Breath   • Toprol Xl [Metoprolol] Anaphylaxis   • Chocolate Flavor    • Omeprazole-Sodium Bicarbonate Unknown (See Comments)   • Other      Fresh flowers causes throat swelling   • Amoxicillin Rash   • Aspirin Rash   • Ciprofloxin Hcl [Ciprofloxacin] Rash   • Claritin-D 12 Hour [Loratadine-Pseudoephedrine Er] Rash   • Codeine Rash   • Demerol [Meperidine] Rash   • Levaquin [Levofloxacin] Rash   • Lipitor [Atorvastatin] Other (See Comments)      cramping   • Macrobid [Nitrofurantoin] Rash   • Morphine And Related Rash   • Paxil [Paroxetine Hcl] Rash   • Sulfa Antibiotics Rash   • Tape Rash   • Tramadol Rash   • Vioxx [Rofecoxib] Rash   • Zegerid [Omeprazole] Rash   • Zoloft [Sertraline Hcl] Rash         Past Medical History:   Diagnosis Date   • Acid reflux    • Acute peritonitis (CMS/HCC)    • Allergic rhinitis    • Arthritis    • Bee sting    • Blood clot in vein, right leg    • Borderline glaucoma    • Chronic bronchitis (CMS/HCC)    • Constipation     severe with an immotile colon      • Deep venous thrombosis (CMS/HCC)    • High cholesterol    • Hypothyroidism    • Intestinal volvulus (CMS/HCC)    • Muscle atrophy     O/E   • Nuclear senile cataract    • Nuclear senile cataract    • Polyneuropathy in diabetes (CMS/HCC)    • PONV (postoperative nausea and vomiting)    • Type 2 diabetes mellitus (CMS/HCC)     NO BDR   • Type 2 diabetes mellitus without complications (CMS/HCC)    • Unspecified disease of nail          Past Surgical History:   Procedure Laterality Date   • APPENDECTOMY     • BLADDER SUSPENSION     • COLECTOMY PARTIAL / TOTAL  05/22/2014    Subtotal colectomy with ineo-rectostomy.   • ENDOSCOPY N/A 5/1/2018    Procedure: ESOPHAGOGASTRODUODENOSCOPY;  Surgeon: Carlos Eduardo Cyr DO;  Location: [facility] ENDOSCOPY;  Service: Gastroenterology   • HERNIA REPAIR      multiple   • HYSTERECTOMY     • INJECTION OF MEDICATION  12/07/2010    DEPO MEDROL   • JOINT REPLACEMENT Left 07/01/2017    knee   • KNEE ARTHROSCOPY Left    • LAPAROSCOPIC CHOLECYSTECTOMY     • SALPINGO OOPHORECTOMY Left    • SALPINGO OOPHORECTOMY Right    • TOTAL KNEE ARTHROPLASTY Left 7/24/2017    Procedure: TOTAL KNEE ARTHROPLASTY ATTUNE with adductor canal block;  Surgeon: Jose Ballesteros MD;  Location: [facility] OR;  Service:          No family history on file.      Social History     Socioeconomic History   • Marital status:      Spouse name: Not on file   • Number of  children: Not on file   • Years of education: Not on file   • Highest education level: Not on file   Social Needs   • Financial resource strain: Not on file   • Food insecurity - worry: Not on file   • Food insecurity - inability: Not on file   • Transportation needs - medical: Not on file   • Transportation needs - non-medical: Not on file   Occupational History   • Not on file   Tobacco Use   • Smoking status: Former Smoker     Packs/day: 0.00     Years: 0.00     Pack years: 0.00     Start date:      Last attempt to quit:      Years since quittin.9   • Smokeless tobacco: Never Used   Substance and Sexual Activity   • Alcohol use: No   • Drug use: No   • Sexual activity: Defer     Birth control/protection: Surgical   Other Topics Concern   • Not on file   Social History Narrative   • Not on file         Current Outpatient Medications   Medication Sig Dispense Refill   • acetaminophen (TYLENOL) 500 MG tablet Take 1,000 mg by mouth Every 6 (Six) Hours As Needed for Mild Pain .     • apixaban (ELIQUIS) 5 MG tablet tablet Take 5 mg by mouth 2 (Two) Times a Day.     • Calcium Citrate (CITRACAL PO) Take 2 tablets by mouth Daily.     • carboxymethylcellulose (REFRESH PLUS) 0.5 % solution Administer 1 drop to both eyes Daily As Needed for Dry Eyes.     • clonazePAM (KlonoPIN) 0.5 MG tablet Take 0.5 mg by mouth 2 (Two) Times a Day As Needed for Anxiety.     • cyanocobalamin 1000 MCG/ML injection Inject  into the shoulder, thigh, or buttocks Every 28 (Twenty-Eight) Days.  12   • diphenhydrAMINE (BENADRYL) 25 mg capsule Take 25 mg by mouth Every 6 (Six) Hours As Needed.  6   • ferrous sulfate 324 (65 Fe) MG tablet delayed-release EC tablet Take 324 mg by mouth Daily With Breakfast.     • HYDROcodone-acetaminophen (NORCO) 5-325 MG per tablet Take 1 tablet by mouth Every 8 (Eight) Hours As Needed for Moderate Pain  (PRN) for up to 10 days. 30 tablet 0   • levothyroxine (SYNTHROID, LEVOTHROID) 50 MCG tablet Take 50  "mcg by mouth daily.  1   • losartan (COZAAR) 25 MG tablet Take 25 mg by mouth Daily.     • metFORMIN (GLUCOPHAGE) 500 MG tablet Take 500 mg by mouth Daily As Needed (For FSBS > 140).  1   • omeprazole (priLOSEC) 20 MG capsule Take 20 mg by mouth Daily.     • ondansetron (ZOFRAN) 4 MG tablet Take 4 mg by mouth Every 8 (Eight) Hours As Needed for Nausea or Vomiting.     • ONE TOUCH ULTRA TEST test strip TEST BID  5   • verapamil SR (CALAN-SR) 120 MG CR tablet Take 1 tablet by mouth Every Night. 30 tablet 4   • vitamin D (ERGOCALCIFEROL) 61659 UNITS capsule capsule Take 50,000 Units by mouth Every 7 (Seven) Days.  1     No current facility-administered medications for this visit.          Review of Systems     Constitution: Denies any fatigue, fever or chills    HENT: Denies any headache, hearing impairment,     Eyes: Denies any blurring of vision, or photophobia     Cardivascular - As per history of present illness     Respiratory system- The separate NYHA class II with sleep apnea       Endocrine:   history of hyperlipidemia, diabetes,                      Hypothyrodism       Musculoskeletal:  Severe  debilitating arthritis of the left knee That is post knee replacement    Gastrointestinal: No nausea, vomiting, or melena    Genitourinary: No dysuria or hematuria    Neurological:   No history of seizure disorder, stroke, memory problems    Psychiatric/Behavioral:        No history of depression,     Hematological- History of recurrent DVT            OBJECTIVE    /70   Pulse 85   Ht 182.9 cm (72.01\")   Wt 107 kg (235 lb)   LMP 02/23/1998 (Within Months) Comment: Hysterectomy  BMI 31.86 kg/m²       Physical Exam     Constitutional: is oriented to person, place, and time.     Skin-warm and dry, ezema on both hands    Well developed and nourished in no acute distress      Head: Normocephalic and atraumatic.     Eyes: Pupils are equal, round, and reactive to light.     Neck: Neck supple. No bruit in the " carotids,    Cardiovascular: [place] in the fifth intercostal space   Regular rate, and  Rhythm,    S1 greater than S2, no S3 or S4, no gallop     Pulmonary/Chest:   Air  Entry is equal on both sides  No wheezing or crackles,      Abdominal: Soft.  No hepatosplenomegaly,     Musculoskeletal: No kyphoscoliosis, swollen left knee    Neurological: is alert and oriented to person, place, and time.    cranial nerve are intact .   No motor or sensory deficit    Extremities-no edema, no radial femoral delay      Psychiatric: He has a normal mood and affect.                  His behavior is normal.           Procedures      A/P    Palpitations  on Verapamil  mg daily.  Recurrent DVT post surgery on chronic anticoagulation with Eliquis twice a day.  She can proceed with revision of the left knee surgery with moderate risk by ACC guidelines he does not need any further cardiac workup    Diabetes on metformin and she is doing okay and follows with Dr. Aquino    Hyperlipidemia mild started on Lipitor she is a diabetic.    Osteoarthritis status post knee replacement doing okay    Hypothyroidism on Synthroid supplements.    Follow-up in 8 months            This document has been electronically signed by Des Root MD on December 21, 2018 8:45 AM       EMR Dragon/Transcription disclaimer:   Some of this note may be an electronic transcription/translation of spoken language to printed text. The electronic translation of spoken language may permit erroneous, or at times, nonsensical words or phrases to be inadvertently transcribed; Although I have reviewed the note for such errors, some may still exist.

## 2019-01-03 ENCOUNTER — OFFICE VISIT (OUTPATIENT)
Dept: ORTHOPEDIC SURGERY | Facility: CLINIC | Age: 64
End: 2019-01-03

## 2019-01-03 VITALS — HEIGHT: 72 IN | WEIGHT: 235 LBS | BODY MASS INDEX: 31.83 KG/M2

## 2019-01-03 DIAGNOSIS — Z96.652 PRESENCE OF LEFT ARTIFICIAL KNEE JOINT: Primary | ICD-10-CM

## 2019-01-03 DIAGNOSIS — G89.29 CHRONIC PAIN OF LEFT KNEE: ICD-10-CM

## 2019-01-03 DIAGNOSIS — M25.562 CHRONIC PAIN OF LEFT KNEE: ICD-10-CM

## 2019-01-03 DIAGNOSIS — M17.12 PRIMARY OSTEOARTHRITIS OF LEFT KNEE: ICD-10-CM

## 2019-01-03 PROCEDURE — 99213 OFFICE O/P EST LOW 20 MIN: CPT | Performed by: NURSE PRACTITIONER

## 2019-01-03 NOTE — PROGRESS NOTES
"Mini Andersen is a 63 y.o. female returns for     Chief Complaint   Patient presents with   • Left Knee - Follow-up       HISTORY OF PRESENT ILLNESS: Patient presents to office for follow-up of chronic left knee pain.  Patient reports no improvement or change since last office visit 2 weeks ago.  Patient had a left total knee arthroplasty done on 7/24/2017 per Dr. Ballesteros with a normal postoperative recovery.  Patient has had recurrent episodes of left knee pain, primarily to the lateral aspect of her knee.  Patient continues to complain of left knee pain that occurs mostly when she initially stands up and when she makes the transition to a sitting position.  Inflammatory markers done at last office visit on 12/20/2018 were slightly elevated.  Patient has also had previous Synovasure testing and bone scan.  No new complaints or concerns today.  Pain scale 8/10 today.     CONCURRENT MEDICAL HISTORY:    The following portions of the patient's history were reviewed and updated as appropriate: allergies, current medications, past family history, past medical history, past social history, past surgical history and problem list.     ROS  No fevers or chills.  No chest pain or shortness of air.  No GI or  disturbances. Left knee pain.     PHYSICAL EXAMINATION:       Ht 182.9 cm (72\")   Wt 107 kg (235 lb)   LMP 02/23/1998 (Within Months) Comment: Hysterectomy  BMI 31.87 kg/m²     Physical Exam   Constitutional: She is oriented to person, place, and time. Vital signs are normal. She appears well-developed and well-nourished. She is active and cooperative. She does not appear ill. No distress.   HENT:   Head: Normocephalic.   Pulmonary/Chest: Effort normal. No respiratory distress.   Abdominal: Soft. She exhibits no distension.   Musculoskeletal: She exhibits edema (Mild, left knee) and tenderness (Mild, left knee). She exhibits no deformity.        Right knee: She exhibits no effusion.        Left knee: She exhibits " no effusion.   Neurological: She is alert and oriented to person, place, and time. GCS eye subscore is 4. GCS verbal subscore is 5. GCS motor subscore is 6.   Skin: Skin is warm, dry and intact. Capillary refill takes less than 2 seconds. No erythema.   Psychiatric: She has a normal mood and affect. Her speech is normal and behavior is normal. Judgment and thought content normal. Cognition and memory are normal.   Vitals reviewed.      GAIT:     []  Normal  [x]  Antalgic    Assistive device: [x]  None  []  Walker     []  Crutches  []  Cane     []  Wheelchair  []  Stretcher    Right Knee Exam     Range of Motion   Extension: 0   Flexion: 120     Other   Erythema: absent  Sensation: normal  Pulse: present  Swelling: none  Effusion: no effusion present      Left Knee Exam     Tenderness   Left knee tenderness location: Mild, diffuse, more tender to lateral aspect of knee/proximal tibia.    Range of Motion   Extension: 0   Flexion: 120     Other   Erythema: absent  Scars: present (well-healed surgical scar post arthroplasty)  Sensation: normal  Pulse: present  Swelling: mild  Effusion: no effusion present    Comments:  Mild pain and limitations with range of motion. Mild, generalized swelling present, improved from prior exam. No definitive effusion. No erythema. Slight warmth compared to right knee. No overt signs of infection noted. No ecchymosis. Skin is intact. Stable joint exam.             Xr Knee 1 Or 2 View Left    Result Date: 12/21/2018  Narrative: Lateral view of the left knee reveals no evidence of acute fracture or dislocation.  Postsurgical changes are noted post total knee arthroplasty.  Prostheses are well-positioned and well-aligned with no evidence of hardware failure or loosening noted.  No significant changes are noted when compared with prior images from 6/12/2018.  No acute bony radiologic abnormalities are noted at this time. 12/21/18 at 2:31 PM by EDUARDA Minor     Xr Knee Bilateral Ap  Standing    Result Date: 12/21/2018  Narrative: AP standing views of bilateral knees reveals postsurgical changes in the left knee post total knee arthroplasty.  Prostheses are well-positioned and well-aligned with no evidence of hardware failure or loosening noted.  There are degenerative changes present in the right knee with moderate loss of medial compartmental joint spacing and small marginal osteophyte formations noted in the medial compartment.  No evidence of acute fracture or dislocation.  No significant changes are noted when compared with prior images from 6/12/2018.  No acute bony radiologic abnormalities are noted at this time.12/21/18 at 2:28 PM by EDUARDA Minor    EXAMINATION:  Triple phase bone imaging examination      INDICATION: Left knee pain/swelling with hemarthrosis, post left  knee arthroplasty July 2017, Z96.652 Presence of left artificial  knee joint M25.562 Pain in left knee G89.29 Other chronic pain  M17.12 Unilateral primary osteoarthritis, left knee M25.062  Hemarthrosis, left knee  CLINICAL HISTORY:  COMPARISON EXAMINATIONS:   Knee radiograph 6/12/18      DOSE:  26 mCi of technetium labeled MDP (I.V.)   TECHNIQUE:  Triple Phase study, [facility] imaging           FINDINGS:           Phase I, dynamic flow images of the mid lower extremities  demonstrate differentially increased radiotracer activity on the  left, involving the femoral component primarily laterally.          Phase II, blood pool images of the mid lower extremities  demonstrate differentially increased activity on the left  primarily laterally, with mild linear increased activity along  the medial border of the prosthesis.          Phase III, delayed static images of the mid lower extremities  demonstrate in the posterior projection, persistent increased  activity involving the lateral border of the femoral component,  as well as focal activity involving the tibial plateau  posteriorly.  There is focal activity in the  patella.     IMPRESSION:  CONCLUSION:       1.  In the delayed images, posterior projection is persistent  focally increased activity involving the distal femur laterally  along the border with the prosthesis, in the border with the  prosthesis involving the lateral tibial plateau.   Correlation with plain film radiographs demonstrates marked bony  thinning along the lateral border of the femoral component and  mild periprosthetic lucency along the medial border of the  femoral component. The evaluation of the lateral tibial plateau  is radiographically unremarkable.     Summary:  The constellation of findings suggests possible early prosthesis  loosening along the lateral tibial plateau.  The scintigraphic activity involving the lateral femoral  component corresponds to irregular and markedly thinned bone  formation of uncertain clinical significance. However, the  periprosthetic lucency along the medial border of the femoral  component is suspicious for the presence of early prosthesis  loosening.     Electronically signed by:  IRON Panda MD  6/21/2018 3:04  PM CDT Workstation: [id number]      ASSESSMENT:    Diagnoses and all orders for this visit:    Presence of left artificial knee joint    Chronic pain of left knee    Primary osteoarthritis of left knee    PLAN    Patient complains of persistent left knee pain that has been intermittent, but ongoing since her left total knee arthroplasty on 7/24/2017. Patient reports no improvement since last visit 2 weeks ago with trial of modified weightbearing again with use of a cane. Patient is noted to not be using a cane in office today and she has a steady gait. She complains primarily of pain in the lateral aspect of the left knee when she stands and when she transitions to a sitting position and some when she initially starts to ambulate. She denies any instability symptoms. Previous bone scan indicated possible early prosthetic loosening. Patient had a previous  Synovasure evaluation that was negative. Recent recent of inflammatory markers were slightly elevated. Patient does not seem interested/motivated in a revision at this point. She describes symptoms that I would consider mild, yet she rates her pain 8/10. She continues to take Norco on occasion for pain, which helps. Patient has an upcoming appointment with Dr. Ballesteros on 1/31/2019. I have encouraged her to keep that appointment to discuss further treatment recommendations with Dr. Ballesteros at this point.     Return in 4 weeks (on 1/31/2019) for follow up with Dr. Ballesteros.      This document has been electronically signed by EDUARDA Minor on January 5, 2019 4:44 PM      EDUARDA Minor

## 2019-01-15 RX ORDER — HYDROCODONE BITARTRATE AND ACETAMINOPHEN 5; 325 MG/1; MG/1
1 TABLET ORAL EVERY 8 HOURS PRN
Qty: 30 TABLET | Refills: 0 | Status: SHIPPED | OUTPATIENT
Start: 2019-01-15 | End: 2019-01-25

## 2019-01-24 ENCOUNTER — OFFICE VISIT (OUTPATIENT)
Dept: WOUND CARE | Facility: HOSPITAL | Age: 64
End: 2019-01-24

## 2019-01-24 PROCEDURE — G0463 HOSPITAL OUTPT CLINIC VISIT: HCPCS

## 2019-01-31 ENCOUNTER — OFFICE VISIT (OUTPATIENT)
Dept: ORTHOPEDIC SURGERY | Facility: CLINIC | Age: 64
End: 2019-01-31

## 2019-01-31 ENCOUNTER — DOCUMENTATION (OUTPATIENT)
Dept: CARDIOLOGY | Facility: CLINIC | Age: 64
End: 2019-01-31

## 2019-01-31 VITALS — WEIGHT: 229 LBS | BODY MASS INDEX: 31.02 KG/M2 | HEIGHT: 72 IN

## 2019-01-31 DIAGNOSIS — M25.562 CHRONIC PAIN OF LEFT KNEE: Primary | ICD-10-CM

## 2019-01-31 DIAGNOSIS — E11.9 TYPE 2 DIABETES MELLITUS WITHOUT COMPLICATION, WITHOUT LONG-TERM CURRENT USE OF INSULIN (HCC): ICD-10-CM

## 2019-01-31 DIAGNOSIS — G89.29 CHRONIC PAIN OF LEFT KNEE: Primary | ICD-10-CM

## 2019-01-31 DIAGNOSIS — Z96.652 PRESENCE OF LEFT ARTIFICIAL KNEE JOINT: ICD-10-CM

## 2019-01-31 DIAGNOSIS — M17.12 PRIMARY OSTEOARTHRITIS OF LEFT KNEE: ICD-10-CM

## 2019-01-31 DIAGNOSIS — I82.4Y1 DVT, LOWER EXTREMITY, PROXIMAL, ACUTE, RIGHT (HCC): ICD-10-CM

## 2019-01-31 PROCEDURE — 99213 OFFICE O/P EST LOW 20 MIN: CPT | Performed by: ORTHOPAEDIC SURGERY

## 2019-01-31 NOTE — PROGRESS NOTES
"Mini Andersen is a 63 y.o. female returns for     Chief Complaint   Patient presents with   • Left Knee - Follow-up       HISTORY OF PRESENT ILLNESS: left knee pain. Patient states that her knee is feeling much better. Patient has had a bone scan of the left knee. She states that something pinches on the lateral aspect of her knee when she get up.   Occasionally using a cane  No fevers or chills  No new injury     CONCURRENT MEDICAL HISTORY:    The following portions of the patient's history were reviewed and updated as appropriate: allergies, current medications, past family history, past medical history, past social history, past surgical history and problem list.     ROS  No fevers or chills.  No chest pain or shortness of air.  No GI or  disturbances. Left knee pain.     PHYSICAL EXAMINATION:       Ht 182.9 cm (72\")   Wt 104 kg (229 lb)   LMP 02/23/1998 (Within Months) Comment: Hysterectomy  BMI 31.06 kg/m²     Physical Exam   Constitutional: She is oriented to person, place, and time. Vital signs are normal. She appears well-developed and well-nourished. She is active and cooperative. She does not appear ill. No distress.   HENT:   Head: Normocephalic.   Musculoskeletal: She exhibits edema (Mild, left knee) and tenderness (Mild, left knee). She exhibits no deformity.        Right knee: She exhibits no effusion.        Left knee: She exhibits no effusion.   Neurological: She is alert and oriented to person, place, and time.   Skin: Skin is warm, dry and intact. Capillary refill takes less than 2 seconds. No erythema.   Psychiatric: She has a normal mood and affect. Her speech is normal and behavior is normal. Judgment and thought content normal. Cognition and memory are normal.   Vitals reviewed.      GAIT:     []  Normal  [x]  Antalgic    Assistive device: [x]  None  []  Walker     []  Crutches  []  Cane     []  Wheelchair  []  Stretcher    Right Knee Exam     Range of Motion   Extension: 0   Flexion: " 120     Other   Erythema: absent  Sensation: normal  Pulse: present  Swelling: none  Effusion: no effusion present      Left Knee Exam     Tenderness   Left knee tenderness location: Mild, diffuse, more tender to lateral aspect of knee/proximal tibia.    Range of Motion   Extension: 0   Flexion: 120     Other   Erythema: absent  Scars: present (well-healed surgical scar post arthroplasty)  Sensation: normal  Pulse: present  Swelling: mild  Effusion: no effusion present    Comments:  Mild pain and limitations with range of motion. Mild, generalized swelling present, improved from prior exam. No definitive effusion. No erythema. Slight warmth compared to right knee. No overt signs of infection noted. No ecchymosis. Skin is intact. Stable joint exam.               EXAMINATION:  Triple phase bone imaging examination      INDICATION: Left knee pain/swelling with hemarthrosis, post left  knee arthroplasty July 2017, Z96.652 Presence of left artificial  knee joint M25.562 Pain in left knee G89.29 Other chronic pain  M17.12 Unilateral primary osteoarthritis, left knee M25.062  Hemarthrosis, left knee  CLINICAL HISTORY:  COMPARISON EXAMINATIONS:   Knee radiograph 6/12/18      DOSE:  26 mCi of technetium labeled MDP (I.V.)   TECHNIQUE:  Triple Phase study, [facility] imaging           FINDINGS:           Phase I, dynamic flow images of the mid lower extremities  demonstrate differentially increased radiotracer activity on the  left, involving the femoral component primarily laterally.          Phase II, blood pool images of the mid lower extremities  demonstrate differentially increased activity on the left  primarily laterally, with mild linear increased activity along  the medial border of the prosthesis.          Phase III, delayed static images of the mid lower extremities  demonstrate in the posterior projection, persistent increased  activity involving the lateral border of the femoral component,  as well as focal activity  involving the tibial plateau  posteriorly.  There is focal activity in the patella.     IMPRESSION:  CONCLUSION:       1.  In the delayed images, posterior projection is persistent  focally increased activity involving the distal femur laterally  along the border with the prosthesis, in the border with the  prosthesis involving the lateral tibial plateau.   Correlation with plain film radiographs demonstrates marked bony  thinning along the lateral border of the femoral component and  mild periprosthetic lucency along the medial border of the  femoral component. The evaluation of the lateral tibial plateau  is radiographically unremarkable.     Summary:  The constellation of findings suggests possible early prosthesis  loosening along the lateral tibial plateau.  The scintigraphic activity involving the lateral femoral  component corresponds to irregular and markedly thinned bone  formation of uncertain clinical significance. However, the  periprosthetic lucency along the medial border of the femoral  component is suspicious for the presence of early prosthesis  loosening.     Electronically signed by:  IRON Panda MD  6/21/2018 3:04  PM CDT Workstation: [id number]      AP standing views of bilateral knees reveals postsurgical changes in the left knee post total knee arthroplasty.  Prostheses are well-positioned and well-aligned with no evidence of hardware failure or loosening noted.  There are degenerative changes present in the right knee with moderate loss of medial compartmental joint spacing and small marginal osteophyte formations noted in the medial compartment.  No evidence of acute fracture or dislocation.  No significant changes are noted when compared with prior images from 6/12/2018.  No acute bony radiologic abnormalities are noted at this time.12/21/18 at 2:28 PM by EDUARDA Minor    Lateral view of the left knee reveals no evidence of acute fracture or dislocation.  Postsurgical changes are  noted post total knee arthroplasty.  Prostheses are well-positioned and well-aligned with no evidence of hardware failure or loosening noted.  No significant changes are noted when compared with prior images from 6/12/2018.  No acute bony radiologic abnormalities are noted at this time. 12/21/18 at 2:31 PM by EDUARDA Minor    ASSESSMENT:    Diagnoses and all orders for this visit:    Chronic pain of left knee    Presence of left artificial knee joint    Primary osteoarthritis of left knee    DVT, lower extremity, proximal, acute, right (CMS/HCC)    Type 2 diabetes mellitus without complication, without long-term current use of insulin (CMS/[facility])    PLAN  Has some pain and cramping feeling with walking that resolves with rest.   Will check MARCOS's to assess for claudication   Had MARCOS's in wound care last week and reportedly normal.   Possible vascular eval if not improving with use of cane.  Also will continue with strength and conditioning  Use cane for support  Because discussion for possible revision of left knee if symptoms worsen or fail to improve.  Steroid injection into right knee if pain continues.    Patient's Body mass index is 31.06 kg/m². BMI is above normal parameters. Recommendations include: exercise counseling and nutrition counseling.    Return in about 6 weeks (around 3/14/2019) for recheck.      This document has been electronically signed by Jose Ballesteros MD on January 31, 2019 8:23 AM      Jose Ballesteros MD

## 2019-01-31 NOTE — PROGRESS NOTES
Faxed pt assistance application today awaiting approval  Gave pt samples until approval letter is sent  eliquis 5mg 4 boxes  Lot [id number]  6/2021

## 2019-02-20 RX ORDER — HYDROCODONE BITARTRATE AND ACETAMINOPHEN 5; 325 MG/1; MG/1
1 TABLET ORAL EVERY 8 HOURS PRN
Qty: 30 TABLET | Refills: 0 | Status: SHIPPED | OUTPATIENT
Start: 2019-02-20 | End: 2019-03-27 | Stop reason: SDUPTHER

## 2019-03-12 ENCOUNTER — OFFICE VISIT (OUTPATIENT)
Dept: ORTHOPEDIC SURGERY | Facility: CLINIC | Age: 64
End: 2019-03-12

## 2019-03-12 VITALS — HEIGHT: 72 IN | BODY MASS INDEX: 31.15 KG/M2 | WEIGHT: 230 LBS

## 2019-03-12 DIAGNOSIS — G89.29 CHRONIC PAIN OF LEFT KNEE: ICD-10-CM

## 2019-03-12 DIAGNOSIS — M17.12 PRIMARY OSTEOARTHRITIS OF LEFT KNEE: ICD-10-CM

## 2019-03-12 DIAGNOSIS — M25.562 CHRONIC PAIN OF LEFT KNEE: ICD-10-CM

## 2019-03-12 DIAGNOSIS — Z96.652 PRESENCE OF LEFT ARTIFICIAL KNEE JOINT: Primary | ICD-10-CM

## 2019-03-12 DIAGNOSIS — E11.9 TYPE 2 DIABETES MELLITUS WITHOUT COMPLICATION, WITHOUT LONG-TERM CURRENT USE OF INSULIN (HCC): ICD-10-CM

## 2019-03-12 PROCEDURE — 99213 OFFICE O/P EST LOW 20 MIN: CPT | Performed by: ORTHOPAEDIC SURGERY

## 2019-03-12 RX ORDER — LEVOFLOXACIN 500 MG/1
500 TABLET, FILM COATED ORAL DAILY
Status: ON HOLD | COMMUNITY
End: 2020-05-05

## 2019-03-12 NOTE — PROGRESS NOTES
"Mini Andersen is a 63 y.o. female returns for     Chief Complaint   Patient presents with   • Left Knee - Follow-up       HISTORY OF PRESENT ILLNESS: left knee pain.  Not currently doing PT. Her pain is the same as her last visit here. Knee hurts worse when she is up walking around a lot. Feels like it is sore all the time but worse with prolonged walking  No fever or chills  Not any worse       CONCURRENT MEDICAL HISTORY:    The following portions of the patient's history were reviewed and updated as appropriate: allergies, current medications, past family history, past medical history, past social history, past surgical history and problem list.     ROS  No fevers or chills.  No chest pain or shortness of air.  No GI or  disturbances. Left knee pain.     PHYSICAL EXAMINATION:       Ht 182.9 cm (72\")   Wt 104 kg (230 lb)   LMP 02/23/1998 (Within Months) Comment: Hysterectomy  BMI 31.19 kg/m²     Physical Exam   Constitutional: She is oriented to person, place, and time. Vital signs are normal. She appears well-developed and well-nourished. She is active and cooperative. She does not appear ill. No distress.   HENT:   Head: Normocephalic.   Musculoskeletal: She exhibits edema (Mild, left knee) and tenderness (Mild, left knee). She exhibits no deformity.        Right knee: She exhibits no effusion.        Left knee: She exhibits no effusion.   Neurological: She is alert and oriented to person, place, and time.   Skin: Skin is warm, dry and intact. Capillary refill takes less than 2 seconds. No erythema.   Psychiatric: She has a normal mood and affect. Her speech is normal and behavior is normal. Judgment and thought content normal. Cognition and memory are normal.   Vitals reviewed.      GAIT:     []  Normal  [x]  Antalgic    Assistive device: [x]  None  []  Walker     []  Crutches  []  Cane     []  Wheelchair  []  Stretcher    Right Knee Exam     Range of Motion   Extension: 0   Flexion: 120     Other "   Erythema: absent  Sensation: normal  Pulse: present  Swelling: none  Effusion: no effusion present      Left Knee Exam     Tenderness   Left knee tenderness location: Mild, diffuse, more tender to lateral aspect of knee/proximal tibia.    Range of Motion   Extension: 0   Flexion: 120     Other   Erythema: absent  Scars: present (well-healed surgical scar post arthroplasty)  Sensation: normal  Pulse: present  Swelling: mild  Effusion: no effusion present    Comments:  Mild pain and limitations with range of motion. Mild, generalized swelling present, improved from prior exam. No definitive effusion. No erythema. Slight warmth compared to right knee. No overt signs of infection noted. No ecchymosis. Skin is intact. Stable joint exam.               EXAMINATION:  Triple phase bone imaging examination      INDICATION: Left knee pain/swelling with hemarthrosis, post left  knee arthroplasty July 2017, Z96.652 Presence of left artificial  knee joint M25.562 Pain in left knee G89.29 Other chronic pain  M17.12 Unilateral primary osteoarthritis, left knee M25.062  Hemarthrosis, left knee  CLINICAL HISTORY:  COMPARISON EXAMINATIONS:   Knee radiograph 6/12/18      DOSE:  26 mCi of technetium labeled MDP (I.V.)   TECHNIQUE:  Triple Phase study, [facility] imaging           FINDINGS:           Phase I, dynamic flow images of the mid lower extremities  demonstrate differentially increased radiotracer activity on the  left, involving the femoral component primarily laterally.          Phase II, blood pool images of the mid lower extremities  demonstrate differentially increased activity on the left  primarily laterally, with mild linear increased activity along  the medial border of the prosthesis.          Phase III, delayed static images of the mid lower extremities  demonstrate in the posterior projection, persistent increased  activity involving the lateral border of the femoral component,  as well as focal activity involving the  tibial plateau  posteriorly.  There is focal activity in the patella.     IMPRESSION:  CONCLUSION:       1.  In the delayed images, posterior projection is persistent  focally increased activity involving the distal femur laterally  along the border with the prosthesis, in the border with the  prosthesis involving the lateral tibial plateau.   Correlation with plain film radiographs demonstrates marked bony  thinning along the lateral border of the femoral component and  mild periprosthetic lucency along the medial border of the  femoral component. The evaluation of the lateral tibial plateau  is radiographically unremarkable.     Summary:  The constellation of findings suggests possible early prosthesis  loosening along the lateral tibial plateau.  The scintigraphic activity involving the lateral femoral  component corresponds to irregular and markedly thinned bone  formation of uncertain clinical significance. However, the  periprosthetic lucency along the medial border of the femoral  component is suspicious for the presence of early prosthesis  loosening.     Electronically signed by:  IORN Panda MD  6/21/2018 3:04  PM CDT Workstation: [id number]      AP standing views of bilateral knees reveals postsurgical changes in the left knee post total knee arthroplasty.  Prostheses are well-positioned and well-aligned with no evidence of hardware failure or loosening noted.  There are degenerative changes present in the right knee with moderate loss of medial compartmental joint spacing and small marginal osteophyte formations noted in the medial compartment.  No evidence of acute fracture or dislocation.  No significant changes are noted when compared with prior images from 6/12/2018.  No acute bony radiologic abnormalities are noted at this time.12/21/18 at 2:28 PM by EDUARDA Minor    Lateral view of the left knee reveals no evidence of acute fracture or dislocation.  Postsurgical changes are noted post  total knee arthroplasty.  Prostheses are well-positioned and well-aligned with no evidence of hardware failure or loosening noted.  No significant changes are noted when compared with prior images from 6/12/2018.  No acute bony radiologic abnormalities are noted at this time. 12/21/18 at 2:31 PM by EDUARDA Minor    ASSESSMENT:    Diagnoses and all orders for this visit:    Presence of left artificial knee joint    Chronic pain of left knee    Primary osteoarthritis of left knee    Type 2 diabetes mellitus without complication, without long-term current use of insulin (CMS/[facility])    Other orders  -     levoFLOXacin (LEVAQUIN) 500 MG tablet; Take 500 mg by mouth Daily.    PLAN    Continues to do all her activity.  Still mobile  Continue with strength and conditioning  Continued discussion for possible revision surgery if symptoms worsen.  Recheck 3 months with repeat xrays left knee      Patient's Body mass index is 31.19 kg/m². BMI is above normal parameters. Recommendations include: exercise counseling and nutrition counseling.    Return in about 3 months (around 6/12/2019) for Recheck with repeat xrays.          Jose Ballesteros MD

## 2019-03-26 ENCOUNTER — ANESTHESIA (OUTPATIENT)
Dept: GASTROENTEROLOGY | Facility: HOSPITAL | Age: 64
End: 2019-03-26

## 2019-03-26 ENCOUNTER — ANESTHESIA EVENT (OUTPATIENT)
Dept: GASTROENTEROLOGY | Facility: HOSPITAL | Age: 64
End: 2019-03-26

## 2019-03-26 ENCOUNTER — HOSPITAL ENCOUNTER (OUTPATIENT)
Facility: HOSPITAL | Age: 64
Setting detail: HOSPITAL OUTPATIENT SURGERY
Discharge: HOME | End: 2019-03-26
Attending: INTERNAL MEDICINE | Admitting: INTERNAL MEDICINE

## 2019-03-26 VITALS
DIASTOLIC BLOOD PRESSURE: 62 MMHG | HEIGHT: 72 IN | BODY MASS INDEX: 30.48 KG/M2 | RESPIRATION RATE: 18 BRPM | TEMPERATURE: 97.9 F | WEIGHT: 225 LBS | HEART RATE: 88 BPM | SYSTOLIC BLOOD PRESSURE: 110 MMHG | OXYGEN SATURATION: 94 %

## 2019-03-26 DIAGNOSIS — R19.5 NONSPECIFIC ABNORMAL FINDING IN STOOL CONTENTS: ICD-10-CM

## 2019-03-26 DIAGNOSIS — K31.84 GASTROPARESIS: ICD-10-CM

## 2019-03-26 LAB — GLUCOSE BLDC GLUCOMTR-MCNC: 94 MG/DL (ref 70–130)

## 2019-03-26 PROCEDURE — 25010000002 PROPOFOL 10 MG/ML EMULSION: Performed by: NURSE ANESTHETIST, CERTIFIED REGISTERED

## 2019-03-26 PROCEDURE — 25010000002 AZITHROMYCIN PER 500 MG: Performed by: INTERNAL MEDICINE

## 2019-03-26 PROCEDURE — 88305 TISSUE EXAM BY PATHOLOGIST: CPT | Performed by: INTERNAL MEDICINE

## 2019-03-26 PROCEDURE — 82962 GLUCOSE BLOOD TEST: CPT

## 2019-03-26 PROCEDURE — 88305 TISSUE EXAM BY PATHOLOGIST: CPT | Performed by: PATHOLOGY

## 2019-03-26 RX ORDER — PROPOFOL 10 MG/ML
VIAL (ML) INTRAVENOUS AS NEEDED
Status: DISCONTINUED | OUTPATIENT
Start: 2019-03-26 | End: 2019-03-26 | Stop reason: SURG

## 2019-03-26 RX ORDER — DEXTROSE AND SODIUM CHLORIDE 5; .45 G/100ML; G/100ML
20 INJECTION, SOLUTION INTRAVENOUS CONTINUOUS
Status: DISCONTINUED | OUTPATIENT
Start: 2019-03-26 | End: 2019-03-26 | Stop reason: HOSPADM

## 2019-03-26 RX ORDER — LIDOCAINE HYDROCHLORIDE 10 MG/ML
INJECTION, SOLUTION INFILTRATION; PERINEURAL AS NEEDED
Status: DISCONTINUED | OUTPATIENT
Start: 2019-03-26 | End: 2019-03-26 | Stop reason: SURG

## 2019-03-26 RX ADMIN — DEXTROSE AND SODIUM CHLORIDE 20 ML/HR: 5; 450 INJECTION, SOLUTION INTRAVENOUS at 10:02

## 2019-03-26 RX ADMIN — AZITHROMYCIN MONOHYDRATE 250 MG: 500 INJECTION, POWDER, LYOPHILIZED, FOR SOLUTION INTRAVENOUS at 10:07

## 2019-03-26 RX ADMIN — PROPOFOL 70 MG: 10 INJECTION, EMULSION INTRAVENOUS at 10:06

## 2019-03-26 RX ADMIN — PROPOFOL 40 MG: 10 INJECTION, EMULSION INTRAVENOUS at 10:12

## 2019-03-26 RX ADMIN — LIDOCAINE HYDROCHLORIDE 60 MG: 10 INJECTION, SOLUTION INFILTRATION; PERINEURAL at 10:06

## 2019-03-26 RX ADMIN — PROPOFOL 40 MG: 10 INJECTION, EMULSION INTRAVENOUS at 10:09

## 2019-03-26 NOTE — ANESTHESIA PREPROCEDURE EVALUATION
Anesthesia Evaluation     Patient summary reviewed and Nursing notes reviewed   history of anesthetic complications: PONV  NPO Solid Status: > 8 hours  NPO Liquid Status: > 2 hours           Airway   Mallampati: III  TM distance: <3 FB  Neck ROM: full  Possible difficult intubation  Dental    (+) lower dentures and upper dentures    Pulmonary - normal exam   (+) shortness of breath, sleep apnea,   Cardiovascular - normal exam  Exercise tolerance: poor (<4 METS)    (+) dysrhythmias, angina, PVD, DVT, hyperlipidemia,       Neuro/Psych  (+) numbness, psychiatric history Depression,     GI/Hepatic/Renal/Endo    (+)  GERD,  diabetes mellitus type 2, hypothyroidism,     Musculoskeletal     (+) back pain, chronic pain,   Abdominal   (+) obese,    Substance History - negative use     OB/GYN negative ob/gyn ROS   (-)  Pregnant        Other   (+) arthritis                       Anesthesia Plan    ASA 3     MAC     intravenous induction   Anesthetic plan, all risks, benefits, and alternatives have been provided, discussed and informed consent has been obtained with: patient.

## 2019-03-26 NOTE — H&P
Carl Herzog DO,[facility]  Gastroenterology  Hepatology  Endoscopy  Board Certified in Internal Medicine and gastroenterology  [address], [address]  [place]. [address]  - [phone]   F [phone]     GASTROENTEROLOGY HISTORY AND PHYSICAL  NOTE   CARL HERZOG DO.         SUBJECTIVE:   3/26/2019    Name: Mini Andersen  DOD: 1955        Chief Complaint:       Subjective : Dyspepsia, gastroparesis, occult blood in the stools, status post total colectomy    Patient is 64 y.o. female presents with desire for elective EGD with biopsy as well as flexible sigmoidoscopy.      ROS/HISTORY/ CURRENT MEDICATIONS/OBJECTIVE/VS/PE:   Review of Systems:  All systems unremarkable unless specified below.  Constitutional   HENT  Eyes   Respiratory    Cardiovascular  Gastrointestinal   Endocrine  Genitourinary    Musculoskeletal   Skin  Allergic/Immunologic    Neurological    Hematological  Psychiatric/Behavioral    History:     Past Medical History:   Diagnosis Date   • Acid reflux    • Acute peritonitis (CMS/HCC)    • Allergic rhinitis    • Arthritis    • Bee sting    • Blood clot in vein, right leg    • Borderline glaucoma    • Chronic bronchitis (CMS/HCC)    • Constipation     severe with an immotile colon      • Deep venous thrombosis (CMS/HCC)    • High cholesterol    • Hypothyroidism    • Intestinal volvulus (CMS/HCC)    • Muscle atrophy     O/E   • Nuclear senile cataract    • Nuclear senile cataract    • Polyneuropathy in diabetes (CMS/HCC)    • PONV (postoperative nausea and vomiting)    • Type 2 diabetes mellitus (CMS/HCC)     NO BDR   • Type 2 diabetes mellitus without complications (CMS/HCC)    • Unspecified disease of nail      Past Surgical History:   Procedure Laterality Date   • APPENDECTOMY     • BLADDER SUSPENSION     • COLECTOMY PARTIAL / TOTAL  05/22/2014    Subtotal colectomy with ineo-rectostomy.   • ENDOSCOPY N/A 5/1/2018    Procedure: ESOPHAGOGASTRODUODENOSCOPY;  Surgeon:  Carlos Eduardo Cyr DO;  Location: [facility] ENDOSCOPY;  Service: Gastroenterology   • HERNIA REPAIR      multiple   • HYSTERECTOMY     • INJECTION OF MEDICATION  2010    DEPO MEDROL   • JOINT REPLACEMENT Left 2017    knee   • KNEE ARTHROSCOPY Left    • LAPAROSCOPIC CHOLECYSTECTOMY     • SALPINGO OOPHORECTOMY Left    • SALPINGO OOPHORECTOMY Right    • TOTAL KNEE ARTHROPLASTY Left 2017    Procedure: TOTAL KNEE ARTHROPLASTY ATTUNE with adductor canal block;  Surgeon: Jose Ballesteros MD;  Location: [facility] OR;  Service:      History reviewed. No pertinent family history.  Social History     Tobacco Use   • Smoking status: Former Smoker     Packs/day: 0.00     Years: 0.00     Pack years: 0.00     Start date:      Last attempt to quit: 1980     Years since quittin.2   • Smokeless tobacco: Never Used   Substance Use Topics   • Alcohol use: No   • Drug use: No     Medications Prior to Admission   Medication Sig Dispense Refill Last Dose   • acetaminophen (TYLENOL) 500 MG tablet Take 1,000 mg by mouth Every 6 (Six) Hours As Needed for Mild Pain .   3/25/2019 at Unknown time   • Calcium Citrate (CITRACAL PO) Take 2 tablets by mouth Daily.   3/25/2019 at Unknown time   • carboxymethylcellulose (REFRESH PLUS) 0.5 % solution Administer 1 drop to both eyes Daily As Needed for Dry Eyes.   3/25/2019 at Unknown time   • clonazePAM (KlonoPIN) 0.5 MG tablet Take 0.5 mg by mouth 2 (Two) Times a Day As Needed for Anxiety.   3/25/2019 at Unknown time   • diphenhydrAMINE (BENADRYL) 25 mg capsule Take 25 mg by mouth Every 6 (Six) Hours As Needed.  6 3/25/2019 at Unknown time   • ferrous sulfate 324 (65 Fe) MG tablet delayed-release EC tablet Take 324 mg by mouth Daily With Breakfast.   3/25/2019 at Unknown time   • HYDROcodone-acetaminophen (NORCO) 5-325 MG per tablet Take 1 tablet by mouth Every 8 (Eight) Hours As Needed for Moderate Pain . 30 tablet 0 Past Week at Unknown time   • levoFLOXacin (LEVAQUIN) 500  MG tablet Take 500 mg by mouth Daily.   3/25/2019 at Unknown time   • levothyroxine (SYNTHROID, LEVOTHROID) 50 MCG tablet Take 50 mcg by mouth daily.  1 3/25/2019 at Unknown time   • losartan (COZAAR) 25 MG tablet Take 25 mg by mouth Daily.   3/25/2019 at Unknown time   • metFORMIN (GLUCOPHAGE) 500 MG tablet Take 500 mg by mouth Daily As Needed (For FSBS > 140).  1 3/25/2019 at Unknown time   • omeprazole (priLOSEC) 20 MG capsule Take 20 mg by mouth Daily.   3/25/2019 at Unknown time   • ondansetron (ZOFRAN) 4 MG tablet Take 4 mg by mouth Every 8 (Eight) Hours As Needed for Nausea or Vomiting.   3/25/2019 at Unknown time   • ONE TOUCH ULTRA TEST test strip TEST BID  5 3/25/2019 at Unknown time   • verapamil SR (CALAN-SR) 120 MG CR tablet Take 1 tablet by mouth Every Night. 30 tablet 4 3/25/2019 at Unknown time   • apixaban (ELIQUIS) 5 MG tablet tablet Take 1 tablet by mouth Every 12 (Twelve) Hours. 90 tablet 3 3/22/2019   • cyanocobalamin 1000 MCG/ML injection Inject  into the shoulder, thigh, or buttocks Every 28 (Twenty-Eight) Days.  12 More than a month at Unknown time   • vitamin D (ERGOCALCIFEROL) 48820 UNITS capsule capsule Take 50,000 Units by mouth Every 7 (Seven) Days.  1 3/23/2019     Allergies:  Bextra [valdecoxib]; Cephalosporins; Detrol la [tolterodine tartrate er]; Dicyclomine; Fluoxetine; Keflex [cephalexin]; Toprol xl [metoprolol]; Chocolate flavor; Omeprazole-sodium bicarbonate; Other; Amoxicillin; Aspirin; Ciprofloxin hcl [ciprofloxacin]; Claritin-d 12 hour [loratadine-pseudoephedrine er]; Codeine; Demerol [meperidine]; Levaquin [levofloxacin]; Lipitor [atorvastatin]; Macrobid [nitrofurantoin]; Morphine and related; Paxil [paroxetine hcl]; Sulfa antibiotics; Tape; Tramadol; Vioxx [rofecoxib]; Zegerid [omeprazole]; and Zoloft [sertraline hcl]    I have reviewed the patients medical history, surgical history and family history in the available medical record system.     Current Medications:      Current Facility-Administered Medications   Medication Dose Route Frequency Provider Last Rate Last Dose   • azithromycin (ZITHROMAX) 250 mg in sodium chloride 0.9 % 100 mL IVPB  250 mg Intravenous Once Carlos Eduardo Cyr DO       • dextrose 5 % and sodium chloride 0.45 % infusion  20 mL/hr Intravenous Continuous Carlos Eduardo Cyr DO           Objective     Physical Exam:   Temp:  [96 °F (35.6 °C)] 96 °F (35.6 °C)  Heart Rate:  [69] 69  Resp:  [18] 18  BP: (155)/(79) 155/79    Physical Exam:  General Appearance:    Alert, cooperative, in no acute distress   Head:    Normocephalic, without obvious abnormality, atraumatic   Eyes:            Lids and lashes normal, conjunctivae and sclerae normal, no   icterus, no pallor, corneas clear, PERRLA   Ears:    Ears appear intact with no abnormalities noted   Throat:   No oral lesions, no thrush, oral mucosa moist   Neck:   No adenopathy, supple, trachea midline, no thyromegaly, no     carotid bruit, no JVD   Back:     No kyphosis present, no scoliosis present, no skin lesions,       erythema or scars, no tenderness to percussion or                   palpation,   range of motion normal   Lungs:     Clear to auscultation,respirations regular, even and                   unlabored    Heart:    Regular rhythm and normal rate, normal S1 and S2, no            murmur, no gallop, no rub, no click   Breast Exam:    Deferred   Abdomen:     Normal bowel sounds, no masses, no organomegaly, soft        non-tender, non-distended, no guarding, no rebound                 tenderness   Genitalia:    Deferred   Extremities:   Moves all extremities well, no edema, no cyanosis, no              redness   Pulses:   Pulses palpable and equal bilaterally   Skin:   No bleeding, bruising or rash   Lymph nodes:   No palpable adenopathy   Neurologic:   Cranial nerves 2 - 12 grossly intact, sensation intact, DTR        present and equal bilaterally      Results Review:     Lab Results   Component  Value Date    WBC 5.62 06/26/2018    WBC 4.99 06/12/2018    WBC 6.36 09/18/2017    HGB 11.3 (L) 06/26/2018    HGB 12.6 06/12/2018    HGB 10.0 (L) 09/18/2017    HCT 36.2 06/26/2018    HCT 40.4 06/12/2018    HCT 31.4 (L) 09/18/2017     06/26/2018     06/12/2018     09/18/2017             No results found for: LIPASE  Lab Results   Component Value Date    INR 1.03 09/18/2017    INR 0.97 09/16/2017    INR 1.80 (H) 08/30/2017          Radiology Review:  Imaging Results (last 72 hours)     ** No results found for the last 72 hours. **           I reviewed the patient's new clinical results.  I reviewed the patient's new imaging results and agree with the interpretation.     ASSESSMENT/PLAN:   ASSESSMENT:  1.  Dyspepsia  2.  Gastroparesis  3.  Occult blood in the stools  4.  Status post total colectomy    PLAN:  1.  Esophagogastroduodenoscopy with biopsies  2.  Colonoscopy    Risk and benefits associated with the procedure are reviewed with the patient.  The patient wished to proceed     Carlos Eduardo Cyr DO  03/26/19  9:53 AM

## 2019-03-26 NOTE — ANESTHESIA POSTPROCEDURE EVALUATION
Patient: Mini Andersen    Procedure Summary     Date:  03/26/19 Room / Location:  [facility] ENDOSCOPY 2 / [facility] ENDOSCOPY    Anesthesia Start:  1004 Anesthesia Stop:  1015    Procedures:       ESOPHAGOGASTRODUODENOSCOPY (N/A )      SIGMOIDOSCOPY FLEXIBLE (N/A ) Diagnosis:       Nonspecific abnormal finding in stool contents      Gastroparesis      (Nonspecific abnormal finding in stool contents [R19.5])      (Gastroparesis [K31.84])    Surgeon:  Carlos Eduardo Cyr DO Provider:  Becca Humphries CRNA    Anesthesia Type:  MAC ASA Status:  3          Anesthesia Type: MAC  Last vitals  BP   155/79 (03/26/19 0944)   Temp   96 °F (35.6 °C) (03/26/19 0944)   Pulse   69 (03/26/19 0944)   Resp   18 (03/26/19 0944)     SpO2   97 % (03/26/19 0944)     Post Anesthesia Care and Evaluation    Patient location during evaluation: bedside  Patient participation: complete - patient participated  Level of consciousness: awake and awake and alert  Pain score: 0  Pain management: satisfactory to patient  Airway patency: patent  Anesthetic complications: No anesthetic complications  PONV Status: none  Cardiovascular status: acceptable and stable  Respiratory status: acceptable, room air and spontaneous ventilation  Hydration status: acceptable

## 2019-03-27 LAB
LAB AP CASE REPORT: NORMAL
PATH REPORT.FINAL DX SPEC: NORMAL
PATH REPORT.GROSS SPEC: NORMAL

## 2019-03-27 RX ORDER — HYDROCODONE BITARTRATE AND ACETAMINOPHEN 5; 325 MG/1; MG/1
1 TABLET ORAL EVERY 8 HOURS PRN
Qty: 30 TABLET | Refills: 0 | Status: SHIPPED | OUTPATIENT
Start: 2019-03-27 | End: 2019-04-29 | Stop reason: SDUPTHER

## 2019-03-28 ENCOUNTER — TELEPHONE (OUTPATIENT)
Dept: ORTHOPEDIC SURGERY | Facility: CLINIC | Age: 64
End: 2019-03-28

## 2019-04-29 RX ORDER — HYDROCODONE BITARTRATE AND ACETAMINOPHEN 5; 325 MG/1; MG/1
1 TABLET ORAL EVERY 8 HOURS PRN
Qty: 30 TABLET | Refills: 0 | Status: ON HOLD | OUTPATIENT
Start: 2019-04-29 | End: 2020-05-05

## 2019-06-04 RX ORDER — HYDROCODONE BITARTRATE AND ACETAMINOPHEN 5; 325 MG/1; MG/1
1 TABLET ORAL EVERY 8 HOURS PRN
Qty: 30 TABLET | Refills: 0 | OUTPATIENT
Start: 2019-06-04

## 2019-06-04 NOTE — TELEPHONE ENCOUNTER
Will not continue to write long term medication.  Tylenol 650mg every 6 hours should suffice.  JENN

## 2019-06-10 DIAGNOSIS — Z96.652 PRESENCE OF LEFT ARTIFICIAL KNEE JOINT: Primary | ICD-10-CM

## 2019-06-11 ENCOUNTER — OFFICE VISIT (OUTPATIENT)
Dept: ORTHOPEDIC SURGERY | Facility: CLINIC | Age: 64
End: 2019-06-11

## 2019-06-11 VITALS — WEIGHT: 230 LBS | BODY MASS INDEX: 31.15 KG/M2 | HEIGHT: 72 IN

## 2019-06-11 DIAGNOSIS — G89.29 CHRONIC PAIN OF LEFT KNEE: ICD-10-CM

## 2019-06-11 DIAGNOSIS — I82.4Y1 DVT, LOWER EXTREMITY, PROXIMAL, ACUTE, RIGHT (HCC): ICD-10-CM

## 2019-06-11 DIAGNOSIS — M25.562 CHRONIC PAIN OF LEFT KNEE: ICD-10-CM

## 2019-06-11 DIAGNOSIS — Z96.652 PRESENCE OF TOTAL LEFT KNEE JOINT PROSTHESIS: ICD-10-CM

## 2019-06-11 DIAGNOSIS — Z96.652 PRESENCE OF LEFT ARTIFICIAL KNEE JOINT: Primary | ICD-10-CM

## 2019-06-11 DIAGNOSIS — E11.9 TYPE 2 DIABETES MELLITUS WITHOUT COMPLICATION, WITHOUT LONG-TERM CURRENT USE OF INSULIN (HCC): ICD-10-CM

## 2019-06-11 DIAGNOSIS — G47.33 OBSTRUCTIVE SLEEP APNEA SYNDROME: ICD-10-CM

## 2019-06-11 PROCEDURE — 99213 OFFICE O/P EST LOW 20 MIN: CPT | Performed by: ORTHOPAEDIC SURGERY

## 2019-06-11 NOTE — PROGRESS NOTES
"Mini Andersen is a 64 y.o. female returns for     Chief Complaint   Patient presents with   • Left Knee - Follow-up, Pain       HISTORY OF PRESENT ILLNESS: f/u left knee pain. Repeat xrays done today.   Pain is improving.  She occasionally has episodes of relatively severe pain but for the most part is doing better.  No fevers or chills.  No numbness or tingling.       CONCURRENT MEDICAL HISTORY:    The following portions of the patient's history were reviewed and updated as appropriate: allergies, current medications, past family history, past medical history, past social history, past surgical history and problem list.     ROS  No fevers or chills.  No chest pain or shortness of air.  No GI or  disturbances.    PHYSICAL EXAMINATION:       Ht 182.9 cm (72\")   Wt 104 kg (230 lb)   LMP 02/23/1998 (Within Months) Comment: Hysterectomy  BMI 31.19 kg/m²     Physical Exam   Constitutional: She is oriented to person, place, and time. She appears well-developed and well-nourished.   Neurological: She is alert and oriented to person, place, and time.   Psychiatric: She has a normal mood and affect. Her behavior is normal. Judgment and thought content normal.       GAIT:     []  Normal  [x]  Antalgic    Assistive device: [x]  None  []  Walker     []  Crutches  []  Cane     []  Wheelchair  []  Stretcher    Left Knee Exam     Muscle Strength   The patient has normal left knee strength.    Tenderness   The patient is experiencing no tenderness.     Range of Motion   Extension: 0   Flexion: 100     Tests   Varus: negative Valgus: negative  Drawer:  Anterior - negative         Other   Erythema: absent  Scars: present  Sensation: normal  Pulse: present  Swelling: none              Xr Knee 1 Or 2 View Left    Result Date: 6/11/2019  Narrative: Ordering Provider:  Jose Ballesteros MD Ordering Diagnosis/Indication:  Presence of left artificial knee joint Procedure:  XR KNEE 1 OR 2 VW LEFT Exam Date:  6/11/19 " COMPARISON:  Todays X-rays were compared to previous images dated December 20, 2018.     Impression:  AP bilateral standing of the knees with lateral of the left knee shows acceptable position and alignment of a left total knee arthroplasty with no sign of implant loosening or failure.  No acute bony ab normality is noted.  Mild to moderate arthritic change noted in the medial aspect of the right knee with mild varus positioning.  No interval change from prior x-ray. Jose Ballesteros MD 6/11/19     Xr Knee Bilateral Ap Standing    Result Date: 6/11/2019  Narrative: Ordering Provider:  Jose Ballesteros MD Ordering Diagnosis/Indication:  Presence of left artificial knee joint Procedure:  XR KNEE BILATERAL AP STANDING Exam Date:  6/11/19 COMPARISON:  Todays X-rays were compared to previous images dated December 20, 2018.     Impression:  AP bilateral standing of the knees with lateral of the left knee shows acceptable position and alignment of a left total knee arthroplasty with no sign of implant loosening or failure.  No acute bony ab normality is noted.  Mild to moderate arthritic change noted in the medial aspect of the right knee with mild varus positioning.  No interval change from prior x-ray. Jose Ballesteros MD 6/11/19             ASSESSMENT:    Diagnoses and all orders for this visit:    Presence of left artificial knee joint    Chronic pain of left knee    DVT, lower extremity, proximal, acute, right (CMS/HCC)    Type 2 diabetes mellitus without complication, without long-term current use of insulin (CMS/HCC)    Obstructive sleep apnea syndrome    Presence of total left knee joint prosthesis          PLAN    She has no surgical indication at this time.  We discussed no narcotic usage.  She will continue with strengthening and conditioning exercises as tolerated.  Follow-up on an as-needed basis.    Patient's Body mass index is 31.19 kg/m². BMI is above normal parameters. Recommendations  include: exercise counseling and nutrition counseling.    Return if symptoms worsen or fail to improve, for recheck.    Jose Ballesteros MD

## 2019-07-21 ENCOUNTER — APPOINTMENT (OUTPATIENT)
Dept: ULTRASOUND IMAGING | Facility: HOSPITAL | Age: 64
End: 2019-07-21

## 2019-07-21 ENCOUNTER — HOSPITAL ENCOUNTER (EMERGENCY)
Facility: HOSPITAL | Age: 64
Discharge: HOME | End: 2019-07-21
Attending: EMERGENCY MEDICINE | Admitting: EMERGENCY MEDICINE

## 2019-07-21 VITALS
TEMPERATURE: 98.4 F | OXYGEN SATURATION: 96 % | HEIGHT: 72 IN | BODY MASS INDEX: 31.42 KG/M2 | SYSTOLIC BLOOD PRESSURE: 119 MMHG | DIASTOLIC BLOOD PRESSURE: 57 MMHG | WEIGHT: 232 LBS | RESPIRATION RATE: 20 BRPM | HEART RATE: 70 BPM

## 2019-07-21 DIAGNOSIS — R11.0 NAUSEA: ICD-10-CM

## 2019-07-21 DIAGNOSIS — M79.89 RIGHT LEG SWELLING: Primary | ICD-10-CM

## 2019-07-21 LAB
ALBUMIN SERPL-MCNC: 3.6 G/DL (ref 3.5–5.2)
ALBUMIN/GLOB SERPL: 1.4 G/DL
ALP SERPL-CCNC: 101 U/L (ref 39–117)
ALT SERPL W P-5'-P-CCNC: 45 U/L (ref 1–33)
ANION GAP SERPL CALCULATED.3IONS-SCNC: 12 MMOL/L (ref 5–15)
AST SERPL-CCNC: 50 U/L (ref 1–32)
BASOPHILS # BLD AUTO: 0.03 10*3/MM3 (ref 0–0.2)
BASOPHILS NFR BLD AUTO: 0.5 % (ref 0–1.5)
BILIRUB SERPL-MCNC: 0.7 MG/DL (ref 0.2–1.2)
BUN BLD-MCNC: 10 MG/DL (ref 8–23)
BUN/CREAT SERPL: 13.2 (ref 7–25)
CALCIUM SPEC-SCNC: 9 MG/DL (ref 8.6–10.5)
CHLORIDE SERPL-SCNC: 106 MMOL/L (ref 98–107)
CO2 SERPL-SCNC: 25 MMOL/L (ref 22–29)
CREAT BLD-MCNC: 0.76 MG/DL (ref 0.57–1)
DEPRECATED RDW RBC AUTO: 44.3 FL (ref 37–54)
EOSINOPHIL # BLD AUTO: 0.16 10*3/MM3 (ref 0–0.4)
EOSINOPHIL NFR BLD AUTO: 2.9 % (ref 0.3–6.2)
ERYTHROCYTE [DISTWIDTH] IN BLOOD BY AUTOMATED COUNT: 13.6 % (ref 12.3–15.4)
GFR SERPL CREATININE-BSD FRML MDRD: 77 ML/MIN/1.73
GLOBULIN UR ELPH-MCNC: 2.6 GM/DL
GLUCOSE BLD-MCNC: 106 MG/DL (ref 65–99)
HCT VFR BLD AUTO: 37.3 % (ref 34–46.6)
HGB BLD-MCNC: 11.8 G/DL (ref 12–15.9)
IMM GRANULOCYTES # BLD AUTO: 0.02 10*3/MM3 (ref 0–0.05)
IMM GRANULOCYTES NFR BLD AUTO: 0.4 % (ref 0–0.5)
LYMPHOCYTES # BLD AUTO: 1.47 10*3/MM3 (ref 0.7–3.1)
LYMPHOCYTES NFR BLD AUTO: 26.9 % (ref 19.6–45.3)
MCH RBC QN AUTO: 28.2 PG (ref 26.6–33)
MCHC RBC AUTO-ENTMCNC: 31.6 G/DL (ref 31.5–35.7)
MCV RBC AUTO: 89 FL (ref 79–97)
MONOCYTES # BLD AUTO: 0.42 10*3/MM3 (ref 0.1–0.9)
MONOCYTES NFR BLD AUTO: 7.7 % (ref 5–12)
NEUTROPHILS # BLD AUTO: 3.37 10*3/MM3 (ref 1.7–7)
NEUTROPHILS NFR BLD AUTO: 61.6 % (ref 42.7–76)
NRBC BLD AUTO-RTO: 0 /100 WBC (ref 0–0.2)
PLATELET # BLD AUTO: 116 10*3/MM3 (ref 140–450)
PMV BLD AUTO: 10.5 FL (ref 6–12)
POTASSIUM BLD-SCNC: 4.4 MMOL/L (ref 3.5–5.2)
PROT SERPL-MCNC: 6.2 G/DL (ref 6–8.5)
RBC # BLD AUTO: 4.19 10*6/MM3 (ref 3.77–5.28)
RBC MORPH BLD: NORMAL
SMALL PLATELETS BLD QL SMEAR: ADEQUATE
SODIUM BLD-SCNC: 143 MMOL/L (ref 136–145)
WBC MORPH BLD: NORMAL
WBC NRBC COR # BLD: 5.47 10*3/MM3 (ref 3.4–10.8)

## 2019-07-21 PROCEDURE — 85007 BL SMEAR W/DIFF WBC COUNT: CPT | Performed by: PHYSICIAN ASSISTANT

## 2019-07-21 PROCEDURE — 99284 EMERGENCY DEPT VISIT MOD MDM: CPT

## 2019-07-21 PROCEDURE — 80053 COMPREHEN METABOLIC PANEL: CPT | Performed by: PHYSICIAN ASSISTANT

## 2019-07-21 PROCEDURE — 85025 COMPLETE CBC W/AUTO DIFF WBC: CPT | Performed by: PHYSICIAN ASSISTANT

## 2019-07-21 PROCEDURE — 93971 EXTREMITY STUDY: CPT

## 2019-07-21 RX ORDER — ONDANSETRON 4 MG/1
4 TABLET, ORALLY DISINTEGRATING ORAL ONCE
Status: COMPLETED | OUTPATIENT
Start: 2019-07-21 | End: 2019-07-21

## 2019-07-21 RX ADMIN — ONDANSETRON 4 MG: 4 TABLET, ORALLY DISINTEGRATING ORAL at 18:55

## 2019-09-19 ENCOUNTER — OFFICE VISIT (OUTPATIENT)
Dept: ORTHOPEDIC SURGERY | Facility: CLINIC | Age: 64
End: 2019-09-19

## 2019-09-19 VITALS — HEIGHT: 72 IN | WEIGHT: 237 LBS | BODY MASS INDEX: 32.1 KG/M2

## 2019-09-19 DIAGNOSIS — M25.561 CHRONIC PAIN OF RIGHT KNEE: Primary | ICD-10-CM

## 2019-09-19 DIAGNOSIS — M25.562 CHRONIC PAIN OF LEFT KNEE: ICD-10-CM

## 2019-09-19 DIAGNOSIS — G89.4 CHRONIC PAIN SYNDROME: ICD-10-CM

## 2019-09-19 DIAGNOSIS — G89.29 CHRONIC PAIN OF LEFT KNEE: ICD-10-CM

## 2019-09-19 DIAGNOSIS — G89.29 CHRONIC PAIN OF RIGHT KNEE: Primary | ICD-10-CM

## 2019-09-19 DIAGNOSIS — Z96.652 PRESENCE OF LEFT ARTIFICIAL KNEE JOINT: ICD-10-CM

## 2019-09-19 DIAGNOSIS — M17.11 PRIMARY OSTEOARTHRITIS OF RIGHT KNEE: ICD-10-CM

## 2019-09-19 PROCEDURE — 99213 OFFICE O/P EST LOW 20 MIN: CPT | Performed by: NURSE PRACTITIONER

## 2019-09-19 PROCEDURE — 20610 DRAIN/INJ JOINT/BURSA W/O US: CPT | Performed by: NURSE PRACTITIONER

## 2019-09-19 RX ADMIN — LIDOCAINE HYDROCHLORIDE 2 ML: 10 INJECTION, SOLUTION EPIDURAL; INFILTRATION; INTRACAUDAL; PERINEURAL at 14:10

## 2019-09-19 RX ADMIN — TRIAMCINOLONE ACETONIDE 40 MG: 40 INJECTION, SUSPENSION INTRA-ARTICULAR; INTRAMUSCULAR at 14:10

## 2019-09-19 NOTE — PROGRESS NOTES
"Mini Andersen is a 64 y.o. female returns for     Chief Complaint   Patient presents with   • Right Knee - Follow-up       HISTORY OF PRESENT ILLNESS: Patient presents to office for follow-up of chronic bilateral knee pain.  This has been an ongoing issue for several months. Patient had a left total knee arthroplasty performed on 7/24/2017 with a normal postoperative recovery.  However, she has complained consistently of recurrent episodes of left knee pain since the surgery.  She complains today of right knee pain also with no known injury.  Patient requests an evaluation for a right knee injection to help with her pain.  Patient has tried taking Tylenol without improvement.  She cannot take oral NSAIDs due to her current use of Eliquis.  Patient states that she needs pain medication to help with her knees but does not have any.  She requests a referral to pain management today.     CONCURRENT MEDICAL HISTORY:    The following portions of the patient's history were reviewed and updated as appropriate: allergies, current medications, past family history, past medical history, past social history, past surgical history and problem list.     ROS  No fevers or chills.  No chest pain or shortness of air.  No GI or  disturbances. Right knee pain. Left knee pain.     PHYSICAL EXAMINATION:       Ht 182.9 cm (72\")   Wt 108 kg (237 lb)   LMP 02/23/1998 (Within Months) Comment: Hysterectomy  BMI 32.14 kg/m²     Physical Exam   Constitutional: She is oriented to person, place, and time. Vital signs are normal. She appears well-developed and well-nourished. She is active and cooperative. She does not appear ill. No distress.   HENT:   Head: Normocephalic.   Pulmonary/Chest: Effort normal. No respiratory distress.   Abdominal: Soft. She exhibits no distension.   Musculoskeletal: She exhibits edema (Mild, left knee) and tenderness (Mild, left knee, right knee). She exhibits no deformity.        Right knee: She exhibits " no effusion.        Left knee: She exhibits no effusion.   Neurological: She is alert and oriented to person, place, and time. GCS eye subscore is 4. GCS verbal subscore is 5. GCS motor subscore is 6.   Skin: Skin is warm, dry and intact. Capillary refill takes less than 2 seconds. No erythema.   Psychiatric: She has a normal mood and affect. Her speech is normal and behavior is normal. Judgment and thought content normal. Cognition and memory are normal.   Vitals reviewed.      GAIT:     []  Normal  [x]  Antalgic    Assistive device: [x]  None  []  Walker     []  Crutches  []  Cane     []  Wheelchair  []  Stretcher    Right Knee Exam     Tenderness   Right knee tenderness location: Mild, diffuse.    Range of Motion   Extension: 0   Flexion: 120     Tests   Viktoria:  Medial - negative Lateral - negative  Varus: negative Valgus: negative    Other   Erythema: absent  Sensation: normal  Pulse: present  Swelling: none  Effusion: no effusion present    Comments:  Mild pain and limitations with range of motion.  No swelling or effusion noted.  No erythema.  No warmth.  No signs of infection noted.  Stable joint exam.      Left Knee Exam     Tenderness   Left knee tenderness location: Mild, diffuse, more tender to lateral aspect of knee/proximal tibia.    Range of Motion   Extension: 0   Flexion: 120     Other   Erythema: absent  Scars: present (well-healed surgical scar post arthroplasty)  Sensation: normal  Pulse: present  Swelling: mild  Effusion: no effusion present    Comments:  Mild pain and limitations with range of motion. Mild, generalized swelling noted. No definitive effusion. No erythema. No signs of infection noted.  Skin is intact. Stable joint exam.             Xr Knee 1 Or 2 View Right    Result Date: 9/19/2019  Narrative: Lateral view of the right knee reveals degenerative changes in the patellofemoral joint.  There is loss of joint spacing with osteophyte formations noted to the superior and inferior  poles of the patella and in the distal femur both anterior and posteriorly.  The knee joint appears well-aligned.  No evidence of fracture or dislocation is noted.  Soft tissues appear unremarkable.  No joint effusion is seen.  No acute bony radiologic abnormalities are noted at this time.  No comparison images are available for review.09/19/19 at 5:07 PM by EDUARDA Minor     Xr Knee Bilateral Ap Standing    Result Date: 9/19/2019  Narrative: AP standing view of bilateral knees reveals no evidence of acute fracture or dislocation.  There are postsurgical changes noted in the left knee post total knee arthroplasty.  Prostheses are well-positioned and well-aligned with no evidence of hardware failure or loosening noted.  There are degenerative changes noted in the right knee.  There is mild to moderate loss of medial compartmental joint spacing noted in the right knee with mild varus positioning.  Soft tissues appear unremarkable.  No significant changes are noted when compared to prior images from 6/11/2019.09/19/19 at 5:09 PM by EDUARDA Minor     Large Joint Arthrocentesis: R knee  Date/Time: 9/19/2019 2:10 PM  Consent given by: patient  Site marked: site marked  Timeout: Immediately prior to procedure a time out was called to verify the correct patient, procedure, equipment, support staff and site/side marked as required   Supporting Documentation  Indications: pain and diagnostic evaluation   Procedure Details  Location: knee - R knee  Preparation: Patient was prepped and draped in the usual sterile fashion  Needle size: 22 G  Approach: anterolateral  Medications administered: 2 mL lidocaine PF 1% 1 %; 40 mg triamcinolone acetonide 40 MG/ML  Patient tolerance: patient tolerated the procedure well with no immediate complications            ASSESSMENT:    Diagnoses and all orders for this visit:    Chronic pain of right knee  -     XR Knee 1 or 2 View Right; Future  -     XR Knee Bilateral AP Standing;  Future  -     Large Joint Arthrocentesis: R knee  -     Ambulatory Referral to Hospital Pain Management Department    Primary osteoarthritis of right knee  -     XR Knee 1 or 2 View Right; Future  -     XR Knee Bilateral AP Standing; Future  -     Large Joint Arthrocentesis: R knee  -     Ambulatory Referral to Hospital Pain Management Department    Chronic pain syndrome  -     Ambulatory Referral to Hospital Pain Management Department    Presence of left artificial knee joint  -     Ambulatory Referral to Hospital Pain Management Department    Chronic pain of left knee  -     Ambulatory Referral to Hospital Pain Management Department    PLAN    AP standing x-ray of bilateral knees with a lateral x-ray of the right knee performed in office today and reviewed with no acute findings noted.  We discussed the degenerative changes in the right knee, primarily in the patellofemoral joint.  There is also some mild to moderate loss of medial compartmental joint spacing noted in the right knee.  Patient complains of increased right knee pain in recent months.  She denies any known injury.  Recommend an intra-articular injection of steroid today to the right knee for management of joint pain/inflammation.  Patient continues to complain of chronic pain in the left knee.  This has been an ongoing issue since undergoing left total knee arthroplasty on 7/24/2017.  Patient has been evaluated multiple times in the past for her chronic left knee pain and was last seen in office per Dr. Ballesteros on 6/11/2019.  Recommend continue with Tylenol as needed for pain.  Patient states she takes the Tylenol but it does not help her pain.  Patient is unable to take oral NSAIDs due to her current use of Eliquis.  She states she needs pain medication for her chronic knee pain and requests a referral to pain management today.  We discussed pain management options both locally and in the surrounding areas.  Patient wants to see someone in  [place].  I have referred her to advanced pain care clinic.  No pain medication as prescribed today.  Recommend modified weightbearing with use of a cane as needed.  Recommend rest and activity modification during times of increased pain.  Recommend elevation and ice therapy to the bilateral knees as needed to minimize pain/swelling/inflammation.  Follow-up in 3 months as needed for recheck of right knee and repeat intra-articular injection of steroid as needed/indicated.  Follow-up with Dr. Ballesteros as needed regarding continued left knee pain and any further treatment recommendations.    Return in about 3 months (around 12/19/2019), or if symptoms worsen or fail to improve, for Recheck.        This document has been electronically signed by EDUARDA Minor on September 23, 2019 11:46 AM      EDUARDA Minor

## 2019-09-23 RX ORDER — TRIAMCINOLONE ACETONIDE 40 MG/ML
40 INJECTION, SUSPENSION INTRA-ARTICULAR; INTRAMUSCULAR
Status: COMPLETED | OUTPATIENT
Start: 2019-09-19 | End: 2019-09-19

## 2019-09-23 RX ORDER — LIDOCAINE HYDROCHLORIDE 10 MG/ML
2 INJECTION, SOLUTION EPIDURAL; INFILTRATION; INTRACAUDAL; PERINEURAL
Status: COMPLETED | OUTPATIENT
Start: 2019-09-19 | End: 2019-09-19

## 2019-10-24 DIAGNOSIS — I49.9 CARDIAC ARRHYTHMIA, UNSPECIFIED CARDIAC ARRHYTHMIA TYPE: Primary | ICD-10-CM

## 2019-12-02 DIAGNOSIS — I49.9 CARDIAC ARRHYTHMIA, UNSPECIFIED CARDIAC ARRHYTHMIA TYPE: Primary | ICD-10-CM

## 2019-12-03 ENCOUNTER — OFFICE VISIT (OUTPATIENT)
Dept: CARDIOLOGY | Facility: CLINIC | Age: 64
End: 2019-12-03

## 2019-12-03 VITALS
WEIGHT: 233.4 LBS | HEIGHT: 72 IN | HEART RATE: 95 BPM | BODY MASS INDEX: 31.61 KG/M2 | OXYGEN SATURATION: 95 % | DIASTOLIC BLOOD PRESSURE: 80 MMHG | SYSTOLIC BLOOD PRESSURE: 138 MMHG

## 2019-12-03 DIAGNOSIS — R00.2 PALPITATIONS: Primary | ICD-10-CM

## 2019-12-03 PROCEDURE — 99214 OFFICE O/P EST MOD 30 MIN: CPT | Performed by: INTERNAL MEDICINE

## 2019-12-03 PROCEDURE — 93000 ELECTROCARDIOGRAM COMPLETE: CPT | Performed by: INTERNAL MEDICINE

## 2019-12-03 NOTE — PROGRESS NOTES
[facility] Cardiology  OFFICE NOTE    Cardiovascular Medicine  Andrey Dubon M.D., RPVI         No referring provider defined for this encounter.    Thank you for asking me to see Mini Andersen for palpitations.    History of Present Illness  This is a 64 y.o. female with:    1. Palpitations    2. Pure hypercholesterolemia    3. Obstructive sleep apnea syndrome    4. Deep vein thrombosis (DVT) of femoral vein of both lower extremities, unspecified chronicity (CMS/[facility])    5. Type 2 diabetes mellitus without complication, without long-term current use of insulin (CMS/[facility])          Chief complaint -Palpitations        History of present Illness- 64-year-old lady with history of diabetes, hypertension and hyperlipidemia had bilateral knee replacement surgery and subsequently she developed DVT and she had DVT before and after surgery and she is going to be on chronic anticoagulation.      Patient had palpitations after her surgery, when she would get up and walk around.  Most likely sinus tachycardia, no diagnosis of arrhythmias, subsequently patient was started on verapamil with improvement in her symptoms.  However patient is here for follow-up and reported that she has been out of her verapamil for the last 1 month without any recurrence of palpitations.  She will have occasional palpitations at night.  She wants to come off the verapamil at this point currently denying any chest pain or shortness of breath  No bleeding problems from Eliquis      Review of Systems - ROS  Constitution: Negative for weakness, weight gain and weight loss.   HENT: Negative for congestion.    Eyes: Negative for blurred vision.   Cardiovascular: As mentioned above  Respiratory: Negative for cough and hemoptysis.    Endocrine: Negative for polydipsia and polyuria.   Hematologic/Lymphatic: Negative for bleeding problem. Does not bruise/bleed easily.   Skin: Negative for flushing.   Musculoskeletal: Negative for neck  pain and stiffness.   Gastrointestinal: Negative for abdominal pain, diarrhea, jaundice, melena, nausea and vomiting.   Genitourinary: Negative for dysuria and hematuria.   Neurological: Negative for dizziness, focal weakness and numbness.   Psychiatric/Behavioral: Negative for altered mental status and depression.          All other systems were reviewed and were negative.    family history is not on file.     reports that she quit smoking about 39 years ago. She started smoking about 49 years ago. She smoked 0.00 packs per day for 0.00 years. She has never used smokeless tobacco. She reports that she does not drink alcohol or use drugs.    Allergies   Allergen Reactions   • Bextra [Valdecoxib] Shortness Of Breath     Shortness of breath   • Cephalosporins Shortness Of Breath   • Detrol La [Tolterodine Tartrate Er] Shortness Of Breath   • Dicyclomine Shortness Of Breath     Shortness of breath   • Fluoxetine Shortness Of Breath   • Keflex [Cephalexin] Shortness Of Breath   • Toprol Xl [Metoprolol] Anaphylaxis   • Chocolate Flavor    • Omeprazole-Sodium Bicarbonate Unknown (See Comments)   • Other      Fresh flowers causes throat swelling   • Amoxicillin Rash   • Aspirin Rash   • Ciprofloxin Hcl [Ciprofloxacin] Rash   • Claritin-D 12 Hour [Loratadine-Pseudoephedrine Er] Rash   • Codeine Rash   • Demerol [Meperidine] Rash   • Levaquin [Levofloxacin] Rash     Was told not allergic   • Lipitor [Atorvastatin] Other (See Comments)     cramping   • Macrobid [Nitrofurantoin] Rash   • Morphine And Related Rash   • Paxil [Paroxetine Hcl] Rash   • Sulfa Antibiotics Rash   • Tape Rash   • Tramadol Rash   • Vioxx [Rofecoxib] Rash   • Zegerid [Omeprazole] Rash   • Zoloft [Sertraline Hcl] Rash         Current Outpatient Medications:   •  acetaminophen (TYLENOL) 500 MG tablet, Take 1,000 mg by mouth Every 6 (Six) Hours As Needed for Mild Pain ., Disp: , Rfl:   •  apixaban (ELIQUIS) 5 MG tablet tablet, Take 1 tablet by mouth Every  "12 (Twelve) Hours., Disp: 90 tablet, Rfl: 3  •  Calcium Citrate (CITRACAL PO), Take 2 tablets by mouth Daily., Disp: , Rfl:   •  clonazePAM (KlonoPIN) 0.5 MG tablet, Take 0.5 mg by mouth 2 (Two) Times a Day As Needed for Anxiety., Disp: , Rfl:   •  diphenhydrAMINE (BENADRYL) 25 mg capsule, Take 25 mg by mouth Every 6 (Six) Hours As Needed., Disp: , Rfl: 6  •  ferrous sulfate 324 (65 Fe) MG tablet delayed-release EC tablet, Take 324 mg by mouth Daily With Breakfast., Disp: , Rfl:   •  levothyroxine (SYNTHROID, LEVOTHROID) 50 MCG tablet, Take 50 mcg by mouth daily., Disp: , Rfl: 1  •  losartan (COZAAR) 25 MG tablet, Take 25 mg by mouth Daily., Disp: , Rfl:   •  metFORMIN (GLUCOPHAGE) 500 MG tablet, Take 500 mg by mouth Daily As Needed (For FSBS > 140)., Disp: , Rfl: 1  •  omeprazole (priLOSEC) 20 MG capsule, Take 20 mg by mouth Daily., Disp: , Rfl:   •  ondansetron (ZOFRAN) 4 MG tablet, Take 4 mg by mouth Every 8 (Eight) Hours As Needed for Nausea or Vomiting., Disp: , Rfl:   •  ONE TOUCH ULTRA TEST test strip, TEST BID, Disp: , Rfl: 5  •  vitamin D (ERGOCALCIFEROL) 31147 UNITS capsule capsule, Take 50,000 Units by mouth Every 7 (Seven) Days., Disp: , Rfl: 1  •  carboxymethylcellulose (REFRESH PLUS) 0.5 % solution, Administer 1 drop to both eyes Daily As Needed for Dry Eyes., Disp: , Rfl:   •  cyanocobalamin 1000 MCG/ML injection, Inject  into the shoulder, thigh, or buttocks Every 28 (Twenty-Eight) Days., Disp: , Rfl: 12  •  HYDROcodone-acetaminophen (NORCO) 5-325 MG per tablet, Take 1 tablet by mouth Every 8 (Eight) Hours As Needed for Moderate Pain ., Disp: 30 tablet, Rfl: 0  •  levoFLOXacin (LEVAQUIN) 500 MG tablet, Take 500 mg by mouth Daily., Disp: , Rfl:     Physical Exam:  Vitals:    12/03/19 0858   BP: 138/80   BP Location: Left arm   Patient Position: Sitting   Cuff Size: Adult   Pulse: 95   SpO2: 95%   Weight: 106 kg (233 lb 6.4 oz)   Height: 182.9 cm (72.01\")   PainSc: 0-No pain     Current Pain Level: " none  Pulse Ox: Normal  on room air  General: alert, appears stated age and cooperative     Body Habitus: well-nourished    HEENT: Head: Normocephalic, no lesions, without obvious abnormality. No arcus senilis, xanthelasma or xanthomas.    Neuro: alert, oriented x3  Pulses: 2+ and symmetric  JVP: Volume/Pulsation: Normal.  Normal waveforms.   Appropriate inspiratory decrease.  No Kussmaul's. No Padmaja's.   Carotid Exam: no bruit normal pulsation bilaterally   Carotid Volume: normal.     Respirations: no increased work of breathing   Chest:  Normal    Pulmonary:Normal   Precordium: Normal impulses. P2 is not palpable.  RV Heave: absent  LV Heave: absent  [place]:  normal size and placement  Palpable S4: absent.  Heart rate: normal    Heart Rhythm: regular     Heart Sounds: S1: normal  S2: normal  S3: absent   S4: absent  Opening Snap: absent    Pericardial Rub:  Absent: .    Abdomen:   Appearance: normal .  Palpation: Soft, non-tender to palpation, bowel sounds positive in all four quadrants; no guarding or rebound tenderness  Extremity: no edema.   LE Skin: no rashes  LE Hair:  normal  LE Pulses: well perfused with normal pulses in the distal extremities  Pallor on elevation: Absent. Rubor on dependency: None      DATA REVIEWED:     EKG. I personally reviewed and interpreted the EKG.  Normal sinus rhythm normal EKG.    ECG/EMG Results (all)     None        ---------------------------------------------------  TTE/RORY:  Results for orders placed in visit on 07/10/17   Adult Transthoracic Echo Complete    Narrative · Left ventricular systolic function is normal. Estimated EF = 55%.  · Left ventricular diastolic dysfunction (grade I) consistent with   impaired relaxation.  · Mild tricuspid valve regurgitation is present.          --------------------------------------------------------------------------------------------------  LABS:     The 10-year CVD risk score (Viki et al., 2008) is: 23.1%    Values used to  calculate the score:      Age: 64 years      Sex: Female      Diabetic: Yes      Tobacco smoker: No      Systolic Blood Pressure: 138 mmHg      Is BP treated: No      HDL Cholesterol: 46 mg/dL      Total Cholesterol: 241 mg/dL         Lab Results   Component Value Date    GLUCOSE 106 (H) 07/21/2019    BUN 10 07/21/2019    CREATININE 0.76 07/21/2019    EGFRIFNONA 77 07/21/2019    EGFRIFAFRI 55 03/22/2018    BCR 13.2 07/21/2019    K 4.4 07/21/2019    CO2 25.0 07/21/2019    CALCIUM 9.0 07/21/2019    ALBUMIN 3.60 07/21/2019    AST 50 (H) 07/21/2019    ALT 45 (H) 07/21/2019     Lab Results   Component Value Date    WBC 5.4 10/21/2019    HGB 13.2 10/21/2019    HCT 39.3 10/21/2019    MCV 88.6 10/21/2019     10/21/2019     Lab Results   Component Value Date    CHOL 241 (H) 11/11/2019    TRIG 92 11/11/2019    HDL 46 11/11/2019     (H) 11/11/2019     Lab Results   Component Value Date    TSH 2.49 03/14/2019     Lab Results   Component Value Date    CKTOTAL 74 09/18/2017    CKMB 0.96 09/18/2017    TROPONINI <0.012 09/18/2017     Lab Results   Component Value Date    HGBA1C 6.1 11/11/2019     Lab Results   Component Value Date    DDIMER 715 (H) 09/01/2015     Lab Results   Component Value Date    ALT 45 (H) 07/21/2019     Lab Results   Component Value Date    HGBA1C 6.1 11/11/2019    HGBA1C 6.1 06/07/2018    HGBA1C 6.3 (H) 02/22/2018     Lab Results   Component Value Date    CREATININE 0.76 07/21/2019     Lab Results   Component Value Date    IRON 36 03/14/2019     Lab Results   Component Value Date    INR 1.02 10/01/2019    INR 1.03 09/18/2017    INR 0.97 09/16/2017    PROTIME 10.6 10/01/2019    PROTIME 13.4 09/18/2017    PROTIME 12.8 09/16/2017       Assessment/Plan     1.  Palpitations:  Symptoms of actually improved, she has been off verapamil without significant recurrence.  And on performing ZIO Patch to assess for any arrhythmias.  She can stay off verapamil for now.     2.  Diabetes on metformin and she is  doing okay and follows with Dr. Aquino     3.  Hyperlipidemia mild started on Lipitor she is a diabetic.     4.  Osteoarthritis status post knee replacement doing okay     5.  Hypothyroidism on Synthroid supplements.    6.  Hypertension:  Well-controlled on current regimen           Prevention:  Patient's Body mass index is 31.65 kg/m². BMI is above normal parameters. Recommendations include: exercise counseling and nutrition counseling.      Mini Andersen is a former smoker      AAA Screening:   Not needed    Return in about 1 year (around 12/3/2020).          This document has been electronically signed by Andrey Dubon MD on December 3, 2019 9:26 AM

## 2020-01-03 ENCOUNTER — TELEPHONE (OUTPATIENT)
Dept: CARDIOLOGY | Facility: CLINIC | Age: 65
End: 2020-01-03

## 2020-01-14 ENCOUNTER — TELEPHONE (OUTPATIENT)
Dept: CARDIOLOGY | Facility: CLINIC | Age: 65
End: 2020-01-14

## 2020-03-10 ENCOUNTER — TELEPHONE (OUTPATIENT)
Dept: CARDIOLOGY | Facility: CLINIC | Age: 65
End: 2020-03-10

## 2020-03-10 NOTE — TELEPHONE ENCOUNTER
Talked to patient about her medications, will send off her her patient assistance.         ----- Message from Lisa Miles sent at 3/10/2020 11:50 AM CDT -----  Regarding: Dr Dubon pt  This patient returned your call.  Please call her at [phone].  Thank you

## 2020-03-10 NOTE — TELEPHONE ENCOUNTER
Called patient back about her needing help with eliquis.     Left voice mail to call back           ----- Message from Judith Guillaume MA sent at 3/10/2020  8:32 AM CDT -----      ----- Message -----  From: Emily Granda MA  Sent: 3/9/2020   4:29 PM CDT  To: Judith Guillaume MA     sent this to me but dr muniz prescribes her eliquis  ----- Message -----  From: Lisa Miles  Sent: 3/9/2020   4:02 PM CDT  To: Emily Granda MA    This patient left a v/mail today at 3:00, asking for you to call her to discuss her ELIQUIS RX.  Her # is [phone].  Thank you

## 2020-03-13 ENCOUNTER — DOCUMENTATION (OUTPATIENT)
Dept: CARDIOLOGY | Facility: CLINIC | Age: 65
End: 2020-03-13

## 2020-03-24 ENCOUNTER — TELEPHONE (OUTPATIENT)
Dept: CARDIOLOGY | Facility: CLINIC | Age: 65
End: 2020-03-24

## 2020-03-24 NOTE — TELEPHONE ENCOUNTER
Called patient to let her know that her patient assistance was denied. Sent message to Dr. Dubon to see what he wanted her switched to,     Patient voiced understanding

## 2020-05-05 ENCOUNTER — DOCUMENTATION (OUTPATIENT)
Dept: CARDIOLOGY | Facility: CLINIC | Age: 65
End: 2020-05-05

## 2020-05-05 ENCOUNTER — HOSPITAL ENCOUNTER (OUTPATIENT)
Facility: HOSPITAL | Age: 65
Setting detail: HOSPITAL OUTPATIENT SURGERY
Discharge: HOME | End: 2020-05-05
Attending: INTERNAL MEDICINE | Admitting: INTERNAL MEDICINE

## 2020-05-05 ENCOUNTER — ANESTHESIA (OUTPATIENT)
Dept: PERIOP | Facility: HOSPITAL | Age: 65
End: 2020-05-05

## 2020-05-05 ENCOUNTER — ANESTHESIA EVENT (OUTPATIENT)
Dept: PERIOP | Facility: HOSPITAL | Age: 65
End: 2020-05-05

## 2020-05-05 VITALS
DIASTOLIC BLOOD PRESSURE: 62 MMHG | SYSTOLIC BLOOD PRESSURE: 117 MMHG | TEMPERATURE: 97 F | WEIGHT: 229.5 LBS | HEART RATE: 70 BPM | OXYGEN SATURATION: 95 % | BODY MASS INDEX: 31.08 KG/M2 | HEIGHT: 72 IN | RESPIRATION RATE: 18 BRPM

## 2020-05-05 DIAGNOSIS — K92.2 ACUTE GASTROINTESTINAL HEMORRHAGE: ICD-10-CM

## 2020-05-05 LAB — GLUCOSE BLDC GLUCOMTR-MCNC: 122 MG/DL (ref 70–130)

## 2020-05-05 PROCEDURE — 82962 GLUCOSE BLOOD TEST: CPT

## 2020-05-05 PROCEDURE — 88305 TISSUE EXAM BY PATHOLOGIST: CPT | Performed by: PATHOLOGY

## 2020-05-05 PROCEDURE — 25010000002 ONDANSETRON PER 1 MG: Performed by: NURSE ANESTHETIST, CERTIFIED REGISTERED

## 2020-05-05 PROCEDURE — 25010000002 PROPOFOL 10 MG/ML EMULSION: Performed by: NURSE ANESTHETIST, CERTIFIED REGISTERED

## 2020-05-05 PROCEDURE — 88305 TISSUE EXAM BY PATHOLOGIST: CPT | Performed by: INTERNAL MEDICINE

## 2020-05-05 RX ORDER — PROPOFOL 10 MG/ML
VIAL (ML) INTRAVENOUS AS NEEDED
Status: DISCONTINUED | OUTPATIENT
Start: 2020-05-05 | End: 2020-05-05 | Stop reason: SURG

## 2020-05-05 RX ORDER — DEXTROSE AND SODIUM CHLORIDE 5; .45 G/100ML; G/100ML
20 INJECTION, SOLUTION INTRAVENOUS CONTINUOUS
Status: DISCONTINUED | OUTPATIENT
Start: 2020-05-05 | End: 2020-05-05 | Stop reason: HOSPADM

## 2020-05-05 RX ORDER — LIDOCAINE HYDROCHLORIDE 20 MG/ML
INJECTION, SOLUTION INTRAVENOUS AS NEEDED
Status: DISCONTINUED | OUTPATIENT
Start: 2020-05-05 | End: 2020-05-05 | Stop reason: SURG

## 2020-05-05 RX ORDER — ONDANSETRON 2 MG/ML
INJECTION INTRAMUSCULAR; INTRAVENOUS AS NEEDED
Status: DISCONTINUED | OUTPATIENT
Start: 2020-05-05 | End: 2020-05-05 | Stop reason: SURG

## 2020-05-05 RX ORDER — MAGNESIUM 200 MG
1 TABLET ORAL DAILY
COMMUNITY
End: 2022-05-11 | Stop reason: ALTCHOICE

## 2020-05-05 RX ADMIN — PROPOFOL 20 MG: 10 INJECTION, EMULSION INTRAVENOUS at 07:16

## 2020-05-05 RX ADMIN — PROPOFOL 20 MG: 10 INJECTION, EMULSION INTRAVENOUS at 07:14

## 2020-05-05 RX ADMIN — PROPOFOL 80 MG: 10 INJECTION, EMULSION INTRAVENOUS at 07:12

## 2020-05-05 RX ADMIN — LIDOCAINE HYDROCHLORIDE 100 MG: 20 INJECTION, SOLUTION INTRAVENOUS at 07:12

## 2020-05-05 RX ADMIN — DEXTROSE AND SODIUM CHLORIDE 20 ML/HR: 5; 450 INJECTION, SOLUTION INTRAVENOUS at 06:39

## 2020-05-05 RX ADMIN — ONDANSETRON 4 MG: 2 INJECTION INTRAMUSCULAR; INTRAVENOUS at 07:10

## 2020-05-05 NOTE — PROGRESS NOTES
Patient assistance was not approved, she needs to spend a few more dollars before we can get it approved       Will call pharmacy to see if she has met the goal

## 2020-05-05 NOTE — ANESTHESIA PREPROCEDURE EVALUATION
Anesthesia Evaluation     Patient summary reviewed and Nursing notes reviewed   history of anesthetic complications: PONV  NPO Solid Status: > 8 hours  NPO Liquid Status: > 8 hours           Airway   Mallampati: II  TM distance: >3 FB  Neck ROM: full  No difficulty expected  Dental    (+) edentulous    Pulmonary    (+) sleep apnea,   (-) rhonchi, decreased breath sounds, wheezes, not a smoker  Cardiovascular   Exercise tolerance: good (4-7 METS)    Rhythm: regular  Rate: normal    (+) DVT resolved, hyperlipidemia,   (-) hypertension, CAD, angina, SUAREZ, murmur      Neuro/Psych  (+) psychiatric history,     (-) CVA  GI/Hepatic/Renal/Endo    (+)  GERD,  diabetes mellitus well controlled, thyroid problem hypothyroidism  (-) liver disease, no renal disease    Musculoskeletal     Abdominal     Abdomen: soft.   Substance History      OB/GYN          Other                        Anesthesia Plan    ASA 3     MAC   total IV anesthesia  intravenous induction     Anesthetic plan, all risks, benefits, and alternatives have been provided, discussed and informed consent has been obtained with: patient.

## 2020-05-05 NOTE — ANESTHESIA POSTPROCEDURE EVALUATION
Patient: Mini Andersen    Procedure Summary     Date:  05/05/20 Room / Location:  [facility]  / [facility]    Anesthesia Start:  0701 Anesthesia Stop:  0721    Procedure:  ESOPHAGOGASTRODUODENOSCOPY (N/A ) Diagnosis:       Acute gastrointestinal hemorrhage      (k92.2)    Surgeon:  Carlos Eduardo Cyr DO Provider:  Antonino Valdez MD    Anesthesia Type:  MAC ASA Status:  3          Anesthesia Type: MAC    Vitals  No vitals data found for the desired time range.          Post Anesthesia Care and Evaluation    Patient location during evaluation: bedside  Patient participation: complete - patient participated  Level of consciousness: awake and alert  Pain score: 0  Pain management: adequate  Airway patency: patent  Anesthetic complications: No anesthetic complications  PONV Status: none  Cardiovascular status: acceptable  Respiratory status: acceptable and spontaneous ventilation  Hydration status: acceptable

## 2020-05-05 NOTE — H&P
Carl Herzog DO,[facility]  Gastroenterology  Hepatology  Endoscopy  Board Certified in Internal Medicine and gastroenterology  [address] [address] [facility], [address]  [place]. [address]  - [phone]   F - [phone]     GASTROENTEROLOGY HISTORY AND PHYSICAL  NOTE   CARL HERZOG DO.         SUBJECTIVE:   5/5/2020    Name: Mini Andersen  DOD: 1955      Chief Complaint:       Subjective : Gastrointestinal bleeding in the setting of anticoagulation.  Status post near total colectomy.  Evaluate for peptic ulcer disease.  Occult blood within the stools    Patient is 65 y.o. female presents with desire for urgent esophagogastroduodenoscopy with control bleeding as required.      ROS/HISTORY/ CURRENT MEDICATIONS/OBJECTIVE/VS/PE:   Review of Systems:  All systems unremarkable unless specified below.  Constitutional   HENT  Eyes   Respiratory    Cardiovascular  Gastrointestinal   Endocrine  Genitourinary    Musculoskeletal   Skin  Allergic/Immunologic    Neurological    Hematological  Psychiatric/Behavioral    History:     Past Medical History:   Diagnosis Date   • Acid reflux    • Acute peritonitis (CMS/HCC)    • Allergic rhinitis    • Arthritis    • Bee sting    • Blood clot in vein, right leg    • Borderline glaucoma    • Chronic bronchitis (CMS/HCC)    • Constipation     severe with an immotile colon      • Deep venous thrombosis (CMS/HCC)    • High cholesterol    • Hypothyroidism    • Intestinal volvulus (CMS/HCC)    • Muscle atrophy     O/E   • Nuclear senile cataract    • Nuclear senile cataract    • Polyneuropathy in diabetes (CMS/HCC)    • PONV (postoperative nausea and vomiting)    • Type 2 diabetes mellitus (CMS/HCC)     NO BDR   • Type 2 diabetes mellitus without complications (CMS/HCC)    • Unspecified disease of nail      Past Surgical History:   Procedure Laterality Date   • APPENDECTOMY     • BLADDER SUSPENSION     • COLECTOMY PARTIAL / TOTAL  05/22/2014    Subtotal colectomy with  ineo-rectostomy.   • ENDOSCOPY N/A 2018    Procedure: ESOPHAGOGASTRODUODENOSCOPY;  Surgeon: Carlos Eduardo Cyr DO;  Location: [facility] ENDOSCOPY;  Service: Gastroenterology   • ENDOSCOPY N/A 3/26/2019    Procedure: ESOPHAGOGASTRODUODENOSCOPY;  Surgeon: Carlos Eduardo Cyr DO;  Location: [facility] ENDOSCOPY;  Service: Gastroenterology   • HERNIA REPAIR      multiple   • HYSTERECTOMY     • INJECTION OF MEDICATION  2010    DEPO MEDROL   • JOINT REPLACEMENT Left 2017    knee   • KNEE ARTHROSCOPY Left    • LAPAROSCOPIC CHOLECYSTECTOMY     • SALPINGO OOPHORECTOMY Left    • SALPINGO OOPHORECTOMY Right    • SIGMOIDOSCOPY N/A 3/26/2019    Procedure: SIGMOIDOSCOPY FLEXIBLE;  Surgeon: Carlos Eduardo Cyr DO;  Location: [facility] ENDOSCOPY;  Service: Gastroenterology   • TOTAL KNEE ARTHROPLASTY Left 2017    Procedure: TOTAL KNEE ARTHROPLASTY ATTUNE with adductor canal block;  Surgeon: Jose Ballesteros MD;  Location: [facility] OR;  Service:      History reviewed. No pertinent family history.  Social History     Tobacco Use   • Smoking status: Former Smoker     Packs/day: 0.00     Years: 0.00     Pack years: 0.00     Start date:      Last attempt to quit:      Years since quittin.3   • Smokeless tobacco: Never Used   Substance Use Topics   • Alcohol use: No   • Drug use: No     Prior to Admission medications    Medication Sig Start Date End Date Taking? Authorizing Provider   Calcium Citrate (CITRACAL PO) Take 2 tablets by mouth Daily.   Yes Cody Cartagena MD   carboxymethylcellulose (REFRESH PLUS) 0.5 % solution Administer 1 drop to both eyes Daily As Needed for Dry Eyes.   Yes Cody Cartagena MD   clonazePAM (KlonoPIN) 0.5 MG tablet Take 0.5 mg by mouth 2 (Two) Times a Day As Needed for Anxiety.   Yes Cody Cartagena MD   Cyanocobalamin (B-12) 1000 MCG sublingual tablet Place 1 tablet under the tongue Daily.   Yes Cody Cartagena MD   diphenhydrAMINE (BENADRYL) 25 mg capsule  Take 25 mg by mouth Every 6 (Six) Hours As Needed. 9/17/16  Yes Cody Cartagena MD   levothyroxine (SYNTHROID, LEVOTHROID) 50 MCG tablet Take 50 mcg by mouth daily. 9/17/16  Yes Cody Cartagena MD   losartan (COZAAR) 25 MG tablet Take 25 mg by mouth Daily.   Yes Cody Cartagena MD   metFORMIN (GLUCOPHAGE) 500 MG tablet Take 500 mg by mouth Daily As Needed (For FSBS > 140). 9/17/16  Yes Cody Cartagena MD   omeprazole (priLOSEC) 20 MG capsule Take 20 mg by mouth Daily.   Yes Cody Cartagena MD   ondansetron (ZOFRAN) 4 MG tablet Take 4 mg by mouth Every 8 (Eight) Hours As Needed for Nausea or Vomiting.   Yes Cody Cartagena MD   apixaban (ELIQUIS) 5 MG tablet tablet Take 1 tablet by mouth Every 12 (Twelve) Hours. 3/10/20   Andrey Dubon MD   ferrous sulfate 324 (65 Fe) MG tablet delayed-release EC tablet Take 324 mg by mouth Daily With Breakfast.    Cody Cartagena MD   ONE TOUCH ULTRA TEST test strip TEST BID 8/10/16   Cody Cartagena MD   vitamin D (ERGOCALCIFEROL) 70108 UNITS capsule capsule Take 50,000 Units by mouth Every 7 (Seven) Days. 9/17/16   Cody Cartagena MD   acetaminophen (TYLENOL) 500 MG tablet Take 1,000 mg by mouth Every 6 (Six) Hours As Needed for Mild Pain .  5/5/20  Cody Cartagena MD   cyanocobalamin 1000 MCG/ML injection Inject  into the shoulder, thigh, or buttocks Every 28 (Twenty-Eight) Days. 12/21/16 5/5/20  Cody Cartagena MD   HYDROcodone-acetaminophen (NORCO) 5-325 MG per tablet Take 1 tablet by mouth Every 8 (Eight) Hours As Needed for Moderate Pain . 4/29/19 5/5/20  Jose Ballesteros MD   levoFLOXacin (LEVAQUIN) 500 MG tablet Take 500 mg by mouth Daily.  5/5/20  Cody Cartagena MD     Allergies:  Bextra [valdecoxib]; Cephalosporins; Detrol la [tolterodine tartrate er]; Dicyclomine; Fluoxetine; Keflex [cephalexin]; Toprol xl [metoprolol]; Chocolate flavor; Omeprazole-sodium bicarbonate; Other; Amoxicillin;  Aspirin; Ciprofloxin hcl [ciprofloxacin]; Claritin-d 12 hour [loratadine-pseudoephedrine er]; Codeine; Demerol [meperidine]; Levaquin [levofloxacin]; Lipitor [atorvastatin]; Macrobid [nitrofurantoin]; Morphine and related; Paxil [paroxetine hcl]; Sulfa antibiotics; Tape; Tramadol; Vioxx [rofecoxib]; Zegerid [omeprazole]; and Zoloft [sertraline hcl]    I have reviewed the patients medical history, surgical history and family history in the available medical record system.     Current Medications:     Current Facility-Administered Medications   Medication Dose Route Frequency Provider Last Rate Last Dose   • dextrose 5 % and sodium chloride 0.45 % infusion  20 mL/hr Intravenous Continuous Carlos Eduardo Cyr DO 20 mL/hr at 05/05/20 0639 20 mL/hr at 05/05/20 0639       Objective     Physical Exam:   Temp:  [97.9 °F (36.6 °C)] 97.9 °F (36.6 °C)  Heart Rate:  [71] 71  Resp:  [18] 18  BP: (116)/(80) 116/80    Physical Exam:  General Appearance:    Alert, cooperative, in no acute distress   Head:    Normocephalic, without obvious abnormality, atraumatic   Eyes:            Lids and lashes normal, conjunctivae and sclerae normal, no   icterus, no pallor, corneas clear, PERRLA   Ears:    Ears appear intact with no abnormalities noted   Throat:   No oral lesions, no thrush, oral mucosa moist   Neck:   No adenopathy, supple, trachea midline, no thyromegaly, no     carotid bruit, no JVD   Back:     No kyphosis present, no scoliosis present, no skin lesions,       erythema or scars, no tenderness to percussion or                   palpation,   range of motion normal   Lungs:     Clear to auscultation,respirations regular, even and                   unlabored    Heart:    Regular rhythm and normal rate, normal S1 and S2, no            murmur, no gallop, no rub, no click   Breast Exam:    Deferred   Abdomen:     Normal bowel sounds, no masses, no organomegaly, soft        non-tender, non-distended, no guarding, no rebound                  tenderness   Genitalia:    Deferred   Extremities:   Moves all extremities well, no edema, no cyanosis, no              redness   Pulses:   Pulses palpable and equal bilaterally   Skin:   No bleeding, bruising or rash   Lymph nodes:   No palpable adenopathy   Neurologic:   Cranial nerves 2 - 12 grossly intact, sensation intact, DTR        present and equal bilaterally      Results Review:     Lab Results   Component Value Date    WBC 5.4 10/21/2019    WBC 6.0 10/01/2019    WBC 5.47 07/21/2019    HGB 13.2 10/21/2019    HGB 13.0 10/01/2019    HGB 11.8 (L) 07/21/2019    HCT 39.3 10/21/2019    HCT 39.4 10/01/2019    HCT 37.3 07/21/2019     10/21/2019     10/01/2019     (L) 07/21/2019             No results found for: LIPASE  Lab Results   Component Value Date    INR 1.02 10/01/2019    INR 1.03 09/18/2017    INR 0.97 09/16/2017     No results found for: CULTURE    Radiology Review:  Imaging Results (Last 72 Hours)     ** No results found for the last 72 hours. **           I reviewed the patient's new clinical results.  I reviewed the patient's new imaging results and agree with the interpretation.     ASSESSMENT/PLAN:   ASSESSMENT:  1.  Gastrointestinal bleeding in the setting of anticoagulation.  Consider peptic ulcer disease    PLAN:  1.  Esophagogastroduodenoscopy with measures to control bleeding.  I am using the criteria of the joint recommendation staff of gastrointestinal bleeding as the reason for doing the procedure.    Risk and benefits associated with the procedure are reviewed with the patient.  The patient wished to proceed.     Carlos Eduardo Cyr DO  05/05/20  06:47

## 2020-05-06 ENCOUNTER — TELEPHONE (OUTPATIENT)
Dept: CARDIOLOGY | Facility: CLINIC | Age: 65
End: 2020-05-06

## 2020-05-06 LAB
LAB AP CASE REPORT: NORMAL
PATH REPORT.FINAL DX SPEC: NORMAL
PATH REPORT.GROSS SPEC: NORMAL

## 2020-05-06 NOTE — TELEPHONE ENCOUNTER
Called to let her know that her patient to let her know that her patient assistance is approved.       She voiced understanding

## 2020-09-14 DIAGNOSIS — M25.551 RIGHT HIP PAIN: Primary | ICD-10-CM

## 2020-09-17 ENCOUNTER — OFFICE VISIT (OUTPATIENT)
Dept: ORTHOPEDIC SURGERY | Facility: CLINIC | Age: 65
End: 2020-09-17

## 2020-09-17 VITALS — HEIGHT: 72 IN | BODY MASS INDEX: 30.2 KG/M2 | WEIGHT: 223 LBS

## 2020-09-17 DIAGNOSIS — M79.604 PAIN OF RIGHT LOWER EXTREMITY: ICD-10-CM

## 2020-09-17 DIAGNOSIS — M25.551 RIGHT HIP PAIN: Primary | ICD-10-CM

## 2020-09-17 DIAGNOSIS — Z86.718 HISTORY OF DEEP VENOUS THROMBOSIS (DVT) OF DISTAL VEIN OF RIGHT LOWER EXTREMITY: ICD-10-CM

## 2020-09-17 DIAGNOSIS — G89.29 CHRONIC RIGHT-SIDED LOW BACK PAIN WITH RIGHT-SIDED SCIATICA: ICD-10-CM

## 2020-09-17 DIAGNOSIS — M54.41 CHRONIC RIGHT-SIDED LOW BACK PAIN WITH RIGHT-SIDED SCIATICA: ICD-10-CM

## 2020-09-17 DIAGNOSIS — M51.36 DDD (DEGENERATIVE DISC DISEASE), LUMBAR: ICD-10-CM

## 2020-09-17 PROCEDURE — 99214 OFFICE O/P EST MOD 30 MIN: CPT | Performed by: NURSE PRACTITIONER

## 2020-09-17 PROCEDURE — 96372 THER/PROPH/DIAG INJ SC/IM: CPT | Performed by: NURSE PRACTITIONER

## 2020-09-17 RX ORDER — HYDROCODONE BITARTRATE AND ACETAMINOPHEN 5; 325 MG/1; MG/1
1 TABLET ORAL EVERY 6 HOURS PRN
Qty: 30 TABLET | Refills: 0 | Status: SHIPPED | OUTPATIENT
Start: 2020-09-17 | End: 2020-09-27

## 2020-09-17 RX ORDER — TRIAMCINOLONE ACETONIDE 40 MG/ML
80 INJECTION, SUSPENSION INTRA-ARTICULAR; INTRAMUSCULAR ONCE
Status: COMPLETED | OUTPATIENT
Start: 2020-09-17 | End: 2020-09-17

## 2020-09-17 RX ADMIN — TRIAMCINOLONE ACETONIDE 80 MG: 40 INJECTION, SUSPENSION INTRA-ARTICULAR; INTRAMUSCULAR at 16:19

## 2020-09-17 NOTE — PROGRESS NOTES
Mini Andersen is a 65 y.o. female   Primary provider:  Hawa Aquino MD       Chief Complaint   Patient presents with   • Right Hip - Pain   • Establish Care     HISTORY OF PRESENT ILLNESS:    65-year-old female patient presents to office for evaluation of acute right hip/leg pain.  Onset of pain occurred 3 months ago with no known injury or incident.  Patient also has a history of chronic low back pain.  Patient states that her entire right leg hurts from her hip down to the level of her foot.  Patient has not seen any medical providers for this complaint and no previous work-up has been done.  Pain is described as constant and severe.  Pain is described as aching and burning in nature with associated swelling and numbness and tingling that occurs intermittently.  Pain is worse with sitting, driving, standing, walking and different positions of her leg, such as when it is hanging downward.  Patient has tried rest/activity modification and acetaminophen with no improvement.  Pain scale today is 5/10.  X-rays of the lumbar spine and right hip/pelvis were performed in office today.    Pain  This is a new problem. The current episode started more than 1 month ago (3 months). The problem occurs constantly. The problem has been unchanged. Associated symptoms include arthralgias, myalgias and numbness (Right leg/foot). Associated symptoms comments: Aching, burning pain; swelling. The symptoms are aggravated by standing and walking (driving, sitting, leg hanging down). She has tried rest and acetaminophen for the symptoms. The treatment provided no relief.     CONCURRENT MEDICAL HISTORY:    Past Medical History:   Diagnosis Date   • Acid reflux    • Acute peritonitis (CMS/HCC)    • Allergic rhinitis    • Arthritis    • Bee sting    • Blood clot in vein, right leg    • Borderline glaucoma    • Chronic bronchitis (CMS/HCC)    • Constipation     severe with an immotile colon      • Deep venous thrombosis  (CMS/HCC)    • High cholesterol    • Hypothyroidism    • Intestinal volvulus (CMS/HCC)    • Muscle atrophy     O/E   • Nuclear senile cataract    • Nuclear senile cataract    • Polyneuropathy in diabetes (CMS/HCC)    • PONV (postoperative nausea and vomiting)    • Type 2 diabetes mellitus (CMS/HCC)     NO BDR   • Type 2 diabetes mellitus without complications (CMS/HCC)    • Unspecified disease of nail        Allergies   Allergen Reactions   • Bextra [Valdecoxib] Shortness Of Breath     Shortness of breath   • Cephalosporins Shortness Of Breath   • Detrol La [Tolterodine Tartrate Er] Shortness Of Breath   • Dicyclomine Shortness Of Breath     Shortness of breath   • Fluoxetine Shortness Of Breath   • Keflex [Cephalexin] Shortness Of Breath   • Toprol Xl [Metoprolol] Anaphylaxis   • Chocolate Flavor    • Omeprazole-Sodium Bicarbonate Unknown (See Comments)   • Other      Fresh flowers causes throat swelling   • Amoxicillin Rash   • Aspirin Rash   • Ciprofloxin Hcl [Ciprofloxacin] Rash   • Claritin-D 12 Hour [Loratadine-Pseudoephedrine Er] Rash   • Codeine Rash   • Demerol [Meperidine] Rash   • Levaquin [Levofloxacin] Rash     Was told not allergic   • Lipitor [Atorvastatin] Other (See Comments)     cramping   • Macrobid [Nitrofurantoin] Rash   • Morphine And Related Rash   • Paxil [Paroxetine Hcl] Rash   • Sulfa Antibiotics Rash   • Tape Rash   • Tramadol Rash   • Vioxx [Rofecoxib] Rash   • Zegerid [Omeprazole] Rash   • Zoloft [Sertraline Hcl] Rash         Current Outpatient Medications:   •  apixaban (ELIQUIS) 5 MG tablet tablet, Take 1 tablet by mouth Every 12 (Twelve) Hours., Disp: 180 tablet, Rfl: 3  •  Calcium Citrate (CITRACAL PO), Take 2 tablets by mouth Daily., Disp: , Rfl:   •  carboxymethylcellulose (REFRESH PLUS) 0.5 % solution, Administer 1 drop to both eyes Daily As Needed for Dry Eyes., Disp: , Rfl:   •  clonazePAM (KlonoPIN) 0.5 MG tablet, Take 0.5 mg by mouth 2 (Two) Times a Day As Needed for Anxiety.,  Disp: , Rfl:   •  Cyanocobalamin (B-12) 1000 MCG sublingual tablet, Place 1 tablet under the tongue Daily., Disp: , Rfl:   •  diphenhydrAMINE (BENADRYL) 25 mg capsule, Take 25 mg by mouth Every 6 (Six) Hours As Needed., Disp: , Rfl: 6  •  ferrous sulfate 324 (65 Fe) MG tablet delayed-release EC tablet, Take 324 mg by mouth Daily With Breakfast., Disp: , Rfl:   •  levothyroxine (SYNTHROID, LEVOTHROID) 50 MCG tablet, Take 50 mcg by mouth daily., Disp: , Rfl: 1  •  losartan (COZAAR) 25 MG tablet, Take 25 mg by mouth Daily., Disp: , Rfl:   •  metFORMIN (GLUCOPHAGE) 500 MG tablet, Take 500 mg by mouth Daily As Needed (For FSBS > 140)., Disp: , Rfl: 1  •  omeprazole (priLOSEC) 20 MG capsule, Take 20 mg by mouth Daily., Disp: , Rfl:   •  ondansetron (ZOFRAN) 4 MG tablet, Take 4 mg by mouth Every 8 (Eight) Hours As Needed for Nausea or Vomiting., Disp: , Rfl:   •  ONE TOUCH ULTRA TEST test strip, TEST BID, Disp: , Rfl: 5  •  vitamin D (ERGOCALCIFEROL) 31099 UNITS capsule capsule, Take 50,000 Units by mouth Every 7 (Seven) Days., Disp: , Rfl: 1  •  HYDROcodone-acetaminophen (NORCO) 5-325 MG per tablet, Take 1 tablet by mouth Every 6 (Six) Hours As Needed for Moderate Pain  or Severe Pain  for up to 10 days., Disp: 30 tablet, Rfl: 0    Past Surgical History:   Procedure Laterality Date   • APPENDECTOMY     • BLADDER SUSPENSION     • COLECTOMY PARTIAL / TOTAL  05/22/2014    Subtotal colectomy with ineo-rectostomy.   • ENDOSCOPY N/A 5/1/2018    Procedure: ESOPHAGOGASTRODUODENOSCOPY;  Surgeon: Carlos Eduardo Cyr DO;  Location: [facility] ENDOSCOPY;  Service: Gastroenterology   • ENDOSCOPY N/A 3/26/2019    Procedure: ESOPHAGOGASTRODUODENOSCOPY;  Surgeon: Carlos Eduardo Cyr DO;  Location: [facility] ENDOSCOPY;  Service: Gastroenterology   • ENDOSCOPY N/A 5/5/2020    Procedure: ESOPHAGOGASTRODUODENOSCOPY;  Surgeon: Carlos Eduardo Cyr DO;  Location: [facility] OR;  Service: Gastroenterology;  Laterality: N/A;   • HERNIA REPAIR      multiple   •  "HYSTERECTOMY     • INJECTION OF MEDICATION  2010    DEPO MEDROL   • JOINT REPLACEMENT Left 2017    knee   • KNEE ARTHROSCOPY Left    • LAPAROSCOPIC CHOLECYSTECTOMY     • SALPINGO OOPHORECTOMY Left    • SALPINGO OOPHORECTOMY Right    • SIGMOIDOSCOPY N/A 3/26/2019    Procedure: SIGMOIDOSCOPY FLEXIBLE;  Surgeon: Carlos Eduardo Cyr DO;  Location: [facility] ENDOSCOPY;  Service: Gastroenterology   • TOTAL KNEE ARTHROPLASTY Left 2017    Procedure: TOTAL KNEE ARTHROPLASTY ATTUNE with adductor canal block;  Surgeon: Jose Ballesteros MD;  Location: [facility] OR;  Service:        History reviewed. No pertinent family history.    Social History     Socioeconomic History   • Marital status:      Spouse name: Not on file   • Number of children: Not on file   • Years of education: Not on file   • Highest education level: Not on file   Tobacco Use   • Smoking status: Former Smoker     Packs/day: 0.00     Years: 0.00     Pack years: 0.00     Start date:      Quit date:      Years since quittin.7   • Smokeless tobacco: Never Used   Substance and Sexual Activity   • Alcohol use: No   • Drug use: No   • Sexual activity: Defer     Birth control/protection: Surgical        Review of Systems   Constitutional: Positive for activity change.   HENT: Positive for postnasal drip.    Eyes: Negative.         Dry eyes   Respiratory: Negative.    Cardiovascular: Negative.    Gastrointestinal: Positive for diarrhea.   Endocrine: Negative.    Genitourinary: Negative.    Musculoskeletal: Positive for arthralgias, back pain, gait problem and myalgias.        Muscle pain. Right hip pain. Right leg pain.    Skin:        Itching, eczema   Allergic/Immunologic: Negative.    Neurological: Positive for numbness (Right leg/foot).   Hematological: Bruises/bleeds easily.   Psychiatric/Behavioral: Positive for sleep disturbance. The patient is nervous/anxious.        PHYSICAL EXAMINATION:       Ht 182.9 cm (72\")   Wt 101 kg " (223 lb)   LMP 02/23/1998 (Within Months) Comment: Hysterectomy  BMI 30.24 kg/m²     Physical Exam  Vitals signs reviewed.   Constitutional:       General: She is not in acute distress.     Appearance: She is well-developed. She is not ill-appearing.   HENT:      Head: Normocephalic.   Pulmonary:      Effort: Pulmonary effort is normal. No respiratory distress.   Abdominal:      General: There is no distension.      Palpations: Abdomen is soft.   Musculoskeletal:         General: Tenderness (Mild, lumbar spine) present. No swelling or deformity.   Skin:     General: Skin is warm and dry.      Capillary Refill: Capillary refill takes less than 2 seconds.      Findings: No erythema.   Neurological:      Mental Status: She is alert and oriented to person, place, and time.      GCS: GCS eye subscore is 4. GCS verbal subscore is 5. GCS motor subscore is 6.   Psychiatric:         Speech: Speech normal.         Behavior: Behavior normal.         Thought Content: Thought content normal.         Judgment: Judgment normal.         GAIT:     [x]  Normal  []  Antalgic    Assistive device: [x]  None  []  Walker     []  Crutches  []  Cane     []  Wheelchair  []  Stretcher    Right Hip Exam     Tenderness   The patient is experiencing no tenderness.     Range of Motion   Abduction: 35   Flexion: 120   External rotation: 70   Internal rotation: 15     Muscle Strength   The patient has normal right hip strength.    Tests   EMI: negative  Fadir:  Negative FADIR test    Other   Erythema: absent  Sensation: normal  Pulse: present    Comments:  No pain and minimal limitations with range of motion of the hip joint.  Overall good motion and rotation of the hip joint.  No swelling appreciated.  No leg length discrepancy.  No erythema.  No warmth.  No tenderness to palpation.  Stable joint exam.      Left Hip Exam     Tenderness   The patient is experiencing no tenderness.     Range of Motion   Abduction: 35   Flexion: 120   External  rotation: 70   Internal rotation: 15     Muscle Strength   The patient has normal left hip strength.     Tests   EMI: negative  Fadir:  Negative FADIR test    Other   Erythema: absent  Sensation: normal  Pulse: present      Back Exam     Tenderness   The patient is experiencing tenderness in the lumbar.    Range of Motion   Flexion: abnormal   Rotation right: abnormal   Rotation left: abnormal     Muscle Strength   The patient has normal back strength.    Tests   Straight leg raise right: positive at 90 deg  Straight leg raise left: negative    Reflexes   Patellar: normal    Other   Gait: antalgic   Erythema: no back redness    Comments:  Pain and limitations with range of motion.  Mild tenderness to palpation at the lumbar spine.  No focal neurological deficits noted.            Xr Spine Lumbar 2 Or 3 View    Result Date: 9/18/2020  Narrative: AP and lateral views of the lumbar spine reveal no evidence of acute fracture or subluxation.  There are degenerative changes noted with decreased intervertebral disc facing at multiple levels, more pronounced at L4-L5 and L5-S1.  The degenerative changes and decreased intervertebral disc facing appear to have progressed when compared with prior images from 9/18/2017.  The spine appears normally aligned.  No evidence of spondylolisthesis is noted.  Vertebral body heights appear well-maintained.  There are small anterior spurs noted at multiple levels.  There is evidence of prior vascular surgery/stenting as well as evidence of an IVC filter.  No acute bony radiologic abnormalities are noted at this time. 09/18/20 at 13:17 CDT by EDUARDA Minor     Xr Hip With Or Without Pelvis 2 - 3 View Right    Result Date: 9/18/2020  Narrative: AP and lateral views of the right hip with an AP view of the pelvis reveal no evidence of acute fracture or dislocation.  There are mild degenerative changes noted at the right hip joint with mild loss of joint spacing and subchondral  sclerotic changes at the superior rim of the acetabulum.  The femoral head is round and smooth and well-positioned within the acetabulum.  There are similar, mild degenerative changes noted in the left hip on the pelvic view.  The bony pelvis appears intact.  The SI joints are intact and appear symmetrical.  There is evidence of prior vascular surgery and stenting noted on the pelvic view.  No acute bony radiologic abnormalities are noted at this time.  No prior comparison images of the right hip are available for review.  No significant changes are noted when compared with prior pelvic images from 9/18/2017.09/18/20 at 13:20 CDT by EDUARDA Minor         ASSESSMENT:    Diagnoses and all orders for this visit:    Right hip pain  -     HYDROcodone-acetaminophen (NORCO) 5-325 MG per tablet; Take 1 tablet by mouth Every 6 (Six) Hours As Needed for Moderate Pain  or Severe Pain  for up to 10 days.  -     triamcinolone acetonide (KENALOG-40) injection 80 mg  -     MRI Hip Right Without Contrast; Future    Pain of right lower extremity  -     HYDROcodone-acetaminophen (NORCO) 5-325 MG per tablet; Take 1 tablet by mouth Every 6 (Six) Hours As Needed for Moderate Pain  or Severe Pain  for up to 10 days.  -     triamcinolone acetonide (KENALOG-40) injection 80 mg  -     MRI Lumbar Spine Without Contrast; Future  -     MRI Hip Right Without Contrast; Future  -     US Venous Doppler Lower Extremity Right (duplex); Future    Chronic right-sided low back pain with right-sided sciatica  -     HYDROcodone-acetaminophen (NORCO) 5-325 MG per tablet; Take 1 tablet by mouth Every 6 (Six) Hours As Needed for Moderate Pain  or Severe Pain  for up to 10 days.  -     triamcinolone acetonide (KENALOG-40) injection 80 mg  -     MRI Lumbar Spine Without Contrast; Future    History of deep venous thrombosis (DVT) of distal vein of right lower extremity  -     HYDROcodone-acetaminophen (NORCO) 5-325 MG per tablet; Take 1 tablet by mouth  Every 6 (Six) Hours As Needed for Moderate Pain  or Severe Pain  for up to 10 days.  -     triamcinolone acetonide (KENALOG-40) injection 80 mg  -     US Venous Doppler Lower Extremity Right (duplex); Future    DDD (degenerative disc disease), lumbar  -     HYDROcodone-acetaminophen (NORCO) 5-325 MG per tablet; Take 1 tablet by mouth Every 6 (Six) Hours As Needed for Moderate Pain  or Severe Pain  for up to 10 days.  -     triamcinolone acetonide (KENALOG-40) injection 80 mg  -     MRI Lumbar Spine Without Contrast; Future    PLAN    X-rays of the lumbar spine and right hip/pelvis performed in office today reviewed with no acute findings noted.  Patient complains of severe pain throughout her entire right hip and leg down to the level of her foot for the past 3 months with no known injury.  She has no significant degenerative changes noted in the right hip.  She has evidence of degenerative disc disease and facet arthropathy in the lumbar spine.  Recommend MRI of the lumbar spine to evaluate for foraminal stenosis, spinal stenosis, disc herniation and/or nerve compression.  Recommend MRI of the right hip to evaluate for avascular necrosis or stress fracture in the right hip or pelvis.  She does have increased pain with weightbearing.  However, her pain is more suspicious for radiculopathy than hip pathology.  Patient also has a significant history of multiple DVTs throughout her right leg.  She is currently on anticoagulation therapy, but states that her previous DVTs performed while on anticoagulation therapy also.  She also has an IVC filter in place.  Recommend ultrasound of the right lower extremity to rule out new DVT as a source of her pain.  No significant swelling is noted.  Recommend IM injection of Kenalog today for management of pain/inflammation.  Patient states her pain has been severe and she request pain medication.  Norco was prescribed to take as needed for moderate to severe pain.  Patient is  instructed to take the pain medication sparingly and only when needed.  She is instructed to take the least amount of pain medication needed to control her pain.  I have encouraged/instructed her to focus on non-opioid pain management methods as much as possible.  Recommend Tylenol as needed for milder pain.  Oral NSAIDs will be avoided due to her current use of Eliquis and the increased risk for bleeding.  Recommend ice and/or heat therapy to the low back and right hip as needed to minimize pain/inflammation/muscle tension.  Recommend rest and activity modification as tolerated and based on her pain.  Recommend modified weightbearing with use of a cane or walker as needed.  Follow-up after MRIs.  Our office will notify her via telephone of the ultrasound results.    This patient has tried and failed a course of multiple treatment modalities which include acetaminophen and rest/activity modification.  She reports her pain is severe and poorly controlled.  Therefore, I will recommend a course of narcotic pain medication for this patient until their pain has been sufficiently reduced to a level that I deem acceptable to be treated with alternative treatment options.   [facility] reviewed # [id number].     Return for follow up after MRI.        This document has been electronically signed by EDUARDA Minor on September 22, 2020 17:50 CDT      EDUARDA Minor

## 2020-10-05 ENCOUNTER — HOSPITAL ENCOUNTER (OUTPATIENT)
Dept: MRI IMAGING | Facility: HOSPITAL | Age: 65
Discharge: HOME | End: 2020-10-05

## 2020-10-05 ENCOUNTER — HOSPITAL ENCOUNTER (OUTPATIENT)
Dept: ULTRASOUND IMAGING | Facility: HOSPITAL | Age: 65
Discharge: HOME | End: 2020-10-05

## 2020-10-05 DIAGNOSIS — M25.551 RIGHT HIP PAIN: ICD-10-CM

## 2020-10-05 DIAGNOSIS — M54.41 CHRONIC RIGHT-SIDED LOW BACK PAIN WITH RIGHT-SIDED SCIATICA: ICD-10-CM

## 2020-10-05 DIAGNOSIS — G89.29 CHRONIC RIGHT-SIDED LOW BACK PAIN WITH RIGHT-SIDED SCIATICA: ICD-10-CM

## 2020-10-05 DIAGNOSIS — F41.8 TEST ANXIETY: Primary | ICD-10-CM

## 2020-10-05 DIAGNOSIS — M79.604 PAIN OF RIGHT LOWER EXTREMITY: ICD-10-CM

## 2020-10-05 DIAGNOSIS — M51.36 DDD (DEGENERATIVE DISC DISEASE), LUMBAR: ICD-10-CM

## 2020-10-05 DIAGNOSIS — Z86.718 HISTORY OF DEEP VENOUS THROMBOSIS (DVT) OF DISTAL VEIN OF RIGHT LOWER EXTREMITY: ICD-10-CM

## 2020-10-05 PROCEDURE — 73721 MRI JNT OF LWR EXTRE W/O DYE: CPT

## 2020-10-05 PROCEDURE — 72148 MRI LUMBAR SPINE W/O DYE: CPT

## 2020-10-05 PROCEDURE — 93971 EXTREMITY STUDY: CPT

## 2020-10-05 RX ORDER — DIAZEPAM 10 MG/1
TABLET ORAL
Qty: 1 TABLET | Refills: 0 | Status: SHIPPED | OUTPATIENT
Start: 2020-10-05 | End: 2021-03-15

## 2020-10-05 NOTE — TELEPHONE ENCOUNTER
ON CALL DR RAJPUT Pt REQUESTING MEDICATION TO TAKE TO HELP CALM HER NERVES DURING HER MRI THIS MORNING    Pt SCHEDULED FOR 10:45AM   SHE WOULD LIKE THIS SENT TO [facility] PLEASE

## 2020-10-14 ENCOUNTER — OFFICE VISIT (OUTPATIENT)
Dept: ORTHOPEDIC SURGERY | Facility: CLINIC | Age: 65
End: 2020-10-14

## 2020-10-14 VITALS — BODY MASS INDEX: 29.93 KG/M2 | HEIGHT: 72 IN | WEIGHT: 221 LBS

## 2020-10-14 DIAGNOSIS — M25.551 RIGHT HIP PAIN: Primary | ICD-10-CM

## 2020-10-14 DIAGNOSIS — M79.604 PAIN OF RIGHT LOWER EXTREMITY: ICD-10-CM

## 2020-10-14 DIAGNOSIS — M54.41 CHRONIC RIGHT-SIDED LOW BACK PAIN WITH RIGHT-SIDED SCIATICA: ICD-10-CM

## 2020-10-14 DIAGNOSIS — M51.36 DDD (DEGENERATIVE DISC DISEASE), LUMBAR: ICD-10-CM

## 2020-10-14 DIAGNOSIS — G89.29 CHRONIC RIGHT-SIDED LOW BACK PAIN WITH RIGHT-SIDED SCIATICA: ICD-10-CM

## 2020-10-14 PROCEDURE — 99213 OFFICE O/P EST LOW 20 MIN: CPT | Performed by: NURSE PRACTITIONER

## 2020-10-14 NOTE — PROGRESS NOTES
"Mini Andersen is a 65 y.o. female returns for     Chief Complaint   Patient presents with   • Right Hip - Follow-up   • Results     MRI       HISTORY OF PRESENT ILLNESS: Patient presents to office for follow-up of acute right hip/leg pain and MRI results of lumbar spine and right hip. Patient was given an IM injection of Kenalog at last office visit on 9/17/2020 for acutely worsened pain radiating throughout her entire right leg from her hip to the level of her foot.  Reports her pain is significantly improved now following the injection.  She has no new complaints or concerns noted today.  No injuries reported.  Patient also underwent an ultrasound of the right lower extremity, which was negative for DVT.  Patient has a history of DVTs.  Patient is ambulatory in office today without difficulty.  Pain scale today is 4/10.     CONCURRENT MEDICAL HISTORY:    The following portions of the patient's history were reviewed and updated as appropriate: allergies, current medications, past family history, past medical history, past social history, past surgical history and problem list.     ROS  No fevers or chills.  No chest pain or shortness of air.  No GI or  disturbances. Low back pain.     PHYSICAL EXAMINATION:       Ht 182.9 cm (72\")   Wt 100 kg (221 lb)   LMP 02/23/1998 (Within Months) Comment: Hysterectomy  BMI 29.97 kg/m²     Physical Exam  Vitals signs reviewed.   Constitutional:       General: She is not in acute distress.     Appearance: She is well-developed. She is not ill-appearing.   HENT:      Head: Normocephalic.   Pulmonary:      Effort: Pulmonary effort is normal. No respiratory distress.   Abdominal:      General: There is no distension.      Palpations: Abdomen is soft.   Musculoskeletal:         General: Tenderness (Mild, lumbar spine and right SI joint) present. No swelling, deformity or signs of injury.   Skin:     General: Skin is warm and dry.      Capillary Refill: Capillary refill takes " less than 2 seconds.      Findings: No erythema.   Neurological:      Mental Status: She is alert and oriented to person, place, and time.      GCS: GCS eye subscore is 4. GCS verbal subscore is 5. GCS motor subscore is 6.   Psychiatric:         Speech: Speech normal.         Behavior: Behavior normal.         Thought Content: Thought content normal.         Judgment: Judgment normal.         GAIT:     []  Normal  [x]  Antalgic (mild)    Assistive device: [x]  None  []  Walker     []  Crutches  []  Cane     []  Wheelchair  []  Stretcher    Right Hip Exam     Tenderness   The patient is experiencing no tenderness.     Range of Motion   Abduction: 35   Flexion: 120   External rotation: 70   Internal rotation: 15     Muscle Strength   The patient has normal right hip strength.    Tests   EMI: negative  Fadir:  Negative FADIR test    Other   Erythema: absent  Sensation: normal  Pulse: present    Comments:  No pain and minimal limitations with range of motion of the hip joint.  Overall good motion and rotation of the hip joint.  No swelling appreciated.  No leg length discrepancy.  No erythema.  No warmth.  No tenderness to palpation.  Stable joint exam.      Left Hip Exam     Tenderness   The patient is experiencing no tenderness.     Range of Motion   Abduction: 35   Flexion: 120   External rotation: 70   Internal rotation: 15     Muscle Strength   The patient has normal left hip strength.     Tests   EMI: negative  Fadir:  Negative FADIR test    Other   Erythema: absent  Sensation: normal  Pulse: present      Back Exam     Tenderness   The patient is experiencing tenderness in the lumbar and sacroiliac (Mild).    Range of Motion   Flexion: abnormal   Rotation right: abnormal   Rotation left: abnormal     Muscle Strength   The patient has normal back strength.    Tests   Straight leg raise right: negative  Straight leg raise left: negative    Reflexes   Patellar: normal    Other   Gait: antalgic (mild)   Erythema:  no back redness    Comments:  Mild pain and limitations with range of motion.  Mild tenderness to palpation at the lumbar spine and right SI joint.  No focal neurological deficits noted.            Mri Lumbar Spine Without Contrast    Result Date: 10/5/2020  Narrative: EXAM DESCRIPTION: MRI LUMBAR SPINE WO CONTRAST CLINICAL HISTORY: 65-year-old female with history given for low back and right leg pain/radiculopathy for three months. COMPARISON: Radiographs 9/17/2020 FINDINGS:  Sagittal and axial T1 and T2 MR images of lumbar spine are submitted for interpretation. No acute or suspicious findings within the included paraspinal soft tissues. There is anatomic alignment of the lumbar and included lower thoracic vertebra. No compression fracture, suspicious marrow replacement or osseous lesion identified. The conus terminates at the inferior margin of L1 and demonstrates normal signal and morphology. There is loss of T2 disc signal at L3-4, L4-5 and L5-S1. Loss of disc space height at all levels most conspicuous at L4-5. There is endplate degenerative signal alteration at all levels as well as osteophytosis. T12-L1:  No significant disc bulge, protrusion/extrusion, or stenosis. L1-L2:  No significant disc bulge, protrusion/extrusion, or stenosis. L2-L3:  Minimal disc bulge without protrusion/extrusion or stenosis. Mild facet arthropathy. L3-L4: There is mild disc bulge with some lobularity along the posterior margin. Annular fissure along the left lateral extraforaminal margin. There is mild facet arthropathy. There is mild contour changes of the thecal sac but no significant central spinal canal stenosis. Minimal disc encroachment upon the anterior inferior foramina without significant stenosis. L4-L5: There is mild generalized disc bulge with central to right central annular fissure. No protruding/extruded margin. There is facet arthropathy. Contour changes of the thecal sac but no significant central spinal canal  stenosis. Minimal disc encroachment upon the anterior inferior foramina without significant stenosis. L5-S1: There is generalized disc bulge with small central annular fissure and protrusion. No significant compromise of the thecal sac or the coursing S1 nerve roots/sleeves. There is facet arthropathy and thickening of the ligamentum flavum. There is mild inferior foraminal narrowing on the right but no significant stenosis or compromise of the L5 nerves. There are tiny synovial cyst involving both facets projecting posteriorly not anteriorly into the central spinal canal.     Impression: Multilevel degenerative changes as detailed above. No central spinal canal or significant foraminal stenosis. The above report for full details. Electronically signed by:  Heri George MD  10/5/2020 12:15 PM CDT Workstation: [id number]    Mri Hip Right Without Contrast    Result Date: 10/5/2020  Narrative: EXAM: MR HIP WITHOUT IV CONTRAST HISTORY: Right hip pain x 3 months, radiates throughout right leg, M25.551 Pain in right hip M79.604 Pain in right leg COMPARISON:  Radiographs 9/17/2020 TECHNIQUE: Multiplanar, multisequence MR images were obtained of the pelvis. Sagittal T2 sequences were obtained of the right hip. FINDINGS: Tendons: The gluteus medius, gluteus minimus, iliopsoas, hamstring, and rectus femoris tendons are intact. Labrum: There is no large labral tear. Cartilage is preserved. Bone and joint: There is no fracture, bone marrow edema, avascular necrosis, or bone lesion. There is no hip effusion. There is normal sphericity of the femoral head neck junction. There is no fibrocystic changes of the femoral neck. The ischium appears normal. Mild subchondral sclerosis of the bilateral acetabulum. Pelvis: The visualized pelvic contents are unremarkable. There is no pelvic adenopathy. Neurovascular structures: The neurovascular structures have normal course and signal through the hip joint. Spine: There is mild to moderate  degenerative disc disease of the visualized lumbosacral spine. Soft tissue: There is no soft tissue mass or abnormal fluid collection. Musculature is normal in signal. Trochanteric, iliopsoas, and ischial bursas are normal.      Impression: No acute abnormality of the right hip. Mild bilateral femoral acetabular osteoarthritis. Mild to moderate lumbosacral degenerative disc disease. Electronically signed by:  Doug Keys MD  10/5/2020 1:08 PM CDT Workstation: [id number]    Us Venous Doppler Lower Extremity Right (duplex)    Result Date: 10/5/2020  Narrative: PROCEDURE: US VENOUS DOPPLER LOWER EXTREMITY RIGHT (DUPLEX) INDICATION:  Right lower extremity pain, history of multiple DVTs. Patient presently anticoagulated COMPARISON:  7/21/2019 TECHNIQUE: Right lower extremity serial axial graded compression technique                         grayscale, color flow, spectral and Doppler FINDINGS: Imaged vessels:   - common femoral vein (CFV)   - femoral vein (FV)   - profunda femoral vein (PFV) (cephalad portion)   - popliteal vein (PV)   - anterior tibial vein (ATV) (cephalad portion)   - posterior tibial vein (PTV)   - greater saphenous vein (GSV) (non-contiguous segments) All of the above described vessels were freely compressible and demonstrated spontaneous and phasic flow as well as appropriate flow with augmentation.     Impression: No evidence of deep venous thrombosis within the femoral-popliteal system of the right lower extremity. Electronically signed by:  Albina Calderon MD  10/5/2020 8:22 PM CDT Workstation: [id number]        ASSESSMENT:    Diagnoses and all orders for this visit:    Right hip pain    Pain of right lower extremity    Chronic right-sided low back pain with right-sided sciatica    DDD (degenerative disc disease), lumbar    PLAN    MRIs of the right hip and lumbar spine reviewed and results discussed with patient.  We discussed evidence of mild osteoarthritic changes in her bilateral  hips.  We discussed evidence of multilevel degenerative changes in her lumbar spine and mild facet arthropathy but no evidence of spinal stenosis, foraminal stenosis or nerve compression.  Patient has a history of chronic low back pain.  She was complaining of acutely worsened right leg pain at last office visit with pain that radiated from her hip down to her foot.  There is no evidence of any source of nerve compression to account for her pain at that time.  Today, she reports her symptoms are significantly improved and she is doing much better.  She was given an IM injection of Kenalog, which she states significantly improved her pain.  Patient also underwent ultrasound of the right lower extremity, which was negative for DVT.  The patient has a history of previous DVTs and has an IVC filter in place.  Continue with gradual progression of activity as tolerated.  Continue with use of ice and/or heat therapy to the low back and right hip/SI joint as needed to minimize pain/inflammation/muscle tension.  Recommend Tylenol as needed for pain.  She has Norco at home to take as needed for worse pain.  Patient is unable to take oral NSAIDs due to her current use of Eliquis.  Follow-up with orthopedics as needed for any new, worsening or persistent symptoms.    Return if symptoms worsen or fail to improve.        This document has been electronically signed by EDUARDA Minor on October 18, 2020 12:20 CDT      EDUARDA Minor     64

## 2020-12-08 ENCOUNTER — OFFICE VISIT (OUTPATIENT)
Dept: CARDIOLOGY | Facility: CLINIC | Age: 65
End: 2020-12-08

## 2020-12-08 VITALS
DIASTOLIC BLOOD PRESSURE: 76 MMHG | WEIGHT: 232 LBS | OXYGEN SATURATION: 95 % | SYSTOLIC BLOOD PRESSURE: 122 MMHG | HEIGHT: 72 IN | HEART RATE: 84 BPM | BODY MASS INDEX: 31.42 KG/M2

## 2020-12-08 DIAGNOSIS — I82.413 DEEP VEIN THROMBOSIS (DVT) OF FEMORAL VEIN OF BOTH LOWER EXTREMITIES, UNSPECIFIED CHRONICITY (HCC): Primary | ICD-10-CM

## 2020-12-08 DIAGNOSIS — I49.9 CARDIAC ARRHYTHMIA, UNSPECIFIED CARDIAC ARRHYTHMIA TYPE: Primary | ICD-10-CM

## 2020-12-08 PROCEDURE — 93000 ELECTROCARDIOGRAM COMPLETE: CPT | Performed by: INTERNAL MEDICINE

## 2020-12-08 PROCEDURE — 99214 OFFICE O/P EST MOD 30 MIN: CPT | Performed by: INTERNAL MEDICINE

## 2020-12-08 RX ORDER — DEXLANSOPRAZOLE 60 MG/1
1 CAPSULE, DELAYED RELEASE ORAL AS NEEDED
COMMUNITY
Start: 2020-04-30 | End: 2022-07-12

## 2020-12-08 RX ORDER — ROSUVASTATIN CALCIUM 5 MG/1
5 TABLET, COATED ORAL DAILY
Qty: 30 TABLET | Refills: 11 | Status: SHIPPED | OUTPATIENT
Start: 2020-12-08 | End: 2021-03-15

## 2020-12-08 RX ORDER — ONDANSETRON 4 MG/1
TABLET, ORALLY DISINTEGRATING ORAL
COMMUNITY
Start: 2020-09-04 | End: 2022-10-18

## 2020-12-08 RX ORDER — SUCRALFATE ORAL 1 G/10ML
10 SUSPENSION ORAL EVERY 6 HOURS
COMMUNITY
Start: 2020-07-27 | End: 2021-03-15

## 2020-12-08 RX ORDER — PROMETHAZINE HYDROCHLORIDE 25 MG/1
25 TABLET ORAL EVERY 6 HOURS PRN
COMMUNITY
Start: 2020-10-14

## 2020-12-08 NOTE — PROGRESS NOTES
[facility]  OFFICE NOTE    Cardiovascular Medicine  Andrey Dubon M.D., RPVI         No referring provider defined for this encounter.    Thank you for asking me to see Mini Andersen for palpitations.    History of Present Illness  This is a 65 y.o. female with:    1. Palpitations    2. Pure hypercholesterolemia    3. Obstructive sleep apnea syndrome    4. Deep vein thrombosis (DVT) of femoral vein of both lower extremities, unspecified chronicity (CMS/[facility]ly [facility])    5. Type 2 diabetes mellitus without complication, without long-term current use of insulin (CMS/[facility])          Chief complaint -Palpitations        History of present Illness- 64-year-old lady with history of diabetes, hypertension and hyperlipidemia had bilateral knee replacement surgery and subsequently she developed DVT and she had DVT before and after surgery and she is going to be on chronic anticoagulation.      Patient had palpitations after her surgery, when she would get up and walk around.  Most likely sinus tachycardia, no diagnosis of arrhythmias, subsequently patient was started on verapamil with improvement in her symptoms.  However patient is here for follow-up and reported that she has been out of her verapamil for the last 1 month without any recurrence of palpitations.  She will have occasional palpitations at night.  She wants to come off the verapamil at this point currently denying any chest pain or shortness of breath  No bleeding problems from Eliquis    12/08/2020;  No acute issues since last visit.  Has occasional palpitations whenever she is exerting though are occasionally in the middle of the night however she has sleep apnea and does not use her CPAP religiously.  No bleeding problems on Eliquis.  No palpitations otherwise.        Review of Systems - Constitution: Negative for weakness, weight gain and weight loss.   HENT: Negative for congestion.    Eyes: Negative for blurred vision.    Cardiovascular: As mentioned above  Respiratory: Negative for cough and hemoptysis.    Endocrine: Negative for polydipsia and polyuria.   Hematologic/Lymphatic: Negative for bleeding problem. Does not bruise/bleed easily.   Skin: Negative for flushing.   Musculoskeletal: Negative for neck pain and stiffness.   Gastrointestinal: Negative for abdominal pain, diarrhea, jaundice, melena, nausea and vomiting.   Genitourinary: Negative for dysuria and hematuria.   Neurological: Negative for dizziness, focal weakness and numbness.   Psychiatric/Behavioral: Negative for altered mental status and depression.          All other systems were reviewed and were negative.    family history is not on file.     reports that she quit smoking about 40 years ago. She started smoking about 50 years ago. She smoked 0.00 packs per day for 0.00 years. She has never used smokeless tobacco. She reports that she does not drink alcohol or use drugs.    Allergies   Allergen Reactions   • Bextra [Valdecoxib] Shortness Of Breath     Shortness of breath   • Cephalosporins Shortness Of Breath   • Detrol La [Tolterodine Tartrate Er] Shortness Of Breath   • Dicyclomine Shortness Of Breath     Shortness of breath   • Fluoxetine Shortness Of Breath   • Keflex [Cephalexin] Shortness Of Breath   • Toprol Xl [Metoprolol] Anaphylaxis   • Chocolate Flavor    • Omeprazole-Sodium Bicarbonate Unknown (See Comments)   • Other      Fresh flowers causes throat swelling   • Amoxicillin Rash   • Aspirin Rash   • Ciprofloxin Hcl [Ciprofloxacin] Rash   • Claritin-D 12 Hour [Loratadine-Pseudoephedrine Er] Rash   • Codeine Rash   • Demerol [Meperidine] Rash   • Levaquin [Levofloxacin] Rash     Was told not allergic   • Lipitor [Atorvastatin] Other (See Comments)     cramping   • Macrobid [Nitrofurantoin] Rash   • Morphine And Related Rash   • Paxil [Paroxetine Hcl] Rash   • Sulfa Antibiotics Rash   • Tape Rash   • Tramadol Rash   • Vioxx [Rofecoxib] Rash   • Zegerid  [Omeprazole] Rash   • Zoloft [Sertraline Hcl] Rash         Current Outpatient Medications:   •  apixaban (ELIQUIS) 5 MG tablet tablet, Take 1 tablet by mouth Every 12 (Twelve) Hours., Disp: 180 tablet, Rfl: 3  •  Calcium Citrate (CITRACAL PO), Take 2 tablets by mouth Daily., Disp: , Rfl:   •  carboxymethylcellulose (REFRESH PLUS) 0.5 % solution, Administer 1 drop to both eyes Daily As Needed for Dry Eyes., Disp: , Rfl:   •  clonazePAM (KlonoPIN) 0.5 MG tablet, Take 0.5 mg by mouth 2 (Two) Times a Day As Needed for Anxiety., Disp: , Rfl:   •  Cyanocobalamin (B-12) 1000 MCG sublingual tablet, Place 1 tablet under the tongue Daily., Disp: , Rfl:   •  dexlansoprazole (Dexilant) 60 MG capsule, Take 1 capsule by mouth., Disp: , Rfl:   •  diazePAM (Valium) 10 MG tablet, One tablet 30 min prior to having mri., Disp: 1 tablet, Rfl: 0  •  diphenhydrAMINE (BENADRYL) 25 mg capsule, Take 25 mg by mouth Every 6 (Six) Hours As Needed., Disp: , Rfl: 6  •  ferrous sulfate 324 (65 Fe) MG tablet delayed-release EC tablet, Take 324 mg by mouth Daily With Breakfast., Disp: , Rfl:   •  levothyroxine (SYNTHROID, LEVOTHROID) 50 MCG tablet, Take 50 mcg by mouth daily., Disp: , Rfl: 1  •  losartan (COZAAR) 25 MG tablet, Take 25 mg by mouth Daily., Disp: , Rfl:   •  metFORMIN (GLUCOPHAGE) 500 MG tablet, Take 500 mg by mouth Daily As Needed (For FSBS > 140)., Disp: , Rfl: 1  •  omeprazole (priLOSEC) 20 MG capsule, Take 20 mg by mouth Daily., Disp: , Rfl:   •  ondansetron (ZOFRAN) 4 MG tablet, Take 4 mg by mouth Every 8 (Eight) Hours As Needed for Nausea or Vomiting., Disp: , Rfl:   •  ondansetron ODT (ZOFRAN-ODT) 4 MG disintegrating tablet, take 1 Tablet by oral route  every 8 hours as needed for nausea, Disp: , Rfl:   •  ONE TOUCH ULTRA TEST test strip, TEST BID, Disp: , Rfl: 5  •  promethazine (PHENERGAN) 25 MG tablet, , Disp: , Rfl:   •  sucralfate (Carafate) 1 GM/10ML suspension, Take 10 mL by mouth Every 6 (Six) Hours., Disp: , Rfl:   •   "vitamin D (ERGOCALCIFEROL) 44106 UNITS capsule capsule, Take 50,000 Units by mouth Every 7 (Seven) Days., Disp: , Rfl: 1    Physical Exam:  Vitals:    12/08/20 1309   BP: 122/76   BP Location: Left arm   Patient Position: Sitting   Cuff Size: Adult   Pulse: 84   SpO2: 95%   Weight: 105 kg (232 lb)   Height: 182.9 cm (72\")   PainSc: 0-No pain     Current Pain Level: none  Pulse Ox: Normal  on room air  General: alert, appears stated age and cooperative     Body Habitus: well-nourished    HEENT: Head: Normocephalic, no lesions, without obvious abnormality. No arcus senilis, xanthelasma or xanthomas.    Neuro: alert, oriented x3  Pulses: 2+ and symmetric  JVP: Volume/Pulsation: Normal.  Normal waveforms.   Appropriate inspiratory decrease.  No Kussmaul's. No Padmaja's.   Carotid Exam: no bruit normal pulsation bilaterally   Carotid Volume: normal.     Respirations: no increased work of breathing   Chest:  Normal    Pulmonary:Normal   Precordium: Normal impulses. P2 is not palpable.  RV Heave: absent  LV Heave: absent  [place]:  normal size and placement  Palpable S4: absent.  Heart rate: normal    Heart Rhythm: regular     Heart Sounds: S1: normal  S2: normal  S3: absent   S4: absent  Opening Snap: absent    Pericardial Rub:  Absent: .    Abdomen:   Appearance: normal .  Palpation: Soft, non-tender to palpation, bowel sounds positive in all four quadrants; no guarding or rebound tenderness  Extremity: no edema.   LE Skin: no rashes  LE Hair:  normal  LE Pulses: well perfused with normal pulses in the distal extremities  Pallor on elevation: Absent. Rubor on dependency: None      DATA REVIEWED:     EKG. I personally reviewed and interpreted the EKG.  Normal sinus rhythm normal EKG.    ECG/EMG Results (all)     None        ---------------------------------------------------  TTE/RORY:  Results for orders placed in visit on 07/10/17   Adult Transthoracic Echo Complete    Narrative · Left ventricular systolic function is " normal. Estimated EF = 55%.  · Left ventricular diastolic dysfunction (grade I) consistent with   impaired relaxation.  · Mild tricuspid valve regurgitation is present.          --------------------------------------------------------------------------------------------------  LABS:     The 10-year CVD risk score (Viki et al., 2008) is: 23.1%    Values used to calculate the score:      Age: 64 years      Sex: Female      Diabetic: Yes      Tobacco smoker: No      Systolic Blood Pressure: 138 mmHg      Is BP treated: No      HDL Cholesterol: 46 mg/dL      Total Cholesterol: 241 mg/dL         Lab Results   Component Value Date    GLUCOSE 106 (H) 07/21/2019    BUN 10 07/21/2019    CREATININE 0.76 07/21/2019    EGFRIFNONA 77 07/21/2019    BCR 13.2 07/21/2019    K 4.4 07/21/2019    CO2 25.0 07/21/2019    CALCIUM 9.0 07/21/2019    ALBUMIN 3.60 07/21/2019    AST 50 (H) 07/21/2019    ALT 45 (H) 07/21/2019     Lab Results   Component Value Date    WBC 5.4 10/21/2019    HGB 13.2 10/21/2019    HCT 39.3 10/21/2019    MCV 88.6 10/21/2019     10/21/2019     Lab Results   Component Value Date    CHOL 241 (H) 11/11/2019    TRIG 92 11/11/2019    HDL 46 11/11/2019     (H) 11/11/2019     Lab Results   Component Value Date    TSH 2.49 03/14/2019     Lab Results   Component Value Date    CKTOTAL 74 09/18/2017    CKMB 0.96 09/18/2017    TROPONINI <0.012 09/18/2017     Lab Results   Component Value Date    HGBA1C 5.7 12/03/2020     Lab Results   Component Value Date    DDIMER 715 (H) 09/01/2015     Lab Results   Component Value Date    ALT 45 (H) 07/21/2019     Lab Results   Component Value Date    HGBA1C 5.7 12/03/2020    HGBA1C 6.4 (H) 06/10/2020    HGBA1C 6.1 11/11/2019     Lab Results   Component Value Date    CREATININE 0.76 07/21/2019     Lab Results   Component Value Date    IRON 36 03/14/2019     Lab Results   Component Value Date    INR 1.02 10/01/2019    INR 1.03 09/18/2017    INR 0.97 09/16/2017    PROTIME  10.6 10/01/2019    PROTIME 13.4 09/18/2017    PROTIME 12.8 09/16/2017       Interpretation Summary    · A relatively benign monitor study.  · Patient had a minimum heart of 57 bpm, maximal heart rate of 167 bpm and average heart rate of 88 bpm. Predominant underlying rhythm was sinus rhythm 7 supraventricular tachycardia runs occurred, the run with the fastest interval lasting 5 beats with a max rate of 167 bpm, the longest lasting 9 beats an average rate of 124 bpm.  · PACs were rare less than 1% PVCs were rate less than 1%  · Normal diurnal variation heart rate.  · Diary event of arm neck pain, chest pain, fluttering, anxiety, shortness of breath and lightheadedness were associated with normal sinus rhythm  · One episode of chest pounding was associated with sinus tachycardia.  · Patient did not have triggered events with her SVT.  · Patient had total of 13 triggered events which were associated with normal sinus rhythm or sinus tachycardia.         Assessment/Plan     1.  Palpitations:  Symptoms of actually improved, she has been off verapamil without significant recurrence.  Monitor as above without any significant arrhythmias  Recent TSH electrolytes are okay  Palpitations and exertional sinus tachycardia in and that at the night mostly from her sleep apnea.  Visor to exercise on regular basis, weight loss and religiously use her CPAP     2.  Diabetes on metformin and she is doing okay and follows with Dr. Aquino     3.  Hyperlipidemia had dizziness from Lipitor.  Will try Crestor in the setting of her elevated ASCVD risk score     4.  Osteoarthritis status post knee replacement doing okay     5.  Hypothyroidism on Synthroid supplements.    6.  Hypertension:  Well-controlled on current regimen losartan 25 mg    7.  DVT:  On chronic anticoagulation with Eliquis.  Recent CBC with normal hemoglobin  She has an IVC filter from before, will refer to vascular surgery see if her IVC is  retrievable.           Prevention:  Patient's Body mass index is 31.46 kg/m². BMI is above normal parameters. Recommendations include: exercise counseling and nutrition counseling.      Mini Andersen is a former smoker      AAA Screening:   Not needed          This document has been electronically signed by Andrey Dubon MD on December 8, 2020 13:17 CST

## 2020-12-09 DIAGNOSIS — I82.5Y1 CHRONIC EMBOLISM AND THROMBOSIS OF UNSPECIFIED DEEP VEINS OF RIGHT PROXIMAL LOWER EXTREMITY (HCC): ICD-10-CM

## 2020-12-09 DIAGNOSIS — I82.593 CHRONIC DEEP VEIN THROMBOSIS (DVT) OF OTHER VEIN OF BOTH LOWER EXTREMITIES (HCC): Primary | ICD-10-CM

## 2020-12-09 LAB
QT INTERVAL: 372 MS
QTC INTERVAL: 418 MS

## 2020-12-22 ENCOUNTER — TELEPHONE (OUTPATIENT)
Dept: CARDIOLOGY | Facility: CLINIC | Age: 65
End: 2020-12-22

## 2020-12-22 NOTE — TELEPHONE ENCOUNTER
Called patient to let him know we need her to sing the patient assistance form and bring a proof of income.    She voiced understanding

## 2021-02-01 ENCOUNTER — OFFICE VISIT (OUTPATIENT)
Dept: CARDIAC SURGERY | Facility: CLINIC | Age: 66
End: 2021-02-01

## 2021-02-01 VITALS
HEART RATE: 81 BPM | BODY MASS INDEX: 32.15 KG/M2 | DIASTOLIC BLOOD PRESSURE: 82 MMHG | OXYGEN SATURATION: 97 % | SYSTOLIC BLOOD PRESSURE: 139 MMHG | HEIGHT: 72 IN | WEIGHT: 237.4 LBS | TEMPERATURE: 97.5 F

## 2021-02-01 DIAGNOSIS — E11.9 TYPE 2 DIABETES MELLITUS WITHOUT COMPLICATION, WITHOUT LONG-TERM CURRENT USE OF INSULIN (HCC): ICD-10-CM

## 2021-02-01 DIAGNOSIS — G89.4 CHRONIC PAIN SYNDROME: ICD-10-CM

## 2021-02-01 DIAGNOSIS — Z95.828 PRESENCE OF IVC FILTER: ICD-10-CM

## 2021-02-01 DIAGNOSIS — E78.00 PURE HYPERCHOLESTEROLEMIA: ICD-10-CM

## 2021-02-01 DIAGNOSIS — E66.09 CLASS 1 OBESITY DUE TO EXCESS CALORIES WITH SERIOUS COMORBIDITY AND BODY MASS INDEX (BMI) OF 32.0 TO 32.9 IN ADULT: ICD-10-CM

## 2021-02-01 DIAGNOSIS — G47.33 OBSTRUCTIVE SLEEP APNEA SYNDROME: ICD-10-CM

## 2021-02-01 DIAGNOSIS — I82.513 CHRONIC DEEP VEIN THROMBOSIS (DVT) OF FEMORAL VEIN OF BOTH LOWER EXTREMITIES (HCC): Primary | ICD-10-CM

## 2021-02-01 PROCEDURE — 99204 OFFICE O/P NEW MOD 45 MIN: CPT | Performed by: THORACIC SURGERY (CARDIOTHORACIC VASCULAR SURGERY)

## 2021-02-20 PROBLEM — E11.9 TYPE 2 DIABETES MELLITUS WITHOUT COMPLICATION, WITHOUT LONG-TERM CURRENT USE OF INSULIN (HCC): Status: RESOLVED | Noted: 2017-07-10 | Resolved: 2021-02-20

## 2021-02-20 PROBLEM — Z95.828 PRESENCE OF IVC FILTER: Status: ACTIVE | Noted: 2021-02-20

## 2021-02-20 PROBLEM — E66.09 CLASS 1 OBESITY DUE TO EXCESS CALORIES WITH SERIOUS COMORBIDITY AND BODY MASS INDEX (BMI) OF 32.0 TO 32.9 IN ADULT: Status: ACTIVE | Noted: 2021-02-20

## 2021-02-20 NOTE — PROGRESS NOTES
2/1/2021    Mini Andersen  1955    Chief Complaint:    Chief Complaint   Patient presents with   • Deep Vein Thrombosis       HPI:      PCP:  Hawa Aquino MD  Cardiology:  Dr Dubon    65 y.o. female with HTN(stable, increased risk stroke, rupture), Hyperlipidemia(stable, increased risk cardiovascular events), Diabetes Mellitus(stable, increased risk cardiovascular events) and Obesity(uncontrolled, increased risk cardiovascular events) , DVT(new, chronic, increase risk VTE).  former smoker.  Asymptomatic VTE.  Moderate LEFT knee pain x years.  Mild feet swelling occasional..  No TIA stroke amaurosis.  No MI claudication. No other associated signs, symptoms or modifying factors.    2/2021 Lower extremity venous duplex:  No dvt.  RIGHT GSV reflux (>2sec), superficial thrombophlebitis proximal.    7/2017 Echocardiogram:  EF 55%, LA 39mm, LV 51mm, RVSP 28mmHg.  Mild TR  12/2019 Holter:  Benign, HR .  12/2020 ECG:  NSR 76, QTc 418    The following portions of the patient's history were reviewed and updated as appropriate: allergies, current medications, past family history, past medical history, past social history, past surgical history and problem list.  Recent images independently reviewed.  Available laboratory values reviewed.    PMH:  Past Medical History:   Diagnosis Date   • Acid reflux    • Acute peritonitis (CMS/HCC)    • Allergic rhinitis    • Arthritis    • Bee sting    • Blood clot in vein, right leg    • Borderline glaucoma    • Chronic bronchitis (CMS/HCC)    • Constipation     severe with an immotile colon      • Deep venous thrombosis (CMS/HCC)    • High cholesterol    • Hypothyroidism    • Intestinal volvulus (CMS/HCC)    • Muscle atrophy     O/E   • Nuclear senile cataract    • Nuclear senile cataract    • Polyneuropathy in diabetes (CMS/HCC)    • PONV (postoperative nausea and vomiting)    • Type 2 diabetes mellitus (CMS/HCC)     NO BDR   • Type 2 diabetes mellitus without  complications (CMS/HCC)    • Unspecified disease of nail      Past Surgical History:   Procedure Laterality Date   • APPENDECTOMY     • BLADDER SUSPENSION     • COLECTOMY PARTIAL / TOTAL  2014    Subtotal colectomy with ineo-rectostomy.   • ENDOSCOPY N/A 2018    Procedure: ESOPHAGOGASTRODUODENOSCOPY;  Surgeon: Carlos Eduardo Cyr DO;  Location: [facility] ENDOSCOPY;  Service: Gastroenterology   • ENDOSCOPY N/A 3/26/2019    Procedure: ESOPHAGOGASTRODUODENOSCOPY;  Surgeon: Carlos Eduardo Cyr DO;  Location: [facility] ENDOSCOPY;  Service: Gastroenterology   • ENDOSCOPY N/A 2020    Procedure: ESOPHAGOGASTRODUODENOSCOPY;  Surgeon: Carlos Eduardo Cyr DO;  Location: [facility] OR;  Service: Gastroenterology;  Laterality: N/A;   • HERNIA REPAIR      multiple   • HYSTERECTOMY     • INJECTION OF MEDICATION  2010    DEPO MEDROL   • JOINT REPLACEMENT Left 2017    knee   • KNEE ARTHROSCOPY Left    • LAPAROSCOPIC CHOLECYSTECTOMY     • SALPINGO OOPHORECTOMY Left    • SALPINGO OOPHORECTOMY Right    • SIGMOIDOSCOPY N/A 3/26/2019    Procedure: SIGMOIDOSCOPY FLEXIBLE;  Surgeon: Carlos Eduardo Cyr DO;  Location: [facility] ENDOSCOPY;  Service: Gastroenterology   • TOTAL KNEE ARTHROPLASTY Left 2017    Procedure: TOTAL KNEE ARTHROPLASTY ATTUNE with adductor canal block;  Surgeon: Jose Ballesteros MD;  Location: [facility];  Service:      No family history on file.  Social History     Tobacco Use   • Smoking status: Former Smoker     Packs/day: 0.00     Years: 0.00     Pack years: 0.00     Start date:      Quit date: 1980     Years since quittin.1   • Smokeless tobacco: Never Used   Substance Use Topics   • Alcohol use: No   • Drug use: No       ALLERGIES:  Allergies   Allergen Reactions   • Bextra [Valdecoxib] Shortness Of Breath     Shortness of breath   • Cephalosporins Shortness Of Breath   • Detrol La [Tolterodine Tartrate Er] Shortness Of Breath   • Dicyclomine Shortness Of Breath     Shortness of breath    • Fluoxetine Shortness Of Breath   • Keflex [Cephalexin] Shortness Of Breath   • Toprol Xl [Metoprolol] Anaphylaxis   • Chocolate Flavor    • Omeprazole-Sodium Bicarbonate Unknown (See Comments)   • Other      Fresh flowers causes throat swelling   • Amoxicillin Rash   • Aspirin Rash   • Ciprofloxin Hcl [Ciprofloxacin] Rash   • Claritin-D 12 Hour [Loratadine-Pseudoephedrine Er] Rash   • Codeine Rash   • Demerol [Meperidine] Rash   • Levaquin [Levofloxacin] Rash     Was told not allergic   • Lipitor [Atorvastatin] Other (See Comments)     cramping   • Macrobid [Nitrofurantoin] Rash   • Morphine And Related Rash   • Paxil [Paroxetine Hcl] Rash   • Sulfa Antibiotics Rash   • Tape Rash   • Tramadol Rash   • Vioxx [Rofecoxib] Rash   • Zegerid [Omeprazole] Rash   • Zoloft [Sertraline Hcl] Rash         MEDICATIONS:    Current Outpatient Medications:   •  apixaban (ELIQUIS) 5 MG tablet tablet, Take 1 tablet by mouth Every 12 (Twelve) Hours., Disp: 180 tablet, Rfl: 3  •  Calcium Citrate (CITRACAL PO), Take 2 tablets by mouth Daily., Disp: , Rfl:   •  carboxymethylcellulose (REFRESH PLUS) 0.5 % solution, Administer 1 drop to both eyes Daily As Needed for Dry Eyes., Disp: , Rfl:   •  clonazePAM (KlonoPIN) 0.5 MG tablet, Take 0.5 mg by mouth 2 (Two) Times a Day As Needed for Anxiety., Disp: , Rfl:   •  Cyanocobalamin (B-12) 1000 MCG sublingual tablet, Place 1 tablet under the tongue Daily., Disp: , Rfl:   •  dexlansoprazole (Dexilant) 60 MG capsule, Take 1 capsule by mouth., Disp: , Rfl:   •  diazePAM (Valium) 10 MG tablet, One tablet 30 min prior to having mri., Disp: 1 tablet, Rfl: 0  •  diphenhydrAMINE (BENADRYL) 25 mg capsule, Take 25 mg by mouth Every 6 (Six) Hours As Needed., Disp: , Rfl: 6  •  ferrous sulfate 324 (65 Fe) MG tablet delayed-release EC tablet, Take 324 mg by mouth Daily With Breakfast., Disp: , Rfl:   •  levothyroxine (SYNTHROID, LEVOTHROID) 50 MCG tablet, Take 50 mcg by mouth daily., Disp: , Rfl: 1  •   losartan (COZAAR) 25 MG tablet, Take 25 mg by mouth Daily., Disp: , Rfl:   •  metFORMIN (GLUCOPHAGE) 500 MG tablet, Take 500 mg by mouth Daily As Needed (For FSBS > 140)., Disp: , Rfl: 1  •  omeprazole (priLOSEC) 20 MG capsule, Take 20 mg by mouth Daily., Disp: , Rfl:   •  ondansetron (ZOFRAN) 4 MG tablet, Take 4 mg by mouth Every 8 (Eight) Hours As Needed for Nausea or Vomiting., Disp: , Rfl:   •  ondansetron ODT (ZOFRAN-ODT) 4 MG disintegrating tablet, take 1 Tablet by oral route  every 8 hours as needed for nausea, Disp: , Rfl:   •  ONE TOUCH ULTRA TEST test strip, TEST BID, Disp: , Rfl: 5  •  promethazine (PHENERGAN) 25 MG tablet, , Disp: , Rfl:   •  rosuvastatin (CRESTOR) 5 MG tablet, Take 1 tablet by mouth Daily., Disp: 30 tablet, Rfl: 11  •  sucralfate (Carafate) 1 GM/10ML suspension, Take 10 mL by mouth Every 6 (Six) Hours., Disp: , Rfl:   •  vitamin D (ERGOCALCIFEROL) 79500 UNITS capsule capsule, Take 50,000 Units by mouth Every 7 (Seven) Days., Disp: , Rfl: 1    Review of Systems   Review of Systems   Constitution: Positive for malaise/fatigue. Negative for night sweats and weight loss.   HENT: Negative for hearing loss, hoarse voice and stridor.    Eyes: Negative for vision loss in left eye, vision loss in right eye and visual disturbance.   Cardiovascular: Positive for leg swelling. Negative for chest pain and palpitations.   Respiratory: Negative for cough, hemoptysis and shortness of breath.    Hematologic/Lymphatic: Negative for adenopathy and bleeding problem. Does not bruise/bleed easily.   Skin: Negative for color change, poor wound healing and rash.   Musculoskeletal: Positive for arthritis, joint pain and muscle weakness. Negative for back pain and neck pain.   Gastrointestinal: Negative for abdominal pain, dysphagia and heartburn.   Neurological: Negative for dizziness, numbness and seizures.   Psychiatric/Behavioral: Negative for altered mental status, depression and memory loss. The patient is  "not nervous/anxious.        Physical Exam   Vitals:    02/01/21 1116   BP: 139/82   BP Location: Left arm   Patient Position: Sitting   Pulse: 81   Temp: 97.5 °F (36.4 °C)   TempSrc: Infrared   SpO2: 97%   Weight: 108 kg (237 lb 6.4 oz)   Height: 182.9 cm (72\")     Physical Exam  Constitutional:       General: She is not in acute distress.     Appearance: She is not ill-appearing.   HENT:      Head: Atraumatic.      Right Ear: Hearing normal.      Left Ear: Hearing normal.      Nose: No nasal deformity.      Mouth/Throat:      Dentition: Normal dentition. Does not have dentures.   Eyes:      General: Lids are normal.      Conjunctiva/sclera: Conjunctivae normal.      Pupils:      Right eye: Pupil is round and reactive.      Left eye: Pupil is round and reactive.   Neck:      Thyroid: No thyroid mass or thyromegaly.      Vascular: No carotid bruit or JVD.      Trachea: No tracheal deviation.   Cardiovascular:      Rate and Rhythm: Normal rate and regular rhythm.      Pulses:           Carotid pulses are 2+ on the right side and 2+ on the left side.       Radial pulses are 2+ on the right side and 2+ on the left side.        Posterior tibial pulses are 2+ on the right side and 2+ on the left side.      Heart sounds: No murmur.   Pulmonary:      Effort: Pulmonary effort is normal.      Breath sounds: Normal breath sounds.   Abdominal:      General: There is no distension.      Palpations: Abdomen is soft. There is no hepatomegaly, splenomegaly or mass.      Tenderness: There is no abdominal tenderness.   Musculoskeletal:         General: No deformity.      Comments: Gait normal.    Lymphadenopathy:      Cervical: No cervical adenopathy.      Upper Body:      Right upper body: No supraclavicular adenopathy.      Left upper body: No supraclavicular adenopathy.   Skin:     General: Skin is warm and dry.      Coloration: Skin is not pale.      Findings: No erythema.      Comments: No venous staining   Neurological:      " Mental Status: She is alert and oriented to person, place, and time.   Psychiatric:         Speech: Speech normal.         Behavior: Behavior is cooperative.         Thought Content: Thought content normal.         Judgment: Judgment normal.         BUN   Date Value Ref Range Status   07/21/2019 10 8 - 23 mg/dL Final   06/07/2018 12 7 - 18 mg/dL Final     Creatinine   Date Value Ref Range Status   07/21/2019 0.76 0.57 - 1.00 mg/dL Final   06/07/2018 0.9 0.6 - 1.3 mg/dL Final     eGFR Non  Amer   Date Value Ref Range Status   07/21/2019 77 >60 mL/min/1.73 Final       ASSESSMENT:  Diagnoses and all orders for this visit:    1. Chronic deep vein thrombosis (DVT) of femoral vein of both lower extremities (CMS/[facility]) (Primary)    2. Type 2 diabetes mellitus without complication, without long-term current use of insulin (CMS/[facility])    3. Pure hypercholesterolemia    4. Chronic pain syndrome    5. Obstructive sleep apnea syndrome    6. Presence of IVC filter    7. Class 1 obesity due to excess calories with serious comorbidity and body mass index (BMI) of 32.0 to 32.9 in adult      PLAN:  Detailed discussion with Mini Andersen regarding situation and options.  Prior DVT, RIGHT GSV reflux. Minimal symptoms. IVC filter (retreivable) in place.  Recommend leave IVC filter in place with lifelong need for anticoagulation.  When off anticoagulation for procedures or bleeding issues will add protection for PTE.  Multiple risk factors with severe comorbidities.  No intervention indicated at this time.   Risks, benefits discussed.  Understands and wishes to proceed with plan.    Return as needed  Obesity Class  1. Increased risk cardiovascular events, sleep and breathing disorders, joint issues, type 2 diabetes mellitus. Options for weight management, heart healthy diet, exercise programs, and associated health risks of obesity discussed.    Recommended regular physical activity, progressive walking program.  Continue current  medications as directed.  Will Obtain relevant old records.  Advance Care Planning   ACP discussion was declined by the patient. Patient does not have an advance directive, declines further assistance.    Thank you for the opportunity to participate in this patient's care.    Copy to primary care provider.

## 2021-03-11 DIAGNOSIS — M79.671 PAIN OF RIGHT HEEL: Primary | ICD-10-CM

## 2021-03-15 ENCOUNTER — LAB (OUTPATIENT)
Dept: LAB | Facility: HOSPITAL | Age: 66
End: 2021-03-15

## 2021-03-15 ENCOUNTER — OFFICE VISIT (OUTPATIENT)
Dept: ORTHOPEDIC SURGERY | Facility: CLINIC | Age: 66
End: 2021-03-15

## 2021-03-15 VITALS — WEIGHT: 231.1 LBS | HEIGHT: 72 IN | BODY MASS INDEX: 31.3 KG/M2

## 2021-03-15 DIAGNOSIS — M79.671 PAIN OF RIGHT HEEL: Primary | ICD-10-CM

## 2021-03-15 DIAGNOSIS — Z01.818 PREOP TESTING: Primary | ICD-10-CM

## 2021-03-15 LAB — SARS-COV-2 ORF1AB RESP QL NAA+PROBE: NOT DETECTED

## 2021-03-15 PROCEDURE — U0004 COV-19 TEST NON-CDC HGH THRU: HCPCS

## 2021-03-15 PROCEDURE — C9803 HOPD COVID-19 SPEC COLLECT: HCPCS

## 2021-03-15 PROCEDURE — 99214 OFFICE O/P EST MOD 30 MIN: CPT | Performed by: NURSE PRACTITIONER

## 2021-03-15 RX ORDER — HYDROCODONE BITARTRATE AND ACETAMINOPHEN 5; 325 MG/1; MG/1
1 TABLET ORAL EVERY 8 HOURS PRN
Qty: 30 TABLET | Refills: 0 | Status: SHIPPED | OUTPATIENT
Start: 2021-03-15 | End: 2021-03-25

## 2021-03-15 NOTE — PROGRESS NOTES
"  Mini Andersen is a 65 y.o. female   Primary provider:  Hawa Aquino MD       Chief Complaint   Patient presents with   • Right Foot - Pain       HISTORY OF PRESENT ILLNESS:     65-year-old female patient presents to office for evaluation of acute right heel pain.  Onset of pain occurred several months ago with no known injury.  Patient has a history of chronic right leg pain.  Patient localizes her pain to a very specific area of her plantar heel with tenderness to touch.  Patient states it feels as if she is \"walking on a rock\" at times.  Patient states she has been taking half tablets of Norco 5 mg for pain relief, which does offer some mild relief.  This was a leftover prescription from September 2020.  Patient states she is out of this pain medication now and requests a refill today.  Pain is currently described as constant and severe.  Pain is described as stabbing in nature with associated swelling.  Pain is worse with sitting, driving, standing and walking.  Patient states that she has worse pain in the mornings when she awakens.  X-rays are performed in office today.  Pain scale today is 10/10.    Pain  This is a chronic problem. The current episode started more than 1 month ago (Onset several months ago). The problem occurs constantly. The problem has been gradually worsening. Associated symptoms include arthralgias, joint swelling, nausea, numbness, a rash and vomiting. Associated symptoms comments: Stabbing pain at the plantar heel. The symptoms are aggravated by walking and standing (Sitting, driving). She has tried oral narcotics (Norco) for the symptoms. The treatment provided mild relief.     CONCURRENT MEDICAL HISTORY:    Past Medical History:   Diagnosis Date   • Acid reflux    • Acute peritonitis (CMS/HCC)    • Allergic rhinitis    • Arthritis    • Bee sting    • Blood clot in vein, right leg    • Borderline glaucoma    • Chronic bronchitis (CMS/HCC)    • Constipation     severe " with an immotile colon      • Deep venous thrombosis (CMS/HCC)    • High cholesterol    • Hypothyroidism    • Intestinal volvulus (CMS/HCC)    • Muscle atrophy     O/E   • Nuclear senile cataract    • Nuclear senile cataract    • Polyneuropathy in diabetes (CMS/HCC)    • PONV (postoperative nausea and vomiting)    • Type 2 diabetes mellitus (CMS/HCC)     NO BDR   • Type 2 diabetes mellitus without complications (CMS/HCC)    • Unspecified disease of nail        Allergies   Allergen Reactions   • Bextra [Valdecoxib] Shortness Of Breath     Shortness of breath   • Cephalosporins Shortness Of Breath   • Detrol La [Tolterodine Tartrate Er] Shortness Of Breath   • Dicyclomine Shortness Of Breath     Shortness of breath   • Fluoxetine Shortness Of Breath   • Keflex [Cephalexin] Shortness Of Breath   • Toprol Xl [Metoprolol] Anaphylaxis   • Chocolate Flavor    • Omeprazole-Sodium Bicarbonate Unknown (See Comments)   • Other      Fresh flowers causes throat swelling   • Amoxicillin Rash   • Aspirin Rash   • Ciprofloxin Hcl [Ciprofloxacin] Rash   • Claritin-D 12 Hour [Loratadine-Pseudoephedrine Er] Rash   • Codeine Rash   • Demerol [Meperidine] Rash   • Levaquin [Levofloxacin] Rash     Was told not allergic   • Lipitor [Atorvastatin] Other (See Comments)     cramping   • Macrobid [Nitrofurantoin] Rash   • Morphine And Related Rash   • Paxil [Paroxetine Hcl] Rash   • Sulfa Antibiotics Rash   • Tape Rash   • Tramadol Rash   • Vioxx [Rofecoxib] Rash   • Zegerid [Omeprazole] Rash   • Zoloft [Sertraline Hcl] Rash         Current Outpatient Medications:   •  apixaban (ELIQUIS) 5 MG tablet tablet, Take 1 tablet by mouth Every 12 (Twelve) Hours., Disp: 180 tablet, Rfl: 3  •  Calcium Citrate (CITRACAL PO), Take 2 tablets by mouth Daily., Disp: , Rfl:   •  carboxymethylcellulose (REFRESH PLUS) 0.5 % solution, Administer 1 drop to both eyes Daily As Needed for Dry Eyes., Disp: , Rfl:   •  clonazePAM (KlonoPIN) 0.5 MG tablet, Take 0.5 mg  by mouth 2 (Two) Times a Day As Needed for Anxiety., Disp: , Rfl:   •  Cyanocobalamin (B-12) 1000 MCG sublingual tablet, Place 1 tablet under the tongue Daily., Disp: , Rfl:   •  dexlansoprazole (Dexilant) 60 MG capsule, Take 1 capsule by mouth., Disp: , Rfl:   •  diphenhydrAMINE (BENADRYL) 25 mg capsule, Take 25 mg by mouth Every 6 (Six) Hours As Needed., Disp: , Rfl: 6  •  ferrous sulfate 324 (65 Fe) MG tablet delayed-release EC tablet, Take 324 mg by mouth Daily With Breakfast., Disp: , Rfl:   •  levothyroxine (SYNTHROID, LEVOTHROID) 50 MCG tablet, Take 50 mcg by mouth daily., Disp: , Rfl: 1  •  losartan (COZAAR) 25 MG tablet, Take 25 mg by mouth Daily., Disp: , Rfl:   •  metFORMIN (GLUCOPHAGE) 500 MG tablet, Take 500 mg by mouth Daily As Needed (For FSBS > 140)., Disp: , Rfl: 1  •  omeprazole (priLOSEC) 20 MG capsule, Take 20 mg by mouth Daily., Disp: , Rfl:   •  ondansetron (ZOFRAN) 4 MG tablet, Take 4 mg by mouth Every 8 (Eight) Hours As Needed for Nausea or Vomiting., Disp: , Rfl:   •  ondansetron ODT (ZOFRAN-ODT) 4 MG disintegrating tablet, take 1 Tablet by oral route  every 8 hours as needed for nausea, Disp: , Rfl:   •  ONE TOUCH ULTRA TEST test strip, TEST BID, Disp: , Rfl: 5  •  promethazine (PHENERGAN) 25 MG tablet, , Disp: , Rfl:   •  vitamin D (ERGOCALCIFEROL) 66780 UNITS capsule capsule, Take 50,000 Units by mouth Every 7 (Seven) Days., Disp: , Rfl: 1  •  HYDROcodone-acetaminophen (NORCO) 5-325 MG per tablet, Take 1 tablet by mouth Every 8 (Eight) Hours As Needed for Severe Pain  for up to 10 days., Disp: 30 tablet, Rfl: 0    Past Surgical History:   Procedure Laterality Date   • APPENDECTOMY     • BLADDER SUSPENSION     • COLECTOMY PARTIAL / TOTAL  05/22/2014    Subtotal colectomy with ineo-rectostomy.   • ENDOSCOPY N/A 5/1/2018    Procedure: ESOPHAGOGASTRODUODENOSCOPY;  Surgeon: Carlos Eduardo Cyr DO;  Location: [facility] ENDOSCOPY;  Service: Gastroenterology   • ENDOSCOPY N/A 3/26/2019    Procedure:  ESOPHAGOGASTRODUODENOSCOPY;  Surgeon: Carlos Eduardo Cyr DO;  Location: [facility] ENDOSCOPY;  Service: Gastroenterology   • ENDOSCOPY N/A 2020    Procedure: ESOPHAGOGASTRODUODENOSCOPY;  Surgeon: Carlos Eduardo Cyr DO;  Location: [facility] OR;  Service: Gastroenterology;  Laterality: N/A;   • HERNIA REPAIR      multiple   • HYSTERECTOMY     • INJECTION OF MEDICATION  2010    DEPO MEDROL   • JOINT REPLACEMENT Left 2017    knee   • KNEE ARTHROSCOPY Left    • LAPAROSCOPIC CHOLECYSTECTOMY     • SALPINGO OOPHORECTOMY Left    • SALPINGO OOPHORECTOMY Right    • SIGMOIDOSCOPY N/A 3/26/2019    Procedure: SIGMOIDOSCOPY FLEXIBLE;  Surgeon: Carlos Eduardo Cyr DO;  Location: [facility] ENDOSCOPY;  Service: Gastroenterology   • TOTAL KNEE ARTHROPLASTY Left 2017    Procedure: TOTAL KNEE ARTHROPLASTY ATTUNE with adductor canal block;  Surgeon: Jose Ballesteros MD;  Location: [facility] OR;  Service:        No family history on file.    Social History     Socioeconomic History   • Marital status:      Spouse name: Not on file   • Number of children: Not on file   • Years of education: Not on file   • Highest education level: Not on file   Tobacco Use   • Smoking status: Former Smoker     Packs/day: 0.00     Years: 0.00     Pack years: 0.00     Start date:      Quit date:      Years since quittin.2   • Smokeless tobacco: Never Used   Substance and Sexual Activity   • Alcohol use: No   • Drug use: No   • Sexual activity: Defer     Birth control/protection: Surgical        Review of Systems   Constitutional: Negative.    HENT: Negative.    Eyes: Negative.    Respiratory: Negative.    Cardiovascular: Negative.    Gastrointestinal: Positive for diarrhea, nausea and vomiting.   Endocrine: Negative.    Genitourinary: Negative.    Musculoskeletal: Positive for arthralgias, back pain, gait problem and joint swelling.        Right heel pain.    Skin: Positive for rash.   Allergic/Immunologic: Negative.   "  Neurological: Positive for numbness.   Hematological: Negative.    Psychiatric/Behavioral: Positive for sleep disturbance.        Anxiety/Depression         PHYSICAL EXAMINATION:       Ht 182.9 cm (72\")   Wt 105 kg (231 lb 1.6 oz)   LMP 02/23/1998 (Within Months) Comment: Hysterectomy  BMI 31.34 kg/m²     Physical Exam  Vitals reviewed.   Constitutional:       General: She is not in acute distress.     Appearance: She is well-developed. She is not ill-appearing.   HENT:      Head: Normocephalic.   Pulmonary:      Effort: Pulmonary effort is normal. No respiratory distress.   Abdominal:      General: There is no distension.      Palpations: Abdomen is soft.   Musculoskeletal:         General: Tenderness (Right heel) present. No swelling, deformity or signs of injury.   Skin:     General: Skin is warm and dry.      Capillary Refill: Capillary refill takes less than 2 seconds.      Findings: No erythema.   Neurological:      Mental Status: She is alert and oriented to person, place, and time.      GCS: GCS eye subscore is 4. GCS verbal subscore is 5. GCS motor subscore is 6.   Psychiatric:         Speech: Speech normal.         Behavior: Behavior normal.         Thought Content: Thought content normal.         Judgment: Judgment normal.         GAIT:     []  Normal  [x]  Antalgic    Assistive device: [x]  None  []  Walker     []  Crutches  []  Cane     []  Wheelchair  []  Stretcher    Right Ankle Exam     Tenderness   Right ankle tenderness location: plantar heel.  Swelling: none    Range of Motion   The patient has normal right ankle ROM.    Muscle Strength   The patient has normal right ankle strength.    Other   Erythema: absent  Sensation: normal  Pulse: present     Comments:  Localized pain and tenderness at the plantar heel.  No swelling appreciated.  No ecchymosis.  No erythema.  No signs of infection noted.  Achilles tendon is nontender to palpation and no pain elicited at the posterior ankle/heel.  " Overall good motion of the ankle joint.      Left Ankle Exam     Tenderness   The patient is experiencing no tenderness.   Swelling: none    Range of Motion   The patient has normal left ankle ROM.     Muscle Strength   The patient has normal left ankle strength.    Other   Erythema: absent  Sensation: normal  Pulse: present              XR Calcaneus 2+ View Right    Result Date: 3/16/2021  Narrative: XR CALCANEUS 2 VIEWS: 3/15/2021 1:14 PM CDT CLINICAL HISTORY:  Pain. COMPARISON: None available. FINDINGS: The joint spaces are preserved and there is no osseous lesion. No acute fracture, dislocation or radiopaque foreign body is present.     Impression: Normal study. Electronically signed by:  Khai Alonso MD  3/16/2021 6:15 PM CDT Workstation: [id number]        ASSESSMENT:    Diagnoses and all orders for this visit:    Pain of right heel  -     HYDROcodone-acetaminophen (NORCO) 5-325 MG per tablet; Take 1 tablet by mouth Every 8 (Eight) Hours As Needed for Severe Pain  for up to 10 days.  -     MRI Foot Right Without Contrast; Future    PLAN    X-rays of the right calcaneus performed in office today and reviewed with no acute findings noted.  We discussed the possibility of stress fracture.  We also discussed the possibility of plantar fasciitis, although her physical exam is not very consistent with this.  Recommend an Orthoboot to the right foot/ankle for immobilization.  We discussed that this will facilitate healing and will cover an array of differential diagnoses, including a possible stress fracture at the calcaneus.  Recommend modified weightbearing off the right foot with use of a cane or walker.  We discussed that she can gradually progress her weightbearing (while in the Orthoboot) as tolerated and based on her pain.  Recommend elevation and ice therapy to the right foot as needed to minimize pain/inflammation.  Recommend MRI of the right foot to evaluate for possible stress fracture of the calcaneus, bone  edema or any other abnormalities to account for her severe right heel pain.    Norco is prescribed to take as needed for moderate to severe pain.  Patient has been taking half tablets of Norco 5 mg intermittently as needed for severe pain with some relief.  She was taking an old prescription from September but recently ran out and requests this to be refilled today.  Patient is reminded to take the least amount of pain medication needed to control her pain.  Patient is cautioned that opioids can be addictive.  We also discussed other potential adverse side effects of opioids including constipation, dizziness, drowsiness, increased risk for falls and/or respiratory depression.  Patient verbalized understanding of these risks.  Recommend Tylenol as needed for milder pain.  Patient is unable to take oral NSAIDs due to her current use of Eliquis and the increased risk for bleeding.  Follow-up after MRI to discuss results and further treatment recommendations.    We discussed that if she has evidence of an insufficiency fracture or stress reaction change in the bone, I would recommend an updated bone density study as I cannot find evidence that she has had one in recent years.  Patient denies any history of osteoporosis that she is aware of.    This patient complains of severe right heel pain that affects her daily life and activities.  Therefore, I will recommend a continued course of narcotic pain medication for this patient until their pain has been sufficiently reduced to a level that I deem acceptable to be treated with alternative treatment options. LOTTIE reviewed internally via Epic and is appropriate.      Patient's Body mass index is 31.34 kg/m². BMI is above normal parameters.  Recommendations include nutrition counseling and exercise counseling.    EMR Dragon/Transciption Disclaimer: Some of this note may be an electronic transcription/translation of spoken language to printed text.  The electronic  translation of spoken language may permit erroneous, or at times, nonsensical words or phrases to be inadvertently transcribed. Although I have reviewed the note for such errors, some may still exist.     Return for follow up after MRI.        This document has been electronically signed by EDUARDA Minor on March 17, 2021 12:58 CDT      EDUARDA Minor

## 2021-03-18 ENCOUNTER — ANESTHESIA EVENT (OUTPATIENT)
Dept: GASTROENTEROLOGY | Facility: HOSPITAL | Age: 66
End: 2021-03-18

## 2021-03-18 ENCOUNTER — ANESTHESIA (OUTPATIENT)
Dept: GASTROENTEROLOGY | Facility: HOSPITAL | Age: 66
End: 2021-03-18

## 2021-03-18 ENCOUNTER — HOSPITAL ENCOUNTER (OUTPATIENT)
Facility: HOSPITAL | Age: 66
Setting detail: HOSPITAL OUTPATIENT SURGERY
Discharge: HOME | End: 2021-03-18
Attending: INTERNAL MEDICINE | Admitting: INTERNAL MEDICINE

## 2021-03-18 VITALS
BODY MASS INDEX: 31.02 KG/M2 | RESPIRATION RATE: 16 BRPM | SYSTOLIC BLOOD PRESSURE: 113 MMHG | DIASTOLIC BLOOD PRESSURE: 60 MMHG | HEIGHT: 72 IN | HEART RATE: 80 BPM | OXYGEN SATURATION: 95 % | TEMPERATURE: 97 F | WEIGHT: 229 LBS

## 2021-03-18 PROCEDURE — 25010000002 PROPOFOL 10 MG/ML EMULSION: Performed by: NURSE ANESTHETIST, CERTIFIED REGISTERED

## 2021-03-18 RX ORDER — LIDOCAINE HYDROCHLORIDE 20 MG/ML
INJECTION, SOLUTION INTRAVENOUS AS NEEDED
Status: DISCONTINUED | OUTPATIENT
Start: 2021-03-18 | End: 2021-03-18 | Stop reason: SURG

## 2021-03-18 RX ORDER — PROPOFOL 10 MG/ML
VIAL (ML) INTRAVENOUS AS NEEDED
Status: DISCONTINUED | OUTPATIENT
Start: 2021-03-18 | End: 2021-03-18 | Stop reason: SURG

## 2021-03-18 RX ORDER — DEXTROSE AND SODIUM CHLORIDE 5; .45 G/100ML; G/100ML
30 INJECTION, SOLUTION INTRAVENOUS CONTINUOUS PRN
Status: DISCONTINUED | OUTPATIENT
Start: 2021-03-18 | End: 2021-03-18 | Stop reason: HOSPADM

## 2021-03-18 RX ORDER — DEXTROSE AND SODIUM CHLORIDE 5; .45 G/100ML; G/100ML
INJECTION, SOLUTION INTRAVENOUS CONTINUOUS PRN
Status: DISCONTINUED | OUTPATIENT
Start: 2021-03-18 | End: 2021-03-18

## 2021-03-18 RX ADMIN — PROPOFOL 40 MG: 10 INJECTION, EMULSION INTRAVENOUS at 09:58

## 2021-03-18 RX ADMIN — PROPOFOL 40 MG: 10 INJECTION, EMULSION INTRAVENOUS at 09:55

## 2021-03-18 RX ADMIN — PROPOFOL 100 MG: 10 INJECTION, EMULSION INTRAVENOUS at 09:53

## 2021-03-18 RX ADMIN — DEXTROSE AND SODIUM CHLORIDE 30 ML/HR: 5; 450 INJECTION, SOLUTION INTRAVENOUS at 09:02

## 2021-03-18 RX ADMIN — LIDOCAINE HYDROCHLORIDE 50 MG: 20 INJECTION, SOLUTION INTRAVENOUS at 09:53

## 2021-03-18 NOTE — ANESTHESIA POSTPROCEDURE EVALUATION
Patient: Mini Andersen    Procedure Summary     Date: 03/18/21 Room / Location: [facility] ENDOSCOPY 2 / [facility] ENDOSCOPY    Anesthesia Start: 0946 Anesthesia Stop: 1004    Procedure: ESOPHAGOGASTRODUODENOSCOPY (N/A ) Diagnosis:       Stricture of esophagus      (Stricture of esophagus [K22.2])    Surgeons: Carlos Eduardo Cyr DO Provider: Becca Humphries CRNA    Anesthesia Type: MAC ASA Status: 3          Anesthesia Type: MAC    Vitals  No vitals data found for the desired time range.          Post Anesthesia Care and Evaluation    Patient location during evaluation: bedside  Patient participation: complete - patient participated  Level of consciousness: awake and awake and alert  Pain score: 0  Pain management: adequate  Airway patency: patent  Anesthetic complications: No anesthetic complications  PONV Status: none  Cardiovascular status: acceptable and stable  Respiratory status: acceptable, room air and spontaneous ventilation  Hydration status: acceptable    Comments: BP: 140/82   HR:  71  SAT:  100%  RR:  16  TEMP:

## 2021-03-18 NOTE — H&P
Carl Herzog DO,[facility]  Gastroenterology  Hepatology  Endoscopy  Board Certified in Internal Medicine and gastroenterology  [address], [address]  [place]. [address]  - [phone]   F - [phone]     GASTROENTEROLOGY HISTORY AND PHYSICAL  NOTE   CARL HERZOG DO.         SUBJECTIVE:   3/18/2021    Name: Mini Andersen  DOD: 1955        Chief Complaint:       Subjective : Dysphagia with a personal history of esophageal stricture    Patient is 65 y.o. female presents with desire for elective EGD with dilation.      ROS/HISTORY/ CURRENT MEDICATIONS/OBJECTIVE/VS/PE:   Review of Systems:  All systems unremarkable unless specified below.  Constitutional   HENT  Eyes   Respiratory    Cardiovascular  Gastrointestinal   Endocrine  Genitourinary    Musculoskeletal   Skin  Allergic/Immunologic    Neurological    Hematological  Psychiatric/Behavioral    History:     Past Medical History:   Diagnosis Date   • Acid reflux    • Acute peritonitis (CMS/HCC)    • Allergic rhinitis    • Arthritis    • Bee sting    • Blood clot in vein, right leg    • Borderline glaucoma    • Chronic bronchitis (CMS/HCC)    • Constipation     severe with an immotile colon      • Deep venous thrombosis (CMS/HCC)    • High cholesterol    • History of transfusion    • Hypothyroidism    • Intestinal volvulus (CMS/HCC)    • Muscle atrophy     O/E   • Nuclear senile cataract    • Nuclear senile cataract    • Polyneuropathy in diabetes (CMS/HCC)    • PONV (postoperative nausea and vomiting)    • Sleep apnea    • Type 2 diabetes mellitus (CMS/HCC)     NO BDR   • Type 2 diabetes mellitus without complications (CMS/HCC)    • Unspecified disease of nail      Past Surgical History:   Procedure Laterality Date   • APPENDECTOMY     • BLADDER SUSPENSION     • COLECTOMY PARTIAL / TOTAL  05/22/2014    Subtotal colectomy with ineo-rectostomy.   • ENDOSCOPY N/A 5/1/2018    Procedure: ESOPHAGOGASTRODUODENOSCOPY;  Surgeon: Carl SLAUGHTER  Left voice message to schedule appointment with Dr. Staley per request from primary Dr. Miller   DO Ger;  Location: [facility] ENDOSCOPY;  Service: Gastroenterology   • ENDOSCOPY N/A 3/26/2019    Procedure: ESOPHAGOGASTRODUODENOSCOPY;  Surgeon: Carlos Eduardo Cyr DO;  Location: [facility] ENDOSCOPY;  Service: Gastroenterology   • ENDOSCOPY N/A 2020    Procedure: ESOPHAGOGASTRODUODENOSCOPY;  Surgeon: Carlos Eduardo Cyr DO;  Location: [facility] OR;  Service: Gastroenterology;  Laterality: N/A;   • HERNIA REPAIR      multiple   • HYSTERECTOMY     • INJECTION OF MEDICATION  2010    DEPO MEDROL   • JOINT REPLACEMENT Left 2017    knee   • KNEE ARTHROSCOPY Left    • LAPAROSCOPIC CHOLECYSTECTOMY     • SALPINGO OOPHORECTOMY Left    • SALPINGO OOPHORECTOMY Right    • SIGMOIDOSCOPY N/A 3/26/2019    Procedure: SIGMOIDOSCOPY FLEXIBLE;  Surgeon: Carlos Eduardo Cyr DO;  Location: [facility] ENDOSCOPY;  Service: Gastroenterology   • TOTAL KNEE ARTHROPLASTY Left 2017    Procedure: TOTAL KNEE ARTHROPLASTY ATTUNE with adductor canal block;  Surgeon: Jose Ballesteros MD;  Location: [facility];  Service:      History reviewed. No pertinent family history.  Social History     Tobacco Use   • Smoking status: Former Smoker     Packs/day: 0.00     Years: 0.00     Pack years: 0.00     Start date:      Quit date:      Years since quittin.2   • Smokeless tobacco: Never Used   Substance Use Topics   • Alcohol use: No   • Drug use: No     Prior to Admission medications    Medication Sig Start Date End Date Taking? Authorizing Provider   Calcium Citrate (CITRACAL PO) Take 2 tablets by mouth Daily.   Yes Cody Cartagena MD   carboxymethylcellulose (REFRESH PLUS) 0.5 % solution Administer 1 drop to both eyes Daily As Needed for Dry Eyes.   Yes Cody Cartagena MD   clonazePAM (KlonoPIN) 0.5 MG tablet Take 0.5 mg by mouth 2 (Two) Times a Day As Needed for Anxiety.   Yes Cody Cartagena MD   Cyanocobalamin (B-12) 1000 MCG sublingual tablet Place 1 tablet under the tongue Daily.   Yes Osiel  MD Cody   dexlansoprazole (Dexilant) 60 MG capsule Take 1 capsule by mouth. 4/30/20  Yes Cody Cartagena MD   diphenhydrAMINE (BENADRYL) 25 mg capsule Take 25 mg by mouth Every 6 (Six) Hours As Needed. 9/17/16  Yes Cody Cartagena MD   ferrous sulfate 324 (65 Fe) MG tablet delayed-release EC tablet Take 324 mg by mouth Daily With Breakfast.   Yes Cody Cartagena MD   HYDROcodone-acetaminophen (NORCO) 5-325 MG per tablet Take 1 tablet by mouth Every 8 (Eight) Hours As Needed for Severe Pain  for up to 10 days. 3/15/21 3/25/21 Yes Jerman, EDUARDA Silva   levothyroxine (SYNTHROID, LEVOTHROID) 50 MCG tablet Take 50 mcg by mouth daily. 9/17/16  Yes Cody Cartagena MD   losartan (COZAAR) 25 MG tablet Take 25 mg by mouth Daily.   Yes Cody Cartagena MD   metFORMIN (GLUCOPHAGE) 500 MG tablet Take 500 mg by mouth Daily As Needed (For FSBS > 140). 9/17/16  Yes Cody Cartagena MD   omeprazole (priLOSEC) 20 MG capsule Take 20 mg by mouth Daily.   Yes Cody Cartagena MD   ondansetron (ZOFRAN) 4 MG tablet Take 4 mg by mouth Every 8 (Eight) Hours As Needed for Nausea or Vomiting.   Yes Cody Cartagena MD   ondansetron ODT (ZOFRAN-ODT) 4 MG disintegrating tablet take 1 Tablet by oral route  every 8 hours as needed for nausea 9/4/20  Yes Cody Cartagena MD   ONE TOUCH ULTRA TEST test strip TEST BID 8/10/16  Yes Cody Cartagena MD   promethazine (PHENERGAN) 25 MG tablet  10/14/20  Yes Cody Cartagena MD   vitamin D (ERGOCALCIFEROL) 79978 UNITS capsule capsule Take 50,000 Units by mouth Every 7 (Seven) Days. 9/17/16  Yes Cody Cartagena MD   apixaban (ELIQUIS) 5 MG tablet tablet Take 1 tablet by mouth Every 12 (Twelve) Hours. 2/1/21   Andrey Dubon MD     Allergies:  Bextra [valdecoxib], Cephalosporins, Detrol la [tolterodine tartrate er], Dicyclomine, Fluoxetine, Keflex [cephalexin], Toprol xl [metoprolol], Chocolate flavor, Omeprazole-sodium bicarbonate,  Other, Amoxicillin, Aspirin, Ciprofloxin hcl [ciprofloxacin], Claritin-d 12 hour [loratadine-pseudoephedrine er], Codeine, Demerol [meperidine], Levaquin [levofloxacin], Lipitor [atorvastatin], Macrobid [nitrofurantoin], Morphine and related, Paxil [paroxetine hcl], Sulfa antibiotics, Tape, Tramadol, Vioxx [rofecoxib], Zegerid [omeprazole], and Zoloft [sertraline hcl]    I have reviewed the patients medical history, surgical history and family history in the available medical record system.     Current Medications:     Current Facility-Administered Medications   Medication Dose Route Frequency Provider Last Rate Last Admin   • dextrose 5 % and sodium chloride 0.45 % infusion  30 mL/hr Intravenous Continuous PRN Carlos Eduardo Cyr DO 30 mL/hr at 03/18/21 0902 30 mL/hr at 03/18/21 0902       Objective     Physical Exam:   Temp:  [98.1 °F (36.7 °C)] 98.1 °F (36.7 °C)  Heart Rate:  [86] 86  Resp:  [22] 22  BP: (178)/(88) 178/88    Physical Exam:  General Appearance:    Alert, cooperative, in no acute distress   Head:    Normocephalic, without obvious abnormality, atraumatic   Eyes:            Lids and lashes normal, conjunctivae and sclerae normal, no icterus, no pallor, corneas clear, PERRLA   Ears:    Ears appear intact with no abnormalities noted   Throat:   No oral lesions, no thrush, oral mucosa moist   Neck:   No adenopathy, supple, trachea midline, no thyromegaly, no  carotid bruit, no JVD   Back:     No kyphosis present, no scoliosis present, no skin lesions,   erythema or scars, no tenderness to percussion or                 palpation,  range of motion normal   Lungs:     Clear to auscultation,respirations regular, even and         unlabored    Heart:    Regular rhythm and normal rate, normal S1 and S2, no  murmur, no gallop, no rub, no click   Breast Exam:    Deferred   Abdomen:     Normal bowel sounds, no masses, no organomegaly, soft  nontender, nondistended, no guarding, no rebound                  tenderness   Genitalia:    Deferred   Extremities:   Moves all extremities well, no edema, no cyanosis, no          redness   Pulses:   Pulses palpable and equal bilaterally   Skin:   No bleeding, bruising or rash   Lymph nodes:   No palpable adenopathy   Neurologic:   Cranial nerves 2 - 12 grossly intact, sensation intact, DTR     present and equal bilaterally      Results Review:     Lab Results   Component Value Date    WBC 5.4 10/21/2019    WBC 6.0 10/01/2019    WBC 5.47 07/21/2019    HGB 13.2 10/21/2019    HGB 13.0 10/01/2019    HGB 11.8 (L) 07/21/2019    HCT 39.3 10/21/2019    HCT 39.4 10/01/2019    HCT 37.3 07/21/2019     10/21/2019     10/01/2019     (L) 07/21/2019             No results found for: LIPASE  Lab Results   Component Value Date    INR 1.02 10/01/2019    INR 1.03 09/18/2017    INR 0.97 09/16/2017     No results found for: CULTURE    Radiology Review:  Imaging Results (Last 72 Hours)     ** No results found for the last 72 hours. **           I reviewed the patient's new clinical results.  I reviewed the patient's new imaging results and agree with the interpretation.     ASSESSMENT/PLAN:   ASSESSMENT:  1.  Dysphagia with a history of esophageal stricture    PLAN:  1.  Esophagogastroduodenoscopy with dilation of the esophagus and biopsy is required    Risk and benefits associated with the procedure are reviewed with the patient.  The patient wished to proceed     Carlos Eduardo Cyr DO  03/18/21  09:41 CDT

## 2021-03-18 NOTE — ANESTHESIA PREPROCEDURE EVALUATION
Anesthesia Evaluation     Patient summary reviewed and Nursing notes reviewed   history of anesthetic complications: PONV  NPO Solid Status: > 8 hours  NPO Liquid Status: > 2 hours           Airway   Mallampati: III  TM distance: <3 FB  Neck ROM: full  Possible difficult intubation  Dental    (+) lower dentures and upper dentures    Pulmonary - normal exam   (+) shortness of breath, sleep apnea,   Cardiovascular - normal exam  Exercise tolerance: poor (<4 METS)    (+) dysrhythmias, angina, PVD, DVT, hyperlipidemia,       Neuro/Psych  (+) numbness, psychiatric history Depression,     GI/Hepatic/Renal/Endo    (+) obesity,  GERD,  diabetes mellitus type 2,     Musculoskeletal     (+) back pain, chronic pain,   Abdominal   (+) obese,    Substance History - negative use     OB/GYN negative ob/gyn ROS   (-)  Pregnant        Other   arthritis,                        Anesthesia Plan    ASA 3     MAC     intravenous induction     Anesthetic plan, all risks, benefits, and alternatives have been provided, discussed and informed consent has been obtained with: patient.

## 2021-04-02 DIAGNOSIS — M79.671 PAIN OF RIGHT HEEL: ICD-10-CM

## 2021-04-15 ENCOUNTER — OFFICE VISIT (OUTPATIENT)
Dept: ORTHOPEDIC SURGERY | Facility: CLINIC | Age: 66
End: 2021-04-15

## 2021-04-15 VITALS — WEIGHT: 227.8 LBS | HEIGHT: 72 IN | BODY MASS INDEX: 30.85 KG/M2

## 2021-04-15 DIAGNOSIS — T14.8XXA CONTUSION OF BONE: ICD-10-CM

## 2021-04-15 DIAGNOSIS — M79.671 RIGHT FOOT PAIN: Primary | ICD-10-CM

## 2021-04-15 PROCEDURE — 99213 OFFICE O/P EST LOW 20 MIN: CPT | Performed by: NURSE PRACTITIONER

## 2021-04-15 NOTE — PROGRESS NOTES
"Mini Andersen is a 66 y.o. female returns for     Chief Complaint   Patient presents with   • Results     MRI Right foot       HISTORY OF PRESENT ILLNESS: Patient presents to office for follow-up of acute right foot/heel pain and MRI results of right ankle.  Onset of pain occurred several months ago with no known injury.  Patient has experienced localized pain/tenderness in her plantar heel and stated that it feels as if she is \"walking on a rock\".  Patient was placed in an Orthoboot at last office visit, which she is not wearing in office today.  Patient did not really explain why she is not wearing it other than she states \"I cannot wear that boot\".  No new complaints or concerns noted today.  No falls or injuries reported.  Pain scale today is 8/10.     CONCURRENT MEDICAL HISTORY:    The following portions of the patient's history were reviewed and updated as appropriate: allergies, current medications, past family history, past medical history, past social history, past surgical history and problem list.     ROS  No fevers or chills.  No chest pain or shortness of air.  No GI or  disturbances. Right foot/heel pain.     PHYSICAL EXAMINATION:       Ht 182.9 cm (72\")   Wt 103 kg (227 lb 12.8 oz)   LMP 02/23/1998 (Within Months) Comment: Hysterectomy  BMI 30.90 kg/m²     Physical Exam  Vitals reviewed.   Constitutional:       General: She is not in acute distress.     Appearance: She is well-developed. She is not ill-appearing.   HENT:      Head: Normocephalic.   Pulmonary:      Effort: Pulmonary effort is normal. No respiratory distress.   Abdominal:      General: There is no distension.      Palpations: Abdomen is soft.   Musculoskeletal:         General: Tenderness (Right heel) present. No swelling, deformity or signs of injury.   Skin:     General: Skin is warm and dry.      Capillary Refill: Capillary refill takes less than 2 seconds.      Findings: No erythema.   Neurological:      Mental Status: She " is alert and oriented to person, place, and time.      GCS: GCS eye subscore is 4. GCS verbal subscore is 5. GCS motor subscore is 6.   Psychiatric:         Speech: Speech normal.         Behavior: Behavior normal.         Thought Content: Thought content normal.         Judgment: Judgment normal.         GAIT:     []  Normal  [x]  Antalgic (mild)    Assistive device: [x]  None  []  Walker     []  Crutches  []  Cane     []  Wheelchair  []  Stretcher    Right Ankle Exam     Tenderness   Right ankle tenderness location: plantar heel.  Swelling: none    Range of Motion   The patient has normal right ankle ROM.    Muscle Strength   The patient has normal right ankle strength.    Other   Erythema: absent  Sensation: normal  Pulse: present     Comments:  Localized pain and tenderness at the plantar heel.  No swelling appreciated.  No ecchymosis.  No erythema.  No signs of infection noted.  Achilles tendon is nontender to palpation and no pain elicited at the posterior ankle/heel.  Overall good motion of the ankle joint.      Left Ankle Exam     Tenderness   The patient is experiencing no tenderness.   Swelling: none    Range of Motion   The patient has normal left ankle ROM.     Muscle Strength   The patient has normal left ankle strength.    Other   Erythema: absent  Sensation: normal  Pulse: present              XR Calcaneus 2+ View Right     Result Date: 3/16/2021  Narrative: XR CALCANEUS 2 VIEWS: 3/15/2021 1:14 PM CDT CLINICAL HISTORY:  Pain. COMPARISON: None available. FINDINGS: The joint spaces are preserved and there is no osseous lesion. No acute fracture, dislocation or radiopaque foreign body is present.      Impression: Normal study. Electronically signed by:  Khai Alonso MD  3/16/2021 6:15 PM CDT Workstation: [id number]    MRI right foot: The lateral ligaments and peroneal tendons are unremarkable the deltoid ligament and medial tendons have a normal appearance . There is patchy signal change in the marrow of  the calcaneus that is probably subacute bone bruising the   Achilles tendon and plantar aponeurosis are unremarkable . The joints are well-preserved . The flexor and extensor tendons of the foot are unremarkable     IMPRESSION:     Subacute bone bruising in the calcaneus       ASSESSMENT:    Diagnoses and all orders for this visit:    Right foot pain  -     Cane    Contusion of bone    PLAN    MRI of the right ankle reviewed and results discussed with patient.  We discussed evidence of subacute bone bruising at the right heel.  No other abnormalities are noted.  No evidence of stress fracture.  No evidence of plantar fasciitis.  Patient has not been compliant with the Orthoboot and is wearing her normal shoes in office today.  She continues to complain of pain in her right heel and rates it at 8/10 severity.  Her reports of pain are out of proportion to the MRI findings as well as her physical exam.  We discussed that modified weightbearing off the right foot would be the primary method to facilitate healing and improve her pain, certainly if she is not willing to wear the Orthoboot as instructed.  I have recommended use of a cane for modified weightbearing of the right foot.  We discussed proper use of the cane in her left hand.  A prescription for cane is provided to the patient today as she states she does not have 1 at home.  Recommend elevation and ice therapy to the right foot/heel as needed to minimize pain/inflammation.  Recommend Tylenol as needed for pain/discomfort.  Patient is unable to take oral NSAIDs due to her current use of Eliquis.  Follow-up in 4 weeks for recheck as needed for any new, worsening or persistent symptoms.  We discussed that her pain in the right heel should progressively improve and eventually resolve with modified weightbearing.    Patient's Body mass index is 30.9 kg/m². BMI is above normal parameters. Recommendations include nutrition counseling and exercise counseling.     EMR  Dragon/Transciption Disclaimer: Some of this note may be an electronic transcription/translation of spoken language to printed text.  The electronic translation of spoken language may permit erroneous, or at times, nonsensical words or phrases to be inadvertently transcribed. Although I have reviewed the note for such errors, some may still exist.     Return in about 4 weeks (around 5/13/2021), or if symptoms worsen or fail to improve, for Recheck.        This document has been electronically signed by EDUARDA Minor on April 17, 2021 22:35 CDT      EDUARDA Minor

## 2021-04-17 PROBLEM — T14.8XXA CONTUSION OF BONE: Status: ACTIVE | Noted: 2021-04-17

## 2021-04-19 ENCOUNTER — TELEPHONE (OUTPATIENT)
Dept: CARDIOLOGY | Facility: CLINIC | Age: 66
End: 2021-04-19

## 2021-04-19 NOTE — TELEPHONE ENCOUNTER
Spoke with patient about her out of pocket of medication this year.   When speaking to alessandra they stated that she has only spent $39, but the patient stated that she spent more.   She is going to contact them and I will be leaving some samples up front for her to .     She was understanding

## 2021-06-11 DIAGNOSIS — M25.561 ACUTE PAIN OF RIGHT KNEE: Primary | ICD-10-CM

## 2021-06-14 ENCOUNTER — OFFICE VISIT (OUTPATIENT)
Dept: ORTHOPEDIC SURGERY | Facility: CLINIC | Age: 66
End: 2021-06-14

## 2021-06-14 VITALS — BODY MASS INDEX: 31.42 KG/M2 | WEIGHT: 232 LBS | HEIGHT: 72 IN

## 2021-06-14 DIAGNOSIS — M17.11 PRIMARY OSTEOARTHRITIS OF RIGHT KNEE: Primary | ICD-10-CM

## 2021-06-14 DIAGNOSIS — M23.91 INTERNAL DERANGEMENT OF RIGHT KNEE: ICD-10-CM

## 2021-06-14 DIAGNOSIS — G89.29 CHRONIC PAIN OF RIGHT KNEE: ICD-10-CM

## 2021-06-14 DIAGNOSIS — M25.561 CHRONIC PAIN OF RIGHT KNEE: ICD-10-CM

## 2021-06-14 PROCEDURE — 20610 DRAIN/INJ JOINT/BURSA W/O US: CPT | Performed by: NURSE PRACTITIONER

## 2021-06-14 PROCEDURE — 99214 OFFICE O/P EST MOD 30 MIN: CPT | Performed by: NURSE PRACTITIONER

## 2021-06-14 RX ORDER — HYDROCODONE BITARTRATE AND ACETAMINOPHEN 5; 325 MG/1; MG/1
1 TABLET ORAL EVERY 8 HOURS PRN
Qty: 30 TABLET | Refills: 0 | Status: SHIPPED | OUTPATIENT
Start: 2021-06-14 | End: 2021-06-24

## 2021-06-14 RX ADMIN — TRIAMCINOLONE ACETONIDE 40 MG: 40 INJECTION, SUSPENSION INTRA-ARTICULAR; INTRAMUSCULAR at 16:03

## 2021-06-14 RX ADMIN — LIDOCAINE HYDROCHLORIDE 2 ML: 10 INJECTION, SOLUTION INFILTRATION; PERINEURAL at 16:03

## 2021-06-19 RX ORDER — LIDOCAINE HYDROCHLORIDE 10 MG/ML
2 INJECTION, SOLUTION INFILTRATION; PERINEURAL
Status: COMPLETED | OUTPATIENT
Start: 2021-06-14 | End: 2021-06-14

## 2021-06-19 RX ORDER — TRIAMCINOLONE ACETONIDE 40 MG/ML
40 INJECTION, SUSPENSION INTRA-ARTICULAR; INTRAMUSCULAR
Status: COMPLETED | OUTPATIENT
Start: 2021-06-14 | End: 2021-06-14

## 2021-06-30 ENCOUNTER — HOSPITAL ENCOUNTER (OUTPATIENT)
Dept: MRI IMAGING | Facility: HOSPITAL | Age: 66
End: 2021-06-30

## 2021-07-07 ENCOUNTER — APPOINTMENT (OUTPATIENT)
Dept: MRI IMAGING | Facility: HOSPITAL | Age: 66
End: 2021-07-07

## 2021-07-15 ENCOUNTER — APPOINTMENT (OUTPATIENT)
Dept: MRI IMAGING | Facility: HOSPITAL | Age: 66
End: 2021-07-15

## 2021-09-24 ENCOUNTER — OFFICE VISIT (OUTPATIENT)
Dept: ORTHOPEDIC SURGERY | Facility: CLINIC | Age: 66
End: 2021-09-24

## 2021-09-24 VITALS — HEIGHT: 72 IN | WEIGHT: 221 LBS | BODY MASS INDEX: 29.93 KG/M2

## 2021-09-24 DIAGNOSIS — G89.29 CHRONIC PAIN OF RIGHT KNEE: ICD-10-CM

## 2021-09-24 DIAGNOSIS — M17.11 PRIMARY OSTEOARTHRITIS OF RIGHT KNEE: Primary | ICD-10-CM

## 2021-09-24 DIAGNOSIS — M25.561 CHRONIC PAIN OF RIGHT KNEE: ICD-10-CM

## 2021-09-24 PROCEDURE — 99214 OFFICE O/P EST MOD 30 MIN: CPT | Performed by: NURSE PRACTITIONER

## 2021-09-24 PROCEDURE — 20610 DRAIN/INJ JOINT/BURSA W/O US: CPT | Performed by: NURSE PRACTITIONER

## 2021-09-24 RX ORDER — HYDROCODONE BITARTRATE AND ACETAMINOPHEN 5; 325 MG/1; MG/1
1 TABLET ORAL EVERY 8 HOURS PRN
Qty: 40 TABLET | Refills: 0 | Status: SHIPPED | OUTPATIENT
Start: 2021-09-24 | End: 2021-10-04

## 2021-09-24 RX ADMIN — LIDOCAINE HYDROCHLORIDE 2 ML: 10 INJECTION, SOLUTION INFILTRATION; PERINEURAL at 13:32

## 2021-09-24 RX ADMIN — TRIAMCINOLONE ACETONIDE 40 MG: 40 INJECTION, SUSPENSION INTRA-ARTICULAR; INTRAMUSCULAR at 13:32

## 2021-09-24 NOTE — PROGRESS NOTES
"Mini Andersen is a 66 y.o. female returns for     Chief Complaint   Patient presents with   • Right Knee - Follow-up, Pain       HISTORY OF PRESENT ILLNESS: Patient presents to office for follow-up of chronic right knee pain.  Patient has experienced right knee pain for several years that has progressively worsened over time. Patient has known osteoarthritic changes in the right knee.  Patient experienced significantly worsened right knee pain and June 2021 for unknown reasons.  Patient was last seen in office on 6/14/2021 and given an intra-articular injection of steroid, which offered some temporary pain improvement.  MRI was ordered at that time based on the severity of her subjective complaints of pain but the patient never had the MRI performed.  Patient reports that her right knee pain has been gradually worsening again.  Otherwise, no new complaints or concerns noted.  No falls or injuries reported.  Patient also requests a refill of Norco today to take for pain control.     CONCURRENT MEDICAL HISTORY:    The following portions of the patient's history were reviewed and updated as appropriate: allergies, current medications, past family history, past medical history, past social history, past surgical history and problem list.     ROS  No fevers or chills.  No chest pain or shortness of air.  No GI or  disturbances. Right knee pain.     PHYSICAL EXAMINATION:       Ht 182.9 cm (72\")   Wt 100 kg (221 lb)   LMP 02/23/1998 (Within Months) Comment: Hysterectomy  BMI 29.97 kg/m²     Physical Exam  Vitals reviewed.   Constitutional:       General: She is not in acute distress.     Appearance: She is well-developed. She is not ill-appearing.   HENT:      Head: Normocephalic.   Pulmonary:      Effort: Pulmonary effort is normal. No respiratory distress.   Abdominal:      General: There is no distension.      Palpations: Abdomen is soft.   Musculoskeletal:         General: Swelling (Mild, right knee) and " tenderness (Right knee) present. No deformity or signs of injury.      Right knee: No effusion.      Instability Tests: Medial Viktoria test positive and lateral Viktoria test positive.   Skin:     General: Skin is warm and dry.      Capillary Refill: Capillary refill takes less than 2 seconds.      Findings: No erythema.   Neurological:      Mental Status: She is alert and oriented to person, place, and time.      GCS: GCS eye subscore is 4. GCS verbal subscore is 5. GCS motor subscore is 6.   Psychiatric:         Speech: Speech normal.         Behavior: Behavior normal.         Thought Content: Thought content normal.         Judgment: Judgment normal.         GAIT:     []  Normal  [x]  Antalgic    Assistive device: [x]  None  []  Walker     []  Crutches  []  Cane     []  Wheelchair  []  Stretcher    Right Knee Exam     Tenderness   The patient is experiencing tenderness in the medial joint line and lateral joint line (Mild, diffuse).    Range of Motion   Extension: 0   Flexion: 110     Tests   Viktoria:  Medial - positive Lateral - positive  Varus: negative Valgus: negative    Other   Erythema: absent  Sensation: normal  Pulse: present  Swelling: mild  Effusion: no effusion present    Comments:  Mild pain and limitations with range of motion.  No erythema.  No warmth.  No signs of infection noted.            XR Knee 1 or 2 View Right     Result Date: 6/14/2021  Narrative: Lateral view of the right knee reveals degenerative changes at the patellofemoral joint.  There is loss of joint spacing and osteophyte formations noted at the distal femur both anteriorly and posteriorly, more pronounced in the posterior knee.  The knee appears well-aligned.  The bones appear diffusely demineralized. Soft tissues appear unremarkable.  There is a possible, small suprapatellar joint effusion noted.  No evidence of acute fracture or dislocation.  No acute bony radiologic abnormalities are noted at this time.  No acute interval  changes are noted when compared with prior images from 9/19/2019.06/14/21 at 17:03 CDT by EDUARDA Minor      XR Knee Bilateral AP Standing     Result Date: 6/14/2021  Narrative: AP standing view of the bilateral knees reveals no evidence of acute fracture or dislocation.  There are postsurgical changes noted in the left knee post total knee arthroplasty.  Prostheses appear well-positioned and well-aligned with no evidence of hardware failure or loosening noted.  There are degenerative changes noted in the right knee with moderate loss of medial compartmental joint spacing.  There has been some mild progression of the loss of medial joint spacing in the right knee when compared with prior images from 9/19/2019.  There is mild varus positioning noted of the right knee.  There are small marginal osteophyte formations noted in the right knee.  The bones are diffusely demineralized in appearance.  Soft tissues appear unremarkable.  No other significant changes are noted when compared with prior images from 9/19/2019.  No acute bony radiologic abnormalities are noted at this time. 06/14/21 at 17:06 CDT by EDUARDA Minor       ASSESSMENT:    Diagnoses and all orders for this visit:    Primary osteoarthritis of right knee  -     Large Joint Arthrocentesis: R knee  -     HYDROcodone-acetaminophen (NORCO) 5-325 MG per tablet; Take 1 tablet by mouth Every 8 (Eight) Hours As Needed for Moderate Pain  or Severe Pain  for up to 10 days.    Chronic pain of right knee  -     Large Joint Arthrocentesis: R knee  -     HYDROcodone-acetaminophen (NORCO) 5-325 MG per tablet; Take 1 tablet by mouth Every 8 (Eight) Hours As Needed for Moderate Pain  or Severe Pain  for up to 10 days.      Large Joint Arthrocentesis: R knee  Date/Time: 9/24/2021 1:32 PM  Consent given by: patient  Timeout: Immediately prior to procedure a time out was called to verify the correct patient, procedure, equipment, support staff and site/side marked  as required   Supporting Documentation  Indications: pain, diagnostic evaluation and joint swelling   Procedure Details  Location: knee - R knee  Preparation: Patient was prepped and draped in the usual sterile fashion  Needle size: 22 G  Approach: anterolateral  Medications administered: 40 mg triamcinolone acetonide 40 MG/ML; 2 mL lidocaine 1 %  Patient tolerance: patient tolerated the procedure well with no immediate complications      PLAN    Patient complains of chronic right knee pain that has been gradually worsening over the course of several years.  Patient has known, moderate osteoarthritic changes in both the medial compartment of the knee in the patellofemoral compartment as well.  Patient had a previous exacerbation of chronic right knee pain in June 2021 for unknown reasons and was treated conservatively with an intra-articular injection of steroid, modified weightbearing with use of a cane and Norco for pain control.  Patient reports that her right knee pain has been gradually worsening again for unknown reasons.  She denies any falls or injuries.  Recommend proceeding today with a repeat intra-articular injection of steroid to the right knee for management of joint pain/inflammation/swelling.  MRI was previously ordered at last office visit in June 2021 based on the severity of her pain.  However, the patient never had the MRI performed.  Suspicion for stress fracture is low at this time as she is ambulating in office without the use of her cane and no significant difficulty.    Recommend the following:  -Rest and activity modification with limited weightbearing activity for now.  -Use of her cane for modified weightbearing off the right knee.  Patient confirms that she has a cane at home to use.  She is not currently using it in office today.  -Gradual progression of weightbearing and activity as pain and swelling allow.  -Elevation and ice therapy to the right knee as needed to minimize  pain/swelling/inflammation.     Norco is refilled today at the patient's request today to take as needed for moderate to severe pain. Patient is instructed to take the pain medication sparingly and to take the least amount of pain medication needed to control the pain.  Patient is cautioned that opioids can be addictive.  We discussed other potential adverse side effects of opioids including constipation, dizziness, drowsiness, increased risk for falls and/or respiratory depression.  Patient verbalized understanding of these risks.  I have encouraged the patient to focus on nonopioid pain management methods as much as possible.  Recommend Tylenol as needed for milder pain. Patient cannot take oral NSAIDs due to her current use of Eliquis.  LOTTIE is reviewed internally via Epic and is noted to be appropriate.    Follow-up in 3 months for recheck as needed for any new, worsening or persistent symptoms.  Follow-up sooner as needed.  Consider viscosupplementation as a treatment option for osteoarthritic pain.    EMR Dragon/Transciption Disclaimer: Some of this note may be an electronic transcription/translation of spoken language to printed text.  The electronic translation of spoken language may permit erroneous, or at times, nonsensical words or phrases to be inadvertently transcribed. Although I have reviewed the note for such errors, some may still exist.     Return in about 3 months (around 12/24/2021), or if symptoms worsen or fail to improve, for Recheck.        This document has been electronically signed by EDUARDA Minor on September 30, 2021 18:25 CDT      EDUARDA Minor

## 2021-09-30 RX ORDER — LIDOCAINE HYDROCHLORIDE 10 MG/ML
2 INJECTION, SOLUTION INFILTRATION; PERINEURAL
Status: COMPLETED | OUTPATIENT
Start: 2021-09-24 | End: 2021-09-24

## 2021-09-30 RX ORDER — TRIAMCINOLONE ACETONIDE 40 MG/ML
40 INJECTION, SUSPENSION INTRA-ARTICULAR; INTRAMUSCULAR
Status: COMPLETED | OUTPATIENT
Start: 2021-09-24 | End: 2021-09-24

## 2021-10-06 ENCOUNTER — TELEPHONE (OUTPATIENT)
Dept: ORTHOPEDIC SURGERY | Facility: CLINIC | Age: 66
End: 2021-10-06

## 2021-10-06 DIAGNOSIS — M79.673 HEEL PAIN, UNSPECIFIED LATERALITY: Primary | ICD-10-CM

## 2021-10-06 NOTE — TELEPHONE ENCOUNTER
REGULO FLORES    PT CALLED TO SEE IF REGULO COULD SEND A REFERRAL TO PODIATRY FOR HER RT HEEL PAIN. SHE SAID THAT THEY DISCUSSED THIS POSSIBILITY AT THE LAST APPT. PT ALSO SAID TO CALL IF THERE WERE ANY FIRTHER QUESTIONS. HER CALL BACK NUMBER [phone]

## 2021-10-12 ENCOUNTER — OFFICE VISIT (OUTPATIENT)
Dept: PODIATRY | Facility: CLINIC | Age: 66
End: 2021-10-12

## 2021-10-12 VITALS
DIASTOLIC BLOOD PRESSURE: 76 MMHG | OXYGEN SATURATION: 94 % | HEIGHT: 72 IN | HEART RATE: 85 BPM | WEIGHT: 221 LBS | SYSTOLIC BLOOD PRESSURE: 120 MMHG | BODY MASS INDEX: 29.93 KG/M2

## 2021-10-12 DIAGNOSIS — M24.573 EQUINUS CONTRACTURE OF ANKLE: ICD-10-CM

## 2021-10-12 DIAGNOSIS — M72.2 PLANTAR FASCIITIS: ICD-10-CM

## 2021-10-12 DIAGNOSIS — M85.871 OSTEOPENIA OF RIGHT FOOT: ICD-10-CM

## 2021-10-12 DIAGNOSIS — M79.671 RIGHT FOOT PAIN: Primary | ICD-10-CM

## 2021-10-12 PROCEDURE — 99204 OFFICE O/P NEW MOD 45 MIN: CPT | Performed by: PODIATRIST

## 2021-10-12 PROCEDURE — 20550 NJX 1 TENDON SHEATH/LIGAMENT: CPT | Performed by: PODIATRIST

## 2021-10-12 RX ORDER — GLUCOSAM/CHON-MSM1/C/MANG/BOSW 500-416.6
TABLET ORAL
COMMUNITY
Start: 2021-09-01

## 2021-10-12 RX ORDER — TRIAMCINOLONE ACETONIDE 40 MG/ML
20 INJECTION, SUSPENSION INTRA-ARTICULAR; INTRAMUSCULAR ONCE
Status: COMPLETED | OUTPATIENT
Start: 2021-10-12 | End: 2021-10-12

## 2021-10-12 RX ORDER — ISOPROPYL ALCOHOL 0.75 G/1
SWAB TOPICAL
COMMUNITY
Start: 2021-08-06

## 2021-10-12 RX ORDER — CYCLOBENZAPRINE HCL 10 MG
10 TABLET ORAL 3 TIMES DAILY PRN
COMMUNITY
Start: 2021-07-10

## 2021-10-12 RX ORDER — DEXAMETHASONE SODIUM PHOSPHATE 4 MG/ML
2 INJECTION, SOLUTION INTRA-ARTICULAR; INTRALESIONAL; INTRAMUSCULAR; INTRAVENOUS; SOFT TISSUE ONCE
Status: COMPLETED | OUTPATIENT
Start: 2021-10-12 | End: 2021-10-12

## 2021-10-12 RX ADMIN — TRIAMCINOLONE ACETONIDE 20 MG: 40 INJECTION, SUSPENSION INTRA-ARTICULAR; INTRAMUSCULAR at 10:05

## 2021-10-12 RX ADMIN — DEXAMETHASONE SODIUM PHOSPHATE 2 MG: 4 INJECTION, SOLUTION INTRA-ARTICULAR; INTRALESIONAL; INTRAMUSCULAR; INTRAVENOUS; SOFT TISSUE at 10:05

## 2021-10-12 NOTE — PROGRESS NOTES
Mini Andersen  1955  66 y.o. female  PCP: Hawa Aquino MD: 7/12/21  BS: 91 per patient     Patient presents to clinic today with the complaint of right heel pain.   10/12/2021  Chief Complaint   Patient presents with   • Right Foot - Pain           History of Present Illness    Mini Andersen is a 66 y.o. female who presents for evaluation of right foot pain.  Pain primarily located in the bottom and outside of her heel.  States is been ongoing for about 6 months.  She had previously have an MRI which showed some bone bruising of her calcaneus without fracture.  She was immobilized in a cam boot for a short time, however she states this made her feel worse.  She was recently seen by Jesusita steele of orthopedics for chronic knee pain and given a intra-articular injection which she states did help some with her heel pain as well.      Past Medical History:   Diagnosis Date   • Acid reflux    • Acute peritonitis (HCC)    • Allergic rhinitis    • Arthritis    • Bee sting    • Blood clot in vein, right leg    • Borderline glaucoma    • Bunion    • Callus    • Chronic bronchitis (HCC)    • Constipation     severe with an immotile colon      • Deep venous thrombosis (HCC)    • High cholesterol    • History of transfusion    • Hypothyroidism    • Ingrown toenail    • Intestinal volvulus (HCC)    • Muscle atrophy     O/E   • Nuclear senile cataract    • Nuclear senile cataract    • Polyneuropathy in diabetes (HCC)    • PONV (postoperative nausea and vomiting)    • Sleep apnea    • Type 2 diabetes mellitus (HCC)     NO BDR   • Type 2 diabetes mellitus without complications (HCC)    • Unspecified disease of nail          Past Surgical History:   Procedure Laterality Date   • APPENDECTOMY     • BLADDER SUSPENSION     • COLECTOMY PARTIAL / TOTAL  05/22/2014    Subtotal colectomy with ineo-rectostomy.   • ENDOSCOPY N/A 5/1/2018    Procedure: ESOPHAGOGASTRODUODENOSCOPY;  Surgeon: Carlos Eduardo Cyr DO;  Location:   [facility] ENDOSCOPY;  Service: Gastroenterology   • ENDOSCOPY N/A 3/26/2019    Procedure: ESOPHAGOGASTRODUODENOSCOPY;  Surgeon: Carlos Eduardo Cyr DO;  Location: [facility] ENDOSCOPY;  Service: Gastroenterology   • ENDOSCOPY N/A 2020    Procedure: ESOPHAGOGASTRODUODENOSCOPY;  Surgeon: Carlos Eduardo Cyr DO;  Location: [facility] OR;  Service: Gastroenterology;  Laterality: N/A;   • ENDOSCOPY N/A 3/18/2021    Procedure: ESOPHAGOGASTRODUODENOSCOPY;  Surgeon: Carlos Eduardo Cyr DO;  Location: [facility] ENDOSCOPY;  Service: Gastroenterology;  Laterality: N/A;   • HERNIA REPAIR      multiple   • HYSTERECTOMY     • INJECTION OF MEDICATION  2010    DEPO MEDROL   • JOINT REPLACEMENT Left 2017    knee   • KNEE ARTHROSCOPY Left    • LAPAROSCOPIC CHOLECYSTECTOMY     • SALPINGO OOPHORECTOMY Left    • SALPINGO OOPHORECTOMY Right    • SIGMOIDOSCOPY N/A 3/26/2019    Procedure: SIGMOIDOSCOPY FLEXIBLE;  Surgeon: Carlos Eduardo Cyr DO;  Location: [facility] ENDOSCOPY;  Service: Gastroenterology   • TOTAL KNEE ARTHROPLASTY Left 2017    Procedure: TOTAL KNEE ARTHROPLASTY ATTUNE with adductor canal block;  Surgeon: Jose Ballesteros MD;  Location: [facility];  Service:          Family History   Problem Relation Age of Onset   • Osteoporosis Mother    • Diabetes Sister    • Cancer Brother    • Heart disease Brother    • Diabetes Brother    • Hypertension Brother          Social History     Socioeconomic History   • Marital status:    Tobacco Use   • Smoking status: Former Smoker     Packs/day: 0.00     Years: 0.00     Pack years: 0.00     Start date:      Quit date:      Years since quittin.8   • Smokeless tobacco: Never Used   Vaping Use   • Vaping Use: Never used   Substance and Sexual Activity   • Alcohol use: No   • Drug use: No   • Sexual activity: Defer     Birth control/protection: Surgical         Current Outpatient Medications   Medication Sig Dispense Refill   • Alcohol Swabs (B-D SINGLE USE SWABS  REGULAR) pads      • apixaban (ELIQUIS) 5 MG tablet tablet Take 1 tablet by mouth Every 12 (Twelve) Hours. 180 tablet 3   • Calcium Citrate (CITRACAL PO) Take 2 tablets by mouth Daily.     • carboxymethylcellulose (REFRESH PLUS) 0.5 % solution Administer 1 drop to both eyes Daily As Needed for Dry Eyes.     • clonazePAM (KlonoPIN) 0.5 MG tablet Take 0.5 mg by mouth 2 (Two) Times a Day As Needed for Anxiety.     • Cyanocobalamin (B-12) 1000 MCG sublingual tablet Place 1 tablet under the tongue Daily.     • cyclobenzaprine (FLEXERIL) 10 MG tablet TAKE ONE TABLET BY MOUTH EVERY EIGHT HOURS AS NEEDED FOR MUSCLE SPASMS FOR UP TO TEN DAYS     • dexlansoprazole (Dexilant) 60 MG capsule Take 1 capsule by mouth.     • diphenhydrAMINE (BENADRYL) 25 mg capsule Take 25 mg by mouth Every 6 (Six) Hours As Needed.  6   • ferrous sulfate 324 (65 Fe) MG tablet delayed-release EC tablet Take 324 mg by mouth Daily With Breakfast.     • levothyroxine (SYNTHROID, LEVOTHROID) 50 MCG tablet Take 50 mcg by mouth daily.  1   • losartan (COZAAR) 25 MG tablet Take 25 mg by mouth Daily.     • metFORMIN (GLUCOPHAGE) 500 MG tablet Take 500 mg by mouth Daily As Needed (For FSBS > 140).  1   • ondansetron (ZOFRAN) 4 MG tablet Take 4 mg by mouth Every 8 (Eight) Hours As Needed for Nausea or Vomiting.     • ONE TOUCH ULTRA TEST test strip TEST BID  5   • promethazine (PHENERGAN) 25 MG tablet      • TRUEplus Lancets 28G misc      • vitamin D (ERGOCALCIFEROL) 59379 UNITS capsule capsule Take 50,000 Units by mouth Every 7 (Seven) Days.  1   • omeprazole (priLOSEC) 20 MG capsule Take 20 mg by mouth Daily.     • ondansetron ODT (ZOFRAN-ODT) 4 MG disintegrating tablet take 1 Tablet by oral route  every 8 hours as needed for nausea       Current Facility-Administered Medications   Medication Dose Route Frequency Provider Last Rate Last Admin   • dexamethasone (DECADRON) injection 2 mg  2 mg Intramuscular Once Carlos Eduardo Wilkinson DPM       • triamcinolone  "acetonide (KENALOG-40) injection 20 mg  20 mg Intramuscular Once Carlos Eduardo Wilkinson, DPM             OBJECTIVE    /76   Pulse 85   Ht 182.9 cm (72\")   Wt 100 kg (221 lb)   LMP 02/23/1998 (Within Months) Comment: Hysterectomy  SpO2 94%   BMI 29.97 kg/m²       Review of Systems   Constitutional: Positive for chills.   HENT: Positive for trouble swallowing.    Eyes: Negative.    Respiratory: Positive for cough and shortness of breath.    Cardiovascular: Positive for palpitations.   Gastrointestinal: Positive for abdominal pain, blood in stool, diarrhea and nausea.   Endocrine: Positive for cold intolerance.   Genitourinary: Negative.    Musculoskeletal: Positive for arthralgias and joint swelling.        Foot pain   Ankle pain  Joint pain   Skin: Positive for rash.   Allergic/Immunologic: Negative.    Neurological: Positive for headaches.   Hematological: Negative.    Psychiatric/Behavioral: The patient is nervous/anxious.          Physical Exam   Constitutional: she appears well-developed and well-nourished.   HEENT: Normocephalic. Atraumatic.  CV: No CP. RRR  Resp: Non-labored respirations.  Psychiatric: she has a normal mood and affect. her behavior is normal.         Lower Extremity Exam:  Vascular: DP/PT pulses palpable 2+.   Trace edema bilateral feet and ankles  Foot warm  Neuro: Protective sensation intact, b/l.  DTRs intact  Negative Tinel over tarsal tunnel  Integument: No open wounds or lesions.  No erythema, scaling  No masses  Musculoskeletal: LE muscle strength 5/5.   Gait normal  Ankle ROM full without pain or crepitus  STJ ROM full without pain or crepitus  Positive equinus  Tenderness on palpation of plantar calcaneal tubercles, central plantar fascial band on right  No tenderness to lateral compression of calcaneus, bilateral      Right heel injection:  Risks and benefits discussed. Written consent obtained.  Skin prepped with alcohol  Medial heel injection 1cc 0.5% Marcaine, 1cc " dexamethasone phosphate, 0.5 cc Kenalog 40 with 27g needle  Band-aid applied.  Pt tolerated well.          ASSESSMENT AND PLAN    Diagnoses and all orders for this visit:    1. Right foot pain (Primary)  -     XR Foot 3+ View Right  -     dexamethasone (DECADRON) injection 2 mg  -     triamcinolone acetonide (KENALOG-40) injection 20 mg    2. Plantar fasciitis    3. Equinus contracture of ankle    4. Osteopenia of right foot      -Comprehensive foot and ankle exam performed  -Radiographs ordered and reviewed  -Patient does not have most typical symptoms of plantar fasciitis however has failed to improve with offloading and activity modification.  Did have some improvement with recent corticosteroid injection to her knee.  Therefore trial of heel corticosteroid injection as above.  -Continue motion control shoe  -Recommend power step over-the-counter inserts  -Aggressive stretching regimen, patient education materials dispensed  -On x-ray patient does have some relative osteopenia in her right foot.  Reports that is been several years since last DEXA scan.  Will obtain new study.  -Recheck 4 weeks          This document has been electronically signed by Carlos Eduardo Wilkinson DPM on October 13, 2021 10:23 CDT       Much of this encounter note is an electronic transcription/translation of spoken language to printed text.   Carlos Eduardo Wilkinson DPM  10/13/2021  10:23 CDT

## 2021-10-14 ENCOUNTER — PATIENT ROUNDING (BHMG ONLY) (OUTPATIENT)
Dept: PODIATRY | Facility: CLINIC | Age: 66
End: 2021-10-14

## 2021-10-14 DIAGNOSIS — I82.512 CHRONIC EMBOLISM AND THROMBOSIS OF LEFT FEMORAL VEIN (HCC): ICD-10-CM

## 2021-10-14 DIAGNOSIS — I87.2 VENOUS INSUFFICIENCY (CHRONIC) (PERIPHERAL): Primary | ICD-10-CM

## 2021-10-15 ENCOUNTER — OFFICE VISIT (OUTPATIENT)
Dept: CARDIOLOGY | Facility: CLINIC | Age: 66
End: 2021-10-15

## 2021-10-15 VITALS
DIASTOLIC BLOOD PRESSURE: 76 MMHG | BODY MASS INDEX: 29.93 KG/M2 | HEIGHT: 72 IN | SYSTOLIC BLOOD PRESSURE: 122 MMHG | HEART RATE: 71 BPM | OXYGEN SATURATION: 99 % | WEIGHT: 221 LBS

## 2021-10-15 DIAGNOSIS — I49.9 CARDIAC ARRHYTHMIA, UNSPECIFIED CARDIAC ARRHYTHMIA TYPE: ICD-10-CM

## 2021-10-15 DIAGNOSIS — R00.2 PALPITATIONS: Primary | ICD-10-CM

## 2021-10-15 PROCEDURE — 99214 OFFICE O/P EST MOD 30 MIN: CPT | Performed by: INTERNAL MEDICINE

## 2021-11-04 ENCOUNTER — OFFICE VISIT (OUTPATIENT)
Dept: CARDIAC SURGERY | Facility: CLINIC | Age: 66
End: 2021-11-04

## 2021-11-04 VITALS
SYSTOLIC BLOOD PRESSURE: 102 MMHG | OXYGEN SATURATION: 98 % | HEART RATE: 90 BPM | TEMPERATURE: 94.6 F | DIASTOLIC BLOOD PRESSURE: 64 MMHG | WEIGHT: 228 LBS | BODY MASS INDEX: 30.88 KG/M2 | HEIGHT: 72 IN

## 2021-11-04 DIAGNOSIS — Z86.718 HISTORY OF DEEP VENOUS THROMBOSIS (DVT) OF DISTAL VEIN OF RIGHT LOWER EXTREMITY: ICD-10-CM

## 2021-11-04 DIAGNOSIS — E11.9 TYPE 2 DIABETES MELLITUS WITHOUT COMPLICATION, WITHOUT LONG-TERM CURRENT USE OF INSULIN: Primary | ICD-10-CM

## 2021-11-04 DIAGNOSIS — Z95.828 PRESENCE OF IVC FILTER: ICD-10-CM

## 2021-11-04 DIAGNOSIS — G89.4 CHRONIC PAIN SYNDROME: ICD-10-CM

## 2021-11-04 DIAGNOSIS — I83.811 VARICOSE VEINS OF RIGHT LOWER EXTREMITY WITH PAIN: ICD-10-CM

## 2021-11-04 DIAGNOSIS — G47.33 OBSTRUCTIVE SLEEP APNEA SYNDROME: ICD-10-CM

## 2021-11-04 DIAGNOSIS — G89.29 CHRONIC RIGHT-SIDED LOW BACK PAIN WITH RIGHT-SIDED SCIATICA: ICD-10-CM

## 2021-11-04 DIAGNOSIS — M54.41 CHRONIC RIGHT-SIDED LOW BACK PAIN WITH RIGHT-SIDED SCIATICA: ICD-10-CM

## 2021-11-04 DIAGNOSIS — I10 BENIGN ESSENTIAL HTN: ICD-10-CM

## 2021-11-04 DIAGNOSIS — E66.09 CLASS 1 OBESITY DUE TO EXCESS CALORIES WITH SERIOUS COMORBIDITY AND BODY MASS INDEX (BMI) OF 32.0 TO 32.9 IN ADULT: ICD-10-CM

## 2021-11-04 PROCEDURE — 99214 OFFICE O/P EST MOD 30 MIN: CPT | Performed by: THORACIC SURGERY (CARDIOTHORACIC VASCULAR SURGERY)

## 2021-11-04 NOTE — TELEPHONE ENCOUNTER
REGULO FLORES    PT CALLED TO SEE IF REGULO WOULD CALL IN A REFILL FOR HYDROcodone-acetaminophen (NORCO) 5-325 MG.   PT USES [facility]. PT CALL BACK NUMBER [phone]

## 2021-11-05 ENCOUNTER — TELEPHONE (OUTPATIENT)
Dept: CARDIOLOGY | Facility: CLINIC | Age: 66
End: 2021-11-05

## 2021-11-05 RX ORDER — HYDROCODONE BITARTRATE AND ACETAMINOPHEN 5; 325 MG/1; MG/1
1 TABLET ORAL EVERY 8 HOURS PRN
Qty: 40 TABLET | Refills: 0 | Status: SHIPPED | OUTPATIENT
Start: 2021-11-05 | End: 2021-12-20

## 2021-11-08 RX ORDER — DILTIAZEM HYDROCHLORIDE 120 MG/1
120 CAPSULE, EXTENDED RELEASE ORAL DAILY
Qty: 30 CAPSULE | Refills: 6 | Status: SHIPPED | OUTPATIENT
Start: 2021-11-08 | End: 2022-05-11 | Stop reason: SINTOL

## 2021-11-09 ENCOUNTER — TELEPHONE (OUTPATIENT)
Dept: CARDIOLOGY | Facility: CLINIC | Age: 66
End: 2021-11-09

## 2021-11-09 NOTE — TELEPHONE ENCOUNTER
Andrey Dubon MD Oldham, Donna L, MA  We can try diltiazem 120mg is she is symptomatic, if asymptomatic then we can continue to monitor as well             Previous Messages       ----- Message -----   From: Emily Avery MA   Sent: 11/5/2021   3:02 PM CST   To: Andrey Dubon MD     Her chart says she is allergic to metoprolol   ----- Message -----   From: Andrey Dubon MD   Sent: 11/5/2021   2:20 PM CDT   To: Emily Avery MA     Will start metoprolol tartrate 25mg BID   ----- Message -----   From: Andrey Dubon MD   Sent: 11/5/2021   2:18 PM CDT   To: Andrey Dubon MD      Patient advised cardizem 120 mg sent to her pharmacy as metoprolol is listed as an allergy in her chart.  Monitor results discussed with the patient per dr. Dubon.

## 2021-11-12 ENCOUNTER — TELEPHONE (OUTPATIENT)
Dept: CARDIOLOGY | Facility: CLINIC | Age: 66
End: 2021-11-12

## 2021-11-12 NOTE — TELEPHONE ENCOUNTER
Returned patient call, per Dr. Dubon stop diltiazem and see if that helps. She voiced her understanding.

## 2021-11-12 NOTE — TELEPHONE ENCOUNTER
----- Message from Andrey Dubon MD sent at 11/12/2021  1:43 PM CST -----  Regarding: RE:  Ok she can stop it and see if it helps.  ----- Message -----  From: Epley, Kendall, MA  Sent: 11/12/2021   1:25 PM CST  To: Andrey Dubon MD    Please advise.   ----- Message -----  From: Ivonne Belle  Sent: 11/12/2021  12:36 PM CST  To: [facility] [place]    Dr. Dubon     He started her on Diltiazem  and she said it broke out her mouth, rash on her face.  She says she has a lot of drug allergies.      Call her at [phone].     Thanks    Ivonne

## 2021-11-22 ENCOUNTER — OFFICE VISIT (OUTPATIENT)
Dept: PODIATRY | Facility: CLINIC | Age: 66
End: 2021-11-22

## 2021-11-22 VITALS
HEIGHT: 72 IN | OXYGEN SATURATION: 96 % | HEART RATE: 120 BPM | DIASTOLIC BLOOD PRESSURE: 76 MMHG | WEIGHT: 228 LBS | SYSTOLIC BLOOD PRESSURE: 134 MMHG | BODY MASS INDEX: 30.88 KG/M2

## 2021-11-22 DIAGNOSIS — M85.871 OSTEOPENIA OF RIGHT FOOT: Primary | ICD-10-CM

## 2021-11-22 DIAGNOSIS — M24.573 EQUINUS CONTRACTURE OF ANKLE: ICD-10-CM

## 2021-11-22 DIAGNOSIS — M79.671 RIGHT FOOT PAIN: ICD-10-CM

## 2021-11-22 DIAGNOSIS — M72.2 PLANTAR FASCIITIS: ICD-10-CM

## 2021-11-22 PROCEDURE — 99213 OFFICE O/P EST LOW 20 MIN: CPT | Performed by: PODIATRIST

## 2021-11-22 PROCEDURE — 20550 NJX 1 TENDON SHEATH/LIGAMENT: CPT | Performed by: PODIATRIST

## 2021-11-22 RX ORDER — TRIAMCINOLONE ACETONIDE 40 MG/ML
20 INJECTION, SUSPENSION INTRA-ARTICULAR; INTRAMUSCULAR ONCE
Status: COMPLETED | OUTPATIENT
Start: 2021-11-22 | End: 2021-11-22

## 2021-11-22 RX ORDER — DEXAMETHASONE SODIUM PHOSPHATE 4 MG/ML
2 INJECTION, SOLUTION INTRA-ARTICULAR; INTRALESIONAL; INTRAMUSCULAR; INTRAVENOUS; SOFT TISSUE ONCE
Status: COMPLETED | OUTPATIENT
Start: 2021-11-22 | End: 2021-11-22

## 2021-11-22 RX ADMIN — TRIAMCINOLONE ACETONIDE 20 MG: 40 INJECTION, SUSPENSION INTRA-ARTICULAR; INTRAMUSCULAR at 14:00

## 2021-11-22 RX ADMIN — DEXAMETHASONE SODIUM PHOSPHATE 2 MG: 4 INJECTION, SOLUTION INTRA-ARTICULAR; INTRALESIONAL; INTRAMUSCULAR; INTRAVENOUS; SOFT TISSUE at 14:00

## 2021-11-22 NOTE — PROGRESS NOTES
Mini Andersen  1955  66 y.o. female  PCP: EDUARDA Torres 10/21/2021  BS: 90 per patient     Patient presents to clinic today with the complaint of right heel pain.     11/22/2021    Chief Complaint   Patient presents with   • Right Foot - Pain           History of Present Illness    Mini Andersen is a 66 y.o. female who presents for evaluation of recurrent right foot pain.  Patient was also sent for DEXA scan at last visit.  She presents for review of those results as well.    Past Medical History:   Diagnosis Date   • Acid reflux    • Acute peritonitis (HCC)    • Allergic rhinitis    • Arthritis    • Bee sting    • Blood clot in vein, right leg    • Borderline glaucoma    • Bunion    • Callus    • Chronic bronchitis (HCC)    • Constipation     severe with an immotile colon      • Deep venous thrombosis (HCC)    • High cholesterol    • History of transfusion    • Hypothyroidism    • Ingrown toenail    • Intestinal volvulus (HCC)    • Muscle atrophy     O/E   • Nuclear senile cataract    • Nuclear senile cataract    • Polyneuropathy in diabetes (HCC)    • PONV (postoperative nausea and vomiting)    • Sleep apnea    • Type 2 diabetes mellitus (HCC)     NO BDR   • Type 2 diabetes mellitus without complications (HCC)    • Unspecified disease of nail          Past Surgical History:   Procedure Laterality Date   • APPENDECTOMY     • BLADDER SUSPENSION     • COLECTOMY PARTIAL / TOTAL  05/22/2014    Subtotal colectomy with ineo-rectostomy.   • ENDOSCOPY N/A 5/1/2018    Procedure: ESOPHAGOGASTRODUODENOSCOPY;  Surgeon: Carlos Eduardo Cyr DO;  Location: [facility] ENDOSCOPY;  Service: Gastroenterology   • ENDOSCOPY N/A 3/26/2019    Procedure: ESOPHAGOGASTRODUODENOSCOPY;  Surgeon: Carlos Eduardo Cyr DO;  Location: [facility] ENDOSCOPY;  Service: Gastroenterology   • ENDOSCOPY N/A 5/5/2020    Procedure: ESOPHAGOGASTRODUODENOSCOPY;  Surgeon: Carlos Eduardo Cyr DO;  Location: [facility] OR;  Service: Gastroenterology;   Laterality: N/A;   • ENDOSCOPY N/A 3/18/2021    Procedure: ESOPHAGOGASTRODUODENOSCOPY;  Surgeon: Carlos Eduardo Cyr DO;  Location: [facility] ENDOSCOPY;  Service: Gastroenterology;  Laterality: N/A;   • HERNIA REPAIR      multiple   • HYSTERECTOMY     • INJECTION OF MEDICATION  2010    DEPO MEDROL   • JOINT REPLACEMENT Left 2017    knee   • KNEE ARTHROSCOPY Left    • LAPAROSCOPIC CHOLECYSTECTOMY     • SALPINGO OOPHORECTOMY Left    • SALPINGO OOPHORECTOMY Right    • SIGMOIDOSCOPY N/A 3/26/2019    Procedure: SIGMOIDOSCOPY FLEXIBLE;  Surgeon: Carlos Eduardo Cyr DO;  Location: [facility] ENDOSCOPY;  Service: Gastroenterology   • TOTAL KNEE ARTHROPLASTY Left 2017    Procedure: TOTAL KNEE ARTHROPLASTY ATTUNE with adductor canal block;  Surgeon: Jose Ballesteros MD;  Location: [facility] OR;  Service:          Family History   Problem Relation Age of Onset   • Osteoporosis Mother    • Diabetes Sister    • Cancer Brother    • Heart disease Brother    • Diabetes Brother    • Hypertension Brother          Social History     Socioeconomic History   • Marital status:    Tobacco Use   • Smoking status: Former Smoker     Packs/day: 0.00     Years: 0.00     Pack years: 0.00     Start date:      Quit date:      Years since quittin.9   • Smokeless tobacco: Never Used   Vaping Use   • Vaping Use: Never used   Substance and Sexual Activity   • Alcohol use: No   • Drug use: No   • Sexual activity: Defer     Birth control/protection: Surgical         Current Outpatient Medications   Medication Sig Dispense Refill   • Alcohol Swabs (B-D SINGLE USE SWABS REGULAR) pads      • apixaban (ELIQUIS) 5 MG tablet tablet Take 1 tablet by mouth Every 12 (Twelve) Hours. 180 tablet 3   • Calcium Citrate (CITRACAL PO) Take 2 tablets by mouth Daily.     • carboxymethylcellulose (REFRESH PLUS) 0.5 % solution Administer 1 drop to both eyes Daily As Needed for Dry Eyes.     • clonazePAM (KlonoPIN) 0.5 MG tablet Take 0.5 mg  by mouth 2 (Two) Times a Day As Needed for Anxiety.     • Cyanocobalamin (B-12) 1000 MCG sublingual tablet Place 1 tablet under the tongue Daily.     • cyclobenzaprine (FLEXERIL) 10 MG tablet TAKE ONE TABLET BY MOUTH EVERY EIGHT HOURS AS NEEDED FOR MUSCLE SPASMS FOR UP TO TEN DAYS     • dexlansoprazole (Dexilant) 60 MG capsule Take 1 capsule by mouth.     • dilTIAZem XR (DILACOR XR) 120 MG 24 hr capsule Take 1 capsule by mouth Daily. 30 capsule 6   • diphenhydrAMINE (BENADRYL) 25 mg capsule Take 25 mg by mouth Every 6 (Six) Hours As Needed.  6   • ferrous sulfate 324 (65 Fe) MG tablet delayed-release EC tablet Take 324 mg by mouth Daily With Breakfast.     • HYDROcodone-acetaminophen (NORCO) 5-325 MG per tablet Take 1 tablet by mouth Every 8 (Eight) Hours As Needed for Moderate Pain . 40 tablet 0   • levothyroxine (SYNTHROID, LEVOTHROID) 50 MCG tablet Take 50 mcg by mouth daily.  1   • losartan (COZAAR) 25 MG tablet Take 25 mg by mouth Daily.     • metFORMIN (GLUCOPHAGE) 500 MG tablet Take 500 mg by mouth Daily As Needed (For FSBS > 140).  1   • omeprazole (priLOSEC) 20 MG capsule Take 20 mg by mouth Daily.     • ondansetron (ZOFRAN) 4 MG tablet Take 4 mg by mouth Every 8 (Eight) Hours As Needed for Nausea or Vomiting.     • ondansetron ODT (ZOFRAN-ODT) 4 MG disintegrating tablet take 1 Tablet by oral route  every 8 hours as needed for nausea     • ONE TOUCH ULTRA TEST test strip TEST BID  5   • promethazine (PHENERGAN) 25 MG tablet      • TRUEplus Lancets 28G misc      • vitamin D (ERGOCALCIFEROL) 86182 UNITS capsule capsule Take 50,000 Units by mouth Every 7 (Seven) Days.  1     Current Facility-Administered Medications   Medication Dose Route Frequency Provider Last Rate Last Admin   • dexamethasone (DECADRON) injection 2 mg  2 mg Intramuscular Once Carlos Eduardo Wilkinson DPM       • triamcinolone acetonide (KENALOG-40) injection 20 mg  20 mg Intramuscular Once Carlos Eduardo Wilkinson DPM             OBJECTIVE    BP  "134/76   Pulse 120   Ht 182.9 cm (72\")   Wt 103 kg (228 lb)   LMP 02/23/1998 (Within Months) Comment: Hysterectomy  SpO2 96%   BMI 30.92 kg/m²       Review of Systems   Constitutional: Positive for chills.   HENT: Positive for trouble swallowing.    Eyes: Negative.    Respiratory: Positive for cough and shortness of breath.    Cardiovascular: Positive for palpitations.   Gastrointestinal: Positive for abdominal pain, blood in stool, diarrhea and nausea.   Endocrine: Positive for cold intolerance.   Genitourinary: Negative.    Musculoskeletal: Positive for arthralgias and joint swelling.        Foot pain   Ankle pain  Joint pain   Skin: Positive for rash.   Allergic/Immunologic: Negative.    Neurological: Positive for headaches.   Hematological: Negative.    Psychiatric/Behavioral: The patient is nervous/anxious.          Physical Exam   Constitutional: she appears well-developed and well-nourished.   HEENT: Normocephalic. Atraumatic.  CV: No CP. RRR  Resp: Non-labored respirations.  Psychiatric: she has a normal mood and affect. her behavior is normal.         Lower Extremity Exam:  Vascular: DP/PT pulses palpable 2+.   Trace edema bilateral feet and ankles  Foot warm  Neuro: Protective sensation intact, b/l.  DTRs intact  Negative Tinel over tarsal tunnel  Integument: No open wounds or lesions.  No erythema, scaling  No masses  Musculoskeletal: LE muscle strength 5/5.   Gait normal  Ankle ROM full without pain or crepitus  STJ ROM full without pain or crepitus  Positive equinus  Tenderness on palpation of plantar calcaneal tubercles, medial plantar fascial band on right  No tenderness to lateral compression of calcaneus, bilateral      Right heel injection:  Risks and benefits discussed. Written consent obtained.  Skin prepped with alcohol  Medial heel injection 1cc 0.5% Marcaine, 1cc dexamethasone phosphate, 0.5 cc Kenalog 40 with 27g needle  Band-aid applied.  Pt tolerated well.          ASSESSMENT AND " PLAN    Diagnoses and all orders for this visit:    1. Osteopenia of right foot (Primary)  -     Ambulatory Referral to [facility]    2. Right foot pain  -     XR Foot 3+ View Right    3. Plantar fasciitis  -     dexamethasone (DECADRON) injection 2 mg  -     triamcinolone acetonide (KENALOG-40) injection 20 mg    4. Equinus contracture of ankle      -Comprehensive foot and ankle exam performed  -DEXA scan reviewed with patient.  Does have multilevel osteopenia.  Will refer to the bone health clinic for further work-up and treatment  -Corticosteroid injection as above for plantar fasciitis.  -Continue motion control shoe, power step over-the-counter inserts  -Night splint dispensed for equinus contracture, increased stretch.  -Recheck 6 weeks, as needed          This document has been electronically signed by Carlos Eduardo Wilkinson DPM on November 22, 2021 17:07 CST       Much of this encounter note is an electronic transcription/translation of spoken language to printed text.   Carlos Eduardo Wilkinson DPM  11/22/2021  17:07 CST

## 2021-12-20 ENCOUNTER — OFFICE VISIT (OUTPATIENT)
Dept: ORTHOPEDIC SURGERY | Facility: CLINIC | Age: 66
End: 2021-12-20

## 2021-12-20 VITALS — BODY MASS INDEX: 30.46 KG/M2 | HEIGHT: 72 IN | WEIGHT: 224.9 LBS

## 2021-12-20 DIAGNOSIS — E11.9 TYPE 2 DIABETES MELLITUS WITHOUT COMPLICATION, WITHOUT LONG-TERM CURRENT USE OF INSULIN (HCC): ICD-10-CM

## 2021-12-20 DIAGNOSIS — M85.89 OSTEOPENIA OF MULTIPLE SITES: ICD-10-CM

## 2021-12-20 DIAGNOSIS — M84.374A STRESS FRACTURE OF RIGHT FOOT, INITIAL ENCOUNTER: ICD-10-CM

## 2021-12-20 DIAGNOSIS — K21.9 GASTROESOPHAGEAL REFLUX DISEASE WITHOUT ESOPHAGITIS: ICD-10-CM

## 2021-12-20 DIAGNOSIS — M81.0 AGE-RELATED OSTEOPOROSIS WITHOUT CURRENT PATHOLOGICAL FRACTURE: Primary | ICD-10-CM

## 2021-12-20 DIAGNOSIS — Z86.39 HISTORY OF VITAMIN D DEFICIENCY: ICD-10-CM

## 2021-12-20 DIAGNOSIS — E28.319 EARLY MENOPAUSE OCCURRING IN PATIENT AGE YOUNGER THAN 45 YEARS: ICD-10-CM

## 2021-12-20 DIAGNOSIS — S92.334A CLOSED NONDISPLACED FRACTURE OF THIRD METATARSAL BONE OF RIGHT FOOT, INITIAL ENCOUNTER: ICD-10-CM

## 2021-12-20 DIAGNOSIS — Z78.0 POSTMENOPAUSAL: ICD-10-CM

## 2021-12-20 DIAGNOSIS — E03.9 ACQUIRED HYPOTHYROIDISM: ICD-10-CM

## 2021-12-20 DIAGNOSIS — M79.671 RIGHT FOOT PAIN: ICD-10-CM

## 2021-12-20 PROCEDURE — 99214 OFFICE O/P EST MOD 30 MIN: CPT | Performed by: NURSE PRACTITIONER

## 2021-12-20 RX ORDER — HYDROCODONE BITARTRATE AND ACETAMINOPHEN 5; 325 MG/1; MG/1
1 TABLET ORAL EVERY 8 HOURS PRN
Qty: 40 TABLET | Refills: 0 | Status: SHIPPED | OUTPATIENT
Start: 2021-12-20 | End: 2021-12-30

## 2021-12-20 NOTE — PROGRESS NOTES
"Mini Andersen is a 66 y.o. female returns for     Chief Complaint   Patient presents with   • Osteoporosis     Bone Health       HISTORY OF PRESENT ILLNESS: 66-year-old  female patient [facility] for [facility] health evaluation and treatment of osteoporosis.    Last Bone Density Study: 10/28/2021    Referred by: Dr. Carlos Eduardo Wilkinson (podiatry)    History of Osteoporosis or Osteopenia: No.  Osteoporosis is a new diagnosis for this patient based on her recent DEXA scan, FRAX score, x-ray imaging and current suspected pathologic fracture in the right foot.  Prior Treatments for Osteoporosis: None    Decrease in Height: Yes    Fracture History: History of left toe fracture.  Current suspected stress fracture in the right third metatarsal that has occurred without injury or incident.    High Risk Medications:   Current: Lasix, Synthroid, Dexilant   Previous: Prilosec, heparin, glucocorticoids intermittently for various conditions; no long-term use of systemic steroids greater than 3 months reported.    Comorbid Medical Conditions that Increase Risk for Osteoporosis: Diabetes mellitus-type II, irritable bowel syndrome, thyroid disease, former tobacco use, long-term use of proton pump inhibitors    Postmenopausal status: Postmenopausal.  Patient previously underwent hysterectomy but retained her ovaries and then later underwent salpingo-oophorectomies bilaterally but at different times.  Age of Menopause/Hysterectomy: Unknown  Hormone Replacement Therapy: No    Family History of Osteoporosis: Yes, history of osteoporosis in her mother.  Patient also states that her grandmother had osteoporosis and was significantly kyphotic.    History of Smoking: Yes, former smoker.  Patient states that she smoked for approximately 10 years and quit smoking in 1980.    Taking Calcium supplements: Not consistently.  Patient states she only takes Citracal when she is having \"muscle cramps\".  Taking Vitamin D " "supplements: Yes, taking a high-dose vitamin D supplement of 50,000 units once weekly.  Patient has a history of vitamin D deficiency.  Last vitamin D level was checked on 7/12/2021 and noted to be 31.4.    Activity Level: Patient does not exercise per her report.  Patient has some limited mobility due to chronic right knee pain, chronic low back pain and chronic right leg pain.    Patient has an additional complaint today of acute right foot pain and localizes the pain to the mid dorsal aspect of her right foot and states that it radiates into the right great toe.  Onset of pain occurred 3-4 days ago without injury or incident.  Patient localizes the pain to the dorsal aspect of her midfoot.  She states it has been tender and sensitive to touch, even wearing a sock on it.  Patient has been treated in recent months by podiatry for right plantar foot pain and suspected plantar fasciitis (per review of podiatry notes).  Previous MRI imaging of the right foot from March 2021 showed evidence of subacute bone bruising in the calcaneus and no other abnormalities at that time.  Patient is wearing Crocs today.  She states that she normally wears more supportive athletic shoes but the tenderness in the dorsal aspect of her right foot is worse when her shoe is pressing against it.  X-rays of the right foot are performed in office today to rule out stress fracture.  Pain scale currently is 8/10.     CONCURRENT MEDICAL HISTORY:    The following portions of the patient's history were reviewed and updated as appropriate: allergies, current medications, past family history, past medical history, past social history, past surgical history and problem list.     ROS  No fevers or chills.  No chest pain or shortness of air.  No GI or  disturbances. Right foot pain.     PHYSICAL EXAMINATION:       Ht 182.9 cm (72\")   Wt 102 kg (224 lb 14.4 oz)   LMP 02/23/1998 (Within Months) Comment: Hysterectomy  BMI 30.50 kg/m²     Physical " Exam  Vitals reviewed.   Constitutional:       General: She is not in acute distress.     Appearance: She is well-developed. She is not ill-appearing.   HENT:      Head: Normocephalic.   Pulmonary:      Effort: Pulmonary effort is normal. No respiratory distress.   Abdominal:      General: There is no distension.      Palpations: Abdomen is soft.   Musculoskeletal:         General: Swelling (Mild, right foot) and tenderness (Right foot) present. No deformity or signs of injury.   Skin:     General: Skin is warm and dry.      Capillary Refill: Capillary refill takes less than 2 seconds.      Findings: No erythema.   Neurological:      Mental Status: She is alert and oriented to person, place, and time.      GCS: GCS eye subscore is 4. GCS verbal subscore is 5. GCS motor subscore is 6.   Psychiatric:         Speech: Speech normal.         Behavior: Behavior normal.         Thought Content: Thought content normal.         Judgment: Judgment normal.         GAIT:     []  Normal  [x]  Antalgic    Assistive device: [x]  None  []  Walker     []  Crutches  []  Cane     []  Wheelchair  []  Stretcher    Right Ankle Exam     Tenderness   Right ankle tenderness location: Dorsal midfoot.  Swelling: mild (Dorsal midfoot)    Range of Motion   The patient has normal right ankle ROM.    Muscle Strength   Right ankle normal muscle strength: Deferred.    Other   Erythema: absent  Sensation: normal  Pulse: present     Comments:  No deformity noted.  No ecchymosis.  No erythema.  Skin is intact.  Hallux valgus noted.       Back Exam     Comments:  No kyphosis or deformity noted.  No focal neurological deficits noted.            DEXA scan     HISTORY: Osteoporosis screening postmenopausal     Scans were obtained at the L1-L4 levels and of each hip.     FINDINGS:   Average bone mineral density for the lumbar spine is 0.944 g/sq  cm. The T score of -0.9 corresponds to a WHO of classification of  normal bone density. (Note is made that the  L1 T score of -1.3  corresponds to osteopenia. Fracture risk increased for the L1  vertebral body.)     The bone mineral density for the left femoral neck is 0.631 g/sq  cm. The T score of -2.0 corresponds to a WHO classification of  osteopenia. Total left hip T score of -0.6 corresponds to normal  bone density.     The bone mineral density for the right femoral neck is 0.630 g/sq  cm. The T score of -2.0 corresponds to a WHO classification of  osteopenia. Total right hip T score of -0.7 corresponds to normal  bone density.     Ten-year probability of fracture based on risk factors and  femoral neck bone mineral density is as follows: Major  osteoporotic fracture 30 %. Hip fracture 2.4 %.     IMPRESSION:  CONCLUSION:  Average lumbar spine bone density is normal.  (Note is made that the L1 T score of -1.3 corresponds to  osteopenia. Fracture risk increased for the L1 vertebral body.)  Femoral necks osteopenia. Fracture risk increased.     [id number]     Electronically signed by:  Tye Kolb MD  10/28/2021 4:49 PM  CDT Workstation: [id number]     XR Foot 3+ View Right    Result Date: 12/20/2021  Narrative: PROCEDURE: Right foot x-rays with three views. INDICATION: pain, M79.671 Pain in right foot COMPARISON: E 11/22/2021 right foot x-ray. FINDINGS: No fractures or acute osseous abnormalities. No change in the bunion and moderate hallux valgus deformity first metatarsophalangeal joint. No soft tissue abnormalities.     Impression: No interval acute osseous abnormalities. Stable findings of bunion with moderate hallux valgus deformity first metatarsal phalangeal joint. [id number] Electronically signed by:  Jorge Luis Starkey MD  12/20/2021 3:19 PM CST Workstation: [id number]    XR Foot 3+ View Right    Result Date: 11/29/2021  Narrative: Exam: Three-view radiographs right foot Comparison Studies: 10/12/2021 Indication: Heel pain, no injury Findings: Diffuse osteopenia.  No acute fracture, erosions or subluxations are seen.  Moderate  hallux abductovalgus is noted.  Small posterior calcaneal enthesophyte.  Soft tissues unremarkable. Result: As above.  No acute osseous pathology        ASSESSMENT:    Diagnoses and all orders for this visit:    Age-related osteoporosis without current pathological fracture    Type 2 diabetes mellitus without complication, without long-term current use of insulin (HCC)    Acquired hypothyroidism    Gastroesophageal reflux disease without esophagitis    Postmenopausal    Early menopause occurring in patient age younger than 45 years    History of vitamin D deficiency    Osteopenia of multiple sites    Right foot pain  -     XR Foot 3+ View Right; Future  -     HYDROcodone-acetaminophen (NORCO) 5-325 MG per tablet; Take 1 tablet by mouth Every 8 (Eight) Hours As Needed for Moderate Pain  or Severe Pain  for up to 10 days.  -     Miscellaneous DME    Closed nondisplaced fracture of third metatarsal bone of right foot, initial encounter  -     HYDROcodone-acetaminophen (NORCO) 5-325 MG per tablet; Take 1 tablet by mouth Every 8 (Eight) Hours As Needed for Moderate Pain  or Severe Pain  for up to 10 days.  -     Miscellaneous DME    Stress fracture of right foot, initial encounter  -     HYDROcodone-acetaminophen (NORCO) 5-325 MG per tablet; Take 1 tablet by mouth Every 8 (Eight) Hours As Needed for Moderate Pain  or Severe Pain  for up to 10 days.  -     Miscellaneous DME    PLAN    Bone density study results from 10/28/2021 are reviewed and discussed with patient today.  We discussed that she has a normal T score for her average lumbar spine of -0.9.  However, she has an outlier indicative of osteopenia in the L1 vertebrae with a T score of -1.3.  We discussed that she has T-scores of -2.0 in the bilateral femoral necks, again indicative of osteopenia.  However, the patient's FRAX score and risk for major osteoporotic fracture is noted to be 30%, indicative of osteoporosis.  Additionally, she has osteoporotic-appearing  bones on recent foot x-rays with diffuse demineralization noted and has experienced acute right foot pain in recent days without injury.  X-rays performed in office today show evidence of a stress fracture at the base of the third metatarsal, which would be an osteoporotic-related fracture.  We discussed her diagnosis of osteoporosis and that she is at a high risk for fracture.  I have recommended starting treatment for osteoporosis in an effort to prevent worsening osteoporosis, prevent further bone loss and reduce her risk for fracture.  We discussed her individualized risk factors for osteoporosis including her age, race, gender, postmenopausal status, positive family history, long-term use of proton pump inhibitors, former tobacco use and comorbid medical conditions including vitamin D deficiency, diabetes mellitus-type II and irritable bowel syndrome.  We discussed various pharmaceutical treatment options for osteoporosis today including oral bisphosphonates, Prolia, Evenity, Tymlos and Forteo.  The patient does have a long history of chronic issues with reflux and has been on long-term proton pump inhibitors for several years to control this.  We discussed that oral bisphosphonates can potentially exacerbate upper GI issues and reflux.  I have recommended that she start Prolia for treatment of osteoporosis.  We discussed risk versus benefits of Prolia.  We discussed potential adverse side effects of Prolia.  Written literature regarding Prolia is provided to the patient today.    We discussed the importance of proper nutrition and adequate intake of calcium and vitamin D for optimal bone health and prevention of worsening osteoporosis. Recommend starting a calcium supplement twice daily to support good bone health and also we discussed the importance of taking calcium while on Prolia to prevent low blood calcium.  Patient is not currently taking calcium on a regular basis and states she only takes it when she  "has \"muscle cramps\".  Patient does have a history of vitamin D deficiency.  Recommend to continue with her high-dose vitamin D supplement of 50,000 units once weekly.  Last vitamin D level is noted to have been low normal at 31.4 as of 7/12/2021.  We also discussed the importance of regular exercise, especially weightbearing exercise such as walking, for optimal bone health and prevention of worsening osteoporosis.  Patient does have some mobility limitations due to chronic low back pain and chronic right knee pain.  She ambulates with use of a cane as needed.  An educational packet regarding osteoporosis is provided to the patient today.  The patient is a former smoker and has not smoked since 1980.  We discussed the importance of maintaining her smoking cessation as smoking will worsen osteoporosis.    X-rays of the right foot are performed in office today and reviewed with evidence of a nondisplaced fracture at the base of the third metatarsal, only seen on the oblique view, although not noted by the radiologist' interpretation. This correlates with her area of pain that started acutely 3 to 4 days ago.  She has not sustained any injuries or trauma and we discussed that this would be considered a stress fracture.  She has very demineralized appearing bones on her foot/ankle x-rays.  Recommend a postop shoe for immobilization and to promote bony healing. This is placed/fitted in office today.  Recommend rest and activity modification with limited weightbearing activity for now.  Recommend modified weightbearing of the right foot with use of a cane or walker.  The patient states that she loaned her cane to someone so I have sent a new prescription for a cane to [facility].  Recommend elevation and ice therapy as needed to minimize pain/swelling/inflammation.  We discussed that she can gradually progress her weightbearing as pain allows but I prefer that she remains in the postop shoe for the next 3 " weeks.    Norco is prescribed to take as needed for moderate to severe pain. Patient is instructed to take the pain medication sparingly and to take the least amount of pain medication needed to control the pain.  Patient is cautioned that opioids can be addictive.  We discussed other potential adverse side effects of opioids including constipation, dizziness, drowsiness, increased risk for falls and/or respiratory depression.  Patient verbalized understanding of these risks.  I have encouraged the patient to focus on nonopioid pain management methods as much as possible.  Recommend Tylenol as needed for milder pain.  The patient cannot take oral NSAIDs due to her current use of Eliquis.  LOTTIE is reviewed internally via Epic and is noted to be appropriate.    In regards to her acute stress fracture in the right third metatarsal, recommend a follow-up in 3 weeks with repeat x-rays at that time of the right foot.  In regards to her bone health, recommend a repeat bone density study in 1 year and follow-up with [facility].    Time spent of a minimum of 30 minutes including the face to face evaluation, reviewing of medical history and prior medial records, reviewing of diagnostic studies, prescription drug management, documentation, patient education and coordination of care.     EMR Dragon/Transciption Disclaimer: Some of this note may be an electronic transcription/translation of spoken language to printed text.  The electronic translation of spoken language may permit erroneous, or at times, nonsensical words or phrases to be inadvertently transcribed. Although I have reviewed the note for such errors, some may still exist.     Return in about 1 year (around 12/20/2022) for Recheck.        This document has been electronically signed by EDUARDA Minor on December 20, 2021 17:17 CST      EDUARDA Minor

## 2022-01-03 DIAGNOSIS — M81.0 AGE-RELATED OSTEOPOROSIS WITHOUT CURRENT PATHOLOGICAL FRACTURE: Primary | ICD-10-CM

## 2022-01-05 ENCOUNTER — TELEPHONE (OUTPATIENT)
Dept: CARDIOLOGY | Facility: CLINIC | Age: 67
End: 2022-01-05

## 2022-01-05 NOTE — TELEPHONE ENCOUNTER
----- Message from Ginna Guillaume sent at 2022  1:46 PM CST -----  Regarding: SAMPLES  Mini Andersen  [birth year] want some samples of Eliquis 5 mg, she wanted a call back @ [phone] to talk about this. thxs     Patient provided with eliquis 5 mg samples lot [id number] exp 10/23. The patient will provide new insurance info when she picks up her samples. Prior auth will be started on eliquis.

## 2022-01-17 PROBLEM — I10 BENIGN ESSENTIAL HTN: Status: ACTIVE | Noted: 2022-01-17

## 2022-01-17 PROBLEM — I83.811 VARICOSE VEINS OF RIGHT LOWER EXTREMITY WITH PAIN: Status: ACTIVE | Noted: 2020-09-17

## 2022-01-17 NOTE — PROGRESS NOTES
11/4/2021    Mini Andersen  1955    Chief Complaint:    Chief Complaint   Patient presents with   • Leg Pain       HPI:      PCP:  Martha Flower APRN  Cardiology:  Dr Dubon     66y.o. female with HTN(stable, increased risk stroke, rupture), Hyperlipidemia(stable, increased risk cardiovascular events), Diabetes Mellitus(stable, increased risk cardiovascular events) and Obesity(uncontrolled, increased risk cardiovascular events) , DVT(new, chronic, increase risk VTE).  former smoker.   RIGHT leg knee pain x 1 year.  Varicose veins. Asymptomatic VTE.  Moderate LEFT knee pain x years.  Mild feet swelling occasional..  No TIA stroke amaurosis.  No MI claudication. No other associated signs, symptoms or modifying factors.     2/2021 Lower extremity venous duplex:  No dvt.  RIGHT GSV reflux (>2sec), superficial thrombophlebitis proximal.  12/2021 Lower extremity venous duplex:  No dvt.  RIGHT GSV reflux (>1.4sec), chronic non-occlusive superficial thrombophlebitis proximal GSV, SFJ.   LEFT no dvt, no reflux.     7/2017 Echocardiogram:  EF 55%, LA 39mm, LV 51mm, RVSP 28mmHg.  Mild TR  12/2019 Holter:  Benign, HR .  12/2020 ECG:  NSR 76, QTc 418    The following portions of the patient's history were reviewed and updated as appropriate: allergies, current medications, past family history, past medical history, past social history, past surgical history and problem list.  Recent images independently reviewed.  Available laboratory values reviewed.    PMH:  Past Medical History:   Diagnosis Date   • Acid reflux    • Acute peritonitis (HCC)    • Allergic rhinitis    • Arthritis    • Bee sting    • Blood clot in vein, right leg    • Borderline glaucoma    • Bunion    • Callus    • Chronic bronchitis (HCC)    • Constipation     severe with an immotile colon      • Deep venous thrombosis (HCC)    • High cholesterol    • History of transfusion    • Hypothyroidism    • Ingrown toenail    • Intestinal volvulus (HCC)     • Muscle atrophy     O/E   • Nuclear senile cataract    • Nuclear senile cataract    • Polyneuropathy in diabetes (HCC)    • PONV (postoperative nausea and vomiting)    • Sleep apnea    • Type 2 diabetes mellitus (HCC)     NO BDR   • Type 2 diabetes mellitus without complications (HCC)    • Unspecified disease of nail      Past Surgical History:   Procedure Laterality Date   • APPENDECTOMY     • BLADDER SUSPENSION     • COLECTOMY PARTIAL / TOTAL  05/22/2014    Subtotal colectomy with ineo-rectostomy.   • ENDOSCOPY N/A 5/1/2018    Procedure: ESOPHAGOGASTRODUODENOSCOPY;  Surgeon: Carlos Eduardo Cyr DO;  Location: [facility] ENDOSCOPY;  Service: Gastroenterology   • ENDOSCOPY N/A 3/26/2019    Procedure: ESOPHAGOGASTRODUODENOSCOPY;  Surgeon: Carlos Eduardo Cyr DO;  Location: [facility] ENDOSCOPY;  Service: Gastroenterology   • ENDOSCOPY N/A 5/5/2020    Procedure: ESOPHAGOGASTRODUODENOSCOPY;  Surgeon: Carlos Eduardo Cyr DO;  Location: [facility] OR;  Service: Gastroenterology;  Laterality: N/A;   • ENDOSCOPY N/A 3/18/2021    Procedure: ESOPHAGOGASTRODUODENOSCOPY;  Surgeon: Carlos Eduardo Cyr DO;  Location: [facility] ENDOSCOPY;  Service: Gastroenterology;  Laterality: N/A;   • HERNIA REPAIR      multiple   • HYSTERECTOMY     • INJECTION OF MEDICATION  12/07/2010    DEPO MEDROL   • JOINT REPLACEMENT Left 07/01/2017    knee   • KNEE ARTHROSCOPY Left    • LAPAROSCOPIC CHOLECYSTECTOMY     • SALPINGO OOPHORECTOMY Left    • SALPINGO OOPHORECTOMY Right    • SIGMOIDOSCOPY N/A 3/26/2019    Procedure: SIGMOIDOSCOPY FLEXIBLE;  Surgeon: Carlos Eduardo Cyr DO;  Location: [facility] ENDOSCOPY;  Service: Gastroenterology   • TOTAL KNEE ARTHROPLASTY Left 7/24/2017    Procedure: TOTAL KNEE ARTHROPLASTY ATTUNE with adductor canal block;  Surgeon: Jose Ballesteros MD;  Location: [facility] OR;  Service:      Family History   Problem Relation Age of Onset   • Osteoporosis Mother    • Diabetes Sister    • Cancer Brother    • Heart disease Brother    •  Diabetes Brother    • Hypertension Brother      Social History     Tobacco Use   • Smoking status: Former Smoker     Packs/day: 0.00     Years: 0.00     Pack years: 0.00     Start date:      Quit date:      Years since quittin.0   • Smokeless tobacco: Never Used   Vaping Use   • Vaping Use: Never used   Substance Use Topics   • Alcohol use: No   • Drug use: No       ALLERGIES:  Allergies   Allergen Reactions   • Bextra [Valdecoxib] Shortness Of Breath     Shortness of breath   • Cephalosporins Shortness Of Breath   • Detrol La [Tolterodine Tartrate Er] Shortness Of Breath   • Dicyclomine Shortness Of Breath     Shortness of breath   • Fluoxetine Shortness Of Breath   • Keflex [Cephalexin] Shortness Of Breath   • Toprol Xl [Metoprolol] Anaphylaxis   • Chocolate Flavor    • Omeprazole-Sodium Bicarbonate Unknown (See Comments)   • Other      Fresh flowers causes throat swelling   • Amoxicillin Rash   • Aspirin Rash   • Claritin-D 12 Hour [Loratadine-Pseudoephedrine Er] Rash   • Codeine Rash   • Demerol [Meperidine] Rash   • Levaquin [Levofloxacin] Rash     Was told not allergic   • Lipitor [Atorvastatin] Other (See Comments)     cramping   • Macrobid [Nitrofurantoin] Rash   • Morphine And Related Rash   • Paxil [Paroxetine Hcl] Rash   • Sulfa Antibiotics Rash   • Tape Rash   • Tramadol Rash   • Vioxx [Rofecoxib] Rash   • Zegerid [Omeprazole] Rash   • Zoloft [Sertraline Hcl] Rash         MEDICATIONS:    Current Outpatient Medications:   •  Alcohol Swabs (B-D SINGLE USE SWABS REGULAR) pads, , Disp: , Rfl:   •  apixaban (ELIQUIS) 5 MG tablet tablet, Take 1 tablet by mouth Every 12 (Twelve) Hours., Disp: 180 tablet, Rfl: 3  •  Calcium Citrate (CITRACAL PO), Take 2 tablets by mouth Daily., Disp: , Rfl:   •  carboxymethylcellulose (REFRESH PLUS) 0.5 % solution, Administer 1 drop to both eyes Daily As Needed for Dry Eyes., Disp: , Rfl:   •  clonazePAM (KlonoPIN) 0.5 MG tablet, Take 0.5 mg by mouth 2 (Two) Times a  Day As Needed for Anxiety., Disp: , Rfl:   •  Cyanocobalamin (B-12) 1000 MCG sublingual tablet, Place 1 tablet under the tongue Daily., Disp: , Rfl:   •  cyclobenzaprine (FLEXERIL) 10 MG tablet, TAKE ONE TABLET BY MOUTH EVERY EIGHT HOURS AS NEEDED FOR MUSCLE SPASMS FOR UP TO TEN DAYS, Disp: , Rfl:   •  diphenhydrAMINE (BENADRYL) 25 mg capsule, Take 25 mg by mouth Every 6 (Six) Hours As Needed., Disp: , Rfl: 6  •  ferrous sulfate 324 (65 Fe) MG tablet delayed-release EC tablet, Take 324 mg by mouth Daily With Breakfast., Disp: , Rfl:   •  levothyroxine (SYNTHROID, LEVOTHROID) 50 MCG tablet, Take 50 mcg by mouth daily., Disp: , Rfl: 1  •  losartan (COZAAR) 25 MG tablet, Take 25 mg by mouth Daily., Disp: , Rfl:   •  metFORMIN (GLUCOPHAGE) 500 MG tablet, Take 500 mg by mouth Daily As Needed (For FSBS > 140)., Disp: , Rfl: 1  •  omeprazole (priLOSEC) 20 MG capsule, Take 20 mg by mouth Daily., Disp: , Rfl:   •  ondansetron (ZOFRAN) 4 MG tablet, Take 4 mg by mouth Every 8 (Eight) Hours As Needed for Nausea or Vomiting., Disp: , Rfl:   •  ONE TOUCH ULTRA TEST test strip, TEST BID, Disp: , Rfl: 5  •  promethazine (PHENERGAN) 25 MG tablet, , Disp: , Rfl:   •  TRUEplus Lancets 28G misc, , Disp: , Rfl:   •  vitamin D (ERGOCALCIFEROL) 67326 UNITS capsule capsule, Take 50,000 Units by mouth Every 7 (Seven) Days., Disp: , Rfl: 1  •  dexlansoprazole (Dexilant) 60 MG capsule, Take 1 capsule by mouth., Disp: , Rfl:   •  dilTIAZem XR (DILACOR XR) 120 MG 24 hr capsule, Take 1 capsule by mouth Daily., Disp: 30 capsule, Rfl: 6  •  ondansetron ODT (ZOFRAN-ODT) 4 MG disintegrating tablet, take 1 Tablet by oral route  every 8 hours as needed for nausea, Disp: , Rfl:     Review of Systems   Review of Systems   Constitutional: Negative for malaise/fatigue and weight loss.   Cardiovascular: Negative for chest pain, claudication and dyspnea on exertion.   Respiratory: Negative for cough and shortness of breath.    Skin: Negative for color  "change and poor wound healing.   Musculoskeletal: Positive for arthritis, joint pain and myalgias.   Neurological: Negative for dizziness, numbness and weakness.       Physical Exam   Vitals:    11/04/21 1431   BP: 102/64   BP Location: Left arm   Pulse: 90   Temp: 94.6 °F (34.8 °C)   SpO2: 98%   Weight: 103 kg (228 lb)   Height: 182.9 cm (72\")     Body surface area is 2.25 meters squared.  Body mass index is 30.92 kg/m².  Physical Exam  Constitutional:       General: She is not in acute distress.     Appearance: She is not ill-appearing.   HENT:      Right Ear: Hearing normal.      Left Ear: Hearing normal.      Nose: No nasal deformity.      Mouth/Throat:      Dentition: Normal dentition. Does not have dentures.   Cardiovascular:      Rate and Rhythm: Normal rate and regular rhythm.      Pulses:           Carotid pulses are 2+ on the right side and 2+ on the left side.       Radial pulses are 2+ on the right side and 2+ on the left side.        Dorsalis pedis pulses are 2+ on the right side and 2+ on the left side.        Posterior tibial pulses are 2+ on the right side and 2+ on the left side.      Heart sounds: No murmur heard.       Comments: mild varicosity medial RIGHT leg  Pulmonary:      Effort: Pulmonary effort is normal.      Breath sounds: Normal breath sounds.   Abdominal:      General: There is no distension.      Palpations: Abdomen is soft. There is no mass.      Tenderness: There is no abdominal tenderness.   Musculoskeletal:         General: No deformity.      Comments: Gait normal.    Skin:     General: Skin is warm and dry.      Coloration: Skin is not pale.      Findings: No erythema.      Comments: No venous staining   Neurological:      Mental Status: She is alert and oriented to person, place, and time.   Psychiatric:         Speech: Speech normal.         Behavior: Behavior is cooperative.         Thought Content: Thought content normal.         Judgment: Judgment normal.         BUN   Date " Value Ref Range Status   07/12/2021 10 7 - 25 mg/dL Final     Creatinine   Date Value Ref Range Status   07/12/2021 0.8 0.7 - 1.3 mg/dL Final     eGFR Non  Amer   Date Value Ref Range Status   07/21/2019 77 >60 mL/min/1.73 Final     eGFR African Am   Date Value Ref Range Status   07/12/2021 87 mL/min Final     Comment:         GFR    WITH KIDNEY DAMAGE     W/O DAMAGE  >=90         STAGE 1            NORMAL  60-89        STAGE 2            DEC GFR  30-59        STAGE 3            STAGE 3  15-29        STAGE 4            STAGE 4  <15          STAGE 5            STAGE 5      The MDRD GFR formula is valid only for adults between age 18 and 70.       ASSESSMENT:  Diagnoses and all orders for this visit:    1. Type 2 diabetes mellitus without complication, without long-term current use of insulin (HCC) (Primary)    2. Obstructive sleep apnea syndrome    3. History of deep venous thrombosis (DVT) of distal vein of right lower extremity    4. Chronic pain syndrome    5. Chronic right-sided low back pain with right-sided sciatica    6. Presence of IVC filter    7. Class 1 obesity due to excess calories with serious comorbidity and body mass index (BMI) of 32.0 to 32.9 in adult    8. Benign essential HTN    9. Varicose veins of right lower extremity with pain    PLAN:  Detailed discussion with Mini Andersen regarding situation and options.  Stable, small varicosity RIGHT leg..  Multiple risk factors with severe comorbidities.  No intervention indicated at this time.  Risks, benefits discussed.  Understands and wishes to proceed with plan.    Return after above studies complete  Obesity Class  1. Increased risk cardiovascular events, sleep and breathing disorders, joint issues, type 2 diabetes mellitus. Options for weight management, heart healthy diet, exercise programs, and associated health risks of obesity discussed.    Recommended regular physical activity, progressive walking program.  Continue current  medications as directed.  Advance Care Planning   ACP discussion was declined by the patient. Patient does not have an advance directive, declines further assistance.    Thank you for the opportunity to participate in this patient's care.    Copy to primary care provider.    Answers for HPI/ROS submitted by the patient on 11/2/2021  Please describe your symptoms.: Bad Leg Pain  Have you had these symptoms before?: Yes  How long have you been having these symptoms?: Greater than 2 weeks  Please list any medications you are currently taking for this condition.: Nothing was taking Norco 5mg didnt help  Please describe any probable cause for these symptoms. : Vein leakage  What is the primary reason for your visit?: Other

## 2022-01-27 ENCOUNTER — APPOINTMENT (OUTPATIENT)
Dept: LAB | Facility: HOSPITAL | Age: 67
End: 2022-01-27

## 2022-02-03 ENCOUNTER — APPOINTMENT (OUTPATIENT)
Dept: ONCOLOGY | Facility: HOSPITAL | Age: 67
End: 2022-02-03

## 2022-03-15 ENCOUNTER — OFFICE VISIT (OUTPATIENT)
Dept: PODIATRY | Facility: CLINIC | Age: 67
End: 2022-03-15

## 2022-03-15 VITALS — OXYGEN SATURATION: 95 % | HEIGHT: 72 IN | BODY MASS INDEX: 30.46 KG/M2 | HEART RATE: 101 BPM | WEIGHT: 224.9 LBS

## 2022-03-15 DIAGNOSIS — M79.671 RIGHT FOOT PAIN: Primary | ICD-10-CM

## 2022-03-15 DIAGNOSIS — M19.079 ARTHRITIS OF MIDFOOT: ICD-10-CM

## 2022-03-15 PROCEDURE — 99213 OFFICE O/P EST LOW 20 MIN: CPT | Performed by: PODIATRIST

## 2022-03-15 PROCEDURE — 20600 DRAIN/INJ JOINT/BURSA W/O US: CPT | Performed by: PODIATRIST

## 2022-03-15 RX ORDER — BETAMETHASONE SODIUM PHOSPHATE AND BETAMETHASONE ACETATE 3; 3 MG/ML; MG/ML
6 INJECTION, SUSPENSION INTRA-ARTICULAR; INTRALESIONAL; INTRAMUSCULAR; SOFT TISSUE ONCE
Status: COMPLETED | OUTPATIENT
Start: 2022-03-15 | End: 2022-03-15

## 2022-03-15 RX ADMIN — BETAMETHASONE SODIUM PHOSPHATE AND BETAMETHASONE ACETATE 6 MG: 3; 3 INJECTION, SUSPENSION INTRA-ARTICULAR; INTRALESIONAL; INTRAMUSCULAR; SOFT TISSUE at 15:30

## 2022-03-15 NOTE — PROGRESS NOTES
Mini Andersen  1955  66 y.o. female  PCP: EDUARDA Torres 1/25/2022  BS: 94 per patient     Patient presents to clinic today with the complaint of right heel pain.     11/22/2021    Chief Complaint   Patient presents with   • Right Foot - Pain           History of Present Illness    Mini Andersen is a 66 y.o. female who presents for new problem of recurrent right foot pain.  States pain is primarily located to the top and outside of her foot at this time.  She was previously seen by orthopedics who thought she may have a stress fracture back in January.  She was placed in a surgical shoe which she presents in today.  She denies significant improvement of her symptoms.    Past Medical History:   Diagnosis Date   • Acid reflux    • Acute peritonitis (HCC)    • Allergic rhinitis    • Arthritis    • Bee sting    • Blood clot in vein, right leg    • Borderline glaucoma    • Bunion    • Callus    • Chronic bronchitis (HCC)    • Constipation     severe with an immotile colon      • Deep venous thrombosis (HCC)    • High cholesterol    • History of transfusion    • Hypothyroidism    • Ingrown toenail    • Intestinal volvulus (HCC)    • Muscle atrophy     O/E   • Nuclear senile cataract    • Nuclear senile cataract    • Polyneuropathy in diabetes (HCC)    • PONV (postoperative nausea and vomiting)    • Sleep apnea    • Type 2 diabetes mellitus (HCC)     NO BDR   • Type 2 diabetes mellitus without complications (HCC)    • Unspecified disease of nail          Past Surgical History:   Procedure Laterality Date   • APPENDECTOMY     • BLADDER SUSPENSION     • COLECTOMY PARTIAL / TOTAL  05/22/2014    Subtotal colectomy with ineo-rectostomy.   • ENDOSCOPY N/A 5/1/2018    Procedure: ESOPHAGOGASTRODUODENOSCOPY;  Surgeon: Carlos Eduardo Cyr DO;  Location: [facility] ENDOSCOPY;  Service: Gastroenterology   • ENDOSCOPY N/A 3/26/2019    Procedure: ESOPHAGOGASTRODUODENOSCOPY;  Surgeon: Carlos Eduardo Cyr DO;  Location: [facility]  ENDOSCOPY;  Service: Gastroenterology   • ENDOSCOPY N/A 2020    Procedure: ESOPHAGOGASTRODUODENOSCOPY;  Surgeon: Carlos Eduardo Cyr DO;  Location: [facility] OR;  Service: Gastroenterology;  Laterality: N/A;   • ENDOSCOPY N/A 3/18/2021    Procedure: ESOPHAGOGASTRODUODENOSCOPY;  Surgeon: Carlos Eduardo Cyr DO;  Location: [facility] ENDOSCOPY;  Service: Gastroenterology;  Laterality: N/A;   • HERNIA REPAIR      multiple   • HYSTERECTOMY     • INJECTION OF MEDICATION  2010    DEPO MEDROL   • JOINT REPLACEMENT Left 2017    knee   • KNEE ARTHROSCOPY Left    • LAPAROSCOPIC CHOLECYSTECTOMY     • SALPINGO OOPHORECTOMY Left    • SALPINGO OOPHORECTOMY Right    • SIGMOIDOSCOPY N/A 3/26/2019    Procedure: SIGMOIDOSCOPY FLEXIBLE;  Surgeon: Carlos Eduardo Cyr DO;  Location: [facility] ENDOSCOPY;  Service: Gastroenterology   • TOTAL KNEE ARTHROPLASTY Left 2017    Procedure: TOTAL KNEE ARTHROPLASTY ATTUNE with adductor canal block;  Surgeon: Jose Ballesteros MD;  Location: [facility];  Service:          Family History   Problem Relation Age of Onset   • Osteoporosis Mother    • Diabetes Sister    • Cancer Brother    • Heart disease Brother    • Diabetes Brother    • Hypertension Brother          Social History     Socioeconomic History   • Marital status:    Tobacco Use   • Smoking status: Former Smoker     Packs/day: 0.00     Years: 0.00     Pack years: 0.00     Start date:      Quit date:      Years since quittin.2   • Smokeless tobacco: Never Used   Vaping Use   • Vaping Use: Never used   Substance and Sexual Activity   • Alcohol use: No   • Drug use: No   • Sexual activity: Defer     Birth control/protection: Surgical         Current Outpatient Medications   Medication Sig Dispense Refill   • Alcohol Swabs (B-D SINGLE USE SWABS REGULAR) pads      • apixaban (ELIQUIS) 5 MG tablet tablet Take 1 tablet by mouth Every 12 (Twelve) Hours. 180 tablet 3   • Calcium Citrate (CITRACAL PO) Take 2 tablets  by mouth Daily.     • carboxymethylcellulose (REFRESH PLUS) 0.5 % solution Administer 1 drop to both eyes Daily As Needed for Dry Eyes.     • clonazePAM (KlonoPIN) 0.5 MG tablet Take 0.5 mg by mouth 2 (Two) Times a Day As Needed for Anxiety.     • Cyanocobalamin (B-12) 1000 MCG sublingual tablet Place 1 tablet under the tongue Daily.     • cyclobenzaprine (FLEXERIL) 10 MG tablet TAKE ONE TABLET BY MOUTH EVERY EIGHT HOURS AS NEEDED FOR MUSCLE SPASMS FOR UP TO TEN DAYS     • dexlansoprazole (Dexilant) 60 MG capsule Take 1 capsule by mouth.     • dilTIAZem XR (DILACOR XR) 120 MG 24 hr capsule Take 1 capsule by mouth Daily. 30 capsule 6   • diphenhydrAMINE (BENADRYL) 25 mg capsule Take 25 mg by mouth Every 6 (Six) Hours As Needed.  6   • ferrous sulfate 324 (65 Fe) MG tablet delayed-release EC tablet Take 324 mg by mouth Daily With Breakfast.     • levothyroxine (SYNTHROID, LEVOTHROID) 50 MCG tablet Take 50 mcg by mouth daily.  1   • losartan (COZAAR) 25 MG tablet Take 25 mg by mouth Daily.     • metFORMIN (GLUCOPHAGE) 500 MG tablet Take 500 mg by mouth Daily As Needed (For FSBS > 140).  1   • omeprazole (priLOSEC) 20 MG capsule Take 20 mg by mouth Daily.     • ondansetron (ZOFRAN) 4 MG tablet Take 4 mg by mouth Every 8 (Eight) Hours As Needed for Nausea or Vomiting.     • ondansetron ODT (ZOFRAN-ODT) 4 MG disintegrating tablet take 1 Tablet by oral route  every 8 hours as needed for nausea     • ONE TOUCH ULTRA TEST test strip TEST BID  5   • promethazine (PHENERGAN) 25 MG tablet      • TRUEplus Lancets 28G misc      • vitamin D (ERGOCALCIFEROL) 78835 UNITS capsule capsule Take 50,000 Units by mouth Every 7 (Seven) Days.  1     Current Facility-Administered Medications   Medication Dose Route Frequency Provider Last Rate Last Admin   • betamethasone acetate-betamethasone sodium phosphate (CELESTONE SOLUSPAN) injection 6 mg  6 mg Intra-articular Once Carlos Eduardo Wilkinson DPM             OBJECTIVE    Pulse 101   Ht 182.9  "cm (72\")   Wt 102 kg (224 lb 14.4 oz)   LMP 02/23/1998 (Within Months) Comment: Hysterectomy  SpO2 95%   BMI 30.50 kg/m²       Review of Systems   Constitutional: Positive for chills.   HENT: Positive for trouble swallowing.    Eyes: Negative.    Respiratory: Positive for cough and shortness of breath.    Cardiovascular: Positive for palpitations.   Gastrointestinal: Positive for abdominal pain, blood in stool, diarrhea and nausea.   Endocrine: Positive for cold intolerance.   Genitourinary: Negative.    Musculoskeletal: Positive for arthralgias and joint swelling.        Foot pain   Ankle pain  Joint pain   Skin: Positive for rash.   Allergic/Immunologic: Negative.    Neurological: Positive for headaches.   Hematological: Negative.    Psychiatric/Behavioral: The patient is nervous/anxious.          Physical Exam   Constitutional: she appears well-developed and well-nourished.   HEENT: Normocephalic. Atraumatic.  CV: No CP. RRR  Resp: Non-labored respirations.  Psychiatric: she has a normal mood and affect. her behavior is normal.         Lower Extremity Exam:  Vascular: DP/PT pulses palpable 2+.   Trace edema bilateral feet and ankles  Foot warm  Neuro: Protective sensation intact, b/l.  DTRs intact  Negative Tinel over tarsal tunnel  Integument: No open wounds or lesions.  No erythema, scaling  No masses  Musculoskeletal: LE muscle strength 5/5.   Gait normal  Ankle ROM full without pain or crepitus  STJ ROM full without pain or crepitus  Positive equinus  Sharp tenderness palpation over third and fourth tarsometatarsal joints.  No gross instability or crepitus.      Right 3rd/4th TMTJ injection:  Risks and benefits discussed. Written consent obtained.  Skin prepped with alcohol  Medial heel injection 1cc 0.5% Marcaine, 1cc Celestone with 27g needle  Band-aid applied.  Pt tolerated well.          ASSESSMENT AND PLAN    Diagnoses and all orders for this visit:    1. Right foot pain (Primary)  -     XR Foot 3+ " View Right  -     betamethasone acetate-betamethasone sodium phosphate (CELESTONE SOLUSPAN) injection 6 mg    2. Arthritis of midfoot      -Comprehensive foot and ankle exam performed  -Radiographs reviewed  -Educated pt on diagnosis, etiology and treatment of midfoot arthritis  -Recommend soft topped motion control shoe gear with lacing modification.  May discontinue surgical shoe.  -Corticosteroid injection as above  -Did discuss possible surgical correction in future if symptoms do not improve.  -Recheck 4 weeks, as needed            This document has been electronically signed by Carlos Eduardo Wilkinson DPM on March 16, 2022 11:50 CDT       Much of this encounter note is an electronic transcription/translation of spoken language to printed text.   Carlos Eduardo Wilkinson DPM  3/16/2022  11:50 CDT

## 2022-03-16 ENCOUNTER — TELEPHONE (OUTPATIENT)
Dept: CARDIOLOGY | Facility: CLINIC | Age: 67
End: 2022-03-16

## 2022-03-16 NOTE — TELEPHONE ENCOUNTER
----- Message from Ramandeep Padgett Rep sent at 3/16/2022  1:33 PM CDT -----  Regarding:  PT - ELOQUIS SAMPLES  She would like samples of apixaban (ELIQUIS) 5 MG tablet tablet [[id number]] please!    eliquis 5 mg provided to the patient. Lot [id number] exp 11/23

## 2022-03-17 ENCOUNTER — TELEPHONE (OUTPATIENT)
Dept: CARDIOLOGY | Facility: CLINIC | Age: 67
End: 2022-03-17

## 2022-03-17 NOTE — TELEPHONE ENCOUNTER
Spoke to Vicky from [facility], she informed me that patient does not require a PA on eliquis. Patient will be able to receive eliquis from any [facility].     Made patient aware.

## 2022-03-22 ENCOUNTER — TELEPHONE (OUTPATIENT)
Dept: CARDIOLOGY | Facility: CLINIC | Age: 67
End: 2022-03-22

## 2022-03-22 NOTE — TELEPHONE ENCOUNTER
Mrs. Andersen called stating alessandra called her regarding eliquis rx. She states she was told it would not cost her anything however alessandra gave her a price of 130.00 per every three months. The patient states she cannot afford that much and cancelled the order. She will be provided with another patient assistance form.

## 2022-05-10 ENCOUNTER — TELEPHONE (OUTPATIENT)
Dept: CARDIOLOGY | Facility: CLINIC | Age: 67
End: 2022-05-10

## 2022-05-10 NOTE — TELEPHONE ENCOUNTER
----- Message from Ramandeep Little sent at 5/10/2022  1:41 PM CDT -----  Contact: [phone]  Mini Andersen called asking to move up her 10/19/2022 appointment due to having chest tightening and trouble breathing. Stated she can't walk more than 20 ft and she is completely wore out and her heart rate has been in the 120's. She quit taking one medicine due to it making her dizzy. Please advise. Return number is [phone]. Thanks     appt made for the patient to see dr. Dubon tomorrow at 9:30 am.

## 2022-05-11 ENCOUNTER — OFFICE VISIT (OUTPATIENT)
Dept: CARDIOLOGY | Facility: CLINIC | Age: 67
End: 2022-05-11

## 2022-05-11 VITALS
OXYGEN SATURATION: 97 % | SYSTOLIC BLOOD PRESSURE: 126 MMHG | HEART RATE: 86 BPM | HEIGHT: 72 IN | DIASTOLIC BLOOD PRESSURE: 70 MMHG | WEIGHT: 233 LBS | BODY MASS INDEX: 31.56 KG/M2

## 2022-05-11 DIAGNOSIS — R07.9 CHEST PAIN, UNSPECIFIED TYPE: ICD-10-CM

## 2022-05-11 DIAGNOSIS — I49.9 CARDIAC ARRHYTHMIA, UNSPECIFIED CARDIAC ARRHYTHMIA TYPE: ICD-10-CM

## 2022-05-11 DIAGNOSIS — R06.02 SHORTNESS OF BREATH: Primary | ICD-10-CM

## 2022-05-11 DIAGNOSIS — R00.2 PALPITATIONS: ICD-10-CM

## 2022-05-11 PROCEDURE — 93000 ELECTROCARDIOGRAM COMPLETE: CPT | Performed by: INTERNAL MEDICINE

## 2022-05-11 PROCEDURE — 99214 OFFICE O/P EST MOD 30 MIN: CPT | Performed by: INTERNAL MEDICINE

## 2022-05-11 RX ORDER — CYANOCOBALAMIN 1000 UG/ML
1000 INJECTION, SOLUTION INTRAMUSCULAR; SUBCUTANEOUS
COMMUNITY
Start: 2022-04-06

## 2022-05-11 RX ORDER — FUROSEMIDE 40 MG/1
40 TABLET ORAL AS NEEDED
COMMUNITY
Start: 2022-04-25

## 2022-05-11 NOTE — PROGRESS NOTES
[facility] Cardiology  OFFICE NOTE    Cardiovascular Medicine  Andrey Dubon M.D., RPVI         No referring provider defined for this encounter.    Thank you for asking me to see Mini Andersen for palpitations.    This is a 67 y.o. female with:    1. Palpitations    2. Pure hypercholesterolemia    3. Obstructive sleep apnea syndrome    4. Deep vein thrombosis (DVT) of femoral vein of both lower extremities, unspecified chronicity (CMS/[facility])    5. Type 2 diabetes mellitus without complication, without long-term current use of insulin (CMS/[facility])          Chief complaint -Palpitations        History of present Illness- 67 y.o.-year-old lady with history of diabetes, hypertension and hyperlipidemia had bilateral knee replacement surgery and subsequently she developed DVT and she had DVT before and after surgery and she is going to be on chronic anticoagulation.      Patient had palpitations after her surgery, when she would get up and walk around.  Most likely sinus tachycardia, no diagnosis of arrhythmias, subsequently patient was started on verapamil with improvement in her symptoms.  However patient is here for follow-up and reported that she has been out of her verapamil for the last 1 month without any recurrence of palpitations.  She will have occasional palpitations at night.  She wants to come off the verapamil at this point currently denying any chest pain or shortness of breath  No bleeding problems from Eliquis    12/08/2020;  No acute issues since last visit.  Has occasional palpitations whenever she is exerting though are occasionally in the middle of the night however she has sleep apnea and does not use her CPAP religiously.  No bleeding problems on Eliquis.  No palpitations otherwise.    10/15/2021;  Is having worsening palpitations now at night.  She has not been using her CPAP regularly.  No bleeding problems from Eliquis    05/11/2022:  Patient event Holter monitor since last  visit.  Her triggered events are associated normal sinus rhythm.  She did have a few runs of SVT and one episode of nonsustained VT.  Got her started on Cardizem but she could not tolerate it.  Continues to have palpitations.  Also reported occasional chest pain or shortness of breath with exertion.  Has been under a lot of stress    Review of Systems - Constitution: Negative for weakness, weight gain and weight loss.   HENT: Negative for congestion.    Eyes: Negative for blurred vision.   Cardiovascular: As mentioned above  Respiratory: Negative for cough and hemoptysis.    Endocrine: Negative for polydipsia and polyuria.   Hematologic/Lymphatic: Negative for bleeding problem. Does not bruise/bleed easily.   Skin: Negative for flushing.   Musculoskeletal: Negative for neck pain and stiffness.   Gastrointestinal: Negative for abdominal pain, diarrhea, jaundice, melena, nausea and vomiting.   Genitourinary: Negative for dysuria and hematuria.   Neurological: Negative for dizziness, focal weakness and numbness.   Psychiatric/Behavioral: Negative for altered mental status and depression.          All other systems were reviewed and were negative.    family history includes Cancer in her brother; Diabetes in her brother and sister; Heart disease in her brother; Hypertension in her brother; Osteoporosis in her mother.     reports that she quit smoking about 42 years ago. She started smoking about 52 years ago. She smoked 0.00 packs per day for 0.00 years. She has never used smokeless tobacco. She reports that she does not drink alcohol and does not use drugs.    Allergies   Allergen Reactions   • Bextra [Valdecoxib] Shortness Of Breath     Shortness of breath   • Cephalosporins Shortness Of Breath   • Detrol La [Tolterodine Tartrate Er] Shortness Of Breath   • Dicyclomine Shortness Of Breath     Shortness of breath   • Fluoxetine Shortness Of Breath   • Keflex [Cephalexin] Shortness Of Breath   • Toprol Xl [Metoprolol]  Anaphylaxis   • Chocolate Flavor    • Omeprazole-Sodium Bicarbonate Unknown (See Comments)   • Other      Fresh flowers causes throat swelling   • Amoxicillin Rash   • Aspirin Rash   • Claritin-D 12 Hour [Loratadine-Pseudoephedrine Er] Rash   • Codeine Rash   • Demerol [Meperidine] Rash   • Levaquin [Levofloxacin] Rash     Was told not allergic   • Lipitor [Atorvastatin] Other (See Comments)     cramping   • Macrobid [Nitrofurantoin] Rash   • Morphine And Related Rash   • Paxil [Paroxetine Hcl] Rash   • Sulfa Antibiotics Rash   • Tape Rash   • Tramadol Rash   • Vioxx [Rofecoxib] Rash   • Zegerid [Omeprazole] Rash   • Zoloft [Sertraline Hcl] Rash         Current Outpatient Medications:   •  Alcohol Swabs (B-D SINGLE USE SWABS REGULAR) pads, , Disp: , Rfl:   •  apixaban (ELIQUIS) 5 MG tablet tablet, Take 1 tablet by mouth Every 12 (Twelve) Hours., Disp: 180 tablet, Rfl: 3  •  Calcium Citrate (CITRACAL PO), Take 2 tablets by mouth Daily., Disp: , Rfl:   •  carboxymethylcellulose (REFRESH PLUS) 0.5 % solution, Administer 1 drop to both eyes Daily As Needed for Dry Eyes., Disp: , Rfl:   •  clonazePAM (KlonoPIN) 0.5 MG tablet, Take 0.5 mg by mouth 2 (Two) Times a Day As Needed for Anxiety., Disp: , Rfl:   •  cyanocobalamin 1000 MCG/ML injection, Inject 1,000 mcg into the appropriate muscle as directed by prescriber., Disp: , Rfl:   •  cyclobenzaprine (FLEXERIL) 10 MG tablet, TAKE ONE TABLET BY MOUTH EVERY EIGHT HOURS AS NEEDED FOR MUSCLE SPASMS FOR UP TO TEN DAYS, Disp: , Rfl:   •  dexlansoprazole (Dexilant) 60 MG capsule, Take 1 capsule by mouth As Needed., Disp: , Rfl:   •  diphenhydrAMINE (BENADRYL) 25 mg capsule, Take 25 mg by mouth Every 6 (Six) Hours As Needed., Disp: , Rfl: 6  •  ferrous sulfate 324 (65 Fe) MG tablet delayed-release EC tablet, Take 324 mg by mouth Daily With Breakfast., Disp: , Rfl:   •  furosemide (LASIX) 40 MG tablet, Take 40 mg by mouth As Needed., Disp: , Rfl:   •  levothyroxine (SYNTHROID,  "LEVOTHROID) 50 MCG tablet, Take 50 mcg by mouth daily., Disp: , Rfl: 1  •  losartan (COZAAR) 25 MG tablet, Take 25 mg by mouth Daily., Disp: , Rfl:   •  metFORMIN (GLUCOPHAGE) 500 MG tablet, Take 500 mg by mouth Daily As Needed (For FSBS > 140)., Disp: , Rfl: 1  •  ondansetron ODT (ZOFRAN-ODT) 4 MG disintegrating tablet, take 1 Tablet by oral route  every 8 hours as needed for nausea, Disp: , Rfl:   •  ONE TOUCH ULTRA TEST test strip, TEST BID, Disp: , Rfl: 5  •  promethazine (PHENERGAN) 25 MG tablet, , Disp: , Rfl:   •  TRUEplus Lancets 28G misc, , Disp: , Rfl:   •  vitamin D (ERGOCALCIFEROL) 32501 UNITS capsule capsule, Take 50,000 Units by mouth Every 7 (Seven) Days., Disp: , Rfl: 1  •  dilTIAZem XR (DILACOR XR) 120 MG 24 hr capsule, Take 1 capsule by mouth Daily., Disp: 30 capsule, Rfl: 6    Physical Exam:  Vitals:    05/11/22 0933   BP: 126/70   BP Location: Left arm   Patient Position: Sitting   Cuff Size: Adult   Pulse: 86   SpO2: 97%   Weight: 106 kg (233 lb)   Height: 182.9 cm (72\")   PainSc: 0-No pain     Current Pain Level: none  Pulse Ox: Normal  on room air  General: alert, appears stated age and cooperative     Body Habitus: well-nourished    HEENT: Head: Normocephalic, no lesions, without obvious abnormality. No arcus senilis, xanthelasma or xanthomas.    Neuro: alert, oriented x3  Pulses: 2+ and symmetric  JVP: Volume/Pulsation: Normal.  Normal waveforms.   Appropriate inspiratory decrease.  No Kussmaul's. No Padmaja's.   Carotid Exam: no bruit normal pulsation bilaterally   Carotid Volume: normal.     Respirations: no increased work of breathing   Chest:  Normal    Pulmonary:Normal   Precordium: Normal impulses. P2 is not palpable.  RV Heave: absent  LV Heave: absent  [place]:  normal size and placement  Palpable S4: absent.  Heart rate: normal    Heart Rhythm: regular     Heart Sounds: S1: normal  S2: normal  S3: absent   S4: absent  Opening Snap: absent    Pericardial Rub:  Absent: .    Abdomen: "   Appearance: normal .  Palpation: Soft, non-tender to palpation, bowel sounds positive in all four quadrants; no guarding or rebound tenderness  Extremity: no edema.   LE Skin: no rashes  LE Hair:  normal  LE Pulses: well perfused with normal pulses in the distal extremities  Pallor on elevation: Absent. Rubor on dependency: None      DATA REVIEWED:     EKG. I personally reviewed and interpreted the EKG.  Normal sinus rhythm normal EKG.    ECG/EMG Results (all)     None        ---------------------------------------------------  TTE/RORY:  Results for orders placed in visit on 07/10/17    Adult Transthoracic Echo Complete    Interpretation Summary  · Left ventricular systolic function is normal. Estimated EF = 55%.  · Left ventricular diastolic dysfunction (grade I) consistent with impaired relaxation.  · Mild tricuspid valve regurgitation is present.      --------------------------------------------------------------------------------------------------  LABS:     The 10-year CVD risk score (Viki et al., 2008) is: 23.1%    Values used to calculate the score:      Age: 64 years      Sex: Female      Diabetic: Yes      Tobacco smoker: No      Systolic Blood Pressure: 138 mmHg      Is BP treated: No      HDL Cholesterol: 46 mg/dL      Total Cholesterol: 241 mg/dL         Lab Results   Component Value Date    GLUCOSE 106 (H) 07/21/2019    BUN 10 07/12/2021    CREATININE 0.8 07/12/2021    EGFRIFNONA 77 07/21/2019    EGFRIFAFRI 87 07/12/2021    BCR 13.2 07/21/2019    K 3.9 07/12/2021    CO2 27 07/12/2021    CALCIUM 9.3 07/12/2021    ALBUMIN 3.9 07/12/2021    AST 20 07/12/2021    ALT 20 07/12/2021     Lab Results   Component Value Date    WBC 5.4 10/21/2019    HGB 13.2 10/21/2019    HCT 39.3 10/21/2019    MCV 88.6 10/21/2019     10/21/2019     Lab Results   Component Value Date    CHOL 241 (H) 11/11/2019    CHLPL 214 (H) 07/12/2021    TRIG 124 07/12/2021    HDL 40 07/12/2021     07/12/2021     Lab  Results   Component Value Date    TSH 1.34 07/12/2021     Lab Results   Component Value Date    CKTOTAL 74 09/18/2017    CKMB 0.96 09/18/2017    TROPONINI <0.012 09/18/2017     Lab Results   Component Value Date    HGBA1C 6.1 (H) 07/12/2021     Lab Results   Component Value Date    DDIMER 715 (H) 09/01/2015     Lab Results   Component Value Date    ALT 20 07/12/2021     Lab Results   Component Value Date    HGBA1C 6.1 (H) 07/12/2021    HGBA1C 5.7 12/03/2020    HGBA1C 6.4 (H) 06/10/2020     Lab Results   Component Value Date    CREATININE 0.8 07/12/2021     Lab Results   Component Value Date    IRON 36 03/14/2019     Lab Results   Component Value Date    INR 1.02 10/01/2019    INR 1.03 09/18/2017    INR 0.97 09/16/2017    PROTIME 10.6 10/01/2019    PROTIME 13.4 09/18/2017    PROTIME 12.8 09/16/2017       Interpretation Summary    · A relatively benign monitor study.  · Patient had a minimum heart of 57 bpm, maximal heart rate of 167 bpm and average heart rate of 88 bpm. Predominant underlying rhythm was sinus rhythm 7 supraventricular tachycardia runs occurred, the run with the fastest interval lasting 5 beats with a max rate of 167 bpm, the longest lasting 9 beats an average rate of 124 bpm.  · PACs were rare less than 1% PVCs were rate less than 1%  · Normal diurnal variation heart rate.  · Diary event of arm neck pain, chest pain, fluttering, anxiety, shortness of breath and lightheadedness were associated with normal sinus rhythm  · One episode of chest pounding was associated with sinus tachycardia.  · Patient did not have triggered events with her SVT.  · Patient had total of 13 triggered events which were associated with normal sinus rhythm or sinus tachycardia.     Blood work from [facility].    CMP was unremarkable.  No recently seen    Assessment/Plan     1.  Palpitations:  She has worsening symptoms now.  Monitor was benign, she did have a few SVTs but she is asymptomatic with these.  Triggered events  are associated normal sinus rhythm.  Recent TSH electrolytes are okay  Palpitations and exertional sinus tachycardia in and that at the night mostly from her sleep apnea.  Advised her to exercise on regular basis, weight loss and religiously use her CPAP  Can get her back on verapamil.  She could not tolerate Cardizem.  Since she had nonsustained VT, complaining of worsening chest pain or shortness of breath.  We will plan on performing a 2-day nuclear stress test to assess for ischemia we will repeat echocardiogram.  I explained the risk, benefits, alternatives and limitations for nuclear stress test with the patient and patient is agreeable.     2.  Diabetes on metformin and she is doing okay and follows with Dr. Aquino     3.  Hyperlipidemia had dizziness from Lipitor.  Will try Crestor in the setting of her elevated ASCVD risk score     4.  Osteoarthritis status post knee replacement doing okay     5.  Hypothyroidism on Synthroid supplements.    6.  Hypertension:  Well-controlled on current regimen losartan 25 mg    7.  DVT:  On chronic anticoagulation with Eliquis.  Recent CBC with normal hemoglobin  She has an IVC filter from before, referred her to surgery for potential retrieval of her IVC but she is recommended to keep it in place.         Prevention:  Patient's Body mass index is 31.6 kg/m². BMI is above normal parameters. Recommendations include: exercise counseling and nutrition counseling.      Mini Andersen is a former smoker      AAA Screening:   Not needed          This document has been electronically signed by Andrey Dubon MD on May 11, 2022 09:45 CDT

## 2022-05-24 LAB
QT INTERVAL: 354 MS
QTC INTERVAL: 423 MS

## 2022-06-08 ENCOUNTER — APPOINTMENT (OUTPATIENT)
Dept: NUCLEAR MEDICINE | Facility: HOSPITAL | Age: 67
End: 2022-06-08

## 2022-06-09 ENCOUNTER — APPOINTMENT (OUTPATIENT)
Dept: NUCLEAR MEDICINE | Facility: HOSPITAL | Age: 67
End: 2022-06-09

## 2022-06-09 ENCOUNTER — APPOINTMENT (OUTPATIENT)
Dept: CARDIOLOGY | Facility: HOSPITAL | Age: 67
End: 2022-06-09

## 2022-06-16 ENCOUNTER — HOSPITAL ENCOUNTER (OUTPATIENT)
Dept: NUCLEAR MEDICINE | Facility: HOSPITAL | Age: 67
Discharge: HOME | End: 2022-06-16

## 2022-06-16 PROCEDURE — A9500 TC99M SESTAMIBI: HCPCS | Performed by: INTERNAL MEDICINE

## 2022-06-16 PROCEDURE — 0 TECHNETIUM SESTAMIBI: Performed by: INTERNAL MEDICINE

## 2022-06-16 PROCEDURE — 93017 CV STRESS TEST TRACING ONLY: CPT

## 2022-06-16 PROCEDURE — 93018 CV STRESS TEST I&R ONLY: CPT | Performed by: INTERNAL MEDICINE

## 2022-06-16 PROCEDURE — 78452 HT MUSCLE IMAGE SPECT MULT: CPT | Performed by: INTERNAL MEDICINE

## 2022-06-16 PROCEDURE — 78452 HT MUSCLE IMAGE SPECT MULT: CPT

## 2022-06-16 RX ADMIN — TECHNETIUM TC 99M SESTAMIBI 1 DOSE: 1 INJECTION INTRAVENOUS at 07:29

## 2022-06-17 ENCOUNTER — HOSPITAL ENCOUNTER (OUTPATIENT)
Dept: NUCLEAR MEDICINE | Facility: HOSPITAL | Age: 67
Discharge: HOME | End: 2022-06-17

## 2022-06-17 ENCOUNTER — HOSPITAL ENCOUNTER (OUTPATIENT)
Dept: CARDIOLOGY | Facility: HOSPITAL | Age: 67
Discharge: HOME | End: 2022-06-17

## 2022-06-17 LAB
BH CV REST NUCLEAR ISOTOPE DOSE: 34.3 MCI
BH CV STRESS COMMENTS STAGE 1: NORMAL
BH CV STRESS DOSE REGADENOSON STAGE 1: 0.4
BH CV STRESS DURATION MIN STAGE 1: 0
BH CV STRESS DURATION SEC STAGE 1: 10
BH CV STRESS HR STAGE 1: 94
BH CV STRESS NUCLEAR ISOTOPE DOSE: 34 MCI
BH CV STRESS PROTOCOL 1: NORMAL
BH CV STRESS RECOVERY BP: NORMAL MMHG
BH CV STRESS RECOVERY HR: 109 BPM
BH CV STRESS STAGE 1: 1
LV EF NUC BP: 65 %
MAXIMAL PREDICTED HEART RATE: 155 BPM
PERCENT MAX PREDICTED HR: 79.35 %
STRESS BASELINE BP: NORMAL MMHG
STRESS BASELINE HR: 86 BPM
STRESS PERCENT HR: 93 %
STRESS POST EXERCISE DUR SEC: 38 SEC
STRESS POST PEAK BP: NORMAL MMHG
STRESS POST PEAK HR: 123 BPM
STRESS TARGET HR: 132 BPM

## 2022-06-17 PROCEDURE — 0 TECHNETIUM SESTAMIBI: Performed by: INTERNAL MEDICINE

## 2022-06-17 PROCEDURE — A9500 TC99M SESTAMIBI: HCPCS | Performed by: INTERNAL MEDICINE

## 2022-06-17 PROCEDURE — 25010000002 REGADENOSON 0.4 MG/5ML SOLUTION: Performed by: INTERNAL MEDICINE

## 2022-06-17 RX ORDER — SODIUM CHLORIDE 0.9 % (FLUSH) 0.9 %
10 SYRINGE (ML) INJECTION ONCE
Status: COMPLETED | OUTPATIENT
Start: 2022-06-17 | End: 2022-06-17

## 2022-06-17 RX ADMIN — Medication 10 ML: at 09:20

## 2022-06-17 RX ADMIN — TECHNETIUM TC 99M SESTAMIBI 1 DOSE: 1 INJECTION INTRAVENOUS at 09:21

## 2022-06-17 RX ADMIN — REGADENOSON 0.4 MG: 0.08 INJECTION, SOLUTION INTRAVENOUS at 09:20

## 2022-06-20 ENCOUNTER — TELEPHONE (OUTPATIENT)
Dept: CARDIOLOGY | Facility: CLINIC | Age: 67
End: 2022-06-20

## 2022-06-20 NOTE — TELEPHONE ENCOUNTER
Andrey Dubon MD  [phone] 6/17/2022 Routine   Andrey Dubon MD  [phone] 6/17/2022      Result Text  · Findings consistent with a normal ECG stress test.  · Left ventricular ejection fraction is normal. (Calculated EF = 65%).  · Myocardial perfusion imaging indicates a normal myocardial perfusion study with no evidence of ischemia.  · Impressions are consistent with a low risk study.     Patient advised nuclear stress test was a low risk study  
constant

## 2022-06-30 ENCOUNTER — PREP FOR SURGERY (OUTPATIENT)
Dept: OTHER | Facility: HOSPITAL | Age: 67
End: 2022-06-30

## 2022-06-30 DIAGNOSIS — K22.2 STRICTURE AND STENOSIS OF ESOPHAGUS: Primary | ICD-10-CM

## 2022-06-30 RX ORDER — DEXTROSE AND SODIUM CHLORIDE 5; .45 G/100ML; G/100ML
30 INJECTION, SOLUTION INTRAVENOUS CONTINUOUS PRN
Status: CANCELLED | OUTPATIENT
Start: 2022-07-14

## 2022-07-05 PROBLEM — K22.2 STRICTURE AND STENOSIS OF ESOPHAGUS: Status: ACTIVE | Noted: 2022-07-05

## 2022-07-07 LAB
BH CV ECHO MEAS - ACS: 2.16 CM
BH CV ECHO MEAS - AO MAX PG: 9.1 MMHG
BH CV ECHO MEAS - AO MEAN PG: 5.1 MMHG
BH CV ECHO MEAS - AO ROOT DIAM: 4 CM
BH CV ECHO MEAS - AO V2 MAX: 151.2 CM/SEC
BH CV ECHO MEAS - AO V2 VTI: 33.1 CM
BH CV ECHO MEAS - AVA(I,D): 2.49 CM2
BH CV ECHO MEAS - EDV(CUBED): 96.1 ML
BH CV ECHO MEAS - EDV(MOD-SP2): 58.4 ML
BH CV ECHO MEAS - EDV(MOD-SP4): 62.9 ML
BH CV ECHO MEAS - EF(MOD-SP2): 60.8 %
BH CV ECHO MEAS - EF(MOD-SP4): 61.5 %
BH CV ECHO MEAS - EPSS: 0.69 CM
BH CV ECHO MEAS - ESV(CUBED): 27 ML
BH CV ECHO MEAS - ESV(MOD-SP2): 22.9 ML
BH CV ECHO MEAS - ESV(MOD-SP4): 24.2 ML
BH CV ECHO MEAS - FS: 34.5 %
BH CV ECHO MEAS - IVS/LVPW: 0.93 CM
BH CV ECHO MEAS - IVSD: 1.01 CM
BH CV ECHO MEAS - LA DIMENSION: 3.1 CM
BH CV ECHO MEAS - LAT PEAK E' VEL: 9.4 CM/SEC
BH CV ECHO MEAS - LV DIASTOLIC VOL/BSA (35-75): 27.6 CM2
BH CV ECHO MEAS - LV MASS(C)D: 168.8 GRAMS
BH CV ECHO MEAS - LV MAX PG: 3.7 MMHG
BH CV ECHO MEAS - LV MEAN PG: 1.85 MMHG
BH CV ECHO MEAS - LV SYSTOLIC VOL/BSA (12-30): 10.6 CM2
BH CV ECHO MEAS - LV V1 MAX: 95.6 CM/SEC
BH CV ECHO MEAS - LV V1 VTI: 19.7 CM
BH CV ECHO MEAS - LVIDD: 4.6 CM
BH CV ECHO MEAS - LVIDS: 3 CM
BH CV ECHO MEAS - LVOT AREA: 4.2 CM2
BH CV ECHO MEAS - LVOT DIAM: 2.31 CM
BH CV ECHO MEAS - LVPWD: 1.09 CM
BH CV ECHO MEAS - MED PEAK E' VEL: 7.2 CM/SEC
BH CV ECHO MEAS - MV A MAX VEL: 74.7 CM/SEC
BH CV ECHO MEAS - MV DEC SLOPE: 455.7 CM/SEC2
BH CV ECHO MEAS - MV DEC TIME: 0.21 MSEC
BH CV ECHO MEAS - MV E MAX VEL: 95.1 CM/SEC
BH CV ECHO MEAS - MV E/A: 1.27
BH CV ECHO MEAS - MV MAX PG: 3.9 MMHG
BH CV ECHO MEAS - MV MEAN PG: 1.54 MMHG
BH CV ECHO MEAS - MV V2 VTI: 23.7 CM
BH CV ECHO MEAS - MVA(VTI): 3.5 CM2
BH CV ECHO MEAS - PA V2 MAX: 108.6 CM/SEC
BH CV ECHO MEAS - RAP SYSTOLE: 3 MMHG
BH CV ECHO MEAS - RV MAX PG: 3.2 MMHG
BH CV ECHO MEAS - RV V1 MAX: 89.7 CM/SEC
BH CV ECHO MEAS - RV V1 VTI: 21.5 CM
BH CV ECHO MEAS - RVDD: 2.49 CM
BH CV ECHO MEAS - RVSP: 20 MMHG
BH CV ECHO MEAS - SI(MOD-SP2): 15.6 ML/M2
BH CV ECHO MEAS - SI(MOD-SP4): 17 ML/M2
BH CV ECHO MEAS - SV(LVOT): 82.3 ML
BH CV ECHO MEAS - SV(MOD-SP2): 35.5 ML
BH CV ECHO MEAS - SV(MOD-SP4): 38.7 ML
BH CV ECHO MEAS - TR MAX PG: 17 MMHG
BH CV ECHO MEAS - TR MAX VEL: 206.3 CM/SEC
BH CV ECHO MEASUREMENTS AVERAGE E/E' RATIO: 11.46
BH CV XLRA - RV BASE: 3.3 CM
BH CV XLRA - RV MID: 2.37 CM
LEFT ATRIUM VOLUME INDEX: 14.1 ML/M2
MAXIMAL PREDICTED HEART RATE: 155 BPM
SINUS: 3.2 CM
STRESS TARGET HR: 132 BPM

## 2022-07-08 ENCOUNTER — TELEPHONE (OUTPATIENT)
Dept: CARDIOLOGY | Facility: CLINIC | Age: 67
End: 2022-07-08

## 2022-07-08 NOTE — TELEPHONE ENCOUNTER
Andrey Dubon MD  [phone] 7/7/2022 Routine     Result Text  · Left ventricular ejection fraction appears to be 61 - 65%. Left ventricular systolic function is normal.  · Left ventricular diastolic function was normal.  · Estimated right ventricular systolic pressure from tricuspid regurgitation is normal (<35 mmHg).     Patient contacted with echo results per dr. Dubon.

## 2022-07-12 RX ORDER — SUCRALFATE 1 G/1
1 TABLET ORAL DAILY
Status: ON HOLD | COMMUNITY
End: 2022-10-27

## 2022-07-12 RX ORDER — PANTOPRAZOLE SODIUM 40 MG/1
40 TABLET, DELAYED RELEASE ORAL DAILY
COMMUNITY

## 2022-07-14 ENCOUNTER — ANESTHESIA EVENT (OUTPATIENT)
Dept: GASTROENTEROLOGY | Facility: HOSPITAL | Age: 67
End: 2022-07-14

## 2022-07-14 ENCOUNTER — ANESTHESIA (OUTPATIENT)
Dept: GASTROENTEROLOGY | Facility: HOSPITAL | Age: 67
End: 2022-07-14

## 2022-07-14 ENCOUNTER — HOSPITAL ENCOUNTER (OUTPATIENT)
Facility: HOSPITAL | Age: 67
Setting detail: HOSPITAL OUTPATIENT SURGERY
Discharge: HOME | End: 2022-07-14
Attending: INTERNAL MEDICINE | Admitting: INTERNAL MEDICINE

## 2022-07-14 VITALS
TEMPERATURE: 97.2 F | DIASTOLIC BLOOD PRESSURE: 57 MMHG | OXYGEN SATURATION: 93 % | HEART RATE: 84 BPM | HEIGHT: 71 IN | RESPIRATION RATE: 12 BRPM | WEIGHT: 229.06 LBS | BODY MASS INDEX: 32.07 KG/M2 | SYSTOLIC BLOOD PRESSURE: 113 MMHG

## 2022-07-14 DIAGNOSIS — K22.2 STRICTURE AND STENOSIS OF ESOPHAGUS: ICD-10-CM

## 2022-07-14 LAB — GLUCOSE BLDC GLUCOMTR-MCNC: 130 MG/DL (ref 70–130)

## 2022-07-14 PROCEDURE — 87071 CULTURE AEROBIC QUANT OTHER: CPT | Performed by: INTERNAL MEDICINE

## 2022-07-14 PROCEDURE — 25010000002 PROPOFOL 10 MG/ML EMULSION: Performed by: NURSE ANESTHETIST, CERTIFIED REGISTERED

## 2022-07-14 PROCEDURE — 82962 GLUCOSE BLOOD TEST: CPT

## 2022-07-14 PROCEDURE — C1769 GUIDE WIRE: HCPCS | Performed by: INTERNAL MEDICINE

## 2022-07-14 PROCEDURE — 88305 TISSUE EXAM BY PATHOLOGIST: CPT

## 2022-07-14 RX ORDER — PROPOFOL 10 MG/ML
VIAL (ML) INTRAVENOUS AS NEEDED
Status: DISCONTINUED | OUTPATIENT
Start: 2022-07-14 | End: 2022-07-14 | Stop reason: SURG

## 2022-07-14 RX ORDER — LIDOCAINE HYDROCHLORIDE 20 MG/ML
INJECTION, SOLUTION INTRAVENOUS AS NEEDED
Status: DISCONTINUED | OUTPATIENT
Start: 2022-07-14 | End: 2022-07-14 | Stop reason: SURG

## 2022-07-14 RX ORDER — DEXTROSE AND SODIUM CHLORIDE 5; .45 G/100ML; G/100ML
30 INJECTION, SOLUTION INTRAVENOUS CONTINUOUS PRN
Status: DISCONTINUED | OUTPATIENT
Start: 2022-07-14 | End: 2022-07-14 | Stop reason: HOSPADM

## 2022-07-14 RX ADMIN — PROPOFOL 30 MG: 10 INJECTION, EMULSION INTRAVENOUS at 09:11

## 2022-07-14 RX ADMIN — PROPOFOL 100 MG: 10 INJECTION, EMULSION INTRAVENOUS at 09:10

## 2022-07-14 RX ADMIN — LIDOCAINE HYDROCHLORIDE 80 MG: 20 INJECTION, SOLUTION INTRAVENOUS at 09:10

## 2022-07-14 RX ADMIN — DEXTROSE AND SODIUM CHLORIDE 30 ML/HR: 5; 450 INJECTION, SOLUTION INTRAVENOUS at 08:22

## 2022-07-14 RX ADMIN — PROPOFOL 30 MG: 10 INJECTION, EMULSION INTRAVENOUS at 09:14

## 2022-07-14 RX ADMIN — PROPOFOL 30 MG: 10 INJECTION, EMULSION INTRAVENOUS at 09:12

## 2022-07-14 RX ADMIN — PROPOFOL 30 MG: 10 INJECTION, EMULSION INTRAVENOUS at 09:13

## 2022-07-14 NOTE — H&P
Carl Herzog DO,[facility]  Gastroenterology  Hepatology  Endoscopy  Board Certified in Internal Medicine and gastroenterology  [address], [address]  [place]. [address]  - [phone]   F - [phone]     GASTROENTEROLOGY HISTORY AND PHYSICAL  NOTE   CARL HERZOG DO.         SUBJECTIVE:   7/14/2022    Name: Mini Andersen  DOD: 1955        Chief Complaint:       Subjective : Dysphagia.  Known esophageal stricture    Patient is 67 y.o. female presents with desire for elective EGD with dilation.      ROS/HISTORY/ CURRENT MEDICATIONS/OBJECTIVE/VS/PE:   Review of Systems:  All systems unremarkable unless specified below.  Constitutional   HENT  Eyes   Respiratory    Cardiovascular  Gastrointestinal   Endocrine  Genitourinary    Musculoskeletal   Skin  Allergic/Immunologic    Neurological    Hematological  Psychiatric/Behavioral    History:     Past Medical History:   Diagnosis Date   • Acid reflux    • Acute peritonitis (HCC)    • Allergic rhinitis    • Arthritis    • Bee sting    • Blood clot in vein, right leg    • Borderline glaucoma    • Bunion    • Callus    • Chronic bronchitis (HCC)    • Constipation     severe with an immotile colon      • Deep venous thrombosis (HCC)    • High cholesterol    • History of transfusion    • Hypothyroidism    • Ingrown toenail    • Intestinal volvulus (HCC)    • Muscle atrophy     O/E   • Nuclear senile cataract    • Nuclear senile cataract    • Polyneuropathy in diabetes (HCC)    • PONV (postoperative nausea and vomiting)    • Sleep apnea    • Type 2 diabetes mellitus (HCC)     NO BDR   • Type 2 diabetes mellitus without complications (HCC)    • Unspecified disease of nail      Past Surgical History:   Procedure Laterality Date   • APPENDECTOMY     • BLADDER SUSPENSION     • COLECTOMY PARTIAL / TOTAL  05/22/2014    Subtotal colectomy with ineo-rectostomy.   • ENDOSCOPY N/A 5/1/2018    Procedure: ESOPHAGOGASTRODUODENOSCOPY;  Surgeon: Carl SLAUGHTER  DO Ger;  Location: [facility] ENDOSCOPY;  Service: Gastroenterology   • ENDOSCOPY N/A 3/26/2019    Procedure: ESOPHAGOGASTRODUODENOSCOPY;  Surgeon: Carlos Eduardo Cyr DO;  Location: [facility] ENDOSCOPY;  Service: Gastroenterology   • ENDOSCOPY N/A 2020    Procedure: ESOPHAGOGASTRODUODENOSCOPY;  Surgeon: Carlos Eduardo Cyr DO;  Location: [facility] OR;  Service: Gastroenterology;  Laterality: N/A;   • ENDOSCOPY N/A 3/18/2021    Procedure: ESOPHAGOGASTRODUODENOSCOPY;  Surgeon: Carlos Eduardo Cyr DO;  Location: [facility] ENDOSCOPY;  Service: Gastroenterology;  Laterality: N/A;   • HERNIA REPAIR      multiple   • HYSTERECTOMY     • INJECTION OF MEDICATION  2010    DEPO MEDROL   • JOINT REPLACEMENT Left 2017    knee   • KNEE ARTHROSCOPY Left    • LAPAROSCOPIC CHOLECYSTECTOMY     • SALPINGO OOPHORECTOMY Left    • SALPINGO OOPHORECTOMY Right    • SIGMOIDOSCOPY N/A 3/26/2019    Procedure: SIGMOIDOSCOPY FLEXIBLE;  Surgeon: Carlos Eduardo Cyr DO;  Location: [facility] ENDOSCOPY;  Service: Gastroenterology   • TOTAL KNEE ARTHROPLASTY Left 2017    Procedure: TOTAL KNEE ARTHROPLASTY ATTUNE with adductor canal block;  Surgeon: Jose Ballesteros MD;  Location: [facility];  Service:      Family History   Problem Relation Age of Onset   • Osteoporosis Mother    • Diabetes Sister    • Cancer Brother    • Heart disease Brother    • Diabetes Brother    • Hypertension Brother      Social History     Tobacco Use   • Smoking status: Former Smoker     Packs/day: 0.00     Years: 0.00     Pack years: 0.00     Start date:      Quit date:      Years since quittin.5   • Smokeless tobacco: Never Used   Vaping Use   • Vaping Use: Never used   Substance Use Topics   • Alcohol use: No   • Drug use: No     Prior to Admission medications    Medication Sig Start Date End Date Taking? Authorizing Provider   Calcium Citrate (CITRACAL PO) Take 2 tablets by mouth Daily.   Yes Provider, MD Cody   carboxymethylcellulose  (REFRESH PLUS) 0.5 % solution Administer 1 drop to both eyes Daily As Needed for Dry Eyes.   Yes Cody Cartagena MD   clonazePAM (KlonoPIN) 0.5 MG tablet Take 0.5 mg by mouth 2 (Two) Times a Day As Needed for Anxiety.   Yes Cody Cartagena MD   cyanocobalamin 1000 MCG/ML injection Inject 1,000 mcg into the appropriate muscle as directed by prescriber. 4/6/22  Yes Cody Cartagena MD   cyclobenzaprine (FLEXERIL) 10 MG tablet TAKE ONE TABLET BY MOUTH EVERY EIGHT HOURS AS NEEDED FOR MUSCLE SPASMS FOR UP TO TEN DAYS 7/10/21  Yes Cody Cartagena MD   diphenhydrAMINE (BENADRYL) 25 mg capsule Take 25 mg by mouth Every 6 (Six) Hours As Needed. 9/17/16  Yes Cody Cartagena MD   ferrous sulfate 324 (65 Fe) MG tablet delayed-release EC tablet Take 324 mg by mouth Daily With Breakfast.   Yes Cody Cartagena MD   furosemide (LASIX) 40 MG tablet Take 40 mg by mouth As Needed. 4/25/22  Yes Cody Cartagena MD   levothyroxine (SYNTHROID, LEVOTHROID) 50 MCG tablet Take 50 mcg by mouth daily. 9/17/16  Yes Cody Cartagena MD   losartan (COZAAR) 25 MG tablet Take 25 mg by mouth Daily.   Yes Cody Cartagena MD   metFORMIN (GLUCOPHAGE) 500 MG tablet Take 500 mg by mouth Daily As Needed (For FSBS > 140). 9/17/16  Yes Cody Cartagena MD   ondansetron ODT (ZOFRAN-ODT) 4 MG disintegrating tablet take 1 Tablet by oral route  every 8 hours as needed for nausea 9/4/20  Yes Cody Cartagena MD   pantoprazole (PROTONIX) 40 MG EC tablet Take 40 mg by mouth Daily.   Yes Cody Cartagena MD   promethazine (PHENERGAN) 25 MG tablet  10/14/20  Yes Cody Cartagena MD   sucralfate (CARAFATE) 1 g tablet Take 1 g by mouth Daily.   Yes Cody Cartagena MD   verapamil SR (CALAN-SR) 120 MG CR tablet Take 1 tablet by mouth Every Night. 5/11/22  Yes Andrey Dubon MD   vitamin D (ERGOCALCIFEROL) 31349 UNITS capsule capsule Take 50,000 Units by mouth Every 7 (Seven) Days. 9/17/16  Yes  ProviderCody MD   Alcohol Swabs (B-D SINGLE USE SWABS REGULAR) pads  8/6/21   Cody Cartagena MD   apixaban (ELIQUIS) 5 MG tablet tablet Take 1 tablet by mouth Every 12 (Twelve) Hours. 3/17/22   Andrey Dubon MD   ONE TOUCH ULTRA TEST test strip TEST BID 8/10/16   Cody Cartagena MD   TRUEplus Lancets 28G misc  9/1/21   Cody Cartagena MD     Allergies:  Bextra [valdecoxib], Cephalosporins, Detrol la [tolterodine tartrate er], Dicyclomine, Fluoxetine, Keflex [cephalexin], Toprol xl [metoprolol], Chocolate flavor, Omeprazole-sodium bicarbonate, Other, Amoxicillin, Aspirin, Claritin-d 12 hour [loratadine-pseudoephedrine er], Codeine, Demerol [meperidine], Levaquin [levofloxacin], Lipitor [atorvastatin], Macrobid [nitrofurantoin], Morphine and related, Paxil [paroxetine hcl], Sulfa antibiotics, Tape, Tramadol, Vioxx [rofecoxib], Zegerid [omeprazole], and Zoloft [sertraline hcl]    I have reviewed the patients medical history, surgical history and family history in the available medical record system.     Current Medications:     Current Facility-Administered Medications   Medication Dose Route Frequency Provider Last Rate Last Admin   • dextrose 5 % and sodium chloride 0.45 % infusion  30 mL/hr Intravenous Continuous PRN Carlos Eduardo Cyr DO 30 mL/hr at 07/14/22 0822 30 mL/hr at 07/14/22 0822       Objective     Physical Exam:   Temp:  [97.4 °F (36.3 °C)] 97.4 °F (36.3 °C)  Heart Rate:  [91] 91  Resp:  [16] 16  BP: (155)/(83) 155/83    Physical Exam:  General Appearance:    Alert, cooperative, in no acute distress   Head:    Normocephalic, without obvious abnormality, atraumatic   Eyes:            Lids and lashes normal, conjunctivae and sclerae normal, no icterus, no pallor, corneas clear, PERRLA   Ears:    Ears appear intact with no abnormalities noted   Throat:   No oral lesions, no thrush, oral mucosa moist   Neck:   No adenopathy, supple, trachea midline, no thyromegaly, no  carotid  bruit, no JVD   Back:     No kyphosis present, no scoliosis present, no skin lesions,   erythema or scars, no tenderness to percussion or                 palpation,  range of motion normal   Lungs:     Clear to auscultation,respirations regular, even and         unlabored    Heart:    Regular rhythm and normal rate, normal S1 and S2, no  murmur, no gallop, no rub, no click   Breast Exam:    Deferred   Abdomen:     Normal bowel sounds, no masses, no organomegaly, soft  nontender, nondistended, no guarding, no rebound                 tenderness   Genitalia:    Deferred   Extremities:   Moves all extremities well, no edema, no cyanosis, no          redness   Pulses:   Pulses palpable and equal bilaterally   Skin:   No bleeding, bruising or rash   Lymph nodes:   No palpable adenopathy   Neurologic:   Cranial nerves 2 - 12 grossly intact, sensation intact, DTR     present and equal bilaterally      Results Review:     Lab Results   Component Value Date    WBC 5.4 10/21/2019    WBC 6.0 10/01/2019    WBC 5.47 07/21/2019    HGB 13.2 10/21/2019    HGB 13.0 10/01/2019    HGB 11.8 (L) 07/21/2019    HCT 39.3 10/21/2019    HCT 39.4 10/01/2019    HCT 37.3 07/21/2019     10/21/2019     10/01/2019     (L) 07/21/2019             No results found for: LIPASE  Lab Results   Component Value Date    INR 1.02 10/01/2019    INR 1.03 09/18/2017    INR 0.97 09/16/2017     No results found for: THROATCX    Radiology Review:  Imaging Results (Last 72 Hours)     ** No results found for the last 72 hours. **           I reviewed the patient's new clinical results.  I reviewed the patient's new imaging results and agree with the interpretation.     ASSESSMENT/PLAN:   ASSESSMENT:  1.  Esophageal stricture    PLAN:  1.  Esophagogastroduodenoscopy with dilation of the esophagus    Risk and benefits associated with the procedure are reviewed with the patient.  The patient wished to proceed     Carlos Eduardo Cyr,    07/14/22  08:33 CDT

## 2022-07-14 NOTE — ANESTHESIA PREPROCEDURE EVALUATION
Anesthesia Evaluation     Patient summary reviewed and Nursing notes reviewed   history of anesthetic complications:  NPO Solid Status: > 8 hours  NPO Liquid Status: > 8 hours           Airway   Mallampati: II  TM distance: >3 FB  Neck ROM: full  No difficulty expected  Dental    (+) edentulous    Pulmonary - normal exam    breath sounds clear to auscultation  (+) shortness of breath, sleep apnea,   Cardiovascular - normal exam    PT is on anticoagulation therapy  Rhythm: regular  Rate: normal    (+) hypertension, angina, DVT, hyperlipidemia,       Neuro/Psych  (+) numbness, psychiatric history,    GI/Hepatic/Renal/Endo    (+) obesity,  GERD,  diabetes mellitus type 2, thyroid problem hypothyroidism  (-) morbid obesity    Musculoskeletal     Abdominal  - normal exam   Substance History - negative use     OB/GYN negative ob/gyn ROS         Other   arthritis,                    Anesthesia Plan    ASA 3     MAC     intravenous induction     Anesthetic plan, risks, benefits, and alternatives have been provided, discussed and informed consent has been obtained with: patient.        CODE STATUS:

## 2022-07-14 NOTE — ANESTHESIA POSTPROCEDURE EVALUATION
Patient: Mini Andersen    Procedure Summary     Date: 07/14/22 Room / Location: [facility] ENDOSCOPY 2 / [facility] ENDOSCOPY    Anesthesia Start: 0905 Anesthesia Stop: 0921    Procedure: ESOPHAGOGASTRODUODENOSCOPY 9:00 (N/A ) Diagnosis:       Stricture and stenosis of esophagus      (Stricture and stenosis of esophagus [K22.2])    Surgeons: Carlos Eduardo Cyr DO Provider: Anai Dailey CRNA    Anesthesia Type: MAC ASA Status: 3          Anesthesia Type: MAC    Vitals  No vitals data found for the desired time range.          Post Anesthesia Care and Evaluation    Patient location during evaluation: bedside  Patient participation: waiting for patient participation  Level of consciousness: sleepy but conscious  Pain score: 0  Pain management: adequate    Airway patency: patent  Anesthetic complications: No anesthetic complications  PONV Status: none  Cardiovascular status: acceptable  Respiratory status: acceptable  Hydration status: acceptable

## 2022-07-15 LAB — REF LAB TEST METHOD: NORMAL

## 2022-07-16 LAB
BACTERIA SPEC AEROBE CULT: ABNORMAL
BACTERIA SPEC AEROBE CULT: ABNORMAL

## 2022-07-18 ENCOUNTER — HOSPITAL ENCOUNTER (EMERGENCY)
Facility: HOSPITAL | Age: 67
Discharge: HOME | End: 2022-07-19
Attending: FAMILY MEDICINE | Admitting: FAMILY MEDICINE

## 2022-07-18 VITALS
DIASTOLIC BLOOD PRESSURE: 62 MMHG | TEMPERATURE: 98.7 F | HEART RATE: 87 BPM | BODY MASS INDEX: 32.2 KG/M2 | OXYGEN SATURATION: 96 % | SYSTOLIC BLOOD PRESSURE: 137 MMHG | HEIGHT: 71 IN | RESPIRATION RATE: 16 BRPM | WEIGHT: 230 LBS

## 2022-07-18 DIAGNOSIS — N30.01 ACUTE CYSTITIS WITH HEMATURIA: Primary | ICD-10-CM

## 2022-07-18 LAB
BACTERIA UR QL AUTO: ABNORMAL /HPF
BILIRUB UR QL STRIP: NEGATIVE
CLARITY UR: ABNORMAL
COLOR UR: ABNORMAL
GLUCOSE UR STRIP-MCNC: NEGATIVE MG/DL
HGB UR QL STRIP.AUTO: ABNORMAL
HYALINE CASTS UR QL AUTO: ABNORMAL /LPF
KETONES UR QL STRIP: NEGATIVE
LEUKOCYTE ESTERASE UR QL STRIP.AUTO: ABNORMAL
NITRITE UR QL STRIP: POSITIVE
PH UR STRIP.AUTO: 5.5 [PH] (ref 5–9)
PROT UR QL STRIP: ABNORMAL
RBC # UR STRIP: ABNORMAL /HPF
REF LAB TEST METHOD: ABNORMAL
SP GR UR STRIP: 1.01 (ref 1–1.03)
SQUAMOUS #/AREA URNS HPF: ABNORMAL /HPF
UROBILINOGEN UR QL STRIP: ABNORMAL
WBC # UR STRIP: ABNORMAL /HPF
YEAST URNS QL MICRO: ABNORMAL /HPF

## 2022-07-18 PROCEDURE — 99283 EMERGENCY DEPT VISIT LOW MDM: CPT

## 2022-07-18 PROCEDURE — 81001 URINALYSIS AUTO W/SCOPE: CPT

## 2022-07-18 RX ORDER — GRANULES FOR ORAL 3 G/1
3 POWDER ORAL ONCE
Status: COMPLETED | OUTPATIENT
Start: 2022-07-18 | End: 2022-07-18

## 2022-07-18 RX ADMIN — FOSFOMYCIN TROMETHAMINE 3 G: 3 POWDER ORAL at 23:30

## 2022-07-19 RX ORDER — ONDANSETRON 4 MG/1
4 TABLET, ORALLY DISINTEGRATING ORAL EVERY 6 HOURS PRN
Qty: 10 TABLET | Refills: 0 | Status: SHIPPED | OUTPATIENT
Start: 2022-07-19

## 2022-07-19 NOTE — ED PROVIDER NOTES
Subjective   Patient presents emergency department with suprapubic pelvic pain and dysuria.  She was placed on ciprofloxacin last week for urinary tract infection and states that her symptoms have not improved.  She was told to discontinue the ciprofloxacin by her primary care provider, presumably because her urine culture result was resistant to ciprofloxacin and levofloxacin.        Urinary Tract Infection  Pain quality:  Burning  Pain severity:  Moderate  Duration:  1 week  Timing:  Constant  Progression:  Worsening  Chronicity:  Recurrent  Associated symptoms: nausea    Associated symptoms: no abdominal pain, no fever and no vomiting        Review of Systems   Constitutional: Positive for activity change. Negative for appetite change, chills, diaphoresis, fatigue and fever.   HENT: Negative for congestion, ear discharge, ear pain, nosebleeds, rhinorrhea, sinus pressure, sore throat and trouble swallowing.    Eyes: Negative for discharge and redness.   Respiratory: Negative for apnea, cough, chest tightness, shortness of breath and wheezing.    Cardiovascular: Negative for chest pain.   Gastrointestinal: Positive for diarrhea (chronic) and nausea. Negative for abdominal pain and vomiting.   Endocrine: Negative for polyuria.   Genitourinary: Positive for difficulty urinating and dysuria. Negative for frequency and urgency.   Musculoskeletal: Negative for myalgias and neck pain.   Skin: Negative for color change and rash.   Allergic/Immunologic: Negative for immunocompromised state.   Neurological: Negative for dizziness, seizures, syncope, weakness, light-headedness and headaches.   Hematological: Negative for adenopathy. Does not bruise/bleed easily.   Psychiatric/Behavioral: Negative for behavioral problems and confusion.   All other systems reviewed and are negative.      Past Medical History:   Diagnosis Date   • Acid reflux    • Acute peritonitis (HCC)    • Allergic rhinitis    • Arthritis    • Bee sting    •  Blood clot in vein, right leg    • Borderline glaucoma    • Bunion    • Callus    • Chronic bronchitis (HCC)    • Constipation     severe with an immotile colon      • Deep venous thrombosis (HCC)    • High cholesterol    • History of transfusion    • Hypothyroidism    • Ingrown toenail    • Intestinal volvulus (HCC)    • Muscle atrophy     O/E   • Nuclear senile cataract    • Nuclear senile cataract    • Polyneuropathy in diabetes (HCC)    • PONV (postoperative nausea and vomiting)    • Sleep apnea    • Type 2 diabetes mellitus (HCC)     NO BDR   • Type 2 diabetes mellitus without complications (HCC)    • Unspecified disease of nail        Allergies   Allergen Reactions   • Bextra [Valdecoxib] Shortness Of Breath     Shortness of breath   • Cephalosporins Shortness Of Breath   • Detrol La [Tolterodine Tartrate Er] Shortness Of Breath   • Dicyclomine Shortness Of Breath     Shortness of breath   • Fluoxetine Shortness Of Breath   • Keflex [Cephalexin] Shortness Of Breath   • Toprol Xl [Metoprolol] Anaphylaxis   • Chocolate Flavor    • Omeprazole-Sodium Bicarbonate Unknown (See Comments)   • Other      Fresh flowers causes throat swelling   • Amoxicillin Rash   • Aspirin Rash   • Claritin-D 12 Hour [Loratadine-Pseudoephedrine Er] Rash   • Codeine Rash   • Demerol [Meperidine] Rash   • Levaquin [Levofloxacin] Rash     Was told not allergic   • Lipitor [Atorvastatin] Other (See Comments)     cramping   • Macrobid [Nitrofurantoin] Rash   • Morphine And Related Rash   • Paxil [Paroxetine Hcl] Rash   • Sulfa Antibiotics Rash   • Tape Rash   • Tramadol Rash   • Vioxx [Rofecoxib] Rash   • Zegerid [Omeprazole] Rash   • Zoloft [Sertraline Hcl] Rash       Past Surgical History:   Procedure Laterality Date   • APPENDECTOMY     • BLADDER SUSPENSION     • COLECTOMY PARTIAL / TOTAL  05/22/2014    Subtotal colectomy with ineo-rectostomy.   • ENDOSCOPY N/A 5/1/2018    Procedure: ESOPHAGOGASTRODUODENOSCOPY;  Surgeon: Carlos Eduardo SLAUGHTER  DO Ger;  Location: [facility] ENDOSCOPY;  Service: Gastroenterology   • ENDOSCOPY N/A 3/26/2019    Procedure: ESOPHAGOGASTRODUODENOSCOPY;  Surgeon: Carlos Eduardo Cyr DO;  Location: [facility] ENDOSCOPY;  Service: Gastroenterology   • ENDOSCOPY N/A 2020    Procedure: ESOPHAGOGASTRODUODENOSCOPY;  Surgeon: Carlos Eduardo Cyr DO;  Location: [facility] OR;  Service: Gastroenterology;  Laterality: N/A;   • ENDOSCOPY N/A 3/18/2021    Procedure: ESOPHAGOGASTRODUODENOSCOPY;  Surgeon: Carlos Eduardo Cyr DO;  Location: [facility] ENDOSCOPY;  Service: Gastroenterology;  Laterality: N/A;   • HERNIA REPAIR      multiple   • HYSTERECTOMY     • INJECTION OF MEDICATION  2010    DEPO MEDROL   • JOINT REPLACEMENT Left 2017    knee   • KNEE ARTHROSCOPY Left    • LAPAROSCOPIC CHOLECYSTECTOMY     • SALPINGO OOPHORECTOMY Left    • SALPINGO OOPHORECTOMY Right    • SIGMOIDOSCOPY N/A 3/26/2019    Procedure: SIGMOIDOSCOPY FLEXIBLE;  Surgeon: Carlos Eduardo Cyr DO;  Location: [facility] ENDOSCOPY;  Service: Gastroenterology   • TOTAL KNEE ARTHROPLASTY Left 2017    Procedure: TOTAL KNEE ARTHROPLASTY ATTUNE with adductor canal block;  Surgeon: Jose Ballesteros MD;  Location: [facility];  Service:        Family History   Problem Relation Age of Onset   • Osteoporosis Mother    • Diabetes Sister    • Cancer Brother    • Heart disease Brother    • Diabetes Brother    • Hypertension Brother        Social History     Socioeconomic History   • Marital status:    Tobacco Use   • Smoking status: Former Smoker     Packs/day: 0.00     Years: 0.00     Pack years: 0.00     Start date:      Quit date:      Years since quittin.5   • Smokeless tobacco: Never Used   Vaping Use   • Vaping Use: Never used   Substance and Sexual Activity   • Alcohol use: No   • Drug use: No   • Sexual activity: Defer     Birth control/protection: Surgical           Objective   Physical Exam  Vitals and nursing note reviewed.   Constitutional:        Appearance: She is well-developed.   HENT:      Head: Normocephalic and atraumatic.      Nose: Nose normal.   Eyes:      General: No scleral icterus.        Right eye: No discharge.         Left eye: No discharge.      Conjunctiva/sclera: Conjunctivae normal.      Pupils: Pupils are equal, round, and reactive to light.   Neck:      Trachea: No tracheal deviation.   Cardiovascular:      Rate and Rhythm: Normal rate and regular rhythm.      Heart sounds: Normal heart sounds. No murmur heard.  Pulmonary:      Effort: Pulmonary effort is normal. No respiratory distress.      Breath sounds: Normal breath sounds. No stridor. No wheezing or rales.   Abdominal:      General: Bowel sounds are normal. There is no distension.      Palpations: Abdomen is soft. There is no mass.      Tenderness: There is abdominal tenderness in the suprapubic area. There is no guarding or rebound.   Musculoskeletal:      Cervical back: Normal range of motion and neck supple.   Skin:     General: Skin is warm and dry.      Findings: No erythema or rash.   Neurological:      Mental Status: She is alert and oriented to person, place, and time.      Coordination: Coordination normal.   Psychiatric:         Behavior: Behavior normal.         Thought Content: Thought content normal.         Procedures           ED Course                   Labs Reviewed   URINALYSIS W/ MICROSCOPIC IF INDICATED (NO CULTURE) - Abnormal; Notable for the following components:       Result Value    Appearance, UA Turbid (*)     Blood, UA Moderate (2+) (*)     Protein, UA 30 mg/dL (1+) (*)     Leuk Esterase, UA Large (3+) (*)     Nitrite, UA Positive (*)     All other components within normal limits   URINALYSIS, MICROSCOPIC ONLY - Abnormal; Notable for the following components:    RBC, UA 3-5 (*)     WBC, UA 21-30 (*)     Bacteria, UA 1+ (*)     Squamous Epithelial Cells, UA 3-5 (*)     All other components within normal limits       No orders to display                                     MDM    Final diagnoses:   Acute cystitis with hematuria       ED Disposition  ED Disposition     ED Disposition   Discharge    Condition   Stable    Comment   --             Martha Flower GUILLERMINA, APRN  [address] [place] [id number] [phone]    In 2 days           Medication List      Changed    * ondansetron ODT 4 MG disintegrating tablet  Commonly known as: ZOFRAN-ODT  What changed: Another medication with the same name was added. Make sure you understand how and when to take each.     * ondansetron ODT 4 MG disintegrating tablet  Commonly known as: ZOFRAN-ODT  Place 1 tablet on the tongue Every 6 (Six) Hours As Needed for Nausea or Vomiting.  What changed: You were already taking a medication with the same name, and this prescription was added. Make sure you understand how and when to take each.         * This list has 2 medication(s) that are the same as other medications prescribed for you. Read the directions carefully, and ask your doctor or other care provider to review them with you.               Where to Get Your Medications      These medications were sent to [facility] - [place], [place] - [address] - [phone] [facility] [phone]   [address], [place] [id number]    Phone: [phone]   · ondansetron ODT 4 MG disintegrating tablet          Ector Millard MD  07/19/22 0014

## 2022-07-19 NOTE — ED NOTES
Pt presents to ED with c/o UTI, states she was seen by primary care 3 days ago and placed on antibiotic w/o improvement and called her dr to change antibiotic, the antibiotic she was changed to she was allergic to so she didn't take and has worsening symptoms today.

## 2022-08-25 ENCOUNTER — OFFICE VISIT (OUTPATIENT)
Dept: PODIATRY | Facility: CLINIC | Age: 67
End: 2022-08-25

## 2022-08-25 VITALS — HEART RATE: 103 BPM | HEIGHT: 71 IN | OXYGEN SATURATION: 97 % | WEIGHT: 230 LBS | BODY MASS INDEX: 32.2 KG/M2

## 2022-08-25 DIAGNOSIS — M17.11 PRIMARY OSTEOARTHRITIS OF RIGHT KNEE: Primary | ICD-10-CM

## 2022-08-25 DIAGNOSIS — M24.573 EQUINUS CONTRACTURE OF ANKLE: ICD-10-CM

## 2022-08-25 DIAGNOSIS — M79.671 RIGHT FOOT PAIN: ICD-10-CM

## 2022-08-25 DIAGNOSIS — M72.2 PLANTAR FASCIITIS: Primary | ICD-10-CM

## 2022-08-25 PROCEDURE — 20550 NJX 1 TENDON SHEATH/LIGAMENT: CPT | Performed by: PODIATRIST

## 2022-08-25 RX ORDER — BETAMETHASONE SODIUM PHOSPHATE AND BETAMETHASONE ACETATE 3; 3 MG/ML; MG/ML
6 INJECTION, SUSPENSION INTRA-ARTICULAR; INTRALESIONAL; INTRAMUSCULAR; SOFT TISSUE ONCE
Status: COMPLETED | OUTPATIENT
Start: 2022-08-25 | End: 2022-08-25

## 2022-08-25 RX ORDER — CONJUGATED ESTROGENS 0.62 MG/G
CREAM VAGINAL
COMMUNITY
Start: 2022-07-28 | End: 2022-10-18

## 2022-08-25 RX ADMIN — BETAMETHASONE SODIUM PHOSPHATE AND BETAMETHASONE ACETATE 6 MG: 3; 3 INJECTION, SUSPENSION INTRA-ARTICULAR; INTRALESIONAL; INTRAMUSCULAR; SOFT TISSUE at 09:48

## 2022-08-25 NOTE — PROGRESS NOTES
Mini Andersen  1955  67 y.o. female  PCP: EDUARDA Torres 08/24/2022  BS: 86 per patient     Patient presents to clinic today with the complaint of right heel pain.     08/25/2022      Chief Complaint   Patient presents with   • Right Foot - Pain           History of Present Illness    Mini Andersen is a 67 y.o. female who presents for evaluation of recurrent right heel pain.    Past Medical History:   Diagnosis Date   • Acid reflux    • Acute peritonitis (HCC)    • Allergic rhinitis    • Arthritis    • Bee sting    • Blood clot in vein, right leg    • Borderline glaucoma    • Bunion    • Callus    • Chronic bronchitis (HCC)    • Constipation     severe with an immotile colon      • Deep venous thrombosis (HCC)    • High cholesterol    • History of transfusion    • Hypothyroidism    • Ingrown toenail    • Intestinal volvulus (HCC)    • Muscle atrophy     O/E   • Nuclear senile cataract    • Nuclear senile cataract    • Plantar fasciitis    • Polyneuropathy in diabetes (HCC)    • PONV (postoperative nausea and vomiting)    • Sleep apnea    • Type 2 diabetes mellitus (HCC)     NO BDR   • Type 2 diabetes mellitus without complications (HCC)    • Unspecified disease of nail          Past Surgical History:   Procedure Laterality Date   • APPENDECTOMY     • BLADDER SUSPENSION     • COLECTOMY PARTIAL / TOTAL  05/22/2014    Subtotal colectomy with ineo-rectostomy.   • ENDOSCOPY N/A 5/1/2018    Procedure: ESOPHAGOGASTRODUODENOSCOPY;  Surgeon: Carlos Eduardo Cyr DO;  Location: [facility] ENDOSCOPY;  Service: Gastroenterology   • ENDOSCOPY N/A 3/26/2019    Procedure: ESOPHAGOGASTRODUODENOSCOPY;  Surgeon: Carlos Eduardo Cyr DO;  Location: [facility] ENDOSCOPY;  Service: Gastroenterology   • ENDOSCOPY N/A 5/5/2020    Procedure: ESOPHAGOGASTRODUODENOSCOPY;  Surgeon: Carlos Eduardo Cyr DO;  Location: [facility] OR;  Service: Gastroenterology;  Laterality: N/A;   • ENDOSCOPY N/A 3/18/2021    Procedure: ESOPHAGOGASTRODUODENOSCOPY;   Surgeon: Carlos Eduardo Cyr DO;  Location: [facility] ENDOSCOPY;  Service: Gastroenterology;  Laterality: N/A;   • ENDOSCOPY N/A 2022    Procedure: ESOPHAGOGASTRODUODENOSCOPY 9:00;  Surgeon: Carlos Eduardo Cyr DO;  Location: [facility] ENDOSCOPY;  Service: Gastroenterology;  Laterality: N/A;   • HERNIA REPAIR      multiple   • HYSTERECTOMY     • INJECTION OF MEDICATION  2010    DEPO MEDROL   • JOINT REPLACEMENT Left 2017    knee   • KNEE ARTHROSCOPY Left    • LAPAROSCOPIC CHOLECYSTECTOMY     • SALPINGO OOPHORECTOMY Left    • SALPINGO OOPHORECTOMY Right    • SIGMOIDOSCOPY N/A 3/26/2019    Procedure: SIGMOIDOSCOPY FLEXIBLE;  Surgeon: Carlos Eduardo Cyr DO;  Location: [facility] ENDOSCOPY;  Service: Gastroenterology   • TOTAL KNEE ARTHROPLASTY Left 2017    Procedure: TOTAL KNEE ARTHROPLASTY ATTUNE with adductor canal block;  Surgeon: Jose Ballesteros MD;  Location: [facility] OR;  Service:          Family History   Problem Relation Age of Onset   • Osteoporosis Mother    • Diabetes Sister    • Cancer Brother    • Heart disease Brother    • Diabetes Brother    • Hypertension Brother          Social History     Socioeconomic History   • Marital status:    Tobacco Use   • Smoking status: Former Smoker     Packs/day: 0.00     Years: 0.00     Pack years: 0.00     Start date:      Quit date:      Years since quittin.6   • Smokeless tobacco: Never Used   Vaping Use   • Vaping Use: Never used   Substance and Sexual Activity   • Alcohol use: No   • Drug use: No   • Sexual activity: Defer     Birth control/protection: Surgical         Current Outpatient Medications   Medication Sig Dispense Refill   • Alcohol Swabs (B-D SINGLE USE SWABS REGULAR) pads      • apixaban (ELIQUIS) 5 MG tablet tablet Take 1 tablet by mouth Every 12 (Twelve) Hours. 180 tablet 3   • Calcium Citrate (CITRACAL PO) Take 2 tablets by mouth Daily.     • carboxymethylcellulose (REFRESH PLUS) 0.5 % solution Administer 1 drop to  "both eyes Daily As Needed for Dry Eyes.     • clonazePAM (KlonoPIN) 0.5 MG tablet Take 0.5 mg by mouth 2 (Two) Times a Day As Needed for Anxiety.     • cyanocobalamin 1000 MCG/ML injection Inject 1,000 mcg into the appropriate muscle as directed by prescriber.     • cyclobenzaprine (FLEXERIL) 10 MG tablet TAKE ONE TABLET BY MOUTH EVERY EIGHT HOURS AS NEEDED FOR MUSCLE SPASMS FOR UP TO TEN DAYS     • diphenhydrAMINE (BENADRYL) 25 mg capsule Take 25 mg by mouth Every 6 (Six) Hours As Needed.  6   • ferrous sulfate 324 (65 Fe) MG tablet delayed-release EC tablet Take 324 mg by mouth Daily With Breakfast.     • furosemide (LASIX) 40 MG tablet Take 40 mg by mouth As Needed.     • levothyroxine (SYNTHROID, LEVOTHROID) 50 MCG tablet Take 50 mcg by mouth daily.  1   • losartan (COZAAR) 25 MG tablet Take 25 mg by mouth Daily.     • metFORMIN (GLUCOPHAGE) 500 MG tablet Take 500 mg by mouth Daily As Needed (For FSBS > 140).  1   • ondansetron ODT (ZOFRAN-ODT) 4 MG disintegrating tablet take 1 Tablet by oral route  every 8 hours as needed for nausea     • ondansetron ODT (ZOFRAN-ODT) 4 MG disintegrating tablet Place 1 tablet on the tongue Every 6 (Six) Hours As Needed for Nausea or Vomiting. 10 tablet 0   • ONE TOUCH ULTRA TEST test strip TEST BID  5   • pantoprazole (PROTONIX) 40 MG EC tablet Take 40 mg by mouth Daily.     • promethazine (PHENERGAN) 25 MG tablet      • sucralfate (CARAFATE) 1 g tablet Take 1 g by mouth Daily.     • TRUEplus Lancets 28G misc      • verapamil SR (CALAN-SR) 120 MG CR tablet Take 1 tablet by mouth Every Night. 90 tablet 3   • vitamin D (ERGOCALCIFEROL) 57932 UNITS capsule capsule Take 50,000 Units by mouth Every 7 (Seven) Days.  1   • Premarin 0.625 MG/GM vaginal cream INSERT 0.5 GRAMS VAGINALLY DAILY AS DIRECTED       No current facility-administered medications for this visit.         OBJECTIVE    Pulse 103   Ht 180.3 cm (71\")   Wt 104 kg (230 lb)   LMP 02/23/1998 (Within Months) Comment: " Hysterectomy  SpO2 97%   BMI 32.08 kg/m²       Review of Systems   Constitutional: Positive for chills.   HENT: Positive for trouble swallowing.    Eyes: Negative.    Respiratory: Positive for cough and shortness of breath.    Cardiovascular: Positive for palpitations.   Gastrointestinal: Positive for abdominal pain, blood in stool, diarrhea and nausea.   Endocrine: Positive for cold intolerance.   Genitourinary: Negative.    Musculoskeletal: Positive for arthralgias and joint swelling.        Foot pain   Ankle pain  Joint pain   Skin: Positive for rash.   Allergic/Immunologic: Negative.    Neurological: Positive for headaches.   Hematological: Negative.    Psychiatric/Behavioral: The patient is nervous/anxious.          Physical Exam   Constitutional: she appears well-developed and well-nourished.   HEENT: Normocephalic. Atraumatic.  CV: No CP. RRR  Resp: Non-labored respirations.  Psychiatric: she has a normal mood and affect. her behavior is normal.         Lower Extremity Exam:  Vascular: DP/PT pulses palpable 2+.   Trace edema bilateral feet and ankles  Foot warm  Neuro: Protective sensation intact, b/l.  DTRs intact  Negative Tinel over tarsal tunnel  Integument: No open wounds or lesions.  No erythema, scaling  No masses  Musculoskeletal: LE muscle strength 5/5.   Gait normal  Ankle ROM full without pain or crepitus  STJ ROM full without pain or crepitus  Positive equinus  Sharp tenderness palpation medial plantar fascial band, plantar calcaneal tubercle on right    Right heel injection:  Risks and benefits discussed. Written consent obtained.  Skin prepped with alcohol  Medial heel injection 1cc 0.5% Marcaine, 1cc Celestone Soluspan with 27g needle  Band-aid applied.  Pt tolerated well.          ASSESSMENT AND PLAN    Diagnoses and all orders for this visit:    1. Plantar fasciitis (Primary)  -     betamethasone acetate-betamethasone sodium phosphate (CELESTONE SOLUSPAN) injection 6 mg    2. Right foot  pain    3. Equinus contracture of ankle      -Comprehensive foot and ankle exam performed  -Radiographs reviewed  -Educated pt on diagnosis, etiology and treatment of plantar fasciitis  -Continue supportive shoes, stretching regimen  -Corticosteroid injection as above  -Did discuss possible surgical correction in future if symptoms do not improve.  -Recheck 4 weeks, as needed            This document has been electronically signed by Carlos Eduardo Wilkinson DPM on August 25, 2022 11:24 CDT       Much of this encounter note is an electronic transcription/translation of spoken language to printed text.   Carlos Eduardo Wilkinson DPM  8/25/2022  11:24 CDT

## 2022-08-26 ENCOUNTER — OFFICE VISIT (OUTPATIENT)
Dept: ORTHOPEDIC SURGERY | Facility: CLINIC | Age: 67
End: 2022-08-26

## 2022-08-26 VITALS — WEIGHT: 225 LBS | BODY MASS INDEX: 31.5 KG/M2 | HEIGHT: 71 IN

## 2022-08-26 DIAGNOSIS — G89.29 CHRONIC PAIN OF RIGHT KNEE: ICD-10-CM

## 2022-08-26 DIAGNOSIS — M17.11 PRIMARY OSTEOARTHRITIS OF RIGHT KNEE: Primary | ICD-10-CM

## 2022-08-26 DIAGNOSIS — M25.561 CHRONIC PAIN OF RIGHT KNEE: ICD-10-CM

## 2022-08-26 PROCEDURE — 20610 DRAIN/INJ JOINT/BURSA W/O US: CPT | Performed by: NURSE PRACTITIONER

## 2022-08-26 PROCEDURE — 99214 OFFICE O/P EST MOD 30 MIN: CPT | Performed by: NURSE PRACTITIONER

## 2022-08-26 RX ORDER — HYDROCODONE BITARTRATE AND ACETAMINOPHEN 5; 325 MG/1; MG/1
1 TABLET ORAL EVERY 8 HOURS PRN
Qty: 40 TABLET | Refills: 0 | Status: SHIPPED | OUTPATIENT
Start: 2022-08-26 | End: 2022-10-03 | Stop reason: SDUPTHER

## 2022-08-26 RX ADMIN — BUPIVACAINE HYDROCHLORIDE 2 ML: 5 INJECTION, SOLUTION PERINEURAL at 10:51

## 2022-08-26 RX ADMIN — TRIAMCINOLONE ACETONIDE 40 MG: 40 INJECTION, SUSPENSION INTRA-ARTICULAR; INTRAMUSCULAR at 10:51

## 2022-08-26 NOTE — PROGRESS NOTES
"Mini Andersen is a 67 y.o. female returns for     Chief Complaint   Patient presents with   • Right Knee - Follow-up, Pain     HISTORY OF PRESENT ILLNESS: Patient presents to office for follow-up of chronic right knee pain. Patient has experienced right knee pain for several years that has progressively worsened over time. Patient has known osteoarthritic changes in the right knee.  Patient previously experienced significantly worsened right knee pain and June 2021 for unknown reasons.  The patient has never had advanced imaging of the right knee.  MRI was previously ordered but the patient never had this completed for unknown reasons.  Patient has been treated conservatively with previous intra-articular injections of steroid, modified weightbearing with use of a cane, rest/activity modification, ice therapy, Tylenol and Norco.  Last injection was given on 9/24/2021, which offered good pain improvement for a few months.  Patient reports that her right knee pain has been gradually returning/worsening in recent weeks.  She denies any falls or injuries.  No new complaints or concerns noted since last office visit.  Updated x-rays are performed in office today.  Patient requests a refill of Norco to help with her pain as she states that Tylenol is not offering any relief.  Pain scale currently is 8/10.    CONCURRENT MEDICAL HISTORY:    The following portions of the patient's history were reviewed and updated as appropriate: allergies, current medications, past family history, past medical history, past social history, past surgical history and problem list.     ROS  No fevers or chills.  No chest pain or shortness of air.  No GI or  disturbances. Right knee pain.     PHYSICAL EXAMINATION:       Ht 180.3 cm (71\")   Wt 102 kg (225 lb)   LMP 02/23/1998 (Within Months) Comment: Hysterectomy  BMI 31.38 kg/m²     Physical Exam  Vitals reviewed.   Constitutional:       General: She is not in acute distress.     " Appearance: She is well-developed. She is not ill-appearing.   HENT:      Head: Normocephalic.   Pulmonary:      Effort: Pulmonary effort is normal. No respiratory distress.   Abdominal:      General: There is no distension.      Palpations: Abdomen is soft.   Musculoskeletal:         General: Swelling (Mild, right knee) and tenderness (Mild, right knee) present. No deformity or signs of injury.      Right knee: No effusion.      Instability Tests: Medial Viktoria test positive. Lateral Viktoria test negative.   Skin:     General: Skin is warm and dry.      Capillary Refill: Capillary refill takes less than 2 seconds.      Findings: No erythema.   Neurological:      Mental Status: She is alert and oriented to person, place, and time.      GCS: GCS eye subscore is 4. GCS verbal subscore is 5. GCS motor subscore is 6.   Psychiatric:         Speech: Speech normal.         Behavior: Behavior normal.         Thought Content: Thought content normal.         Judgment: Judgment normal.         GAIT:     []  Normal  [x]  Antalgic     Assistive device: [x]  None  []  Walker     []  Crutches  []  Cane     []  Wheelchair  []  Stretcher    Right Knee Exam     Tenderness   The patient is experiencing tenderness in the medial joint line and lateral joint line (Mild, diffuse).    Range of Motion   Extension: 0   Flexion: 110     Tests   Viktoria:  Medial - positive Lateral - negative  Varus: negative Valgus: negative    Other   Erythema: absent  Sensation: normal  Pulse: present  Swelling: mild  Effusion: no effusion present            XR Knee 1 or 2 View Right    Result Date: 8/28/2022  Narrative: EXAM DESCRIPTION:  XR KNEE 1-2 VIEWS, XR KNEES ANTEROPOSTERIOR STANDING BILATERAL CLINICAL INDICATION: pain, M17.11 Unilateral primary osteoarthritis, right knee COMPARISON: None FINDINGS: Two views of the right knee demonstrate tricompartmental degenerative arthritis. This includes partial loss of medial and patellofemoral compartment  joint space and tricompartmental marginal hypertrophic osteophytes. No evidence for acute fracture or dislocation. No intra-articular loose body. No right knee effusion. Prior left total knee arthroplasty.     Impression: 1. Tricompartmental degenerative arthritis involving the right knee with partial loss of medial and patellofemoral compartment joint space. 2. Prior left total knee arthroplasty. Electronically signed by:  Rubin Farnsworth MD  8/28/2022 12:40 PM CDT Workstation: [id number]    XR Knee Bilateral AP Standing    Result Date: 8/28/2022  Narrative: EXAM DESCRIPTION:  XR KNEE 1-2 VIEWS, XR KNEES ANTEROPOSTERIOR STANDING BILATERAL CLINICAL INDICATION: pain, M17.11 Unilateral primary osteoarthritis, right knee COMPARISON: None FINDINGS: Two views of the right knee demonstrate tricompartmental degenerative arthritis. This includes partial loss of medial and patellofemoral compartment joint space and tricompartmental marginal hypertrophic osteophytes. No evidence for acute fracture or dislocation. No intra-articular loose body. No right knee effusion. Prior left total knee arthroplasty.     Impression: 1. Tricompartmental degenerative arthritis involving the right knee with partial loss of medial and patellofemoral compartment joint space. 2. Prior left total knee arthroplasty. Electronically signed by:  Rubin Farnsworth MD  8/28/2022 12:40 PM CDT Workstation: [id number]    X-ray hip right AP and lateral 2+views    Result Date: 8/24/2022  Narrative: Right hip pain 2 views right hip, AP pelvis: There are wallstents in the common iliac veins . The pelvis is intact . The SI joints are unremarkable The right hip is normally aligned and no fracture, destructive lesion, or avascular necrosis . The articular surfaces are smooth and the hip joint is well-preserved. .    Impression:   Negative right hip and pelvis [id number]      Large Joint Arthrocentesis: R knee  Date/Time: 8/26/2022 10:51 AM  Consent given by:  patient  Timeout: Immediately prior to procedure a time out was called to verify the correct patient, procedure, equipment, support staff and site/side marked as required   Supporting Documentation  Indications: pain, diagnostic evaluation and joint swelling   Procedure Details  Location: knee - R knee  Preparation: Patient was prepped and draped in the usual sterile fashion  Needle size: 22 G  Approach: anterolateral  Medications administered: 40 mg triamcinolone acetonide 40 MG/ML; 2 mL bupivacaine 0.5 %  Patient tolerance: patient tolerated the procedure well with no immediate complications      ASSESSMENT:    Diagnoses and all orders for this visit:    Primary osteoarthritis of right knee  -     Large Joint Arthrocentesis: R knee  -     HYDROcodone-acetaminophen (NORCO) 5-325 MG per tablet; Take 1 tablet by mouth Every 8 (Eight) Hours As Needed for Moderate Pain  or Severe Pain  for up to 10 days.    Chronic pain of right knee  -     Large Joint Arthrocentesis: R knee  -     HYDROcodone-acetaminophen (NORCO) 5-325 MG per tablet; Take 1 tablet by mouth Every 8 (Eight) Hours As Needed for Moderate Pain  or Severe Pain  for up to 10 days.    PLAN    AP standing x-ray of the bilateral knees with a lateral x-ray of the right knee performed in office today and reviewed with no acute findings noted.  Patient has moderate to advanced degenerative changes in the medial and patellofemoral compartments of her right knee with joint space narrowing noted.  Patient has a history of previous left knee replacement and the implant in the left knee appears stable and unchanged at this time.  Patient complains of chronic/recurrent right knee pain that has progressively worsened in recent weeks for unknown reasons.  She has not sustained any falls or injuries.  She has no new complaints or concerns noted. Patient has been treated conservatively as described in the HPI above.  She is requesting to proceed with a repeat injection  today in an effort to improve her pain.  It has been nearly a year since her last injection.  Recommend a repeat intra-articular injection of steroid to the right knee today for management of joint pain/inflammation/swelling.    Recommend the following:  -Rest and activity modification with limited weightbearing activity for now.  -Use of her cane for modified weightbearing off the right knee.  Patient confirms that she has a cane at home to use.  She is not currently using it in office today.  -Gradual progression of weightbearing and activity as pain and swelling allow.  -Elevation and ice therapy to the right knee as needed to minimize pain/swelling/inflammation.    Patient has taken Norco in the past for pain control and she requests a refill of this today.  Norco 5 mg is prescribed to take as needed for moderate to severe pain that is uncontrolled with Tylenol. Patient is instructed to take the pain medication sparingly and to take the least amount of pain medication needed to control the pain.  Patient is cautioned that opioids can be addictive.  We discussed other potential adverse side effects of opioids including constipation, dizziness, drowsiness, increased risk for falls and/or respiratory depression.  Patient verbalized understanding of these risks.  I have encouraged the patient to focus on nonopioid pain management methods as much as possible.  Recommend Tylenol as needed for milder pain.  The patient cannot take oral NSAIDs due to her current use of Eliquis for chronic anticoagulation therapy.  LOTTIE is reviewed internally via Epic and is noted to be appropriate.    Follow-up in 4 weeks for recheck as needed for any new, worsening or persistent symptoms.  Plan for MRI of the right knee if her pain persists or worsens.  We also discussed that if she gets good relief with the injection again, she can follow-up on an as-needed basis.    Time spent of a minimum of 30 minutes including the face to face  evaluation, reviewing of medical history and prior medial records, reviewing of diagnostic studies, prescription drug management, documentation, patient education and coordination of care.     EMR Dragon/Transciption Disclaimer: Some of this note may be an electronic transcription/translation of spoken language to printed text using the Dragon Dictation System.     Return in about 4 weeks (around 9/23/2022), or if symptoms worsen or fail to improve, for Recheck.        This document has been electronically signed by EDUARDA Minor on August 28, 2022 17:04 CDT      EDUARDA Minor

## 2022-08-28 RX ORDER — TRIAMCINOLONE ACETONIDE 40 MG/ML
40 INJECTION, SUSPENSION INTRA-ARTICULAR; INTRAMUSCULAR
Status: COMPLETED | OUTPATIENT
Start: 2022-08-26 | End: 2022-08-26

## 2022-08-28 RX ORDER — BUPIVACAINE HYDROCHLORIDE 5 MG/ML
2 INJECTION, SOLUTION PERINEURAL
Status: COMPLETED | OUTPATIENT
Start: 2022-08-26 | End: 2022-08-26

## 2022-10-03 DIAGNOSIS — M17.11 PRIMARY OSTEOARTHRITIS OF RIGHT KNEE: ICD-10-CM

## 2022-10-03 DIAGNOSIS — G89.29 CHRONIC PAIN OF RIGHT KNEE: ICD-10-CM

## 2022-10-03 DIAGNOSIS — M25.561 CHRONIC PAIN OF RIGHT KNEE: ICD-10-CM

## 2022-10-03 RX ORDER — HYDROCODONE BITARTRATE AND ACETAMINOPHEN 5; 325 MG/1; MG/1
1 TABLET ORAL EVERY 8 HOURS PRN
Qty: 30 TABLET | Refills: 0 | Status: SHIPPED | OUTPATIENT
Start: 2022-10-03 | End: 2022-10-13

## 2022-10-03 NOTE — TELEPHONE ENCOUNTER
Called patient and no she does not want a referral to pain management.  I told her this would be the last refill. She said she only takes them now and then. I told her if she wanted a referral to give the office a call.

## 2022-10-03 NOTE — TELEPHONE ENCOUNTER
Last refill. We have discussed on multiple occasions that I cannot keep her on opioids for her chronic pain. There are laws that I must follow regarding opioids. Does she want a referral to pain management? We can place one if she prefers. Thanks.

## 2022-10-07 ENCOUNTER — TELEPHONE (OUTPATIENT)
Dept: CARDIOLOGY | Facility: CLINIC | Age: 67
End: 2022-10-07

## 2022-10-07 NOTE — TELEPHONE ENCOUNTER
----- Message from Ivonne Belle sent at 10/5/2022 10:03 AM CDT -----  [phone]    Not sure how this woman wound up on my phone but she called and says she needs eliquis samples or help getting eliquis.  She says her deductible is paid and she now has blood clots.     Please call her back at the # listed above     Thanks    Ivonne     Patient states she has seen her pcp regarding the dvt. pcp has instructed her on how to take her eliquis and has given her samples. pcp is making a referral to vascular as well per the patient.

## 2022-10-12 DIAGNOSIS — Z86.718 HISTORY OF DEEP VENOUS THROMBOSIS (DVT) OF DISTAL VEIN OF RIGHT LOWER EXTREMITY: Primary | ICD-10-CM

## 2022-10-12 DIAGNOSIS — M79.604 PAIN IN BOTH LOWER EXTREMITIES: ICD-10-CM

## 2022-10-12 DIAGNOSIS — M79.605 PAIN IN BOTH LOWER EXTREMITIES: ICD-10-CM

## 2022-10-12 DIAGNOSIS — R09.89 DIMINISHED PULSES IN LOWER EXTREMITY: ICD-10-CM

## 2022-10-18 ENCOUNTER — OFFICE VISIT (OUTPATIENT)
Dept: CARDIAC SURGERY | Facility: CLINIC | Age: 67
End: 2022-10-18

## 2022-10-18 VITALS
SYSTOLIC BLOOD PRESSURE: 129 MMHG | HEIGHT: 71 IN | DIASTOLIC BLOOD PRESSURE: 68 MMHG | BODY MASS INDEX: 31.5 KG/M2 | HEART RATE: 87 BPM | WEIGHT: 225 LBS | OXYGEN SATURATION: 98 %

## 2022-10-18 DIAGNOSIS — E66.9 OBESITY (BMI 30-39.9): ICD-10-CM

## 2022-10-18 DIAGNOSIS — Z95.828 PRESENCE OF IVC FILTER: ICD-10-CM

## 2022-10-18 DIAGNOSIS — I82.419: Primary | ICD-10-CM

## 2022-10-18 DIAGNOSIS — Z79.01 CURRENT USE OF LONG TERM ANTICOAGULATION: ICD-10-CM

## 2022-10-18 DIAGNOSIS — I82.411 ACUTE DEEP VEIN THROMBOSIS (DVT) OF FEMORAL VEIN OF RIGHT LOWER EXTREMITY: ICD-10-CM

## 2022-10-18 PROCEDURE — 99215 OFFICE O/P EST HI 40 MIN: CPT | Performed by: NURSE PRACTITIONER

## 2022-10-18 RX ORDER — CLINDAMYCIN PHOSPHATE 900 MG/50ML
900 INJECTION INTRAVENOUS ONCE
Status: CANCELLED | OUTPATIENT
Start: 2022-10-27 | End: 2022-10-18

## 2022-10-18 RX ORDER — SODIUM CHLORIDE 9 MG/ML
100 INJECTION, SOLUTION INTRAVENOUS CONTINUOUS
Status: CANCELLED | OUTPATIENT
Start: 2022-10-27

## 2022-10-18 NOTE — PROGRESS NOTES
Mini Andersen  1955    Chief Complaint:    Chief Complaint   Patient presents with   • Deep Vein Thrombosis       HPI:      PCP:  Martha Flower APRN  Cardiology:  Dr Dubon     67y.o. female with HTN(stable, increased risk stroke, rupture), Hyperlipidemia(stable, increased risk cardiovascular events), Diabetes Mellitus(stable, increased risk cardiovascular events) and Obesity(uncontrolled, increased risk cardiovascular events) , DVT(new, chronic, increase risk VTE).  former smoker.   RIGHT leg knee pain x 1 year.  Varicose veins. Previous Asymptomatic VTE, now with increased pain/swelling. Subacute thrombus on imaging.  Moderate LEFT knee pain x years.  Mild feet swelling occasional..  No TIA stroke amaurosis.  No MI claudication. No other associated signs, symptoms or modifying factors. Returns for vascular evaluation.       10/2022: MARCOS: RIGHT 1.1 Triphasic LEFT 1.1 Triphasic     2/2021 Lower extremity venous duplex:  No dvt.  RIGHT GSV reflux (>2sec), superficial thrombophlebitis proximal.  12/2021 Lower extremity venous duplex:  No dvt.  RIGHT GSV reflux (>1.4sec), chronic non-occlusive superficial thrombophlebitis proximal GSV, SFJ.   LEFT no dvt, no reflux.  10/2022: Venous duplex: RIGHT subacute thrombus CFV, chronic superficial thrombophlebitis GSV     7/2017 Echocardiogram:  EF 55%, LA 39mm, LV 51mm, RVSP 28mmHg.  Mild TR  12/2019 Holter:  Benign, HR .  12/2020 ECG:  NSR 76, QTc 418    The following portions of the patient's history were reviewed and updated as appropriate: allergies, current medications, past family history, past medical history, past social history, past surgical history and problem list.  Recent images independently reviewed.  Available laboratory values reviewed.    PMH:  Past Medical History:   Diagnosis Date   • Acid reflux    • Acute peritonitis (HCC)    • Allergic rhinitis    • Arthritis    • Bee sting    • Blood clot in vein, right leg    • Borderline glaucoma     • Bunion    • Callus    • Chronic bronchitis (HCC)    • Constipation     severe with an immotile colon      • Deep venous thrombosis (HCC)    • High cholesterol    • History of transfusion    • Hypothyroidism    • Ingrown toenail    • Intestinal volvulus (HCC)    • Muscle atrophy     O/E   • Nuclear senile cataract    • Nuclear senile cataract    • Plantar fasciitis    • Polyneuropathy in diabetes (HCC)    • PONV (postoperative nausea and vomiting)    • Sleep apnea    • Type 2 diabetes mellitus (HCC)     NO BDR   • Type 2 diabetes mellitus without complications (HCC)    • Unspecified disease of nail      Past Surgical History:   Procedure Laterality Date   • APPENDECTOMY     • BLADDER SUSPENSION     • COLECTOMY PARTIAL / TOTAL  05/22/2014    Subtotal colectomy with ineo-rectostomy.   • ENDOSCOPY N/A 5/1/2018    Procedure: ESOPHAGOGASTRODUODENOSCOPY;  Surgeon: Carlos Eduardo Cyr DO;  Location: [facility] ENDOSCOPY;  Service: Gastroenterology   • ENDOSCOPY N/A 3/26/2019    Procedure: ESOPHAGOGASTRODUODENOSCOPY;  Surgeon: Carlos Eduardo Cyr DO;  Location: [facility] ENDOSCOPY;  Service: Gastroenterology   • ENDOSCOPY N/A 5/5/2020    Procedure: ESOPHAGOGASTRODUODENOSCOPY;  Surgeon: Carlos Eduardo Cyr DO;  Location: [facility] OR;  Service: Gastroenterology;  Laterality: N/A;   • ENDOSCOPY N/A 3/18/2021    Procedure: ESOPHAGOGASTRODUODENOSCOPY;  Surgeon: Carlos Eduardo Cyr DO;  Location: [facility] ENDOSCOPY;  Service: Gastroenterology;  Laterality: N/A;   • ENDOSCOPY N/A 7/14/2022    Procedure: ESOPHAGOGASTRODUODENOSCOPY 9:00;  Surgeon: Carlos Eduardo Cyr DO;  Location: [facility] ENDOSCOPY;  Service: Gastroenterology;  Laterality: N/A;   • HERNIA REPAIR      multiple   • HYSTERECTOMY     • INJECTION OF MEDICATION  12/07/2010    DEPO MEDROL   • JOINT REPLACEMENT Left 07/01/2017    knee   • KNEE ARTHROSCOPY Left    • LAPAROSCOPIC CHOLECYSTECTOMY     • SALPINGO OOPHORECTOMY Left    • SALPINGO OOPHORECTOMY Right    • SIGMOIDOSCOPY N/A  3/26/2019    Procedure: SIGMOIDOSCOPY FLEXIBLE;  Surgeon: Carlos Eduardo Cyr DO;  Location: [facility] ENDOSCOPY;  Service: Gastroenterology   • TOTAL KNEE ARTHROPLASTY Left 2017    Procedure: TOTAL KNEE ARTHROPLASTY ATTUNE with adductor canal block;  Surgeon: Jose Ballesteros MD;  Location: [facility] OR;  Service:      Family History   Problem Relation Age of Onset   • Osteoporosis Mother    • Diabetes Sister    • Cancer Brother    • Heart disease Brother    • Diabetes Brother    • Hypertension Brother      Social History     Tobacco Use   • Smoking status: Former     Packs/day: 0.00     Years: 0.00     Pack years: 0.00     Types: Cigarettes     Start date:      Quit date:      Years since quittin.8   • Smokeless tobacco: Never   Vaping Use   • Vaping Use: Never used   Substance Use Topics   • Alcohol use: No   • Drug use: No       ALLERGIES:  Allergies   Allergen Reactions   • Bextra [Valdecoxib] Shortness Of Breath     Shortness of breath   • Cephalosporins Shortness Of Breath   • Detrol La [Tolterodine Tartrate Er] Shortness Of Breath   • Dicyclomine Shortness Of Breath     Shortness of breath   • Fluoxetine Shortness Of Breath   • Keflex [Cephalexin] Shortness Of Breath   • Toprol Xl [Metoprolol] Anaphylaxis   • Chocolate Flavor    • Omeprazole-Sodium Bicarbonate Unknown (See Comments)   • Other      Fresh flowers causes throat swelling   • Amoxicillin Rash   • Aspirin Rash   • Claritin-D 12 Hour [Loratadine-Pseudoephedrine Er] Rash   • Codeine Rash   • Demerol [Meperidine] Rash   • Levaquin [Levofloxacin] Rash     Was told not allergic   • Lipitor [Atorvastatin] Other (See Comments)     cramping   • Macrobid [Nitrofurantoin] Rash   • Morphine And Related Rash   • Paxil [Paroxetine Hcl] Rash   • Sulfa Antibiotics Rash   • Tape Rash   • Tramadol Rash   • Vioxx [Rofecoxib] Rash   • Zegerid [Omeprazole] Rash   • Zoloft [Sertraline Hcl] Rash         MEDICATIONS:    Current Outpatient Medications:    •  Alcohol Swabs (B-D SINGLE USE SWABS REGULAR) pads, , Disp: , Rfl:   •  apixaban (ELIQUIS) 5 MG tablet tablet, Take 1 tablet by mouth Every 12 (Twelve) Hours., Disp: 180 tablet, Rfl: 3  •  Calcium Citrate (CITRACAL PO), Take 2 tablets by mouth Daily., Disp: , Rfl:   •  carboxymethylcellulose (REFRESH PLUS) 0.5 % solution, Administer 1 drop to both eyes Daily As Needed for Dry Eyes., Disp: , Rfl:   •  clonazePAM (KlonoPIN) 0.5 MG tablet, Take 0.5 mg by mouth 2 (Two) Times a Day As Needed for Anxiety., Disp: , Rfl:   •  cyanocobalamin 1000 MCG/ML injection, Inject 1,000 mcg into the appropriate muscle as directed by prescriber., Disp: , Rfl:   •  cyclobenzaprine (FLEXERIL) 10 MG tablet, TAKE ONE TABLET BY MOUTH EVERY EIGHT HOURS AS NEEDED FOR MUSCLE SPASMS FOR UP TO TEN DAYS, Disp: , Rfl:   •  diphenhydrAMINE (BENADRYL) 25 mg capsule, Take 25 mg by mouth Every 6 (Six) Hours As Needed., Disp: , Rfl: 6  •  ferrous sulfate 324 (65 Fe) MG tablet delayed-release EC tablet, Take 324 mg by mouth Daily With Breakfast., Disp: , Rfl:   •  furosemide (LASIX) 40 MG tablet, Take 40 mg by mouth As Needed., Disp: , Rfl:   •  levothyroxine (SYNTHROID, LEVOTHROID) 50 MCG tablet, Take 50 mcg by mouth daily., Disp: , Rfl: 1  •  losartan (COZAAR) 25 MG tablet, Take 25 mg by mouth Daily., Disp: , Rfl:   •  metFORMIN (GLUCOPHAGE) 500 MG tablet, Take 500 mg by mouth Daily As Needed (For FSBS > 140)., Disp: , Rfl: 1  •  ondansetron ODT (ZOFRAN-ODT) 4 MG disintegrating tablet, Place 1 tablet on the tongue Every 6 (Six) Hours As Needed for Nausea or Vomiting., Disp: 10 tablet, Rfl: 0  •  ONE TOUCH ULTRA TEST test strip, TEST BID, Disp: , Rfl: 5  •  pantoprazole (PROTONIX) 40 MG EC tablet, Take 40 mg by mouth Daily., Disp: , Rfl:   •  promethazine (PHENERGAN) 25 MG tablet, , Disp: , Rfl:   •  sucralfate (CARAFATE) 1 g tablet, Take 1 g by mouth Daily., Disp: , Rfl:   •  TRUEplus Lancets 28G misc, , Disp: , Rfl:   •  verapamil SR (CALAN-SR)  "120 MG CR tablet, Take 1 tablet by mouth Every Night., Disp: 90 tablet, Rfl: 3  •  vitamin D (ERGOCALCIFEROL) 42874 UNITS capsule capsule, Take 50,000 Units by mouth Every 7 (Seven) Days., Disp: , Rfl: 1  •  ondansetron ODT (ZOFRAN-ODT) 4 MG disintegrating tablet, take 1 Tablet by oral route  every 8 hours as needed for nausea, Disp: , Rfl:   •  Premarin 0.625 MG/GM vaginal cream, INSERT 0.5 GRAMS VAGINALLY DAILY AS DIRECTED, Disp: , Rfl:     Review of Systems   Review of Systems   Constitutional: Negative for malaise/fatigue and weight loss.   Cardiovascular: Positive for leg swelling. Negative for chest pain, claudication and dyspnea on exertion.   Respiratory: Negative for cough and shortness of breath.    Skin: Negative for color change and poor wound healing.   Musculoskeletal: Positive for arthritis, joint pain and myalgias.   Neurological: Negative for dizziness, numbness and weakness.       Physical Exam   Vitals:    10/18/22 1307   BP: 129/68   BP Location: Left arm   Patient Position: Sitting   Cuff Size: Adult   Pulse: 87   SpO2: 98%   Weight: 102 kg (225 lb)   Height: 180.3 cm (71\")     Body surface area is 2.22 meters squared.  Body mass index is 31.38 kg/m².  Physical Exam  Constitutional:       General: She is not in acute distress.     Appearance: She is not ill-appearing.   HENT:      Right Ear: Hearing normal.      Left Ear: Hearing normal.      Nose: No nasal deformity.      Mouth/Throat:      Dentition: Normal dentition. Does not have dentures.   Cardiovascular:      Rate and Rhythm: Normal rate and regular rhythm.      Pulses:           Carotid pulses are 2+ on the right side and 2+ on the left side.       Radial pulses are 2+ on the right side and 2+ on the left side.        Dorsalis pedis pulses are 2+ on the right side and 2+ on the left side.        Posterior tibial pulses are 2+ on the right side and 2+ on the left side.      Heart sounds: No murmur heard.     Comments: engorged " varicosities medial/distal RIGHT leg  Pulmonary:      Effort: Pulmonary effort is normal.      Breath sounds: Normal breath sounds.   Abdominal:      General: There is no distension.      Palpations: Abdomen is soft. There is no mass.      Tenderness: There is no abdominal tenderness.   Musculoskeletal:         General: No deformity.      Right lower leg: Edema present.   Skin:     General: Skin is warm and dry.      Coloration: Skin is not pale.      Findings: No erythema.      Comments: No venous staining   Neurological:      Mental Status: She is alert and oriented to person, place, and time.      Gait: Gait abnormal.   Psychiatric:         Speech: Speech normal.         Behavior: Behavior is cooperative.         Thought Content: Thought content normal.         Judgment: Judgment normal.         BUN   Date Value Ref Range Status   08/16/2022 13 7 - 25 mg/dL Final     Creatinine   Date Value Ref Range Status   08/16/2022 0.8 0.7 - 1.3 mg/dL Final     eGFR Non  Amer   Date Value Ref Range Status   07/21/2019 77 >60 mL/min/1.73 Final     eGFR African Am   Date Value Ref Range Status   07/12/2021 87 mL/min Final     Comment:         GFR    WITH KIDNEY DAMAGE     W/O DAMAGE  >=90         STAGE 1            NORMAL  60-89        STAGE 2            DEC GFR  30-59        STAGE 3            STAGE 3  15-29        STAGE 4            STAGE 4  <15          STAGE 5            STAGE 5      The MDRD GFR formula is valid only for adults between age 18 and 70.       ASSESSMENT:PLAN  Detailed discussion regarding risks, benefits, and treatment plan. Images independently reviewed. Patient understands, agrees, and wishes to proceed with plan.      1. Occlusion of femoral vein (HCC)  Proceed with RLE Venogram for intervention  Same day surgery information sheet is given to the patient regarding time of arrival for procedure and pre-procedure instructions.    - Follow Anesthesia Guidelines / Protocol; Future  - Obtain Informed  Consent; Future  - Provide NPO Instructions to Patient; Future  - Follow Anesthesia Guidelines / Protocol; Standing  - Obtain Informed Consent; Standing  - Case Request; Standing  - sodium chloride 0.9 % infusion  - clindamycin (CLEOCIN) 900 mg in dextrose (D5W) 5 % 100 mL IVPB  - Case Request    2. Presence of IVC filter      3. Acute deep vein thrombosis (DVT) of femoral vein of right lower extremity (HCC)  Sub-Acute DVT RIGHT lower extremity  On anticoagulation (missed doses)    Occlusion RIGHT Femoral Vein  -pain, venous engorgement    Anticoagulation: Lifelong Eliquis    - Follow Anesthesia Guidelines / Protocol; Future  - Obtain Informed Consent; Future  - Provide NPO Instructions to Patient; Future  - Follow Anesthesia Guidelines / Protocol; Standing  - Obtain Informed Consent; Standing  - Case Request; Standing  - sodium chloride 0.9 % infusion  - clindamycin (CLEOCIN) 900 mg in dextrose (D5W) 5 % 100 mL IVPB  - Case Request    4. Current use of long term anticoagulation  Notify provider if you experience excessive bleeding from the nose, cuts, gums, rectum, urinary tract, or vagina. Reddish or brown urine or stool. Vomiting of blood or hemorrhoidal bleeding. If major injury occurs present to the Emergency Department.      5. Obesity (BMI 30-39.9)  Your BMI is Body mass index is 31.38 kg/m². and falls within  Obese Class I: 30-34.9kg/m2. BMI is strongly correlated with increased health risks. Review options for weight management, heart healthy diet, and exercise programs.         Advance Care Planning discussed and  Informational packet given to patient. Advance Care Plan on file: No    Time spent 40 minutes in face to face evaluation, reviewing of medical history, tests, and procedures. Independent interpretation of vascular studies, ordering additional tests, documentation, and coordination of care.   Counseling and education with the patient and family regarding treatment options, plan of care, and hoped  for outcomes. All questions answered.           This document has been electronically signed by EDUARDA Gonzalez on October 21, 2022 11:01 CDT

## 2022-10-18 NOTE — PATIENT INSTRUCTIONS
Sub-Acute DVT RIGHT lower extremity  On anticoagulation (missed doses)    Occlusion RIGHT Femoral Vein  -pain, venous engorgement    Proceed with RLE Venogram for intervention  Same day surgery information sheet is given to the patient regarding time of arrival for procedure and pre-procedure instructions.      Anticoagulation: Lifelong Eliquis

## 2022-10-20 ENCOUNTER — PREP FOR SURGERY (OUTPATIENT)
Dept: OTHER | Facility: HOSPITAL | Age: 67
End: 2022-10-20

## 2022-10-20 DIAGNOSIS — R19.5 ABNORMAL FECES: Primary | ICD-10-CM

## 2022-10-20 RX ORDER — DEXTROSE AND SODIUM CHLORIDE 5; .45 G/100ML; G/100ML
30 INJECTION, SOLUTION INTRAVENOUS CONTINUOUS PRN
Status: CANCELLED | OUTPATIENT
Start: 2022-12-01

## 2022-10-21 PROBLEM — R19.5 ABNORMAL FECES: Status: ACTIVE | Noted: 2022-10-21

## 2022-10-27 ENCOUNTER — HOSPITAL ENCOUNTER (OUTPATIENT)
Facility: HOSPITAL | Age: 67
Setting detail: HOSPITAL OUTPATIENT SURGERY
Discharge: HOME | End: 2022-10-27
Attending: THORACIC SURGERY (CARDIOTHORACIC VASCULAR SURGERY) | Admitting: THORACIC SURGERY (CARDIOTHORACIC VASCULAR SURGERY)

## 2022-10-27 ENCOUNTER — APPOINTMENT (OUTPATIENT)
Dept: ULTRASOUND IMAGING | Facility: HOSPITAL | Age: 67
End: 2022-10-27

## 2022-10-27 VITALS
TEMPERATURE: 98 F | OXYGEN SATURATION: 99 % | SYSTOLIC BLOOD PRESSURE: 121 MMHG | DIASTOLIC BLOOD PRESSURE: 61 MMHG | WEIGHT: 226.63 LBS | RESPIRATION RATE: 18 BRPM | BODY MASS INDEX: 31.73 KG/M2 | HEIGHT: 71 IN | HEART RATE: 79 BPM

## 2022-10-27 DIAGNOSIS — I82.419: ICD-10-CM

## 2022-10-27 DIAGNOSIS — I82.411 ACUTE DEEP VEIN THROMBOSIS (DVT) OF FEMORAL VEIN OF RIGHT LOWER EXTREMITY: ICD-10-CM

## 2022-10-27 PROCEDURE — 25010000002 IOPAMIDOL 61 % SOLUTION: Performed by: THORACIC SURGERY (CARDIOTHORACIC VASCULAR SURGERY)

## 2022-10-27 PROCEDURE — 25010000002 FENTANYL CITRATE (PF) 50 MCG/ML SOLUTION: Performed by: THORACIC SURGERY (CARDIOTHORACIC VASCULAR SURGERY)

## 2022-10-27 PROCEDURE — 36005 INJECTION EXT VENOGRAPHY: CPT | Performed by: THORACIC SURGERY (CARDIOTHORACIC VASCULAR SURGERY)

## 2022-10-27 PROCEDURE — C1894 INTRO/SHEATH, NON-LASER: HCPCS | Performed by: THORACIC SURGERY (CARDIOTHORACIC VASCULAR SURGERY)

## 2022-10-27 PROCEDURE — 37187 VENOUS MECH THROMBECTOMY: CPT | Performed by: THORACIC SURGERY (CARDIOTHORACIC VASCULAR SURGERY)

## 2022-10-27 PROCEDURE — 25010000002 ENOXAPARIN PER 10 MG: Performed by: THORACIC SURGERY (CARDIOTHORACIC VASCULAR SURGERY)

## 2022-10-27 PROCEDURE — 25010000002 HEPARIN (PORCINE) PER 1000 UNITS: Performed by: THORACIC SURGERY (CARDIOTHORACIC VASCULAR SURGERY)

## 2022-10-27 PROCEDURE — 75820 VEIN X-RAY ARM/LEG: CPT

## 2022-10-27 PROCEDURE — 75820 VEIN X-RAY ARM/LEG: CPT | Performed by: THORACIC SURGERY (CARDIOTHORACIC VASCULAR SURGERY)

## 2022-10-27 PROCEDURE — C1725 CATH, TRANSLUMIN NON-LASER: HCPCS | Performed by: THORACIC SURGERY (CARDIOTHORACIC VASCULAR SURGERY)

## 2022-10-27 PROCEDURE — 25010000002 MIDAZOLAM PER 1 MG: Performed by: THORACIC SURGERY (CARDIOTHORACIC VASCULAR SURGERY)

## 2022-10-27 PROCEDURE — 37248 TRLUML BALO ANGIOP 1ST VEIN: CPT | Performed by: THORACIC SURGERY (CARDIOTHORACIC VASCULAR SURGERY)

## 2022-10-27 PROCEDURE — C1769 GUIDE WIRE: HCPCS | Performed by: THORACIC SURGERY (CARDIOTHORACIC VASCULAR SURGERY)

## 2022-10-27 PROCEDURE — 76937 US GUIDE VASCULAR ACCESS: CPT

## 2022-10-27 RX ORDER — ENOXAPARIN SODIUM 100 MG/ML
80 INJECTION SUBCUTANEOUS ONCE
Status: COMPLETED | OUTPATIENT
Start: 2022-10-27 | End: 2022-10-27

## 2022-10-27 RX ORDER — MIDAZOLAM HYDROCHLORIDE 1 MG/ML
INJECTION INTRAMUSCULAR; INTRAVENOUS AS NEEDED
Status: DISCONTINUED | OUTPATIENT
Start: 2022-10-27 | End: 2022-10-27 | Stop reason: HOSPADM

## 2022-10-27 RX ORDER — MIDAZOLAM HYDROCHLORIDE 1 MG/ML
INJECTION INTRAMUSCULAR; INTRAVENOUS
Status: DISCONTINUED
Start: 2022-10-27 | End: 2022-10-27 | Stop reason: HOSPADM

## 2022-10-27 RX ORDER — LIDOCAINE HYDROCHLORIDE 20 MG/ML
INJECTION, SOLUTION EPIDURAL; INFILTRATION; INTRACAUDAL; PERINEURAL AS NEEDED
Status: DISCONTINUED | OUTPATIENT
Start: 2022-10-27 | End: 2022-10-27 | Stop reason: HOSPADM

## 2022-10-27 RX ORDER — FENTANYL CITRATE 50 UG/ML
INJECTION, SOLUTION INTRAMUSCULAR; INTRAVENOUS AS NEEDED
Status: DISCONTINUED | OUTPATIENT
Start: 2022-10-27 | End: 2022-10-27 | Stop reason: HOSPADM

## 2022-10-27 RX ORDER — ACETAMINOPHEN 325 MG/1
650 TABLET ORAL EVERY 4 HOURS PRN
Status: DISCONTINUED | OUTPATIENT
Start: 2022-10-27 | End: 2022-10-27 | Stop reason: HOSPADM

## 2022-10-27 RX ORDER — FENTANYL CITRATE 50 UG/ML
INJECTION, SOLUTION INTRAMUSCULAR; INTRAVENOUS
Status: DISCONTINUED
Start: 2022-10-27 | End: 2022-10-27 | Stop reason: HOSPADM

## 2022-10-27 RX ORDER — ACETAMINOPHEN 500 MG
1000 TABLET ORAL ONCE
Status: COMPLETED | OUTPATIENT
Start: 2022-10-27 | End: 2022-10-27

## 2022-10-27 RX ORDER — HEPARIN SODIUM 1000 [USP'U]/ML
INJECTION, SOLUTION INTRAVENOUS; SUBCUTANEOUS AS NEEDED
Status: DISCONTINUED | OUTPATIENT
Start: 2022-10-27 | End: 2022-10-27 | Stop reason: HOSPADM

## 2022-10-27 RX ORDER — CLINDAMYCIN PHOSPHATE 900 MG/50ML
900 INJECTION INTRAVENOUS ONCE
Status: DISCONTINUED | OUTPATIENT
Start: 2022-10-27 | End: 2022-10-27 | Stop reason: HOSPADM

## 2022-10-27 RX ORDER — HEPARIN SODIUM 1000 [USP'U]/ML
INJECTION, SOLUTION INTRAVENOUS; SUBCUTANEOUS
Status: DISCONTINUED
Start: 2022-10-27 | End: 2022-10-27 | Stop reason: HOSPADM

## 2022-10-27 RX ORDER — SODIUM CHLORIDE 9 MG/ML
100 INJECTION, SOLUTION INTRAVENOUS CONTINUOUS
Status: DISCONTINUED | OUTPATIENT
Start: 2022-10-27 | End: 2022-10-27 | Stop reason: HOSPADM

## 2022-10-27 RX ORDER — LIDOCAINE HYDROCHLORIDE 20 MG/ML
INJECTION, SOLUTION EPIDURAL; INFILTRATION; INTRACAUDAL; PERINEURAL
Status: DISCONTINUED
Start: 2022-10-27 | End: 2022-10-27 | Stop reason: HOSPADM

## 2022-10-27 RX ADMIN — SODIUM CHLORIDE 100 ML/HR: 9 INJECTION, SOLUTION INTRAVENOUS at 07:39

## 2022-10-27 RX ADMIN — APIXABAN 5 MG: 5 TABLET, FILM COATED ORAL at 14:28

## 2022-10-27 RX ADMIN — ACETAMINOPHEN 1000 MG: 500 TABLET, FILM COATED ORAL at 14:28

## 2022-10-27 RX ADMIN — ENOXAPARIN SODIUM 80 MG: 80 INJECTION SUBCUTANEOUS at 14:27

## 2022-11-10 ENCOUNTER — OFFICE VISIT (OUTPATIENT)
Dept: CARDIAC SURGERY | Facility: CLINIC | Age: 67
End: 2022-11-10

## 2022-11-10 VITALS
HEART RATE: 81 BPM | HEIGHT: 71 IN | OXYGEN SATURATION: 98 % | DIASTOLIC BLOOD PRESSURE: 80 MMHG | BODY MASS INDEX: 31.64 KG/M2 | SYSTOLIC BLOOD PRESSURE: 121 MMHG | WEIGHT: 226 LBS

## 2022-11-10 DIAGNOSIS — R60.0 BILATERAL EDEMA OF LOWER EXTREMITY: ICD-10-CM

## 2022-11-10 DIAGNOSIS — I75.021 ATHEROEMBOLISM OF RIGHT LOWER EXTREMITY: ICD-10-CM

## 2022-11-10 DIAGNOSIS — Z71.89 ENCOUNTER FOR ANTICOAGULATION DISCUSSION AND COUNSELING: ICD-10-CM

## 2022-11-10 DIAGNOSIS — I82.411 ACUTE DEEP VEIN THROMBOSIS (DVT) OF FEMORAL VEIN OF RIGHT LOWER EXTREMITY: ICD-10-CM

## 2022-11-10 DIAGNOSIS — Q27.30 AVM (ARTERIOVENOUS MALFORMATION): Primary | ICD-10-CM

## 2022-11-10 PROCEDURE — 99214 OFFICE O/P EST MOD 30 MIN: CPT | Performed by: NURSE PRACTITIONER

## 2022-11-10 NOTE — PATIENT INSTRUCTIONS
Possible arterial/venous fistula formation right common femoral vein to common femoral artery.  Will review ultrasound with Dr. Randhawa and call you with next recommendations.

## 2022-11-11 NOTE — PROGRESS NOTES
Mini Andersen  1955    Chief Complaint:    Chief Complaint   Patient presents with   • Deep Vein Thrombosis       HPI:      PCP:  Martha Flower APRN  Cardiology:  Dr Dubon     67y.o. female with HTN(stable, increased risk stroke, rupture), Hyperlipidemia(stable, increased risk cardiovascular events), Diabetes Mellitus(stable, increased risk cardiovascular events) and Obesity(uncontrolled, increased risk cardiovascular events) , DVT(new, chronic, increase risk VTE).  former smoker.   RIGHT leg knee pain x 1 year.  Varicose veins. Previous Asymptomatic VTE, now with increased pain/swelling. Subacute thrombus on imaging.  Moderate LEFT knee pain x years.  Mild feet swelling occasional.  Underwent recent venography, minimal change in symptoms persistent right lower extremity edema and pain at right groin. No TIA stroke amaurosis.  No MI claudication. No other associated signs, symptoms or modifying factors. Returns for vascular evaluation.       10/2022: MARCOS: RIGHT 1.1 Triphasic LEFT 1.1 Triphasic     2/2021 Lower extremity venous duplex:  No dvt.  RIGHT GSV reflux (>2sec), superficial thrombophlebitis proximal.  12/2021 Lower extremity venous duplex:  No dvt.  RIGHT GSV reflux (>1.4sec), chronic non-occlusive superficial thrombophlebitis proximal GSV, SFJ.   LEFT no dvt, no reflux.  10/2022: Venous duplex: RIGHT subacute thrombus CFV, chronic superficial thrombophlebitis GSV  11/2022 RLE Venogram: Right femoral vein thrombectomy, angioplasty right CFV, angioplasty right external iliac  11/2022 RLE Venous: Nonoccluding chronic right common femoral DVT.  Possible AV fistula connection between right CFV and right CFA     7/2017 Echocardiogram:  EF 55%, LA 39mm, LV 51mm, RVSP 28mmHg.  Mild TR  12/2019 Holter:  Benign, HR .  12/2020 ECG:  NSR 76, QTc 418    The following portions of the patient's history were reviewed and updated as appropriate: allergies, current medications, past family history, past  medical history, past social history, past surgical history and problem list.  Recent images independently reviewed.  Available laboratory values reviewed.    PMH:  Past Medical History:   Diagnosis Date   • Acid reflux    • Acute peritonitis (HCC)    • Allergic rhinitis    • Arthritis    • Bee sting    • Blood clot in vein, right leg    • Borderline glaucoma    • Bunion    • Callus    • Chronic bronchitis (HCC)    • Constipation     severe with an immotile colon      • Deep venous thrombosis (HCC)    • High cholesterol    • History of transfusion    • Hypothyroidism    • Ingrown toenail    • Intestinal volvulus (HCC)    • Muscle atrophy     O/E   • Nuclear senile cataract    • Nuclear senile cataract    • Plantar fasciitis    • Polyneuropathy in diabetes (HCC)    • PONV (postoperative nausea and vomiting)    • Sleep apnea    • Type 2 diabetes mellitus (HCC)     NO BDR   • Type 2 diabetes mellitus without complications (HCC)    • Unspecified disease of nail      Past Surgical History:   Procedure Laterality Date   • APPENDECTOMY     • BLADDER SUSPENSION     • CHOLECYSTECTOMY     • COLECTOMY PARTIAL / TOTAL  05/22/2014    Subtotal colectomy with ineo-rectostomy.   • ENDOSCOPY N/A 05/01/2018    Procedure: ESOPHAGOGASTRODUODENOSCOPY;  Surgeon: Carlos Eduardo Cyr DO;  Location: [facility] ENDOSCOPY;  Service: Gastroenterology   • ENDOSCOPY N/A 03/26/2019    Procedure: ESOPHAGOGASTRODUODENOSCOPY;  Surgeon: Carlos Eduardo Cyr DO;  Location: [facility] ENDOSCOPY;  Service: Gastroenterology   • ENDOSCOPY N/A 05/05/2020    Procedure: ESOPHAGOGASTRODUODENOSCOPY;  Surgeon: Carlos Eduardo Cyr DO;  Location: [facility] OR;  Service: Gastroenterology;  Laterality: N/A;   • ENDOSCOPY N/A 03/18/2021    Procedure: ESOPHAGOGASTRODUODENOSCOPY;  Surgeon: Carlos Eduardo Cyr DO;  Location: [facility] ENDOSCOPY;  Service: Gastroenterology;  Laterality: N/A;   • ENDOSCOPY N/A 07/14/2022    Procedure: ESOPHAGOGASTRODUODENOSCOPY 9:00;  Surgeon: Ger  Carlos Eduardo SLAUGHTER DO;  Location: [facility] ENDOSCOPY;  Service: Gastroenterology;  Laterality: N/A;   • HERNIA REPAIR      multiple   • HYSTERECTOMY     • INJECTION OF MEDICATION  2010    DEPO MEDROL   • INTERVENTIONAL RADIOLOGY PROCEDURE Right 10/27/2022    Procedure: IR venogram lower extremity right;  Surgeon: Arley Randhawa MD;  Location: [facility] ANGIO INVASIVE LOCATION;  Service: Interventional Radiology;  Laterality: Right;   • JOINT REPLACEMENT Left 2017    knee   • KNEE ARTHROSCOPY Left    • LAPAROSCOPIC CHOLECYSTECTOMY     • SALPINGO OOPHORECTOMY Left    • SALPINGO OOPHORECTOMY Right    • SIGMOIDOSCOPY N/A 2019    Procedure: SIGMOIDOSCOPY FLEXIBLE;  Surgeon: Carlos Eduardo Cyr DO;  Location: [facility] ENDOSCOPY;  Service: Gastroenterology   • TOTAL KNEE ARTHROPLASTY Left 2017    Procedure: TOTAL KNEE ARTHROPLASTY ATTUNE with adductor canal block;  Surgeon: Jose Ballesteros MD;  Location: [facility] OR;  Service:      Family History   Problem Relation Age of Onset   • Osteoporosis Mother    • Diabetes Sister    • Cancer Brother    • Heart disease Brother    • Diabetes Brother    • Hypertension Brother      Social History     Tobacco Use   • Smoking status: Former     Packs/day: 0.00     Years: 0.00     Pack years: 0.00     Types: Cigarettes     Start date:      Quit date:      Years since quittin.8   • Smokeless tobacco: Never   Vaping Use   • Vaping Use: Never used   Substance Use Topics   • Alcohol use: No   • Drug use: No       ALLERGIES:  Allergies   Allergen Reactions   • Bextra [Valdecoxib] Shortness Of Breath     Shortness of breath   • Cephalosporins Shortness Of Breath   • Detrol La [Tolterodine Tartrate Er] Shortness Of Breath   • Dicyclomine Shortness Of Breath     Shortness of breath   • Fluoxetine Shortness Of Breath   • Keflex [Cephalexin] Shortness Of Breath   • Toprol Xl [Metoprolol] Anaphylaxis   • Chocolate Flavor    • Omeprazole-Sodium Bicarbonate Unknown  (See Comments)   • Other      Fresh flowers causes throat swelling   • Amoxicillin Rash   • Aspirin Rash   • Claritin-D 12 Hour [Loratadine-Pseudoephedrine Er] Rash   • Codeine Rash   • Demerol [Meperidine] Rash   • Levaquin [Levofloxacin] Rash     Was told not allergic   • Lipitor [Atorvastatin] Other (See Comments)     cramping   • Macrobid [Nitrofurantoin] Rash   • Morphine And Related Rash   • Paxil [Paroxetine Hcl] Rash   • Sulfa Antibiotics Rash   • Tape Rash   • Tramadol Rash   • Vioxx [Rofecoxib] Rash   • Zegerid [Omeprazole] Rash   • Zoloft [Sertraline Hcl] Rash         MEDICATIONS:    Current Outpatient Medications:   •  Alcohol Swabs (B-D SINGLE USE SWABS REGULAR) pads, , Disp: , Rfl:   •  apixaban (ELIQUIS) 5 MG tablet tablet, Take 1 tablet by mouth Every 12 (Twelve) Hours., Disp: 180 tablet, Rfl: 3  •  Calcium Citrate (CITRACAL PO), Take 2 tablets by mouth Daily., Disp: , Rfl:   •  carboxymethylcellulose (REFRESH PLUS) 0.5 % solution, Administer 1 drop to both eyes Daily As Needed for Dry Eyes., Disp: , Rfl:   •  clonazePAM (KlonoPIN) 0.5 MG tablet, Take 0.5 mg by mouth 2 (Two) Times a Day As Needed for Anxiety., Disp: , Rfl:   •  cyanocobalamin 1000 MCG/ML injection, Inject 1,000 mcg into the appropriate muscle as directed by prescriber., Disp: , Rfl:   •  cyclobenzaprine (FLEXERIL) 10 MG tablet, Take 1 tablet by mouth 3 (Three) Times a Day As Needed., Disp: , Rfl:   •  diphenhydrAMINE (BENADRYL) 25 mg capsule, Take 25 mg by mouth Every 6 (Six) Hours As Needed., Disp: , Rfl: 6  •  ferrous sulfate 324 (65 Fe) MG tablet delayed-release EC tablet, Take 324 mg by mouth Daily With Breakfast., Disp: , Rfl:   •  furosemide (LASIX) 40 MG tablet, Take 40 mg by mouth As Needed., Disp: , Rfl:   •  levothyroxine (SYNTHROID, LEVOTHROID) 50 MCG tablet, Take 50 mcg by mouth daily., Disp: , Rfl: 1  •  losartan (COZAAR) 25 MG tablet, Take 25 mg by mouth Daily., Disp: , Rfl:   •  metFORMIN (GLUCOPHAGE) 500 MG tablet,  "Take 500 mg by mouth Daily As Needed (For FSBS > 140)., Disp: , Rfl: 1  •  ondansetron ODT (ZOFRAN-ODT) 4 MG disintegrating tablet, Place 1 tablet on the tongue Every 6 (Six) Hours As Needed for Nausea or Vomiting., Disp: 10 tablet, Rfl: 0  •  ONE TOUCH ULTRA TEST test strip, TEST BID, Disp: , Rfl: 5  •  pantoprazole (PROTONIX) 40 MG EC tablet, Take 40 mg by mouth Daily., Disp: , Rfl:   •  promethazine (PHENERGAN) 25 MG tablet, , Disp: , Rfl:   •  TRUEplus Lancets 28G misc, , Disp: , Rfl:   •  verapamil SR (CALAN-SR) 120 MG CR tablet, Take 1 tablet by mouth Every Night., Disp: 90 tablet, Rfl: 3  •  vitamin D (ERGOCALCIFEROL) 93390 UNITS capsule capsule, Take 50,000 Units by mouth Every 7 (Seven) Days., Disp: , Rfl: 1    Review of Systems   Review of Systems   Constitutional: Negative for malaise/fatigue and weight loss.   Cardiovascular: Positive for leg swelling. Negative for chest pain, claudication and dyspnea on exertion.   Respiratory: Negative for cough and shortness of breath.    Skin: Negative for color change and poor wound healing.   Musculoskeletal: Positive for arthritis, joint pain and myalgias.   Neurological: Negative for dizziness, numbness and weakness.       Physical Exam   Vitals:    11/10/22 0841   BP: 121/80   BP Location: Left arm   Patient Position: Sitting   Cuff Size: Adult   Pulse: 81   SpO2: 98%   Weight: 103 kg (226 lb)   Height: 180.3 cm (71\")     Body surface area is 2.23 meters squared.  Body mass index is 31.52 kg/m².  Physical Exam  Constitutional:       General: She is not in acute distress.     Appearance: She is not ill-appearing.   HENT:      Right Ear: Hearing normal.      Left Ear: Hearing normal.      Nose: No nasal deformity.      Mouth/Throat:      Dentition: Normal dentition. Does not have dentures.   Cardiovascular:      Rate and Rhythm: Normal rate and regular rhythm.      Pulses:           Carotid pulses are 2+ on the right side and 2+ on the left side.       Radial " pulses are 2+ on the right side and 2+ on the left side.        Dorsalis pedis pulses are 2+ on the right side and 2+ on the left side.        Posterior tibial pulses are 2+ on the right side and 2+ on the left side.      Heart sounds: No murmur heard.     Comments: engorged varicosities medial/distal RIGHT leg  Pulmonary:      Effort: Pulmonary effort is normal.      Breath sounds: Normal breath sounds.   Abdominal:      General: There is no distension.      Palpations: Abdomen is soft. There is no mass.      Tenderness: There is no abdominal tenderness.   Musculoskeletal:         General: No deformity.      Right lower leg: Edema present.   Skin:     General: Skin is warm and dry.      Coloration: Skin is not pale.      Findings: No erythema.      Comments: No venous staining   Neurological:      Mental Status: She is alert and oriented to person, place, and time.      Gait: Gait abnormal.   Psychiatric:         Speech: Speech normal.         Behavior: Behavior is cooperative.         Thought Content: Thought content normal.         Judgment: Judgment normal.         BUN   Date Value Ref Range Status   08/16/2022 13 7 - 25 mg/dL Final     Creatinine   Date Value Ref Range Status   08/16/2022 0.8 0.7 - 1.3 mg/dL Final     eGFR Non  Amer   Date Value Ref Range Status   07/21/2019 77 >60 mL/min/1.73 Final     eGFR African Am   Date Value Ref Range Status   07/12/2021 87 mL/min Final     Comment:         GFR    WITH KIDNEY DAMAGE     W/O DAMAGE  >=90         STAGE 1            NORMAL  60-89        STAGE 2            DEC GFR  30-59        STAGE 3            STAGE 3  15-29        STAGE 4            STAGE 4  <15          STAGE 5            STAGE 5      The MDRD GFR formula is valid only for adults between age 18 and 70.       ASSESSMENT/PLAN  11/2022 RLE Venous: Nonoccluding chronic right common femoral DVT.  Possible AV fistula connection between right CFV and right CFA    1. AVM (arteriovenous  malformation)  Potentially symptomatic.  Will need to further clarify and characterize via CT angiography, and further decide whether or not this could be contributing to any of her current symptoms.  Follow-up with Dr. Randhawa to review films and discuss options    - CT Angio Abdominal Aorta Bilateral Iliofem Runoff With & Without Contrast; Future    2. Atheroembolism of right lower extremity (HCC)    - CT Angio Abdominal Aorta Bilateral Iliofem Runoff With & Without Contrast; Future    3. Acute deep vein thrombosis (DVT) of femoral vein of right lower extremity (HCC)  Chronic nonoccluding DVT right CFV, flow noted through CFV status post intervention.  No significant change in symptoms post venography.      Long-term anticoagulation    4. Bilateral edema of lower extremity      5. Encounter for anticoagulation discussion and counseling  Adverse bleeding events that require evaluation includes blood in the urine, stool, gums, and nose.  If you are to experience these symptoms you should present to the heart and vascular center for evaluation.  Avoid NSAID's such as ibuprofen and naproxen for pain relief as these medications increase your risk of gastrointestinal bleeding.  Over the counter supplements such as garlic, gingko biloba, and other herbs may increase bleeding risks.       Advance Care Planning discussed and  Informational packet given to patient. Advance Care Plan on file: No    Time spent 40 minutes in face to face evaluation, reviewing of medical history, tests, and procedures. Independent interpretation of vascular studies, ordering additional tests, documentation, and coordination of care.   Counseling and education with the patient and family regarding treatment options, plan of care, and hoped for outcomes. All questions answered.         This document has been electronically signed by Jeronimo Rodríguez, AGACNP-BC @  On November 11, 2022 15:15 CST

## 2022-11-19 LAB
MAXIMAL PREDICTED HEART RATE: 155 BPM
STRESS TARGET HR: 132 BPM

## 2022-11-23 ENCOUNTER — HOSPITAL ENCOUNTER (OUTPATIENT)
Dept: CT IMAGING | Facility: HOSPITAL | Age: 67
Discharge: HOME | End: 2022-11-23

## 2022-11-23 ENCOUNTER — TRANSCRIBE ORDERS (OUTPATIENT)
Dept: CT IMAGING | Facility: HOSPITAL | Age: 67
End: 2022-11-23

## 2022-11-23 ENCOUNTER — LAB (OUTPATIENT)
Dept: LAB | Facility: HOSPITAL | Age: 67
End: 2022-11-23

## 2022-11-23 DIAGNOSIS — I75.021 ATHEROEMBOLISM OF RIGHT LOWER EXTREMITY: ICD-10-CM

## 2022-11-23 DIAGNOSIS — Q27.30 AVM (ARTERIOVENOUS MALFORMATION): ICD-10-CM

## 2022-11-23 DIAGNOSIS — Q27.30 AVM (ARTERIOVENOUS MALFORMATION): Primary | ICD-10-CM

## 2022-11-23 LAB
BUN SERPL-MCNC: 10 MG/DL (ref 8–23)
CREAT SERPL-MCNC: 0.91 MG/DL (ref 0.57–1)
EGFRCR SERPLBLD CKD-EPI 2021: 70.2 ML/MIN/1.73

## 2022-11-23 PROCEDURE — 84520 ASSAY OF UREA NITROGEN: CPT

## 2022-11-23 PROCEDURE — 75635 CT ANGIO ABDOMINAL ARTERIES: CPT

## 2022-11-23 PROCEDURE — 0 IOPAMIDOL PER 1 ML: Performed by: NURSE PRACTITIONER

## 2022-11-23 PROCEDURE — 82565 ASSAY OF CREATININE: CPT

## 2022-11-23 PROCEDURE — 36415 COLL VENOUS BLD VENIPUNCTURE: CPT

## 2022-11-23 RX ADMIN — IOPAMIDOL 90 ML: 755 INJECTION, SOLUTION INTRAVENOUS at 13:39

## 2022-12-01 ENCOUNTER — HOSPITAL ENCOUNTER (OUTPATIENT)
Facility: HOSPITAL | Age: 67
Setting detail: HOSPITAL OUTPATIENT SURGERY
Discharge: HOME | End: 2022-12-01
Attending: INTERNAL MEDICINE | Admitting: INTERNAL MEDICINE

## 2022-12-01 ENCOUNTER — ANESTHESIA EVENT (OUTPATIENT)
Dept: GASTROENTEROLOGY | Facility: HOSPITAL | Age: 67
End: 2022-12-01

## 2022-12-01 ENCOUNTER — ANESTHESIA (OUTPATIENT)
Dept: GASTROENTEROLOGY | Facility: HOSPITAL | Age: 67
End: 2022-12-01

## 2022-12-01 VITALS
TEMPERATURE: 97.4 F | DIASTOLIC BLOOD PRESSURE: 56 MMHG | OXYGEN SATURATION: 95 % | SYSTOLIC BLOOD PRESSURE: 115 MMHG | HEART RATE: 79 BPM | WEIGHT: 222.66 LBS | RESPIRATION RATE: 18 BRPM | HEIGHT: 71 IN | BODY MASS INDEX: 31.17 KG/M2

## 2022-12-01 DIAGNOSIS — R19.5 ABNORMAL FECES: ICD-10-CM

## 2022-12-01 LAB — GLUCOSE BLDC GLUCOMTR-MCNC: 118 MG/DL (ref 70–130)

## 2022-12-01 PROCEDURE — 25010000002 PROPOFOL 10 MG/ML EMULSION: Performed by: NURSE ANESTHETIST, CERTIFIED REGISTERED

## 2022-12-01 PROCEDURE — 82962 GLUCOSE BLOOD TEST: CPT

## 2022-12-01 PROCEDURE — 25010000002 ONDANSETRON PER 1 MG: Performed by: NURSE ANESTHETIST, CERTIFIED REGISTERED

## 2022-12-01 RX ORDER — PROPOFOL 10 MG/ML
VIAL (ML) INTRAVENOUS AS NEEDED
Status: DISCONTINUED | OUTPATIENT
Start: 2022-12-01 | End: 2022-12-01 | Stop reason: SURG

## 2022-12-01 RX ORDER — ONDANSETRON 2 MG/ML
INJECTION INTRAMUSCULAR; INTRAVENOUS AS NEEDED
Status: DISCONTINUED | OUTPATIENT
Start: 2022-12-01 | End: 2022-12-01 | Stop reason: SURG

## 2022-12-01 RX ORDER — DEXTROSE AND SODIUM CHLORIDE 5; .45 G/100ML; G/100ML
30 INJECTION, SOLUTION INTRAVENOUS CONTINUOUS PRN
Status: DISCONTINUED | OUTPATIENT
Start: 2022-12-01 | End: 2022-12-01 | Stop reason: HOSPADM

## 2022-12-01 RX ADMIN — PROPOFOL 80 MG: 10 INJECTION, EMULSION INTRAVENOUS at 10:13

## 2022-12-01 RX ADMIN — ONDANSETRON 4 MG: 2 INJECTION INTRAMUSCULAR; INTRAVENOUS at 10:09

## 2022-12-01 RX ADMIN — PROPOFOL 30 MG: 10 INJECTION, EMULSION INTRAVENOUS at 10:16

## 2022-12-01 RX ADMIN — DEXTROSE AND SODIUM CHLORIDE 30 ML/HR: 5; 450 INJECTION, SOLUTION INTRAVENOUS at 10:03

## 2022-12-01 NOTE — ANESTHESIA PREPROCEDURE EVALUATION
Anesthesia Evaluation     history of anesthetic complications:  NPO Solid Status: > 8 hours  NPO Liquid Status: > 2 hours           Airway   Mallampati: II  TM distance: >3 FB  Neck ROM: full  no difficulty expected  Dental - normal exam     Pulmonary - normal exam   (+) shortness of breath, sleep apnea,   Cardiovascular - normal exam    (+) hypertension, angina, DVT, hyperlipidemia,       Neuro/Psych  GI/Hepatic/Renal/Endo    (+) obesity,  GERD,  diabetes mellitus, thyroid problem     Musculoskeletal     Abdominal    Substance History      OB/GYN          Other   arthritis,                      Anesthesia Plan    ASA 4     general   total IV anesthesia  intravenous induction     Anesthetic plan, risks, benefits, and alternatives have been provided, discussed and informed consent has been obtained with: patient.        CODE STATUS:

## 2022-12-01 NOTE — H&P
Carl Herzog DO,[facility]  Gastroenterology  Hepatology  Endoscopy  Board Certified in Internal Medicine and gastroenterology  [address], [address]  [place]. [address]  - [phone]   F - [phone]     GASTROENTEROLOGY HISTORY AND PHYSICAL  NOTE   CARL HERZOG DO.         SUBJECTIVE:   12/1/2022    Name: Mini Andersen  DOD: 1955        Chief Complaint:       Subjective : Occult blood in the stools.  Previous total colectomy with ileal sigmoid/ileal rectal anastomosis.    Patient is 67 y.o. female presents with desire for elective flexible sigmoidoscopy.      ROS/HISTORY/ CURRENT MEDICATIONS/OBJECTIVE/VS/PE:   Review of Systems:  All systems unremarkable unless specified below.  Constitutional   HENT  Eyes   Respiratory    Cardiovascular  Gastrointestinal   Endocrine  Genitourinary    Musculoskeletal   Skin  Allergic/Immunologic    Neurological    Hematological  Psychiatric/Behavioral    History:     Past Medical History:   Diagnosis Date    Acid reflux     Acute peritonitis (HCC)     Allergic rhinitis     Arthritis     Bee sting     Blood clot in vein, right leg     Borderline glaucoma     Bunion     Callus     Chronic bronchitis (HCC)     Constipation     severe with an immotile colon       Deep venous thrombosis (HCC)     High cholesterol     History of transfusion     Hypothyroidism     Ingrown toenail     Intestinal volvulus (HCC)     Muscle atrophy     O/E    Nuclear senile cataract     Nuclear senile cataract     Plantar fasciitis     Polyneuropathy in diabetes (HCC)     PONV (postoperative nausea and vomiting)     Sleep apnea     Type 2 diabetes mellitus (HCC)     NO BDR    Type 2 diabetes mellitus without complications (HCC)     Unspecified disease of nail      Past Surgical History:   Procedure Laterality Date    APPENDECTOMY      BLADDER SUSPENSION      CHOLECYSTECTOMY      COLECTOMY PARTIAL / TOTAL  05/22/2014    Subtotal colectomy with ineo-rectostomy.    ENDOSCOPY  N/A 05/01/2018    Procedure: ESOPHAGOGASTRODUODENOSCOPY;  Surgeon: Carlos Eduardo Cyr DO;  Location: [facility] ENDOSCOPY;  Service: Gastroenterology    ENDOSCOPY N/A 03/26/2019    Procedure: ESOPHAGOGASTRODUODENOSCOPY;  Surgeon: Carlos Eduardo Cyr DO;  Location: [facility] ENDOSCOPY;  Service: Gastroenterology    ENDOSCOPY N/A 05/05/2020    Procedure: ESOPHAGOGASTRODUODENOSCOPY;  Surgeon: Carlos Eduardo Cyr DO;  Location: [facility] OR;  Service: Gastroenterology;  Laterality: N/A;    ENDOSCOPY N/A 03/18/2021    Procedure: ESOPHAGOGASTRODUODENOSCOPY;  Surgeon: Carlos Eduardo Cyr DO;  Location: [facility] ENDOSCOPY;  Service: Gastroenterology;  Laterality: N/A;    ENDOSCOPY N/A 07/14/2022    Procedure: ESOPHAGOGASTRODUODENOSCOPY 9:00;  Surgeon: Carlos Eduardo Cyr DO;  Location: [facility] ENDOSCOPY;  Service: Gastroenterology;  Laterality: N/A;    HERNIA REPAIR      multiple    HYSTERECTOMY      INJECTION OF MEDICATION  12/07/2010    DEPO MEDROL    INTERVENTIONAL RADIOLOGY PROCEDURE Right 10/27/2022    Procedure: IR venogram lower extremity right;  Surgeon: Arley Randhawa MD;  Location: [facility] ANGIO INVASIVE LOCATION;  Service: Interventional Radiology;  Laterality: Right;    JOINT REPLACEMENT Left 07/01/2017    knee    KNEE ARTHROSCOPY Left     LAPAROSCOPIC CHOLECYSTECTOMY      SALPINGO OOPHORECTOMY Left     SALPINGO OOPHORECTOMY Right     SIGMOIDOSCOPY N/A 03/26/2019    Procedure: SIGMOIDOSCOPY FLEXIBLE;  Surgeon: Carlos Eduardo Cyr DO;  Location: [facility] ENDOSCOPY;  Service: Gastroenterology    TOTAL KNEE ARTHROPLASTY Left 07/24/2017    Procedure: TOTAL KNEE ARTHROPLASTY ATTUNE with adductor canal block;  Surgeon: Jose Ballesteros MD;  Location: [facility] OR;  Service:      Family History   Problem Relation Age of Onset    Osteoporosis Mother     Diabetes Sister     Cancer Brother     Heart disease Brother     Diabetes Brother     Hypertension Brother      Social History     Tobacco Use    Smoking status: Former      Packs/day: 0.00     Years: 0.00     Pack years: 0.00     Types: Cigarettes     Start date:      Quit date:      Years since quittin.9    Smokeless tobacco: Never   Vaping Use    Vaping Use: Never used   Substance Use Topics    Alcohol use: No    Drug use: No     Prior to Admission medications    Medication Sig Start Date End Date Taking? Authorizing Provider   Alcohol Swabs (B-D SINGLE USE SWABS REGULAR) pads  21  Yes Cody Cartagena MD   carboxymethylcellulose (REFRESH PLUS) 0.5 % solution Administer 1 drop to both eyes Daily As Needed for Dry Eyes.   Yes Cody Cartagena MD   clonazePAM (KlonoPIN) 0.5 MG tablet Take 0.5 mg by mouth 2 (Two) Times a Day As Needed for Anxiety.   Yes Cody Cartagena MD   cyanocobalamin 1000 MCG/ML injection Inject 1,000 mcg into the appropriate muscle as directed by prescriber. 22  Yes Cody Cartagena MD   diphenhydrAMINE (BENADRYL) 25 mg capsule Take 25 mg by mouth Every 6 (Six) Hours As Needed. 16  Yes Cody Cartagena MD   ferrous sulfate 324 (65 Fe) MG tablet delayed-release EC tablet Take 324 mg by mouth Daily With Breakfast.   Yes Cody Cartagena MD   levothyroxine (SYNTHROID, LEVOTHROID) 50 MCG tablet Take 50 mcg by mouth daily. 16  Yes Cody Cartagena MD   losartan (COZAAR) 25 MG tablet Take 25 mg by mouth Daily.   Yes Cody Cartagena MD   metFORMIN (GLUCOPHAGE) 500 MG tablet Take 500 mg by mouth Daily With Breakfast. 16  Yes Cody Cartagena MD   ondansetron ODT (ZOFRAN-ODT) 4 MG disintegrating tablet Place 1 tablet on the tongue Every 6 (Six) Hours As Needed for Nausea or Vomiting. 22  Yes Ector Millard MD   ONE TOUCH ULTRA TEST test strip TEST BID 8/10/16  Yes Cody Cartagena MD   pantoprazole (PROTONIX) 40 MG EC tablet Take 40 mg by mouth Daily.   Yes Cody Cartagena MD   TRUEplus Lancets 28G misc  21  Yes Cody Cartagena MD   verapamil SR (CALAN-SR)  "120 MG CR tablet Take 1 tablet by mouth Every Night. 5/11/22  Yes Andrey Dubon MD   vitamin D (ERGOCALCIFEROL) 62811 UNITS capsule capsule Take 50,000 Units by mouth Every 7 (Seven) Days. 9/17/16  Yes Cody Cartagena MD   apixaban (ELIQUIS) 5 MG tablet tablet Take 1 tablet by mouth Every 12 (Twelve) Hours. 3/17/22   Andrey Dubon MD   Calcium Citrate (CITRACAL PO) Take 2 tablets by mouth Daily As Needed.    Cody Cartagena MD   cyclobenzaprine (FLEXERIL) 10 MG tablet Take 1 tablet by mouth 3 (Three) Times a Day As Needed. 7/10/21   Cody Cartagena MD   furosemide (LASIX) 40 MG tablet Take 40 mg by mouth As Needed. 4/25/22   Cody Cartagena MD   promethazine (PHENERGAN) 25 MG tablet Take 25 mg by mouth Every 6 (Six) Hours As Needed. 10/14/20   Cody Cartagena MD     Allergies:  Bextra [valdecoxib], Cephalosporins, Detrol la [tolterodine tartrate er], Dicyclomine, Fluoxetine, Keflex [cephalexin], Toprol xl [metoprolol], Chocolate flavor, Omeprazole-sodium bicarbonate, Other, Amoxicillin, Aspirin, Claritin-d 12 hour [loratadine-pseudoephedrine er], Codeine, Demerol [meperidine], Levaquin [levofloxacin], Lipitor [atorvastatin], Macrobid [nitrofurantoin], Morphine and related, Paxil [paroxetine hcl], Sulfa antibiotics, Tape, Tramadol, Vioxx [rofecoxib], Zegerid [omeprazole], and Zoloft [sertraline hcl]    I have reviewed the patients medical history, surgical history and family history in the available medical record system.     Current Medications:     Current Facility-Administered Medications   Medication Dose Route Frequency Provider Last Rate Last Admin    dextrose 5 % and sodium chloride 0.45 % infusion  30 mL/hr Intravenous Continuous PRN Carlos Eduardo Cyr DO           Objective     Physical Exam:    /56 (Patient Position: Lying)   Pulse 79   Temp 97.4 °F (36.3 °C) (Temporal)   Resp 18   Ht 180.3 cm (71\")   Wt 101 kg (222 lb 10.6 oz)   LMP 02/23/1998 (Within Months) " Comment: Hysterectomy  SpO2 95%   BMI 31.06 kg/m²      Physical Exam:  General Appearance:    Alert, cooperative, in no acute distress   Head:    Normocephalic, without obvious abnormality, atraumatic   Eyes:            Lids and lashes normal, conjunctivae and sclerae normal, no icterus, no pallor, corneas clear, PERRLA   Ears:    Ears appear intact with no abnormalities noted   Throat:   No oral lesions, no thrush, oral mucosa moist   Neck:   No adenopathy, supple, trachea midline, no thyromegaly, no  carotid bruit, no JVD   Back:     No kyphosis present, no scoliosis present, no skin lesions,   erythema or scars, no tenderness to percussion or                 palpation,  range of motion normal   Lungs:     Clear to auscultation,respirations regular, even and         unlabored    Heart:    Regular rhythm and normal rate, normal S1 and S2, no  murmur, no gallop, no rub, no click   Breast Exam:    Deferred   Abdomen:     Normal bowel sounds, no masses, no organomegaly, soft  nontender, nondistended, no guarding, no rebound                 tenderness   Genitalia:    Deferred   Extremities:   Moves all extremities well, no edema, no cyanosis, no          redness   Pulses:   Pulses palpable and equal bilaterally   Skin:   No bleeding, bruising or rash   Lymph nodes:   No palpable adenopathy   Neurologic:   Cranial nerves 2 - 12 grossly intact, sensation intact, DTR     present and equal bilaterally      Results Review:     Lab Results   Component Value Date    WBC 5.4 10/21/2019    WBC 6.0 10/01/2019    WBC 5.47 07/21/2019    HGB 13.2 10/21/2019    HGB 13.0 10/01/2019    HGB 11.8 (L) 07/21/2019    HCT 39.3 10/21/2019    HCT 39.4 10/01/2019    HCT 37.3 07/21/2019     10/21/2019     10/01/2019     (L) 07/21/2019             No results found for: LIPASE  Lab Results   Component Value Date    INR 1.02 10/01/2019    INR 1.03 09/18/2017    INR 0.97 09/16/2017     No results found for:  CULTURE    Radiology Review:  Imaging Results (Last 72 Hours)       ** No results found for the last 72 hours. **             I reviewed the patient's new clinical results.  I reviewed the patient's new imaging results and agree with the interpretation.     ASSESSMENT/PLAN:   ASSESSMENT:  1.  Occult blood in the stools  2.  Status post near total colectomy    PLAN:  1.  Flexible sigmoidoscopy    Risk and benefits associated with the procedure are reviewed with the patient.  The patient wished to proceed     Carlos Eduardo Cyr DO  12/01/22  09:48 CST

## 2022-12-01 NOTE — ANESTHESIA POSTPROCEDURE EVALUATION
Patient: Mini Andersen    Procedure Summary     Date: 12/01/22 Room / Location: [facility] ENDOSCOPY 2 / [facility] ENDOSCOPY    Anesthesia Start: 1009 Anesthesia Stop: 1018    Procedure: SIGMOIDOSCOPY FLEXIBLE Diagnosis:       Abnormal feces      (Abnormal feces [R19.5])    Surgeons: Carlos Eduardo Cyr DO Provider: Ahsan Flores CRNA    Anesthesia Type: general ASA Status: 4          Anesthesia Type: general    Vitals  No vitals data found for the desired time range.          Post Anesthesia Care and Evaluation    Patient location during evaluation: bedside  Patient participation: waiting for patient participation  Level of consciousness: sleepy but conscious  Pain management: adequate    Airway patency: patent  Anesthetic complications: No anesthetic complications  PONV Status: none  Cardiovascular status: acceptable  Respiratory status: acceptable  Hydration status: acceptable    Comments: -------------------------              12/01/22                    0948        -------------------------   BP:         150/71        Pulse:        88          Resp:         16          Temp:   96.8 °F (36 °C)   SpO2:         97%        -------------------------

## 2022-12-02 ENCOUNTER — OFFICE VISIT (OUTPATIENT)
Dept: PODIATRY | Facility: CLINIC | Age: 67
End: 2022-12-02

## 2022-12-02 VITALS — BODY MASS INDEX: 31.08 KG/M2 | OXYGEN SATURATION: 98 % | WEIGHT: 222 LBS | HEIGHT: 71 IN | HEART RATE: 78 BPM

## 2022-12-02 DIAGNOSIS — M72.2 PLANTAR FASCIITIS OF RIGHT FOOT: Primary | ICD-10-CM

## 2022-12-02 DIAGNOSIS — M24.573 EQUINUS CONTRACTURE OF ANKLE: ICD-10-CM

## 2022-12-02 DIAGNOSIS — M79.671 RIGHT FOOT PAIN: ICD-10-CM

## 2022-12-02 PROCEDURE — 99213 OFFICE O/P EST LOW 20 MIN: CPT | Performed by: NURSE PRACTITIONER

## 2022-12-02 PROCEDURE — 20605 DRAIN/INJ JOINT/BURSA W/O US: CPT | Performed by: NURSE PRACTITIONER

## 2022-12-02 RX ORDER — DEXAMETHASONE SODIUM PHOSPHATE 10 MG/ML
2 INJECTION INTRAMUSCULAR; INTRAVENOUS ONCE
Status: COMPLETED | OUTPATIENT
Start: 2022-12-02 | End: 2022-12-02

## 2022-12-02 RX ORDER — TRIAMCINOLONE ACETONIDE 40 MG/ML
10 INJECTION, SUSPENSION INTRA-ARTICULAR; INTRAMUSCULAR ONCE
Status: COMPLETED | OUTPATIENT
Start: 2022-12-02 | End: 2022-12-02

## 2022-12-02 RX ADMIN — TRIAMCINOLONE ACETONIDE 10 MG: 40 INJECTION, SUSPENSION INTRA-ARTICULAR; INTRAMUSCULAR at 15:28

## 2022-12-02 RX ADMIN — DEXAMETHASONE SODIUM PHOSPHATE 2 MG: 10 INJECTION INTRAMUSCULAR; INTRAVENOUS at 15:27

## 2022-12-02 NOTE — PROGRESS NOTES
Mini Andersen  1955  67 y.o. female   PCP: Martha Flower 10/6/2022  BS: 112 per patient       12/2/2022    Chief Complaint   Patient presents with   • Right Foot - Follow-up     Plantar Fasciitis        History of Present Illness    Mini Andersen is a 67 y.o.female came to clinic for concern of plantar fascitis or right foot. Patient would like to have a steroid injection.     67-year-old  female in the office today for follow-up evaluation of her right foot pain.  She has a history of plantar fasciitis and since her last evaluation she has been performing her home exercise program/stretches without significant improvement in her pain.  She is requesting an injection into her foot for the plantar fasciitis today.  She currently denies any fever, chills or evidence of infection.  Reports that her blood sugars are under control.  And she is currently retired.    Past Medical History:   Diagnosis Date   • Acid reflux    • Acute peritonitis (HCC)    • Allergic rhinitis    • Arthritis    • Bee sting    • Blood clot in vein, right leg    • Borderline glaucoma    • Bunion    • Callus    • Chronic bronchitis (HCC)    • Constipation     severe with an immotile colon      • Deep venous thrombosis (HCC)    • High cholesterol    • History of transfusion    • Hypothyroidism    • Ingrown toenail    • Intestinal volvulus (HCC)    • Muscle atrophy     O/E   • Nuclear senile cataract    • Nuclear senile cataract    • Plantar fasciitis    • Polyneuropathy in diabetes (HCC)    • PONV (postoperative nausea and vomiting)    • Sleep apnea    • Type 2 diabetes mellitus (HCC)     NO BDR   • Type 2 diabetes mellitus without complications (HCC)    • Unspecified disease of nail          Past Surgical History:   Procedure Laterality Date   • APPENDECTOMY     • BLADDER SUSPENSION     • CHOLECYSTECTOMY     • COLECTOMY PARTIAL / TOTAL  05/22/2014    Subtotal colectomy with ineo-rectostomy.   • ENDOSCOPY N/A 05/01/2018     Procedure: ESOPHAGOGASTRODUODENOSCOPY;  Surgeon: Carlos Eduardo Cyr DO;  Location: [facility] ENDOSCOPY;  Service: Gastroenterology   • ENDOSCOPY N/A 03/26/2019    Procedure: ESOPHAGOGASTRODUODENOSCOPY;  Surgeon: Carlos Eduardo Cyr DO;  Location: [facility] ENDOSCOPY;  Service: Gastroenterology   • ENDOSCOPY N/A 05/05/2020    Procedure: ESOPHAGOGASTRODUODENOSCOPY;  Surgeon: Carlos Eduardo Cyr DO;  Location: [facility] OR;  Service: Gastroenterology;  Laterality: N/A;   • ENDOSCOPY N/A 03/18/2021    Procedure: ESOPHAGOGASTRODUODENOSCOPY;  Surgeon: Carlos Eduardo Cyr DO;  Location: [facility] ENDOSCOPY;  Service: Gastroenterology;  Laterality: N/A;   • ENDOSCOPY N/A 07/14/2022    Procedure: ESOPHAGOGASTRODUODENOSCOPY 9:00;  Surgeon: Carlos Eduardo Cyr DO;  Location: [facility] ENDOSCOPY;  Service: Gastroenterology;  Laterality: N/A;   • HERNIA REPAIR      multiple   • HYSTERECTOMY     • INJECTION OF MEDICATION  12/07/2010    DEPO MEDROL   • INTERVENTIONAL RADIOLOGY PROCEDURE Right 10/27/2022    Procedure: IR venogram lower extremity right;  Surgeon: Arley Randhawa MD;  Location: [facility] ANGIO INVASIVE LOCATION;  Service: Interventional Radiology;  Laterality: Right;   • JOINT REPLACEMENT Left 07/01/2017    knee   • KNEE ARTHROSCOPY Left    • LAPAROSCOPIC CHOLECYSTECTOMY     • SALPINGO OOPHORECTOMY Left    • SALPINGO OOPHORECTOMY Right    • SIGMOIDOSCOPY N/A 03/26/2019    Procedure: SIGMOIDOSCOPY FLEXIBLE;  Surgeon: Carlos Eduardo Cyr DO;  Location: [facility] ENDOSCOPY;  Service: Gastroenterology   • TOTAL KNEE ARTHROPLASTY Left 07/24/2017    Procedure: TOTAL KNEE ARTHROPLASTY ATTUNE with adductor canal block;  Surgeon: Jose Ballesteros MD;  Location: [facility] OR;  Service:          Family History   Problem Relation Age of Onset   • Osteoporosis Mother    • Diabetes Sister    • Cancer Brother    • Heart disease Brother    • Diabetes Brother    • Hypertension Brother        Allergies   Allergen Reactions   • Bextra [Valdecoxib]  Shortness Of Breath     Shortness of breath   • Cephalosporins Shortness Of Breath   • Detrol La [Tolterodine Tartrate Er] Shortness Of Breath   • Dicyclomine Shortness Of Breath     Shortness of breath   • Fluoxetine Shortness Of Breath   • Keflex [Cephalexin] Shortness Of Breath   • Toprol Xl [Metoprolol] Anaphylaxis   • Chocolate Flavor Swelling   • Omeprazole-Sodium Bicarbonate Unknown (See Comments)   • Other      Fresh flowers causes throat swelling   • Amoxicillin Rash   • Aspirin Rash   • Claritin-D 12 Hour [Loratadine-Pseudoephedrine Er] Rash   • Codeine Rash   • Demerol [Meperidine] Rash   • Levaquin [Levofloxacin] Rash     Was told not allergic   • Lipitor [Atorvastatin] Other (See Comments)     cramping   • Macrobid [Nitrofurantoin] Rash   • Morphine And Related Rash   • Paxil [Paroxetine Hcl] Rash   • Sulfa Antibiotics Rash   • Tape Rash   • Tramadol Rash   • Vioxx [Rofecoxib] Rash   • Zegerid [Omeprazole] Rash   • Zoloft [Sertraline Hcl] Rash       Social History     Socioeconomic History   • Marital status:    Tobacco Use   • Smoking status: Former     Packs/day: 0.00     Years: 0.00     Pack years: 0.00     Types: Cigarettes     Start date:      Quit date:      Years since quittin.9   • Smokeless tobacco: Never   Vaping Use   • Vaping Use: Never used   Substance and Sexual Activity   • Alcohol use: No   • Drug use: No   • Sexual activity: Defer     Birth control/protection: Surgical         Current Outpatient Medications   Medication Sig Dispense Refill   • Alcohol Swabs (B-D SINGLE USE SWABS REGULAR) pads      • apixaban (ELIQUIS) 5 MG tablet tablet Take 1 tablet by mouth Every 12 (Twelve) Hours. 180 tablet 3   • Calcium Citrate (CITRACAL PO) Take 2 tablets by mouth Daily As Needed.     • carboxymethylcellulose (REFRESH PLUS) 0.5 % solution Administer 1 drop to both eyes Daily As Needed for Dry Eyes.     • clonazePAM (KlonoPIN) 0.5 MG tablet Take 0.5 mg by mouth 2 (Two) Times a  Day As Needed for Anxiety.     • cyanocobalamin 1000 MCG/ML injection Inject 1,000 mcg into the appropriate muscle as directed by prescriber.     • cyclobenzaprine (FLEXERIL) 10 MG tablet Take 1 tablet by mouth 3 (Three) Times a Day As Needed.     • diphenhydrAMINE (BENADRYL) 25 mg capsule Take 25 mg by mouth Every 6 (Six) Hours As Needed.  6   • ferrous sulfate 324 (65 Fe) MG tablet delayed-release EC tablet Take 324 mg by mouth Daily With Breakfast.     • furosemide (LASIX) 40 MG tablet Take 40 mg by mouth As Needed.     • levothyroxine (SYNTHROID, LEVOTHROID) 50 MCG tablet Take 50 mcg by mouth daily.  1   • losartan (COZAAR) 25 MG tablet Take 25 mg by mouth Daily.     • metFORMIN (GLUCOPHAGE) 500 MG tablet Take 500 mg by mouth Daily With Breakfast.  1   • ondansetron ODT (ZOFRAN-ODT) 4 MG disintegrating tablet Place 1 tablet on the tongue Every 6 (Six) Hours As Needed for Nausea or Vomiting. 10 tablet 0   • ONE TOUCH ULTRA TEST test strip TEST BID  5   • pantoprazole (PROTONIX) 40 MG EC tablet Take 40 mg by mouth Daily.     • promethazine (PHENERGAN) 25 MG tablet Take 25 mg by mouth Every 6 (Six) Hours As Needed.     • TRUEplus Lancets 28G misc      • verapamil SR (CALAN-SR) 120 MG CR tablet Take 1 tablet by mouth Every Night. 90 tablet 3   • vitamin D (ERGOCALCIFEROL) 93663 UNITS capsule capsule Take 50,000 Units by mouth Every 7 (Seven) Days.  1     Current Facility-Administered Medications   Medication Dose Route Frequency Provider Last Rate Last Admin   • dexamethasone (DECADRON) injection 2 mg  2 mg Intravenous Once Edouard Pérez APRN       • triamcinolone acetonide (KENALOG-40) injection 10 mg  10 mg Intramuscular Once Edouard Pérez, APRGLORIA           Review of Systems   Constitutional: Negative.    HENT: Negative.    Eyes: Negative.    Respiratory: Negative.    Cardiovascular: Negative.    Gastrointestinal: Negative.    Endocrine: Negative.    Genitourinary: Negative.    Musculoskeletal:        Foot  "pain    Skin: Negative.    Allergic/Immunologic: Negative.    Neurological: Negative.    Hematological: Negative.    Psychiatric/Behavioral: Negative.          OBJECTIVE    Pulse 78   Ht 180.3 cm (71\")   Wt 101 kg (222 lb)   LMP 02/23/1998 (Within Months) Comment: Hysterectomy  SpO2 98%   BMI 30.96 kg/m²     Physical Exam  Vitals reviewed.   Constitutional:       Appearance: Normal appearance. She is well-developed.   HENT:      Head: Normocephalic and atraumatic.   Neck:      Trachea: Trachea and phonation normal.   Cardiovascular:      Pulses:           Dorsalis pedis pulses are 2+ on the right side and 2+ on the left side.        Posterior tibial pulses are 2+ on the right side and 2+ on the left side.   Pulmonary:      Effort: Pulmonary effort is normal. No respiratory distress.   Abdominal:      General: There is no distension.      Palpations: Abdomen is soft.   Musculoskeletal:        Feet:    Feet:      Right foot:      Protective Sensation: 10 sites tested. 10 sites sensed.      Skin integrity: Skin integrity normal.      Toenail Condition: Right toenails are normal.      Left foot:      Protective Sensation: 10 sites tested. 10 sites sensed.      Skin integrity: Skin integrity normal.      Toenail Condition: Left toenails are normal.   Skin:     General: Skin is warm and dry.   Neurological:      Mental Status: She is alert and oriented to person, place, and time.      GCS: GCS eye subscore is 4. GCS verbal subscore is 5. GCS motor subscore is 6.   Psychiatric:         Speech: Speech normal.         Behavior: Behavior normal. Behavior is cooperative.         Thought Content: Thought content normal.         Judgment: Judgment normal.                Procedures  Plantar Fasciits Injection  Date/Time: 12/02/2022  Performed by: Edouard Pérez  Authorized by: Edouard Pérez   Consent: Verbal consent obtained. Written consent obtained.  Risks and benefits: risks, benefits and alternatives were " discussed  Consent given by: patient  Patient identity confirmed: verbally with patient  Indications: pain relief    Sedation:  Patient sedated: no    Patient position: sitting  Needle size: 27 G  Local anesthetic: 0.5% Marcaine plain, Kenalog 40 mg/ml , Decadron 4 mg/mL.   Outcome: pain improved  Patient tolerance: Patient tolerated the procedure well with no immediate complications      ASSESSMENT AND PLAN    Diagnoses and all orders for this visit:    1. Plantar fasciitis of right foot (Primary)    2. Right foot pain  -     dexamethasone (DECADRON) injection 2 mg  -     triamcinolone acetonide (KENALOG-40) injection 10 mg    3. Equinus contracture of ankle      After risk, benefits and treatment options were discussed with the patient in detail we proceeded with a plantar fascial injection which she tolerated well.  She left ambulatory without difficulty and I recommended that she continue the stretches as directed, inserts and information was provided on that, ice several times daily activity modification during periods of increased pain and contact the office for worsening symptoms.  We also discussed the need to monitor her blood glucose over the next week for increases due to the steroid.  She verbalized understanding plan of care left ambulatory without difficulty.          This document has been electronically signed by Edouard LERNER, FNP-C, ONP-C on December 2, 2022 10:12 CST

## 2022-12-16 ENCOUNTER — OFFICE VISIT (OUTPATIENT)
Dept: PODIATRY | Facility: CLINIC | Age: 67
End: 2022-12-16

## 2022-12-16 VITALS — HEART RATE: 80 BPM | OXYGEN SATURATION: 98 % | HEIGHT: 71 IN | BODY MASS INDEX: 31.08 KG/M2 | WEIGHT: 222 LBS

## 2022-12-16 DIAGNOSIS — M72.2 PLANTAR FASCIITIS: Primary | ICD-10-CM

## 2022-12-16 DIAGNOSIS — M79.671 RIGHT FOOT PAIN: ICD-10-CM

## 2022-12-16 PROCEDURE — 99212 OFFICE O/P EST SF 10 MIN: CPT | Performed by: NURSE PRACTITIONER

## 2022-12-16 NOTE — PROGRESS NOTES
Mini Andersen  1955  67 y.o. female   PCP: Martha Flower 10/6/2022  BS: 109 per patient       12/16/2022    Chief Complaint   Patient presents with   • Right Foot - Follow-up     Plantar Fascitis        History of Present Illness    Mini Andersen is a 67 y.o.female return to clinic for a recheck after right foot Plantar Fascitis. Patient had a steroid injection on 12/2/2022. Patient states she is doing well. Patient has been doing stretches and has new shoe gear.       Past Medical History:   Diagnosis Date   • Acid reflux    • Acute peritonitis (HCC)    • Allergic rhinitis    • Arthritis    • Bee sting    • Blood clot in vein, right leg    • Borderline glaucoma    • Bunion    • Callus    • Chronic bronchitis (HCC)    • Constipation     severe with an immotile colon      • Deep venous thrombosis (HCC)    • High cholesterol    • History of transfusion    • Hypothyroidism    • Ingrown toenail    • Intestinal volvulus (HCC)    • Muscle atrophy     O/E   • Nuclear senile cataract    • Nuclear senile cataract    • Plantar fasciitis    • Polyneuropathy in diabetes (HCC)    • PONV (postoperative nausea and vomiting)    • Sleep apnea    • Type 2 diabetes mellitus (HCC)     NO BDR   • Type 2 diabetes mellitus without complications (HCC)    • Unspecified disease of nail          Past Surgical History:   Procedure Laterality Date   • APPENDECTOMY     • BLADDER SUSPENSION     • CHOLECYSTECTOMY     • COLECTOMY PARTIAL / TOTAL  05/22/2014    Subtotal colectomy with ineo-rectostomy.   • ENDOSCOPY N/A 05/01/2018    Procedure: ESOPHAGOGASTRODUODENOSCOPY;  Surgeon: Carlos Eduardo Cyr DO;  Location: [facility] ENDOSCOPY;  Service: Gastroenterology   • ENDOSCOPY N/A 03/26/2019    Procedure: ESOPHAGOGASTRODUODENOSCOPY;  Surgeon: Carlos Eduardo Cyr DO;  Location: [facility] ENDOSCOPY;  Service: Gastroenterology   • ENDOSCOPY N/A 05/05/2020    Procedure: ESOPHAGOGASTRODUODENOSCOPY;  Surgeon: Carlos Eduardo Cyr DO;  Location: [facility] OR;   Service: Gastroenterology;  Laterality: N/A;   • ENDOSCOPY N/A 03/18/2021    Procedure: ESOPHAGOGASTRODUODENOSCOPY;  Surgeon: Carlos Eduardo Cyr DO;  Location: [facility] ENDOSCOPY;  Service: Gastroenterology;  Laterality: N/A;   • ENDOSCOPY N/A 07/14/2022    Procedure: ESOPHAGOGASTRODUODENOSCOPY 9:00;  Surgeon: Carlos Eduardo Cyr DO;  Location: [facility] ENDOSCOPY;  Service: Gastroenterology;  Laterality: N/A;   • HERNIA REPAIR      multiple   • HYSTERECTOMY     • INJECTION OF MEDICATION  12/07/2010    DEPO MEDROL   • INTERVENTIONAL RADIOLOGY PROCEDURE Right 10/27/2022    Procedure: IR venogram lower extremity right;  Surgeon: Arley Randhawa MD;  Location: [facility] ANGIO INVASIVE LOCATION;  Service: Interventional Radiology;  Laterality: Right;   • JOINT REPLACEMENT Left 07/01/2017    knee   • KNEE ARTHROSCOPY Left    • LAPAROSCOPIC CHOLECYSTECTOMY     • SALPINGO OOPHORECTOMY Left    • SALPINGO OOPHORECTOMY Right    • SIGMOIDOSCOPY N/A 03/26/2019    Procedure: SIGMOIDOSCOPY FLEXIBLE;  Surgeon: Carlos Eduardo Cyr DO;  Location: [facility] ENDOSCOPY;  Service: Gastroenterology   • TOTAL KNEE ARTHROPLASTY Left 07/24/2017    Procedure: TOTAL KNEE ARTHROPLASTY ATTUNE with adductor canal block;  Surgeon: Jose Ballesteros MD;  Location: [facility] OR;  Service:          Family History   Problem Relation Age of Onset   • Osteoporosis Mother    • Diabetes Sister    • Cancer Brother    • Heart disease Brother    • Diabetes Brother    • Hypertension Brother        Allergies   Allergen Reactions   • Bextra [Valdecoxib] Shortness Of Breath     Shortness of breath   • Cephalosporins Shortness Of Breath   • Detrol La [Tolterodine Tartrate Er] Shortness Of Breath   • Dicyclomine Shortness Of Breath     Shortness of breath   • Fluoxetine Shortness Of Breath   • Keflex [Cephalexin] Shortness Of Breath   • Toprol Xl [Metoprolol] Anaphylaxis   • Chocolate Flavor Swelling   • Omeprazole-Sodium Bicarbonate Unknown (See Comments)   • Other       Fresh flowers causes throat swelling   • Amoxicillin Rash   • Aspirin Rash   • Claritin-D 12 Hour [Loratadine-Pseudoephedrine Er] Rash   • Codeine Rash   • Demerol [Meperidine] Rash   • Levaquin [Levofloxacin] Rash     Was told not allergic   • Lipitor [Atorvastatin] Other (See Comments)     cramping   • Macrobid [Nitrofurantoin] Rash   • Morphine And Related Rash   • Paxil [Paroxetine Hcl] Rash   • Sulfa Antibiotics Rash   • Tape Rash   • Tramadol Rash   • Vioxx [Rofecoxib] Rash   • Zegerid [Omeprazole] Rash   • Zoloft [Sertraline Hcl] Rash       Social History     Socioeconomic History   • Marital status:    Tobacco Use   • Smoking status: Former     Packs/day: 0.00     Years: 0.00     Pack years: 0.00     Types: Cigarettes     Start date:      Quit date:      Years since quittin.9   • Smokeless tobacco: Never   Vaping Use   • Vaping Use: Never used   Substance and Sexual Activity   • Alcohol use: No   • Drug use: No   • Sexual activity: Defer     Birth control/protection: Surgical         Current Outpatient Medications   Medication Sig Dispense Refill   • Alcohol Swabs (B-D SINGLE USE SWABS REGULAR) pads      • apixaban (ELIQUIS) 5 MG tablet tablet Take 1 tablet by mouth Every 12 (Twelve) Hours. 180 tablet 3   • Calcium Citrate (CITRACAL PO) Take 2 tablets by mouth Daily As Needed.     • carboxymethylcellulose (REFRESH PLUS) 0.5 % solution Administer 1 drop to both eyes Daily As Needed for Dry Eyes.     • clonazePAM (KlonoPIN) 0.5 MG tablet Take 0.5 mg by mouth 2 (Two) Times a Day As Needed for Anxiety.     • cyanocobalamin 1000 MCG/ML injection Inject 1,000 mcg into the appropriate muscle as directed by prescriber.     • cyclobenzaprine (FLEXERIL) 10 MG tablet Take 1 tablet by mouth 3 (Three) Times a Day As Needed.     • diphenhydrAMINE (BENADRYL) 25 mg capsule Take 25 mg by mouth Every 6 (Six) Hours As Needed.  6   • ferrous sulfate 324 (65 Fe) MG tablet delayed-release EC tablet Take  "324 mg by mouth Daily With Breakfast.     • furosemide (LASIX) 40 MG tablet Take 40 mg by mouth As Needed.     • levothyroxine (SYNTHROID, LEVOTHROID) 50 MCG tablet Take 50 mcg by mouth daily.  1   • losartan (COZAAR) 25 MG tablet Take 25 mg by mouth Daily.     • metFORMIN (GLUCOPHAGE) 500 MG tablet Take 500 mg by mouth Daily With Breakfast.  1   • ondansetron ODT (ZOFRAN-ODT) 4 MG disintegrating tablet Place 1 tablet on the tongue Every 6 (Six) Hours As Needed for Nausea or Vomiting. 10 tablet 0   • ONE TOUCH ULTRA TEST test strip TEST BID  5   • pantoprazole (PROTONIX) 40 MG EC tablet Take 40 mg by mouth Daily.     • promethazine (PHENERGAN) 25 MG tablet Take 25 mg by mouth Every 6 (Six) Hours As Needed.     • TRUEplus Lancets 28G misc      • verapamil SR (CALAN-SR) 120 MG CR tablet Take 1 tablet by mouth Every Night. 90 tablet 3   • vitamin D (ERGOCALCIFEROL) 48016 UNITS capsule capsule Take 50,000 Units by mouth Every 7 (Seven) Days.  1     No current facility-administered medications for this visit.       Review of Systems   Constitutional: Negative.    HENT: Negative.    Eyes: Negative.    Respiratory: Negative.    Cardiovascular: Negative.    Gastrointestinal: Negative.    Endocrine: Negative.    Genitourinary: Negative.    Musculoskeletal:        Foot pain    Skin: Negative.    Allergic/Immunologic: Negative.    Neurological: Negative.    Hematological: Negative.    Psychiatric/Behavioral: Negative.          OBJECTIVE    Pulse 80   Ht 180.3 cm (71\")   Wt 101 kg (222 lb)   LMP 02/23/1998 (Within Months) Comment: Hysterectomy  SpO2 98%   BMI 30.96 kg/m²     Physical Exam  Vitals reviewed.   Constitutional:       Appearance: Normal appearance. She is well-developed.   HENT:      Head: Normocephalic and atraumatic.   Neck:      Trachea: Trachea and phonation normal.   Cardiovascular:      Pulses:           Dorsalis pedis pulses are 2+ on the right side and 2+ on the left side.        Posterior tibial pulses " are 2+ on the right side and 2+ on the left side.   Pulmonary:      Effort: Pulmonary effort is normal. No respiratory distress.   Abdominal:      General: There is no distension.      Palpations: Abdomen is soft.   Musculoskeletal:        Feet:    Feet:      Right foot:      Protective Sensation: 10 sites tested. 10 sites sensed.      Skin integrity: Skin integrity normal.      Toenail Condition: Right toenails are normal.      Left foot:      Protective Sensation: 10 sites tested. 10 sites sensed.      Skin integrity: Skin integrity normal.      Toenail Condition: Left toenails are normal.   Skin:     General: Skin is warm and dry.   Neurological:      Mental Status: She is alert and oriented to person, place, and time.      GCS: GCS eye subscore is 4. GCS verbal subscore is 5. GCS motor subscore is 6.   Psychiatric:         Speech: Speech normal.         Behavior: Behavior normal. Behavior is cooperative.         Thought Content: Thought content normal.         Judgment: Judgment normal.                Procedures    ASSESSMENT AND PLAN    Diagnoses and all orders for this visit:    1. Plantar fasciitis (Primary)    2. Right foot pain    Overall improvement in the symptoms since the last evaluation.  Will recommend progression range of motion activity as tolerated and follow-up as needed for worsening symptoms.  She was instructed to continue to guided home exercise program          This document has been electronically signed by Edouard LERNER, FNP-C, ONP-C on December 16, 2022 09:46 CST

## 2023-01-09 ENCOUNTER — OFFICE VISIT (OUTPATIENT)
Dept: CARDIAC SURGERY | Facility: CLINIC | Age: 68
End: 2023-01-09
Payer: MEDICARE

## 2023-01-09 VITALS
BODY MASS INDEX: 31.67 KG/M2 | SYSTOLIC BLOOD PRESSURE: 132 MMHG | DIASTOLIC BLOOD PRESSURE: 78 MMHG | TEMPERATURE: 97.1 F | HEIGHT: 71 IN | OXYGEN SATURATION: 96 % | HEART RATE: 96 BPM | WEIGHT: 226.2 LBS

## 2023-01-09 DIAGNOSIS — I83.811 VARICOSE VEINS OF RIGHT LOWER EXTREMITY WITH PAIN: Primary | ICD-10-CM

## 2023-01-09 DIAGNOSIS — E66.09 CLASS 1 OBESITY DUE TO EXCESS CALORIES WITH SERIOUS COMORBIDITY AND BODY MASS INDEX (BMI) OF 32.0 TO 32.9 IN ADULT: ICD-10-CM

## 2023-01-09 DIAGNOSIS — Z95.828 PRESENCE OF IVC FILTER: ICD-10-CM

## 2023-01-09 DIAGNOSIS — I82.411 ACUTE DEEP VEIN THROMBOSIS (DVT) OF FEMORAL VEIN OF RIGHT LOWER EXTREMITY: ICD-10-CM

## 2023-01-09 DIAGNOSIS — I10 BENIGN ESSENTIAL HTN: ICD-10-CM

## 2023-01-09 DIAGNOSIS — G47.33 OBSTRUCTIVE SLEEP APNEA SYNDROME: ICD-10-CM

## 2023-01-09 DIAGNOSIS — E11.65 TYPE 2 DIABETES MELLITUS WITH HYPERGLYCEMIA, WITHOUT LONG-TERM CURRENT USE OF INSULIN: ICD-10-CM

## 2023-01-09 DIAGNOSIS — G89.4 CHRONIC PAIN SYNDROME: ICD-10-CM

## 2023-01-09 DIAGNOSIS — E78.00 PURE HYPERCHOLESTEROLEMIA: ICD-10-CM

## 2023-01-09 DIAGNOSIS — G89.29 CHRONIC RIGHT-SIDED LOW BACK PAIN WITH RIGHT-SIDED SCIATICA: ICD-10-CM

## 2023-01-09 DIAGNOSIS — M54.41 CHRONIC RIGHT-SIDED LOW BACK PAIN WITH RIGHT-SIDED SCIATICA: ICD-10-CM

## 2023-01-09 PROCEDURE — 99214 OFFICE O/P EST MOD 30 MIN: CPT | Performed by: THORACIC SURGERY (CARDIOTHORACIC VASCULAR SURGERY)

## 2023-01-09 NOTE — LETTER
January 10, 2023     EDUARDA Mann  [address] [facility] [address]    Patient: Mini Andersen   YOB: 1955   Date of Visit: 1/9/2023       Dear EDUARDA Carrizales:    Thank you for referring Mini Andresen to me for evaluation. Below are the relevant portions of my assessment and plan of care.    If you have questions, please do not hesitate to call me. I look forward to following Mini along with you.         Sincerely,        Arley Randhawa MD        CC: MD Yolette Toure MD Stanfield, Thomas Mark, MD  01/10/23 1452  Sign when Signing Visit  1/9/2023    Mini Andersen  1955    Chief Complaint:    Chief Complaint   Patient presents with   • Deep Vein Thrombosis       HPI:      PCP:  Martha Flower APRN  Cardiology:  Dr Dubon     67y.o. female with HTN(stable, increased risk stroke, rupture), Hyperlipidemia(stable, increased risk cardiovascular events), Diabetes Mellitus(stable, increased risk cardiovascular events) and Obesity(uncontrolled, increased risk cardiovascular events) , DVT(new, chronic, increase risk VTE).  former smoker.   mild occasional RIGHT groin pain.  RIGHT leg knee pain x 1 year.  Varicose veins. Asymptomatic VTE.  Moderate LEFT knee pain x years.  Mild feet swelling occasional..  No TIA stroke amaurosis.  No MI claudication. No other associated signs, symptoms or modifying factors.     2/2021 Lower extremity venous duplex:  No dvt.  RIGHT GSV reflux (>2sec), superficial thrombophlebitis proximal.  12/2021 Lower extremity venous duplex:  No dvt.  RIGHT GSV reflux (>1.4sec), chronic non-occlusive superficial thrombophlebitis proximal GSV, SFJ.   LEFT no dvt, no reflux.  11/2022 Lower extremity venous duplex:  RIGHT CFV chronic dvt.  AVF RIGHT CFA-CFV.  11/2022 CTA Aorta runoff (venous):  IVC filter in place.  Bilateral common femoral venous stents, patent.  Small AVF RIGHT profunda femoris to CFV.  No difference in lower  extremity size.    7/2017 Echocardiogram:  EF 55%, LA 39mm, LV 51mm, RVSP 28mmHg.  Mild TR  12/2019 Holter:  Benign, HR .  12/2020 ECG:  NSR 76, QTc 418    The following portions of the patient's history were reviewed and updated as appropriate: allergies, current medications, past family history, past medical history, past social history, past surgical history and problem list.  Recent images independently reviewed.  Available laboratory values reviewed.    PMH:  Past Medical History:   Diagnosis Date   • Acid reflux    • Acute peritonitis (HCC)    • Allergic rhinitis    • Arthritis    • Bee sting    • Blood clot in vein, right leg    • Borderline glaucoma    • Bunion    • Callus    • Chronic bronchitis (HCC)    • Constipation     severe with an immotile colon      • Deep venous thrombosis (HCC)    • High cholesterol    • History of transfusion    • Hypothyroidism    • Ingrown toenail    • Intestinal volvulus (HCC)    • Muscle atrophy     O/E   • Nuclear senile cataract    • Nuclear senile cataract    • Plantar fasciitis    • Polyneuropathy in diabetes (HCC)    • PONV (postoperative nausea and vomiting)    • Sleep apnea    • Type 2 diabetes mellitus (HCC)     NO BDR   • Type 2 diabetes mellitus without complications (HCC)    • Unspecified disease of nail      Past Surgical History:   Procedure Laterality Date   • APPENDECTOMY     • BLADDER SUSPENSION     • CHOLECYSTECTOMY     • COLECTOMY PARTIAL / TOTAL  05/22/2014    Subtotal colectomy with ineo-rectostomy.   • ENDOSCOPY N/A 05/01/2018    Procedure: ESOPHAGOGASTRODUODENOSCOPY;  Surgeon: Carlos Eduardo Cyr DO;  Location: [facility] ENDOSCOPY;  Service: Gastroenterology   • ENDOSCOPY N/A 03/26/2019    Procedure: ESOPHAGOGASTRODUODENOSCOPY;  Surgeon: Carlos Eduardo Cyr DO;  Location: [facility] ENDOSCOPY;  Service: Gastroenterology   • ENDOSCOPY N/A 05/05/2020    Procedure: ESOPHAGOGASTRODUODENOSCOPY;  Surgeon: Carlos Eduardo Cyr DO;  Location: [facility] OR;  Service:  Gastroenterology;  Laterality: N/A;   • ENDOSCOPY N/A 2021    Procedure: ESOPHAGOGASTRODUODENOSCOPY;  Surgeon: Carlos Eduardo Cyr DO;  Location: [facility] ENDOSCOPY;  Service: Gastroenterology;  Laterality: N/A;   • ENDOSCOPY N/A 2022    Procedure: ESOPHAGOGASTRODUODENOSCOPY 9:00;  Surgeon: Carlos Eduardo Cyr DO;  Location: [facility] ENDOSCOPY;  Service: Gastroenterology;  Laterality: N/A;   • HERNIA REPAIR      multiple   • HYSTERECTOMY     • INJECTION OF MEDICATION  2010    DEPO MEDROL   • INTERVENTIONAL RADIOLOGY PROCEDURE Right 10/27/2022    Procedure: IR venogram lower extremity right;  Surgeon: Arley Randhawa MD;  Location: [facility] ANGIO INVASIVE LOCATION;  Service: Interventional Radiology;  Laterality: Right;   • JOINT REPLACEMENT Left 2017    knee   • KNEE ARTHROSCOPY Left    • LAPAROSCOPIC CHOLECYSTECTOMY     • SALPINGO OOPHORECTOMY Left    • SALPINGO OOPHORECTOMY Right    • SIGMOIDOSCOPY N/A 2019    Procedure: SIGMOIDOSCOPY FLEXIBLE;  Surgeon: Carlos Eduardo Cyr DO;  Location: [facility] ENDOSCOPY;  Service: Gastroenterology   • SIGMOIDOSCOPY N/A 2022    Procedure: SIGMOIDOSCOPY FLEXIBLE;  Surgeon: Carlos Eduardo Cyr DO;  Location: [facility] ENDOSCOPY;  Service: Gastroenterology;  Laterality: N/A;   • TOTAL KNEE ARTHROPLASTY Left 2017    Procedure: TOTAL KNEE ARTHROPLASTY ATTUNE with adductor canal block;  Surgeon: Jose Ballesteros MD;  Location: [facility] OR;  Service:      Family History   Problem Relation Age of Onset   • Osteoporosis Mother    • Diabetes Sister    • Cancer Brother    • Heart disease Brother    • Diabetes Brother    • Hypertension Brother      Social History     Tobacco Use   • Smoking status: Former     Packs/day: 0.00     Years: 0.00     Pack years: 0.00     Types: Cigarettes     Start date:      Quit date:      Years since quittin.0   • Smokeless tobacco: Never   Vaping Use   • Vaping Use: Never used   Substance Use Topics   •  Alcohol use: No   • Drug use: No       ALLERGIES:  Allergies   Allergen Reactions   • Bextra [Valdecoxib] Shortness Of Breath     Shortness of breath   • Cephalosporins Shortness Of Breath   • Detrol La [Tolterodine Tartrate Er] Shortness Of Breath   • Dicyclomine Shortness Of Breath     Shortness of breath   • Fluoxetine Shortness Of Breath   • Keflex [Cephalexin] Shortness Of Breath   • Toprol Xl [Metoprolol] Anaphylaxis   • Chocolate Flavor Swelling   • Omeprazole-Sodium Bicarbonate Unknown (See Comments)   • Other      Fresh flowers causes throat swelling   • Amoxicillin Rash   • Aspirin Rash   • Claritin-D 12 Hour [Loratadine-Pseudoephedrine Er] Rash   • Codeine Rash   • Demerol [Meperidine] Rash   • Levaquin [Levofloxacin] Rash     Was told not allergic   • Lipitor [Atorvastatin] Other (See Comments)     cramping   • Macrobid [Nitrofurantoin] Rash   • Morphine And Related Rash   • Paxil [Paroxetine Hcl] Rash   • Sulfa Antibiotics Rash   • Tape Rash   • Tramadol Rash   • Vioxx [Rofecoxib] Rash   • Zegerid [Omeprazole] Rash   • Zoloft [Sertraline Hcl] Rash         MEDICATIONS:    Current Outpatient Medications:   •  Alcohol Swabs (B-D SINGLE USE SWABS REGULAR) pads, , Disp: , Rfl:   •  apixaban (ELIQUIS) 5 MG tablet tablet, Take 1 tablet by mouth Every 12 (Twelve) Hours., Disp: 180 tablet, Rfl: 3  •  Calcium Citrate (CITRACAL PO), Take 2 tablets by mouth Daily As Needed., Disp: , Rfl:   •  carboxymethylcellulose (REFRESH PLUS) 0.5 % solution, Administer 1 drop to both eyes Daily As Needed for Dry Eyes., Disp: , Rfl:   •  clonazePAM (KlonoPIN) 0.5 MG tablet, Take 0.5 mg by mouth 2 (Two) Times a Day As Needed for Anxiety., Disp: , Rfl:   •  cyanocobalamin 1000 MCG/ML injection, Inject 1,000 mcg into the appropriate muscle as directed by prescriber., Disp: , Rfl:   •  cyclobenzaprine (FLEXERIL) 10 MG tablet, Take 1 tablet by mouth 3 (Three) Times a Day As Needed., Disp: , Rfl:   •  diphenhydrAMINE (BENADRYL) 25 mg  capsule, Take 25 mg by mouth Every 6 (Six) Hours As Needed., Disp: , Rfl: 6  •  ferrous sulfate 324 (65 Fe) MG tablet delayed-release EC tablet, Take 324 mg by mouth Daily With Breakfast., Disp: , Rfl:   •  furosemide (LASIX) 40 MG tablet, Take 40 mg by mouth As Needed., Disp: , Rfl:   •  levothyroxine (SYNTHROID, LEVOTHROID) 50 MCG tablet, Take 50 mcg by mouth daily., Disp: , Rfl: 1  •  losartan (COZAAR) 25 MG tablet, Take 25 mg by mouth Daily., Disp: , Rfl:   •  metFORMIN (GLUCOPHAGE) 500 MG tablet, Take 500 mg by mouth Daily With Breakfast., Disp: , Rfl: 1  •  ondansetron ODT (ZOFRAN-ODT) 4 MG disintegrating tablet, Place 1 tablet on the tongue Every 6 (Six) Hours As Needed for Nausea or Vomiting., Disp: 10 tablet, Rfl: 0  •  ONE TOUCH ULTRA TEST test strip, TEST BID, Disp: , Rfl: 5  •  pantoprazole (PROTONIX) 40 MG EC tablet, Take 40 mg by mouth Daily., Disp: , Rfl:   •  promethazine (PHENERGAN) 25 MG tablet, Take 25 mg by mouth Every 6 (Six) Hours As Needed., Disp: , Rfl:   •  TRUEplus Lancets 28G misc, , Disp: , Rfl:   •  verapamil SR (CALAN-SR) 120 MG CR tablet, Take 1 tablet by mouth Every Night., Disp: 90 tablet, Rfl: 3  •  vitamin D (ERGOCALCIFEROL) 00313 UNITS capsule capsule, Take 50,000 Units by mouth Every 7 (Seven) Days., Disp: , Rfl: 1    Review of Systems   Review of Systems   Constitutional: Negative for malaise/fatigue and weight loss.   Cardiovascular: Negative for chest pain, claudication and dyspnea on exertion.   Respiratory: Negative for cough and shortness of breath.    Skin: Negative for color change and poor wound healing.   Musculoskeletal: Positive for arthritis, joint pain and myalgias.   Neurological: Negative for dizziness, numbness and weakness.       Physical Exam   Vitals:    01/09/23 1438   BP: 132/78   BP Location: Left arm   Pulse: 96   Temp: 97.1 °F (36.2 °C)   TempSrc: Infrared   SpO2: 96%   Weight: 103 kg (226 lb 3.2 oz)   Height: 180.3 cm (71\")     Body surface area is 2.23  meters squared.  Body mass index is 31.55 kg/m².  Physical Exam  Constitutional:       General: She is not in acute distress.     Appearance: She is not ill-appearing.   HENT:      Right Ear: Hearing normal.      Left Ear: Hearing normal.      Nose: No nasal deformity.      Mouth/Throat:      Dentition: Normal dentition. Does not have dentures.   Cardiovascular:      Rate and Rhythm: Normal rate and regular rhythm.      Pulses:           Carotid pulses are 2+ on the right side and 2+ on the left side.       Radial pulses are 2+ on the right side and 2+ on the left side.        Dorsalis pedis pulses are 2+ on the right side and 2+ on the left side.        Posterior tibial pulses are 2+ on the right side and 2+ on the left side.      Heart sounds: No murmur heard.  Pulmonary:      Effort: Pulmonary effort is normal.      Breath sounds: Normal breath sounds.   Abdominal:      General: There is no distension.      Palpations: Abdomen is soft. There is no mass.      Tenderness: There is no abdominal tenderness.   Musculoskeletal:         General: No deformity.      Comments: Gait normal.    Skin:     General: Skin is warm and dry.      Coloration: Skin is not pale.      Findings: No erythema.      Comments: No venous staining   Neurological:      Mental Status: She is alert and oriented to person, place, and time.   Psychiatric:         Speech: Speech normal.         Behavior: Behavior is cooperative.         Thought Content: Thought content normal.         Judgment: Judgment normal.         BUN   Date Value Ref Range Status   11/23/2022 10 8 - 23 mg/dL Final   08/16/2022 13 7 - 25 mg/dL Final     Creatinine   Date Value Ref Range Status   11/23/2022 0.91 0.57 - 1.00 mg/dL Final   08/16/2022 0.8 0.7 - 1.3 mg/dL Final     eGFR Non  Amer   Date Value Ref Range Status   07/21/2019 77 >60 mL/min/1.73 Final     eGFR African Am   Date Value Ref Range Status   07/12/2021 87 mL/min Final     Comment:         GFR    WITH  KIDNEY DAMAGE     W/O DAMAGE  >=90         STAGE 1            NORMAL  60-89        STAGE 2            DEC GFR  30-59        STAGE 3            STAGE 3  15-29        STAGE 4            STAGE 4  <15          STAGE 5            STAGE 5      The MDRD GFR formula is valid only for adults between age 18 and 70.       ASSESSMENT:  Diagnoses and all orders for this visit:    1. Varicose veins of right lower extremity with pain (Primary)    2. Type 2 diabetes mellitus without complication, without long-term current use of insulin (HCC)    3. Pure hypercholesterolemia    4. Obstructive sleep apnea syndrome    5. Acute deep vein thrombosis (DVT) of femoral vein of right lower extremity (HCC)  -     Duplex Venous Lower Extremity - Bilateral CAR; Future    6. Chronic pain syndrome    7. Chronic right-sided low back pain with right-sided sciatica    8. Presence of IVC filter    9. Class 1 obesity due to excess calories with serious comorbidity and body mass index (BMI) of 32.0 to 32.9 in adult    10. Benign essential HTN    PLAN:  Detailed discussion with Mini Iglesias regarding situation and options.  Stable asymptomatic AV fistula RIGHT profunda femoris artery to CFV.  No current swelling RIGHT lower extremity. No dilation iliac veins.  Discussed options with ms iglesias including open surgical repair, endovascular stenting, observation..  Multiple risk factors with severe comorbidities.  No intervention indicated at this time, she prefers observation at this time..  Will follow with interval imaging.  Risks, benefits discussed.  Understands and wishes to proceed with plan.    Return in 6 months with RIGHT lower extremity venous duplex    Return after above studies complete  Obesity Class  1. Increased risk cardiovascular events, sleep and breathing disorders, joint issues, type 2 diabetes mellitus. Options for weight management, heart healthy diet, exercise programs, and associated health risks of obesity  discussed.    Recommended regular physical activity, progressive walking program.  Continue current medications as directed.  Advance Care Planning   ACP discussion was declined by the patient. Patient does not have an advance directive, declines further assistance.    Thank you for the opportunity to participate in this patient's care.    Copy to primary care provider.    Answers for HPI/ROS submitted by the patient on 1/2/2023  Please describe your symptoms.: I have a artery and vein that is connected in my groining thats not suppost to be there and I keep getting blood clots in my right leg,they said something about putting a Stent in it,so coming to find out if they are going to..  Have you had these symptoms before?: Yes  How long have you been having these symptoms?: Greater than 2 weeks  Please list any medications you are currently taking for this condition.: Ill bring a list  Please describe any probable cause for these symptoms. : Really Painful And Leg And Foot Stays Swollen All The Time  What is the primary reason for your visit?: Other

## 2023-01-10 PROBLEM — Z01.818 PRE-OPERATIVE CLEARANCE: Status: RESOLVED | Noted: 2017-07-10 | Resolved: 2023-01-10

## 2023-01-10 NOTE — PROGRESS NOTES
1/9/2023    Mini Andersen  1955    Chief Complaint:    Chief Complaint   Patient presents with   • Deep Vein Thrombosis       HPI:      PCP:  Martha Flower APRN  Cardiology:  Dr Dubon     67y.o. female with HTN(stable, increased risk stroke, rupture), Hyperlipidemia(stable, increased risk cardiovascular events), Diabetes Mellitus(stable, increased risk cardiovascular events) and Obesity(uncontrolled, increased risk cardiovascular events) , DVT(new, chronic, increase risk VTE).  former smoker.   mild occasional RIGHT groin pain.  RIGHT leg knee pain x 1 year.  Varicose veins. Asymptomatic VTE.  Moderate LEFT knee pain x years.  Mild feet swelling occasional..  No TIA stroke amaurosis.  No MI claudication. No other associated signs, symptoms or modifying factors.     2/2021 Lower extremity venous duplex:  No dvt.  RIGHT GSV reflux (>2sec), superficial thrombophlebitis proximal.  12/2021 Lower extremity venous duplex:  No dvt.  RIGHT GSV reflux (>1.4sec), chronic non-occlusive superficial thrombophlebitis proximal GSV, SFJ.   LEFT no dvt, no reflux.  11/2022 Lower extremity venous duplex:  RIGHT CFV chronic dvt.  AVF RIGHT CFA-CFV.  11/2022 CTA Aorta runoff (venous):  IVC filter in place.  Bilateral common femoral venous stents, patent.  Small AVF RIGHT profunda femoris to CFV.  No difference in lower extremity size.    7/2017 Echocardiogram:  EF 55%, LA 39mm, LV 51mm, RVSP 28mmHg.  Mild TR  12/2019 Holter:  Benign, HR .  12/2020 ECG:  NSR 76, QTc 418    The following portions of the patient's history were reviewed and updated as appropriate: allergies, current medications, past family history, past medical history, past social history, past surgical history and problem list.  Recent images independently reviewed.  Available laboratory values reviewed.    PMH:  Past Medical History:   Diagnosis Date   • Acid reflux    • Acute peritonitis (HCC)    • Allergic rhinitis    • Arthritis    • Bee sting    •  Blood clot in vein, right leg    • Borderline glaucoma    • Bunion    • Callus    • Chronic bronchitis (HCC)    • Constipation     severe with an immotile colon      • Deep venous thrombosis (HCC)    • High cholesterol    • History of transfusion    • Hypothyroidism    • Ingrown toenail    • Intestinal volvulus (HCC)    • Muscle atrophy     O/E   • Nuclear senile cataract    • Nuclear senile cataract    • Plantar fasciitis    • Polyneuropathy in diabetes (HCC)    • PONV (postoperative nausea and vomiting)    • Sleep apnea    • Type 2 diabetes mellitus (HCC)     NO BDR   • Type 2 diabetes mellitus without complications (HCC)    • Unspecified disease of nail      Past Surgical History:   Procedure Laterality Date   • APPENDECTOMY     • BLADDER SUSPENSION     • CHOLECYSTECTOMY     • COLECTOMY PARTIAL / TOTAL  05/22/2014    Subtotal colectomy with ineo-rectostomy.   • ENDOSCOPY N/A 05/01/2018    Procedure: ESOPHAGOGASTRODUODENOSCOPY;  Surgeon: Carlos Eduardo Cyr DO;  Location: [facility] ENDOSCOPY;  Service: Gastroenterology   • ENDOSCOPY N/A 03/26/2019    Procedure: ESOPHAGOGASTRODUODENOSCOPY;  Surgeon: Carlos Eduardo Cyr DO;  Location: [facility] ENDOSCOPY;  Service: Gastroenterology   • ENDOSCOPY N/A 05/05/2020    Procedure: ESOPHAGOGASTRODUODENOSCOPY;  Surgeon: Carlos Eduardo Cyr DO;  Location: [facility] OR;  Service: Gastroenterology;  Laterality: N/A;   • ENDOSCOPY N/A 03/18/2021    Procedure: ESOPHAGOGASTRODUODENOSCOPY;  Surgeon: Carlos Eduardo Cyr DO;  Location: [facility] ENDOSCOPY;  Service: Gastroenterology;  Laterality: N/A;   • ENDOSCOPY N/A 07/14/2022    Procedure: ESOPHAGOGASTRODUODENOSCOPY 9:00;  Surgeon: Carlos Eduardo Cyr DO;  Location: [facility] ENDOSCOPY;  Service: Gastroenterology;  Laterality: N/A;   • HERNIA REPAIR      multiple   • HYSTERECTOMY     • INJECTION OF MEDICATION  12/07/2010    DEPO MEDROL   • INTERVENTIONAL RADIOLOGY PROCEDURE Right 10/27/2022    Procedure: IR venogram lower extremity right;  Surgeon:  Arley Randhawa MD;  Location: [facility] ANGIO INVASIVE LOCATION;  Service: Interventional Radiology;  Laterality: Right;   • JOINT REPLACEMENT Left 2017    knee   • KNEE ARTHROSCOPY Left    • LAPAROSCOPIC CHOLECYSTECTOMY     • SALPINGO OOPHORECTOMY Left    • SALPINGO OOPHORECTOMY Right    • SIGMOIDOSCOPY N/A 2019    Procedure: SIGMOIDOSCOPY FLEXIBLE;  Surgeon: Carlos Eduardo Cyr DO;  Location: [facility] ENDOSCOPY;  Service: Gastroenterology   • SIGMOIDOSCOPY N/A 2022    Procedure: SIGMOIDOSCOPY FLEXIBLE;  Surgeon: Carlos Eduardo Cyr DO;  Location: [facility] ENDOSCOPY;  Service: Gastroenterology;  Laterality: N/A;   • TOTAL KNEE ARTHROPLASTY Left 2017    Procedure: TOTAL KNEE ARTHROPLASTY ATTUNE with adductor canal block;  Surgeon: Jose Ballesteros MD;  Location: [facility] OR;  Service:      Family History   Problem Relation Age of Onset   • Osteoporosis Mother    • Diabetes Sister    • Cancer Brother    • Heart disease Brother    • Diabetes Brother    • Hypertension Brother      Social History     Tobacco Use   • Smoking status: Former     Packs/day: 0.00     Years: 0.00     Pack years: 0.00     Types: Cigarettes     Start date:      Quit date:      Years since quittin.0   • Smokeless tobacco: Never   Vaping Use   • Vaping Use: Never used   Substance Use Topics   • Alcohol use: No   • Drug use: No       ALLERGIES:  Allergies   Allergen Reactions   • Bextra [Valdecoxib] Shortness Of Breath     Shortness of breath   • Cephalosporins Shortness Of Breath   • Detrol La [Tolterodine Tartrate Er] Shortness Of Breath   • Dicyclomine Shortness Of Breath     Shortness of breath   • Fluoxetine Shortness Of Breath   • Keflex [Cephalexin] Shortness Of Breath   • Toprol Xl [Metoprolol] Anaphylaxis   • Chocolate Flavor Swelling   • Omeprazole-Sodium Bicarbonate Unknown (See Comments)   • Other      Fresh flowers causes throat swelling   • Amoxicillin Rash   • Aspirin Rash   • Claritin-D 12  Hour [Loratadine-Pseudoephedrine Er] Rash   • Codeine Rash   • Demerol [Meperidine] Rash   • Levaquin [Levofloxacin] Rash     Was told not allergic   • Lipitor [Atorvastatin] Other (See Comments)     cramping   • Macrobid [Nitrofurantoin] Rash   • Morphine And Related Rash   • Paxil [Paroxetine Hcl] Rash   • Sulfa Antibiotics Rash   • Tape Rash   • Tramadol Rash   • Vioxx [Rofecoxib] Rash   • Zegerid [Omeprazole] Rash   • Zoloft [Sertraline Hcl] Rash         MEDICATIONS:    Current Outpatient Medications:   •  Alcohol Swabs (B-D SINGLE USE SWABS REGULAR) pads, , Disp: , Rfl:   •  apixaban (ELIQUIS) 5 MG tablet tablet, Take 1 tablet by mouth Every 12 (Twelve) Hours., Disp: 180 tablet, Rfl: 3  •  Calcium Citrate (CITRACAL PO), Take 2 tablets by mouth Daily As Needed., Disp: , Rfl:   •  carboxymethylcellulose (REFRESH PLUS) 0.5 % solution, Administer 1 drop to both eyes Daily As Needed for Dry Eyes., Disp: , Rfl:   •  clonazePAM (KlonoPIN) 0.5 MG tablet, Take 0.5 mg by mouth 2 (Two) Times a Day As Needed for Anxiety., Disp: , Rfl:   •  cyanocobalamin 1000 MCG/ML injection, Inject 1,000 mcg into the appropriate muscle as directed by prescriber., Disp: , Rfl:   •  cyclobenzaprine (FLEXERIL) 10 MG tablet, Take 1 tablet by mouth 3 (Three) Times a Day As Needed., Disp: , Rfl:   •  diphenhydrAMINE (BENADRYL) 25 mg capsule, Take 25 mg by mouth Every 6 (Six) Hours As Needed., Disp: , Rfl: 6  •  ferrous sulfate 324 (65 Fe) MG tablet delayed-release EC tablet, Take 324 mg by mouth Daily With Breakfast., Disp: , Rfl:   •  furosemide (LASIX) 40 MG tablet, Take 40 mg by mouth As Needed., Disp: , Rfl:   •  levothyroxine (SYNTHROID, LEVOTHROID) 50 MCG tablet, Take 50 mcg by mouth daily., Disp: , Rfl: 1  •  losartan (COZAAR) 25 MG tablet, Take 25 mg by mouth Daily., Disp: , Rfl:   •  metFORMIN (GLUCOPHAGE) 500 MG tablet, Take 500 mg by mouth Daily With Breakfast., Disp: , Rfl: 1  •  ondansetron ODT (ZOFRAN-ODT) 4 MG disintegrating  tablet, Place 1 tablet on the tongue Every 6 (Six) Hours As Needed for Nausea or Vomiting., Disp: 10 tablet, Rfl: 0  •  ONE TOUCH ULTRA TEST test strip, TEST BID, Disp: , Rfl: 5  •  pantoprazole (PROTONIX) 40 MG EC tablet, Take 40 mg by mouth Daily., Disp: , Rfl:   •  promethazine (PHENERGAN) 25 MG tablet, Take 25 mg by mouth Every 6 (Six) Hours As Needed., Disp: , Rfl:   •  TRUEplus Lancets 28G misc, , Disp: , Rfl:   •  verapamil SR (CALAN-SR) 120 MG CR tablet, Take 1 tablet by mouth Every Night., Disp: 90 tablet, Rfl: 3  •  vitamin D (ERGOCALCIFEROL) 68852 UNITS capsule capsule, Take 50,000 Units by mouth Every 7 (Seven) Days., Disp: , Rfl: 1    Review of Systems   Review of Systems   Constitutional: Negative for malaise/fatigue and weight loss.   Cardiovascular: Negative for chest pain, claudication and dyspnea on exertion.   Respiratory: Negative for cough and shortness of breath.    Skin: Negative for color change and poor wound healing.   Musculoskeletal: Positive for arthritis, joint pain and myalgias.   Neurological: Negative for dizziness, numbness and weakness.       Physical Exam   Vitals:    01/09/23 1438   BP: 132/78   BP Location: Left arm   Pulse: 96   Temp: 97.1 °F (36.2 °C)   TempSrc: Infrared   SpO2: 96%   Weight: 103 kg (226 lb 3.2 oz)   Height: 180.3 cm (71\")     Body surface area is 2.23 meters squared.  Body mass index is 31.55 kg/m².  Physical Exam  Constitutional:       General: She is not in acute distress.     Appearance: She is not ill-appearing.   HENT:      Right Ear: Hearing normal.      Left Ear: Hearing normal.      Nose: No nasal deformity.      Mouth/Throat:      Dentition: Normal dentition. Does not have dentures.   Cardiovascular:      Rate and Rhythm: Normal rate and regular rhythm.      Pulses:           Carotid pulses are 2+ on the right side and 2+ on the left side.       Radial pulses are 2+ on the right side and 2+ on the left side.        Dorsalis pedis pulses are 2+ on the  right side and 2+ on the left side.        Posterior tibial pulses are 2+ on the right side and 2+ on the left side.      Heart sounds: No murmur heard.  Pulmonary:      Effort: Pulmonary effort is normal.      Breath sounds: Normal breath sounds.   Abdominal:      General: There is no distension.      Palpations: Abdomen is soft. There is no mass.      Tenderness: There is no abdominal tenderness.   Musculoskeletal:         General: No deformity.      Comments: Gait normal.    Skin:     General: Skin is warm and dry.      Coloration: Skin is not pale.      Findings: No erythema.      Comments: No venous staining   Neurological:      Mental Status: She is alert and oriented to person, place, and time.   Psychiatric:         Speech: Speech normal.         Behavior: Behavior is cooperative.         Thought Content: Thought content normal.         Judgment: Judgment normal.         BUN   Date Value Ref Range Status   11/23/2022 10 8 - 23 mg/dL Final   08/16/2022 13 7 - 25 mg/dL Final     Creatinine   Date Value Ref Range Status   11/23/2022 0.91 0.57 - 1.00 mg/dL Final   08/16/2022 0.8 0.7 - 1.3 mg/dL Final     eGFR Non  Amer   Date Value Ref Range Status   07/21/2019 77 >60 mL/min/1.73 Final     eGFR African Am   Date Value Ref Range Status   07/12/2021 87 mL/min Final     Comment:         GFR    WITH KIDNEY DAMAGE     W/O DAMAGE  >=90         STAGE 1            NORMAL  60-89        STAGE 2            DEC GFR  30-59        STAGE 3            STAGE 3  15-29        STAGE 4            STAGE 4  <15          STAGE 5            STAGE 5      The MDRD GFR formula is valid only for adults between age 18 and 70.       ASSESSMENT:  Diagnoses and all orders for this visit:    1. Varicose veins of right lower extremity with pain (Primary)    2. Type 2 diabetes mellitus with hyperglycemia, without long-term current use of insulin (HCC)    3. Pure hypercholesterolemia    4. Obstructive sleep apnea syndrome    5. Acute deep  vein thrombosis (DVT) of femoral vein of right lower extremity (HCC)  -     Duplex Venous Lower Extremity - Bilateral CAR; Future    6. Chronic pain syndrome    7. Chronic right-sided low back pain with right-sided sciatica    8. Presence of IVC filter    9. Class 1 obesity due to excess calories with serious comorbidity and body mass index (BMI) of 32.0 to 32.9 in adult    10. Benign essential HTN    PLAN:  Detailed discussion with Mini Iglesias regarding situation and options.  Stable asymptomatic AV fistula RIGHT profunda femoris artery to CFV.  No current swelling RIGHT lower extremity. No dilation iliac veins.  Discussed options with ms iglesias including open surgical repair, endovascular stenting, observation..  Multiple risk factors with severe comorbidities.  No intervention indicated at this time, she prefers observation at this time..  Will follow with interval imaging.  Risks, benefits discussed.  Understands and wishes to proceed with plan.    Return in 6 months with RIGHT lower extremity venous duplex    Return after above studies complete  Obesity Class  1. Increased risk cardiovascular events, sleep and breathing disorders, joint issues, type 2 diabetes mellitus. Options for weight management, heart healthy diet, exercise programs, and associated health risks of obesity discussed.    Recommended regular physical activity, progressive walking program.  Continue current medications as directed.  Advance Care Planning   ACP discussion was declined by the patient. Patient does not have an advance directive, declines further assistance.     Thank you for the opportunity to participate in this patient's care.    Copy to primary care provider.    Answers for HPI/ROS submitted by the patient on 1/2/2023  Please describe your symptoms.: I have a artery and vein that is connected in my groining thats not suppost to be there and I keep getting blood clots in my right leg,they said something about putting a  Stent in it,so coming to find out if they are going to..  Have you had these symptoms before?: Yes  How long have you been having these symptoms?: Greater than 2 weeks  Please list any medications you are currently taking for this condition.: Ill bring a list  Please describe any probable cause for these symptoms. : Really Painful And Leg And Foot Stays Swollen All The Time  What is the primary reason for your visit?: Other

## 2023-01-12 ENCOUNTER — TELEPHONE (OUTPATIENT)
Dept: CARDIOLOGY | Facility: CLINIC | Age: 68
End: 2023-01-12

## 2023-01-12 ENCOUNTER — OFFICE VISIT (OUTPATIENT)
Dept: CARDIOLOGY | Facility: CLINIC | Age: 68
End: 2023-01-12
Payer: MEDICARE

## 2023-01-12 VITALS
BODY MASS INDEX: 31.21 KG/M2 | DIASTOLIC BLOOD PRESSURE: 76 MMHG | WEIGHT: 222.9 LBS | SYSTOLIC BLOOD PRESSURE: 126 MMHG | HEIGHT: 71 IN | HEART RATE: 75 BPM | OXYGEN SATURATION: 98 %

## 2023-01-12 DIAGNOSIS — Z87.898 HISTORY OF PALPITATIONS: Primary | ICD-10-CM

## 2023-01-12 PROCEDURE — 99214 OFFICE O/P EST MOD 30 MIN: CPT | Performed by: INTERNAL MEDICINE

## 2023-01-12 PROCEDURE — 93000 ELECTROCARDIOGRAM COMPLETE: CPT | Performed by: INTERNAL MEDICINE

## 2023-01-12 NOTE — TELEPHONE ENCOUNTER
Radha Quigley Donna L, MA  Phone Number: [phone]     Msg left on v/m today @ 1:32- needing to speak with you. No other info left   Patient called the office to let us know her insurance did say they would pay for eliquis.

## 2023-01-12 NOTE — PROGRESS NOTES
[facility] Cardiology  OFFICE NOTE    Cardiovascular Medicine  Andrey Dubon M.D., RPVI         No referring provider defined for this encounter.    Thank you for asking me to see Mini Andersen for palpitations.    This is a 67 y.o. female with:    1. Palpitations    2. Pure hypercholesterolemia    3. Obstructive sleep apnea syndrome    4. Deep vein thrombosis (DVT) of femoral vein of both lower extremities, unspecified chronicity (CMS/Formerly [facility])    5. Type 2 diabetes mellitus without complication, without long-term current use of insulin (CMS/[facility]ly [facility] [place])          Chief complaint -Palpitations        History of present Illness- 67 y.o.-year-old lady with history of diabetes, hypertension and hyperlipidemia had bilateral knee replacement surgery and subsequently she developed DVT and she had DVT before and after surgery and she is going to be on chronic anticoagulation.      Patient had palpitations after her surgery, when she would get up and walk around.  Most likely sinus tachycardia, no diagnosis of arrhythmias, subsequently patient was started on verapamil with improvement in her symptoms.  However patient is here for follow-up and reported that she has been out of her verapamil for the last 1 month without any recurrence of palpitations.  She will have occasional palpitations at night.  She wants to come off the verapamil at this point currently denying any chest pain or shortness of breath  No bleeding problems from Eliquis    01/12/2023:  Had recurrence of DVT in November.  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  Occasional palpitations.  No further exertional chest pains.    12/08/2020;  No acute issues since last visit.  Has occasional palpitations whenever she is exerting though are occasionally in the middle of the night however she has sleep apnea and does not use her CPAP religiously.  No bleeding problems on Eliquis.   No palpitations otherwise.    10/15/2021;  Is having worsening palpitations now at night.  She has not been using her CPAP regularly.  No bleeding problems from Eliquis    05/11/2022:  Patient event Holter monitor since last visit.  Her triggered events are associated normal sinus rhythm.  She did have a few runs of SVT and one episode of nonsustained VT.  Got her started on Cardizem but she could not tolerate it.  Continues to have palpitations.  Also reported occasional chest pain or shortness of breath with exertion.  Has been under a lot of stress    Review of Systems - Constitution: Negative for weakness, weight gain and weight loss.   HENT: Negative for congestion.    Eyes: Negative for blurred vision.   Cardiovascular: As mentioned above  Respiratory: Negative for cough and hemoptysis.    Endocrine: Negative for polydipsia and polyuria.   Hematologic/Lymphatic: Negative for bleeding problem. Does not bruise/bleed easily.   Skin: Negative for flushing.   Musculoskeletal: Negative for neck pain and stiffness.   Gastrointestinal: Negative for abdominal pain, diarrhea, jaundice, melena, nausea and vomiting.   Genitourinary: Negative for dysuria and hematuria.   Neurological: Negative for dizziness, focal weakness and numbness.   Psychiatric/Behavioral: Negative for altered mental status and depression.     I reviewed the ROS as documented here and confirmed the accuracy of it with the patient today. 1/12/2023        All other systems were reviewed and were negative.    family history includes Cancer in her brother; Diabetes in her brother and sister; Heart disease in her brother; Hypertension in her brother; Osteoporosis in her mother.     reports that she quit smoking about 43 years ago. Her smoking use included cigarettes. She started smoking about 53 years ago. She has never used smokeless tobacco. She reports that she does not drink alcohol and does not use drugs.    Allergies   Allergen Reactions   • Bextra  [Valdecoxib] Shortness Of Breath     Shortness of breath   • Cephalosporins Shortness Of Breath   • Detrol La [Tolterodine Tartrate Er] Shortness Of Breath   • Dicyclomine Shortness Of Breath     Shortness of breath   • Fluoxetine Shortness Of Breath   • Keflex [Cephalexin] Shortness Of Breath   • Toprol Xl [Metoprolol] Anaphylaxis   • Chocolate Flavor Swelling   • Omeprazole-Sodium Bicarbonate Unknown (See Comments)   • Other      Fresh flowers causes throat swelling   • Amoxicillin Rash   • Aspirin Rash   • Claritin-D 12 Hour [Loratadine-Pseudoephedrine Er] Rash   • Codeine Rash   • Demerol [Meperidine] Rash   • Levaquin [Levofloxacin] Rash     Was told not allergic   • Lipitor [Atorvastatin] Other (See Comments)     cramping   • Macrobid [Nitrofurantoin] Rash   • Morphine And Related Rash   • Paxil [Paroxetine Hcl] Rash   • Sulfa Antibiotics Rash   • Tape Rash   • Tramadol Rash   • Vioxx [Rofecoxib] Rash   • Zegerid [Omeprazole] Rash   • Zoloft [Sertraline Hcl] Rash         Current Outpatient Medications:   •  Alcohol Swabs (B-D SINGLE USE SWABS REGULAR) pads, , Disp: , Rfl:   •  apixaban (ELIQUIS) 5 MG tablet tablet, Take 1 tablet by mouth Every 12 (Twelve) Hours., Disp: 180 tablet, Rfl: 3  •  Calcium Citrate (CITRACAL PO), Take 2 tablets by mouth Daily As Needed., Disp: , Rfl:   •  carboxymethylcellulose (REFRESH PLUS) 0.5 % solution, Administer 1 drop to both eyes Daily As Needed for Dry Eyes., Disp: , Rfl:   •  clonazePAM (KlonoPIN) 0.5 MG tablet, Take 0.5 mg by mouth 2 (Two) Times a Day As Needed for Anxiety., Disp: , Rfl:   •  cyanocobalamin 1000 MCG/ML injection, Inject 1,000 mcg into the appropriate muscle as directed by prescriber., Disp: , Rfl:   •  cyclobenzaprine (FLEXERIL) 10 MG tablet, Take 1 tablet by mouth 3 (Three) Times a Day As Needed., Disp: , Rfl:   •  diphenhydrAMINE (BENADRYL) 25 mg capsule, Take 25 mg by mouth Every 6 (Six) Hours As Needed., Disp: , Rfl: 6  •  ferrous sulfate 324 (65 Fe)  "MG tablet delayed-release EC tablet, Take 324 mg by mouth Daily With Breakfast., Disp: , Rfl:   •  furosemide (LASIX) 40 MG tablet, Take 40 mg by mouth As Needed., Disp: , Rfl:   •  levothyroxine (SYNTHROID, LEVOTHROID) 50 MCG tablet, Take 50 mcg by mouth daily., Disp: , Rfl: 1  •  losartan (COZAAR) 25 MG tablet, Take 25 mg by mouth Daily., Disp: , Rfl:   •  metFORMIN (GLUCOPHAGE) 500 MG tablet, Take 500 mg by mouth Daily With Breakfast., Disp: , Rfl: 1  •  ondansetron ODT (ZOFRAN-ODT) 4 MG disintegrating tablet, Place 1 tablet on the tongue Every 6 (Six) Hours As Needed for Nausea or Vomiting., Disp: 10 tablet, Rfl: 0  •  ONE TOUCH ULTRA TEST test strip, TEST BID, Disp: , Rfl: 5  •  pantoprazole (PROTONIX) 40 MG EC tablet, Take 40 mg by mouth Daily., Disp: , Rfl:   •  promethazine (PHENERGAN) 25 MG tablet, Take 25 mg by mouth Every 6 (Six) Hours As Needed., Disp: , Rfl:   •  TRUEplus Lancets 28G misc, , Disp: , Rfl:   •  verapamil SR (CALAN-SR) 120 MG CR tablet, Take 1 tablet by mouth Every Night., Disp: 90 tablet, Rfl: 3  •  vitamin D (ERGOCALCIFEROL) 92443 UNITS capsule capsule, Take 50,000 Units by mouth Every 7 (Seven) Days., Disp: , Rfl: 1    Physical Exam:  Vitals:    01/12/23 0826   BP: 126/76   BP Location: Left arm   Patient Position: Sitting   Cuff Size: Adult   Pulse: 75   SpO2: 98%   Weight: 101 kg (222 lb 14.4 oz)   Height: 180.3 cm (71\")   PainSc: 0-No pain   PainLoc: Chest     Current Pain Level: none  Pulse Ox: Normal  on room air  General: alert, appears stated age and cooperative     Body Habitus: well-nourished    HEENT: Head: Normocephalic, no lesions, without obvious abnormality. No arcus senilis, xanthelasma or xanthomas.    Neuro: alert, oriented x3  Pulses: 2+ and symmetric  JVP: Volume/Pulsation: Normal.  Normal waveforms.   Appropriate inspiratory decrease.  No Kussmaul's. No Padmaja's.   Carotid Exam: no bruit normal pulsation bilaterally   Carotid Volume: normal.     Respirations: no " increased work of breathing   Chest:  Normal    Pulmonary:Normal   Precordium: Normal impulses. P2 is not palpable.  RV Heave: absent  LV Heave: absent  Cobb:  normal size and placement  Palpable S4: absent.  Heart rate: normal    Heart Rhythm: regular     Heart Sounds: S1: normal  S2: normal  S3: absent   S4: absent  Opening Snap: absent    Pericardial Rub:  Absent: .    Abdomen:   Appearance: normal .  Palpation: Soft, non-tender to palpation, bowel sounds positive in all four quadrants; no guarding or rebound tenderness  Extremity: no edema.   LE Skin: no rashes  LE Hair:  normal  LE Pulses: well perfused with normal pulses in the distal extremities  Pallor on elevation: Absent. Rubor on dependency: None    I have reexamined the patient and the results are consistent with the previously documented exam. Andrey Dubon MD       DATA REVIEWED:     EKG. I personally reviewed and interpreted the EKG.  Normal sinus rhythm normal EKG.    ECG/EMG Results (all)     None        ---------------------------------------------------  TTE/RORY:  Results for orders placed in visit on 05/11/22    Adult Transthoracic Echo Complete W/ Cont if Necessary Per Protocol    Interpretation Summary  · Left ventricular ejection fraction appears to be 61 - 65%. Left ventricular systolic function is normal.  · Left ventricular diastolic function was normal.  · Estimated right ventricular systolic pressure from tricuspid regurgitation is normal (<35 mmHg).      --------------------------------------------------------------------------------------------------  LABS:     The 10-year CVD risk score (Viki et al., 2008) is: 23.1%    Values used to calculate the score:      Age: 64 years      Sex: Female      Diabetic: Yes      Tobacco smoker: No      Systolic Blood Pressure: 138 mmHg      Is BP treated: No      HDL Cholesterol: 46 mg/dL      Total Cholesterol: 241 mg/dL         Lab Results   Component Value Date    GLUCOSE 106 (H) 07/21/2019     BUN 10 11/23/2022    CREATININE 0.91 11/23/2022    EGFRIFNONA 77 07/21/2019    EGFRIFAFRI 87 07/12/2021    BCR 13.2 07/21/2019    K 4.2 08/16/2022    CO2 29 08/16/2022    CALCIUM 9.3 08/16/2022    ALBUMIN 4.1 08/16/2022    AST 32 08/16/2022    ALT 31 08/16/2022     Lab Results   Component Value Date    WBC 5.4 10/21/2019    HGB 13.2 10/21/2019    HCT 39.3 10/21/2019    MCV 88.6 10/21/2019     10/21/2019     Lab Results   Component Value Date    CHOL 241 (H) 11/11/2019    CHLPL 220 (H) 06/10/2022    TRIG 150 06/10/2022    HDL 43 06/10/2022     06/10/2022     Lab Results   Component Value Date    TSH 1.58 06/10/2022     Lab Results   Component Value Date    CKTOTAL 74 09/18/2017    CKMB 0.96 09/18/2017    TROPONINI <0.012 09/18/2017     Lab Results   Component Value Date    HGBA1C 6.5 (H) 06/10/2022     Lab Results   Component Value Date    DDIMER 715 (H) 09/01/2015     Lab Results   Component Value Date    ALT 31 08/16/2022     Lab Results   Component Value Date    HGBA1C 6.5 (H) 06/10/2022    HGBA1C 6.1 (H) 07/12/2021    HGBA1C 5.7 12/03/2020     Lab Results   Component Value Date    CREATININE 0.91 11/23/2022     Lab Results   Component Value Date    IRON 36 03/14/2019     Lab Results   Component Value Date    INR 1.02 10/01/2019    INR 1.03 09/18/2017    INR 0.97 09/16/2017    PROTIME 10.6 10/01/2019    PROTIME 13.4 09/18/2017    PROTIME 12.8 09/16/2017       Interpretation Summary    · A relatively benign monitor study.  · Patient had a minimum heart of 57 bpm, maximal heart rate of 167 bpm and average heart rate of 88 bpm. Predominant underlying rhythm was sinus rhythm 7 supraventricular tachycardia runs occurred, the run with the fastest interval lasting 5 beats with a max rate of 167 bpm, the longest lasting 9 beats an average rate of 124 bpm.  · PACs were rare less than 1% PVCs were rate less than 1%  · Normal diurnal variation heart rate.  · Diary event of arm neck pain, chest pain,  fluttering, anxiety, shortness of breath and lightheadedness were associated with normal sinus rhythm  · One episode of chest pounding was associated with sinus tachycardia.  · Patient did not have triggered events with her SVT.  · Patient had total of 13 triggered events which were associated with normal sinus rhythm or sinus tachycardia.     Blood work from [facility] Everywhere.    CMP was unremarkable.  No recently seen    Assessment/Plan     1.  Palpitations:  Monitor was benign, she did have a few SVTs but she is asymptomatic with these.  Triggered events are associated normal sinus rhythm.  Recent TSH electrolytes are okay  Palpitations and exertional sinus tachycardia in and that at the night mostly from her sleep apnea.  Advised her to exercise on regular basis, weight loss and religiously use her CPAP  back on verapamil.  She could not tolerate Cardizem.  Since she had nonsustained VT, complaining of worsening chest pain or shortness of breath.  We performed a nuclear stress test which was low risk study.  Echocardiogram showed preserved LV systolic and diastolic function.     2.  Diabetes on metformin and she is doing okay and follows with Dr. Aquino     3.  Hyperlipidemia had dizziness from Lipitor.  Will try Crestor in the setting of her elevated ASCVD risk score     4.  Osteoarthritis status post knee replacement doing okay     5.  Hypothyroidism on Synthroid supplements.    6.  Hypertension:  Well-controlled on current regimen losartan 25 mg    7.  DVT:  On chronic anticoagulation with Eliquis.  Recent CBC with normal hemoglobin  She has an IVC filter from before, referred her to surgery for potential retrieval of her IVC but she is recommended to keep it in place.  And recurrent DVT in October.  She was also noted to have a small fistula right profunda to common femoral vein.  Has been treated conservatively.      Prevention:  Patient's Body mass index is 31.09 kg/m². BMI is above normal parameters.  Recommendations include: exercise counseling and nutrition counseling.      Mini Andersen is a former smoker      AAA Screening:   Not needed          This document has been electronically signed by Andrey Dubon MD on January 12, 2023 08:32 CST

## 2023-01-13 LAB
QT INTERVAL: 374 MS
QTC INTERVAL: 417 MS

## 2023-03-30 ENCOUNTER — OFFICE VISIT (OUTPATIENT)
Dept: PODIATRY | Facility: CLINIC | Age: 68
End: 2023-03-30
Payer: MEDICARE

## 2023-03-30 VITALS
DIASTOLIC BLOOD PRESSURE: 79 MMHG | HEIGHT: 71 IN | OXYGEN SATURATION: 98 % | HEART RATE: 85 BPM | WEIGHT: 222 LBS | SYSTOLIC BLOOD PRESSURE: 126 MMHG | BODY MASS INDEX: 31.08 KG/M2

## 2023-03-30 DIAGNOSIS — M72.2 PLANTAR FASCIITIS: Primary | ICD-10-CM

## 2023-03-30 DIAGNOSIS — M79.671 RIGHT FOOT PAIN: ICD-10-CM

## 2023-03-30 PROCEDURE — 3074F SYST BP LT 130 MM HG: CPT | Performed by: NURSE PRACTITIONER

## 2023-03-30 PROCEDURE — 3078F DIAST BP <80 MM HG: CPT | Performed by: NURSE PRACTITIONER

## 2023-03-30 PROCEDURE — 20605 DRAIN/INJ JOINT/BURSA W/O US: CPT | Performed by: NURSE PRACTITIONER

## 2023-03-30 PROCEDURE — 99212 OFFICE O/P EST SF 10 MIN: CPT | Performed by: NURSE PRACTITIONER

## 2023-03-30 RX ORDER — TRIAMCINOLONE ACETONIDE 40 MG/ML
40 INJECTION, SUSPENSION INTRA-ARTICULAR; INTRAMUSCULAR ONCE
Status: COMPLETED | OUTPATIENT
Start: 2023-03-30 | End: 2023-03-30

## 2023-03-30 RX ADMIN — TRIAMCINOLONE ACETONIDE 40 MG: 40 INJECTION, SUSPENSION INTRA-ARTICULAR; INTRAMUSCULAR at 12:40

## 2023-03-30 NOTE — PROGRESS NOTES
Mini Andersen  1955  68 y.o. female   PCP: Martha Flower 10/6/2022  BS: 109 per patient     03/30/2023    Chief Complaint   Patient presents with   • Right Foot - Pain     Spider vein pain       History of Present Illness    Mini Andersen is a 68 y.o.female return to clinic for a follow up on right foot pain.    Past Medical History:   Diagnosis Date   • Acid reflux    • Acute peritonitis (HCC)    • Allergic rhinitis    • Arthritis    • Bee sting    • Blood clot in vein, right leg    • Borderline glaucoma    • Bunion    • Callus    • Chronic bronchitis (HCC)    • Constipation     severe with an immotile colon      • Deep venous thrombosis (HCC)    • High cholesterol    • History of transfusion    • Hypothyroidism    • Ingrown toenail    • Intestinal volvulus (HCC)    • Muscle atrophy     O/E   • Nuclear senile cataract    • Nuclear senile cataract    • Plantar fasciitis    • Polyneuropathy in diabetes (HCC)    • PONV (postoperative nausea and vomiting)    • Sleep apnea    • Type 2 diabetes mellitus (HCC)     NO BDR   • Type 2 diabetes mellitus without complications (HCC)    • Unspecified disease of nail          Past Surgical History:   Procedure Laterality Date   • APPENDECTOMY     • BLADDER SUSPENSION     • CHOLECYSTECTOMY     • COLECTOMY PARTIAL / TOTAL  05/22/2014    Subtotal colectomy with ineo-rectostomy.   • ENDOSCOPY N/A 05/01/2018    Procedure: ESOPHAGOGASTRODUODENOSCOPY;  Surgeon: Carlos Eduardo Cyr DO;  Location: [facility] ENDOSCOPY;  Service: Gastroenterology   • ENDOSCOPY N/A 03/26/2019    Procedure: ESOPHAGOGASTRODUODENOSCOPY;  Surgeon: Carlos Eduardo Cyr DO;  Location: [facility] ENDOSCOPY;  Service: Gastroenterology   • ENDOSCOPY N/A 05/05/2020    Procedure: ESOPHAGOGASTRODUODENOSCOPY;  Surgeon: Carlos Eduardo Cyr DO;  Location: [facility] OR;  Service: Gastroenterology;  Laterality: N/A;   • ENDOSCOPY N/A 03/18/2021    Procedure: ESOPHAGOGASTRODUODENOSCOPY;  Surgeon: Carlos Eduardo Cyr DO;  Location:  [facility] ENDOSCOPY;  Service: Gastroenterology;  Laterality: N/A;   • ENDOSCOPY N/A 07/14/2022    Procedure: ESOPHAGOGASTRODUODENOSCOPY 9:00;  Surgeon: Carlos Eduardo Cyr DO;  Location: [facility] ENDOSCOPY;  Service: Gastroenterology;  Laterality: N/A;   • HERNIA REPAIR      multiple   • HYSTERECTOMY     • INJECTION OF MEDICATION  12/07/2010    DEPO MEDROL   • INTERVENTIONAL RADIOLOGY PROCEDURE Right 10/27/2022    Procedure: IR venogram lower extremity right;  Surgeon: Arley Randhawa MD;  Location: [facility] ANGIO INVASIVE LOCATION;  Service: Interventional Radiology;  Laterality: Right;   • JOINT REPLACEMENT Left 07/01/2017    knee   • KNEE ARTHROSCOPY Left    • LAPAROSCOPIC CHOLECYSTECTOMY     • SALPINGO OOPHORECTOMY Left    • SALPINGO OOPHORECTOMY Right    • SIGMOIDOSCOPY N/A 03/26/2019    Procedure: SIGMOIDOSCOPY FLEXIBLE;  Surgeon: Carlos Eduardo Cyr DO;  Location: [facility] ENDOSCOPY;  Service: Gastroenterology   • SIGMOIDOSCOPY N/A 12/1/2022    Procedure: SIGMOIDOSCOPY FLEXIBLE;  Surgeon: Carlos Eduardo Cyr DO;  Location: [facility] ENDOSCOPY;  Service: Gastroenterology;  Laterality: N/A;   • TOTAL KNEE ARTHROPLASTY Left 07/24/2017    Procedure: TOTAL KNEE ARTHROPLASTY ATTUNE with adductor canal block;  Surgeon: Jose Ballesteros MD;  Location: [facility] OR;  Service:          Family History   Problem Relation Age of Onset   • Osteoporosis Mother    • Diabetes Sister    • Cancer Brother    • Heart disease Brother    • Diabetes Brother    • Hypertension Brother        Allergies   Allergen Reactions   • Bextra [Valdecoxib] Shortness Of Breath     Shortness of breath   • Cephalosporins Shortness Of Breath   • Detrol La [Tolterodine Tartrate Er] Shortness Of Breath   • Dicyclomine Shortness Of Breath     Shortness of breath   • Fluoxetine Shortness Of Breath   • Keflex [Cephalexin] Shortness Of Breath   • Toprol Xl [Metoprolol] Anaphylaxis   • Chocolate Flavor Swelling   • Omeprazole-Sodium Bicarbonate Unknown (See  Comments)   • Other      Fresh flowers causes throat swelling   • Amoxicillin Rash   • Aspirin Rash   • Claritin-D 12 Hour [Loratadine-Pseudoephedrine Er] Rash   • Codeine Rash   • Demerol [Meperidine] Rash   • Levaquin [Levofloxacin] Rash     Was told not allergic   • Lipitor [Atorvastatin] Other (See Comments)     cramping   • Macrobid [Nitrofurantoin] Rash   • Morphine And Related Rash   • Paxil [Paroxetine Hcl] Rash   • Sulfa Antibiotics Rash   • Tape Rash   • Tramadol Rash   • Vioxx [Rofecoxib] Rash   • Zegerid [Omeprazole] Rash   • Zoloft [Sertraline Hcl] Rash       Social History     Socioeconomic History   • Marital status:    Tobacco Use   • Smoking status: Former     Packs/day: 0.00     Years: 0.00     Pack years: 0.00     Types: Cigarettes     Start date:      Quit date:      Years since quittin.2   • Smokeless tobacco: Never   Vaping Use   • Vaping Use: Never used   Substance and Sexual Activity   • Alcohol use: No   • Drug use: No   • Sexual activity: Defer     Birth control/protection: Surgical         Current Outpatient Medications   Medication Sig Dispense Refill   • Alcohol Swabs (B-D SINGLE USE SWABS REGULAR) pads      • apixaban (ELIQUIS) 5 MG tablet tablet Take 1 tablet by mouth Every 12 (Twelve) Hours. 180 tablet 3   • Calcium Citrate (CITRACAL PO) Take 2 tablets by mouth Daily As Needed.     • carboxymethylcellulose (REFRESH PLUS) 0.5 % solution Administer 1 drop to both eyes Daily As Needed for Dry Eyes.     • clonazePAM (KlonoPIN) 0.5 MG tablet Take 0.5 mg by mouth 2 (Two) Times a Day As Needed for Anxiety.     • cyanocobalamin 1000 MCG/ML injection Inject 1,000 mcg into the appropriate muscle as directed by prescriber.     • cyclobenzaprine (FLEXERIL) 10 MG tablet Take 1 tablet by mouth 3 (Three) Times a Day As Needed.     • diphenhydrAMINE (BENADRYL) 25 mg capsule Take 25 mg by mouth Every 6 (Six) Hours As Needed.  6   • ferrous sulfate 324 (65 Fe) MG tablet  "delayed-release EC tablet Take 324 mg by mouth Daily With Breakfast.     • furosemide (LASIX) 40 MG tablet Take 40 mg by mouth As Needed.     • levothyroxine (SYNTHROID, LEVOTHROID) 50 MCG tablet Take 50 mcg by mouth daily.  1   • losartan (COZAAR) 25 MG tablet Take 25 mg by mouth Daily.     • metFORMIN (GLUCOPHAGE) 500 MG tablet Take 500 mg by mouth Daily With Breakfast.  1   • ondansetron ODT (ZOFRAN-ODT) 4 MG disintegrating tablet Place 1 tablet on the tongue Every 6 (Six) Hours As Needed for Nausea or Vomiting. 10 tablet 0   • ONE TOUCH ULTRA TEST test strip TEST BID  5   • pantoprazole (PROTONIX) 40 MG EC tablet Take 40 mg by mouth Daily.     • promethazine (PHENERGAN) 25 MG tablet Take 25 mg by mouth Every 6 (Six) Hours As Needed.     • TRUEplus Lancets 28G misc      • verapamil SR (CALAN-SR) 120 MG CR tablet Take 1 tablet by mouth Every Night. 90 tablet 3   • vitamin D (ERGOCALCIFEROL) 36810 UNITS capsule capsule Take 50,000 Units by mouth Every 7 (Seven) Days.  1     No current facility-administered medications for this visit.       Review of Systems   Constitutional: Negative.    HENT: Negative.    Eyes: Negative.    Respiratory: Negative.    Cardiovascular: Negative.    Gastrointestinal: Negative.    Endocrine: Negative.    Genitourinary: Negative.    Musculoskeletal:        Foot pain    Skin: Negative.    Allergic/Immunologic: Negative.    Neurological: Negative.    Hematological: Negative.    Psychiatric/Behavioral: Negative.          OBJECTIVE    /79   Pulse 85   Ht 180.3 cm (71\")   Wt 101 kg (222 lb)   LMP 02/23/1998 (Within Months) Comment: Hysterectomy  SpO2 98%   BMI 30.96 kg/m²     Physical Exam  Vitals reviewed.   Constitutional:       Appearance: Normal appearance. She is well-developed.   HENT:      Head: Normocephalic and atraumatic.   Neck:      Trachea: Trachea and phonation normal.   Cardiovascular:      Pulses:           Dorsalis pedis pulses are 2+ on the right side and 2+ on " the left side.        Posterior tibial pulses are 2+ on the right side and 2+ on the left side.   Pulmonary:      Effort: Pulmonary effort is normal. No respiratory distress.   Abdominal:      General: There is no distension.      Palpations: Abdomen is soft.   Musculoskeletal:        Feet:    Feet:      Right foot:      Protective Sensation: 10 sites tested. 10 sites sensed.      Skin integrity: Skin integrity normal.      Toenail Condition: Right toenails are normal.      Left foot:      Protective Sensation: 10 sites tested. 10 sites sensed.      Skin integrity: Skin integrity normal.      Toenail Condition: Left toenails are normal.   Skin:     General: Skin is warm and dry.   Neurological:      Mental Status: She is alert and oriented to person, place, and time.      GCS: GCS eye subscore is 4. GCS verbal subscore is 5. GCS motor subscore is 6.   Psychiatric:         Speech: Speech normal.         Behavior: Behavior normal. Behavior is cooperative.         Thought Content: Thought content normal.         Judgment: Judgment normal.                Procedures  Right Plantar Fasciits Injection  Date/Time: 03/30/2023  Performed by: Edouard Pérez  Authorized by: Edouard Pérez   Consent: Verbal consent obtained. Written consent obtained.  Risks and benefits: risks, benefits and alternatives were discussed  Consent given by: patient  Patient identity confirmed: verbally with patient  Indications: pain relief    Sedation:  Patient sedated: no    Patient position: sitting  Needle size: 27 G  Local anesthetic: 0.5% Marcaine plain, Kenalog 40 mg/ml , Decadron 4 mg/mL.   Outcome: pain improved  Patient tolerance: Patient tolerated the procedure well with no immediate complications  ASSESSMENT AND PLAN      Diagnoses and all orders for this visit:    1. Plantar fasciitis (Primary)  -     triamcinolone acetonide (KENALOG-40) injection 40 mg    2. Right foot pain  -     triamcinolone acetonide (KENALOG-40) injection 40  mg    After risk benefits and treatment options were discussed with the patient detail proceeded with plantar fascia injection which she tolerated well.  Recommended follow-up in 2 weeks for recheck unless symptoms worsen and contact the office for any worsening symptoms.  Continue to guided home exercise program ice, rest and activity modification based on pain.              This document has been electronically signed by Edouard LERNER, FNP-C, ONP-C on March 30, 2023 13:45 CDT

## 2023-03-31 RX ORDER — APIXABAN 5 MG/1
TABLET, FILM COATED ORAL
Qty: 180 TABLET | Refills: 3 | Status: SHIPPED | OUTPATIENT
Start: 2023-03-31

## 2023-04-10 ENCOUNTER — TELEPHONE (OUTPATIENT)
Dept: CARDIOLOGY | Facility: CLINIC | Age: 68
End: 2023-04-10
Payer: MEDICARE

## 2023-04-10 NOTE — TELEPHONE ENCOUNTER
Andrey Dubon MD [place], Donna L, MA  Phone Number: [phone]     Stress test last year was low risk. Can move up her apt to next available, if unstable symptoms, should go to er           Previous Messages       ----- Message -----   From: Emily Avery MA   Sent: 4/10/2023  11:56 AM CDT   To: Andrey Dubon MD       ----- Message -----   From: Radha Quigley   Sent: 4/10/2023  11:33 AM CDT   To: Emily Avery MA     Having chest pressure off and on and have dyspnea with exertion. Not scheduled to come back until Sept        Patient given an appt for April 13 at 10:45 am. The patient was instructed to report to er for evaluation for worsening/unstable symptoms.

## 2023-04-13 ENCOUNTER — LAB (OUTPATIENT)
Dept: LAB | Facility: HOSPITAL | Age: 68
End: 2023-04-13
Payer: MEDICARE

## 2023-04-13 ENCOUNTER — OFFICE VISIT (OUTPATIENT)
Dept: CARDIOLOGY | Facility: CLINIC | Age: 68
End: 2023-04-13
Payer: MEDICARE

## 2023-04-13 VITALS
WEIGHT: 222.1 LBS | BODY MASS INDEX: 31.09 KG/M2 | HEIGHT: 71 IN | DIASTOLIC BLOOD PRESSURE: 82 MMHG | OXYGEN SATURATION: 98 % | HEART RATE: 75 BPM | SYSTOLIC BLOOD PRESSURE: 124 MMHG

## 2023-04-13 DIAGNOSIS — R07.9 CHEST PAIN, UNSPECIFIED TYPE: Primary | ICD-10-CM

## 2023-04-13 LAB
ALBUMIN SERPL-MCNC: 4.4 G/DL (ref 3.5–5.2)
ALBUMIN/GLOB SERPL: 1.4 G/DL
ALP SERPL-CCNC: 101 U/L (ref 39–117)
ALT SERPL W P-5'-P-CCNC: 19 U/L (ref 1–33)
ANION GAP SERPL CALCULATED.3IONS-SCNC: 8.7 MMOL/L (ref 5–15)
AST SERPL-CCNC: 17 U/L (ref 1–32)
BILIRUB SERPL-MCNC: 0.5 MG/DL (ref 0–1.2)
BUN SERPL-MCNC: 15 MG/DL (ref 8–23)
BUN/CREAT SERPL: 14.3 (ref 7–25)
CALCIUM SPEC-SCNC: 10.3 MG/DL (ref 8.6–10.5)
CHLORIDE SERPL-SCNC: 105 MMOL/L (ref 98–107)
CO2 SERPL-SCNC: 30.3 MMOL/L (ref 22–29)
CREAT SERPL-MCNC: 1.05 MG/DL (ref 0.57–1)
EGFRCR SERPLBLD CKD-EPI 2021: 58.7 ML/MIN/1.73
GLOBULIN UR ELPH-MCNC: 3.1 GM/DL
GLUCOSE SERPL-MCNC: 107 MG/DL (ref 65–99)
POTASSIUM SERPL-SCNC: 4.5 MMOL/L (ref 3.5–5.2)
PROT SERPL-MCNC: 7.5 G/DL (ref 6–8.5)
QT INTERVAL: 362 MS
QTC INTERVAL: 404 MS
SODIUM SERPL-SCNC: 144 MMOL/L (ref 136–145)

## 2023-04-13 PROCEDURE — 80053 COMPREHEN METABOLIC PANEL: CPT | Performed by: INTERNAL MEDICINE

## 2023-04-13 PROCEDURE — 36415 COLL VENOUS BLD VENIPUNCTURE: CPT | Performed by: INTERNAL MEDICINE

## 2023-04-13 RX ORDER — SUCRALFATE 1 G/1
TABLET ORAL EVERY 8 HOURS
COMMUNITY
Start: 2023-03-08

## 2023-04-13 RX ORDER — OMEPRAZOLE 40 MG/1
CAPSULE, DELAYED RELEASE ORAL
COMMUNITY
Start: 2023-02-22

## 2023-04-13 RX ORDER — HYDROCODONE BITARTRATE AND ACETAMINOPHEN 5; 325 MG/1; MG/1
TABLET ORAL
COMMUNITY
Start: 2023-02-15

## 2023-04-13 RX ORDER — NYSTATIN 100000 U/G
CREAM TOPICAL
COMMUNITY
Start: 2023-03-21

## 2023-04-13 NOTE — H&P (VIEW-ONLY)
[facility] Cardiology  OFFICE NOTE    Cardiovascular Medicine  Andrey Dubon M.D., RPVI         No referring provider defined for this encounter.    Thank you for asking me to see Mini Andersen for palpitations.    This is a 68 y.o. female with:    1. Palpitations    2. Pure hypercholesterolemia    3. Obstructive sleep apnea syndrome    4. Deep vein thrombosis (DVT) of femoral vein of both lower extremities, unspecified chronicity (CMS/[facility])    5. Type 2 diabetes mellitus without complication, without long-term current use of insulin (CMS/[facility])          Chief complaint -Palpitations        History of present Illness- 68 y.o.-year-old lady with history of diabetes, hypertension and hyperlipidemia had bilateral knee replacement surgery and subsequently she developed DVT and she had DVT before and after surgery and she is going to be on chronic anticoagulation.      Patient had palpitations after her surgery, when she would get up and walk around.  Most likely sinus tachycardia, no diagnosis of arrhythmias, subsequently patient was started on verapamil with improvement in her symptoms.  However patient is here for follow-up and reported that she has been out of her verapamil for the last 1 month without any recurrence of palpitations.  She will have occasional palpitations at night.  She wants to come off the verapamil at this point currently denying any chest pain or shortness of breath  No bleeding problems from [facility]    04/13/2023:  Patient is here earlier than her expected appointment due to chest pain.  She describes substernal heaviness when she is exerting herself happening almost every day with associated pounding and palpitations.    01/12/2023:  Had recurrence of DVT in November.  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  Occasional palpitations.  No further exertional chest pains.    12/08/2020;  No acute issues  since last visit.  Has occasional palpitations whenever she is exerting though are occasionally in the middle of the night however she has sleep apnea and does not use her CPAP religiously.  No bleeding problems on Eliquis.  No palpitations otherwise.    10/15/2021;  Is having worsening palpitations now at night.  She has not been using her CPAP regularly.  No bleeding problems from Eliquis    05/11/2022:  Patient event Holter monitor since last visit.  Her triggered events are associated normal sinus rhythm.  She did have a few runs of SVT and one episode of nonsustained VT.  Got her started on Cardizem but she could not tolerate it.  Continues to have palpitations.  Also reported occasional chest pain or shortness of breath with exertion.  Has been under a lot of stress    Review of Systems - Constitution: Negative for weakness, weight gain and weight loss.   HENT: Negative for congestion.    Eyes: Negative for blurred vision.   Cardiovascular: As mentioned above  Respiratory: Negative for cough and hemoptysis.    Endocrine: Negative for polydipsia and polyuria.   Hematologic/Lymphatic: Negative for bleeding problem. Does not bruise/bleed easily.   Skin: Negative for flushing.   Musculoskeletal: Negative for neck pain and stiffness.   Gastrointestinal: Negative for abdominal pain, diarrhea, jaundice, melena, nausea and vomiting.   Genitourinary: Negative for dysuria and hematuria.   Neurological: Negative for dizziness, focal weakness and numbness.   Psychiatric/Behavioral: Negative for altered mental status and depression.     I reviewed the ROS as documented here and confirmed the accuracy of it with the patient today. 4/13/2023        All other systems were reviewed and were negative.    family history includes Cancer in her brother; Diabetes in her brother and sister; Heart disease in her brother; Hypertension in her brother; Osteoporosis in her mother.     reports that she quit smoking about 43 years ago. Her  smoking use included cigarettes. She started smoking about 53 years ago. She has never used smokeless tobacco. She reports that she does not drink alcohol and does not use drugs.    Allergies   Allergen Reactions   • Bextra [Valdecoxib] Shortness Of Breath     Shortness of breath   • Cephalosporins Shortness Of Breath   • Detrol La [Tolterodine Tartrate Er] Shortness Of Breath   • Dicyclomine Shortness Of Breath     Shortness of breath   • Fluoxetine Shortness Of Breath   • Keflex [Cephalexin] Shortness Of Breath   • Toprol Xl [Metoprolol] Anaphylaxis   • Chocolate Flavor Swelling   • Omeprazole-Sodium Bicarbonate Unknown (See Comments)   • Other      Fresh flowers causes throat swelling   • Amoxicillin Rash   • Aspirin Rash   • Claritin-D 12 Hour [Loratadine-Pseudoephedrine Er] Rash   • Codeine Rash   • Demerol [Meperidine] Rash   • Levaquin [Levofloxacin] Rash     Was told not allergic   • Lipitor [Atorvastatin] Other (See Comments)     cramping   • Macrobid [Nitrofurantoin] Rash   • Morphine And Related Rash   • Paxil [Paroxetine Hcl] Rash   • Sulfa Antibiotics Rash   • Tape Rash   • Tramadol Rash   • Vioxx [Rofecoxib] Rash   • Zegerid [Omeprazole] Rash   • Zoloft [Sertraline Hcl] Rash         Current Outpatient Medications:   •  Alcohol Swabs (B-D SINGLE USE SWABS REGULAR) pads, , Disp: , Rfl:   •  Calcium Citrate (CITRACAL PO), Take 2 tablets by mouth Daily As Needed., Disp: , Rfl:   •  carboxymethylcellulose (REFRESH PLUS) 0.5 % solution, Administer 1 drop to both eyes Daily As Needed for Dry Eyes., Disp: , Rfl:   •  clonazePAM (KlonoPIN) 0.5 MG tablet, Take 1 tablet by mouth 2 (Two) Times a Day As Needed for Anxiety., Disp: , Rfl:   •  cyanocobalamin 1000 MCG/ML injection, Inject 1 mL into the appropriate muscle as directed by prescriber., Disp: , Rfl:   •  cyclobenzaprine (FLEXERIL) 10 MG tablet, Take 1 tablet by mouth 3 (Three) Times a Day As Needed., Disp: , Rfl:   •  diphenhydrAMINE (BENADRYL) 25 mg  capsule, Take 1 capsule by mouth Every 6 (Six) Hours As Needed., Disp: , Rfl: 6  •  Eliquis 5 MG tablet tablet, TAKE 1 TABLET BY MOUTH EVERY 12 (TWELVE) HOURS., Disp: 180 tablet, Rfl: 3  •  ferrous sulfate 324 (65 Fe) MG tablet delayed-release EC tablet, Take 1 tablet by mouth Daily With Breakfast., Disp: , Rfl:   •  furosemide (LASIX) 40 MG tablet, Take 1 tablet by mouth As Needed., Disp: , Rfl:   •  levothyroxine (SYNTHROID, LEVOTHROID) 50 MCG tablet, Take 1 tablet by mouth Daily., Disp: , Rfl: 1  •  losartan (COZAAR) 25 MG tablet, Take 1 tablet by mouth Daily., Disp: , Rfl:   •  metFORMIN (GLUCOPHAGE) 500 MG tablet, Take 1 tablet by mouth Daily With Breakfast., Disp: , Rfl: 1  •  ondansetron ODT (ZOFRAN-ODT) 4 MG disintegrating tablet, Place 1 tablet on the tongue Every 6 (Six) Hours As Needed for Nausea or Vomiting., Disp: 10 tablet, Rfl: 0  •  ONE TOUCH ULTRA TEST test strip, TEST BID, Disp: , Rfl: 5  •  pantoprazole (PROTONIX) 40 MG EC tablet, Take 1 tablet by mouth Daily., Disp: , Rfl:   •  promethazine (PHENERGAN) 25 MG tablet, Take 1 tablet by mouth Every 6 (Six) Hours As Needed., Disp: , Rfl:   •  sucralfate (Carafate) 1 g tablet, Take  by mouth Every 8 (Eight) Hours., Disp: , Rfl:   •  TRUEplus Lancets 28G misc, , Disp: , Rfl:   •  verapamil SR (CALAN-SR) 120 MG CR tablet, TAKE 1 TABLET EVERY NIGHT, Disp: 90 tablet, Rfl: 1  •  vitamin D (ERGOCALCIFEROL) 10395 UNITS capsule capsule, Take 1 capsule by mouth Every 7 (Seven) Days., Disp: , Rfl: 1  •  HYDROcodone-acetaminophen (NORCO) 5-325 MG per tablet, TAKE 1 TABLET BY MOUTH EVERY 8 (EIGHT) HOURS AS NEEDED FOR PAIN. PRN, Disp: , Rfl:   •  nystatin (MYCOSTATIN) 709499 UNIT/GM cream, , Disp: , Rfl:   •  omeprazole (priLOSEC) 40 MG capsule, , Disp: , Rfl:     Physical Exam:  Vitals:    04/13/23 1041   BP: 124/82   BP Location: Left arm   Patient Position: Sitting   Cuff Size: Adult   Pulse: 75   SpO2: 98%   Weight: 101 kg (222 lb 1.6 oz)   Height: 180.3 cm  "(71\")   PainSc: 0-No pain   PainLoc: Chest     Current Pain Level: none  Pulse Ox: Normal  on room air  General: alert, appears stated age and cooperative     Body Habitus: well-nourished    HEENT: Head: Normocephalic, no lesions, without obvious abnormality. No arcus senilis, xanthelasma or xanthomas.    Neuro: alert, oriented x3  Pulses: 2+ and symmetric  JVP: Volume/Pulsation: Normal.  Normal waveforms.   Appropriate inspiratory decrease.  No Kussmaul's. No Padmaja's.   Carotid Exam: no bruit normal pulsation bilaterally   Carotid Volume: normal.     Respirations: no increased work of breathing   Chest:  Normal    Pulmonary:Normal   Precordium: Normal impulses. P2 is not palpable.  RV Heave: absent  LV Heave: absent  [place]:  normal size and placement  Palpable S4: absent.  Heart rate: normal    Heart Rhythm: regular     Heart Sounds: S1: normal  S2: normal  S3: absent   S4: absent  Opening Snap: absent    Pericardial Rub:  Absent: .    Abdomen:   Appearance: normal .  Palpation: Soft, non-tender to palpation, bowel sounds positive in all four quadrants; no guarding or rebound tenderness  Extremity: no edema.   LE Skin: no rashes  LE Hair:  normal  LE Pulses: well perfused with normal pulses in the distal extremities  Pallor on elevation: Absent. Rubor on dependency: None    I have reexamined the patient and the results are consistent with the previously documented exam. Andrey Dubon MD       DATA REVIEWED:     EKG. I personally reviewed and interpreted the EKG.  Normal sinus rhythm normal EKG.    ECG/EMG Results (all)     None        ---------------------------------------------------  TTE/RORY:  Results for orders placed in visit on 05/11/22    Adult Transthoracic Echo Complete W/ Cont if Necessary Per Protocol    Interpretation Summary  · Left ventricular ejection fraction appears to be 61 - 65%. Left ventricular systolic function is normal.  · Left ventricular diastolic function was normal.  · Estimated right " ventricular systolic pressure from tricuspid regurgitation is normal (<35 mmHg).      --------------------------------------------------------------------------------------------------  LABS:     The 10-year CVD risk score (Viki et al., 2008) is: 23.1%    Values used to calculate the score:      Age: 64 years      Sex: Female      Diabetic: Yes      Tobacco smoker: No      Systolic Blood Pressure: 138 mmHg      Is BP treated: No      HDL Cholesterol: 46 mg/dL      Total Cholesterol: 241 mg/dL         Lab Results   Component Value Date    GLUCOSE 106 (H) 07/21/2019    BUN 10 11/23/2022    CREATININE 0.91 11/23/2022    EGFRIFNONA 77 07/21/2019    EGFRIFAFRI 87 07/12/2021    BCR 13.2 07/21/2019    K 4.2 08/16/2022    CO2 29 08/16/2022    CALCIUM 9.3 08/16/2022    ALBUMIN 4.1 08/16/2022    AST 32 08/16/2022    ALT 31 08/16/2022     Lab Results   Component Value Date    WBC 5.4 10/21/2019    HGB 13.2 10/21/2019    HCT 39.3 10/21/2019    MCV 88.6 10/21/2019     10/21/2019     Lab Results   Component Value Date    CHOL 241 (H) 11/11/2019    CHLPL 220 (H) 06/10/2022    TRIG 150 06/10/2022    HDL 43 06/10/2022     06/10/2022     Lab Results   Component Value Date    TSH 1.58 06/10/2022     Lab Results   Component Value Date    CKTOTAL 74 09/18/2017    CKMB 0.96 09/18/2017    TROPONINI <0.012 09/18/2017     Lab Results   Component Value Date    HGBA1C 6.5 (H) 06/10/2022     Lab Results   Component Value Date    DDIMER 715 (H) 09/01/2015     Lab Results   Component Value Date    ALT 31 08/16/2022     Lab Results   Component Value Date    HGBA1C 6.5 (H) 06/10/2022    HGBA1C 6.1 (H) 07/12/2021    HGBA1C 5.7 12/03/2020     Lab Results   Component Value Date    CREATININE 0.91 11/23/2022     Lab Results   Component Value Date    IRON 36 03/14/2019     Lab Results   Component Value Date    INR 1.02 10/01/2019    INR 1.03 09/18/2017    INR 0.97 09/16/2017    PROTIME 10.6 10/01/2019    PROTIME 13.4 09/18/2017     [facility] 12.8 09/16/2017       Interpretation Summary    · A relatively benign monitor study.  · Patient had a minimum heart of 57 bpm, maximal heart rate of 167 bpm and average heart rate of 88 bpm. Predominant underlying rhythm was sinus rhythm 7 supraventricular tachycardia runs occurred, the run with the fastest interval lasting 5 beats with a max rate of 167 bpm, the longest lasting 9 beats an average rate of 124 bpm.  · PACs were rare less than 1% PVCs were rate less than 1%  · Normal diurnal variation heart rate.  · Diary event of arm neck pain, chest pain, fluttering, anxiety, shortness of breath and lightheadedness were associated with normal sinus rhythm  · One episode of chest pounding was associated with sinus tachycardia.  · Patient did not have triggered events with her SVT.  · Patient had total of 13 triggered events which were associated with normal sinus rhythm or sinus tachycardia.     Blood work from [facility].    CMP was unremarkable.  No recently seen    Assessment/Plan     1.  Chest pain: She has risk factors of hypertension, hyperlipidemia, diabetes.  On anticoagulation with Xarelto for recurrent DVT.  We did a nuclear stress test last year for nonsustained ventricular tachycardia which was low risk.  However patient is having recurrent symptoms which are concerning for angina.  I discussed option of cardiac cath versus CTA with the patient, patient reported she had a cath several years ago with Dr. Nunes and did not have a good experience that she wants to pursue CTA for now.  We will arrange for CTA afib normal and she is in need a cardiac cath.  Advised her to seek medical attention if significant chest pain nonresolving.  Continue Eliquis/Cardizem.  We will start aspirin of significant CAD on CTA.    2.  Palpitations:  Monitor was benign, she did have a few SVTs but she is asymptomatic with these.  Triggered events are associated normal sinus rhythm.  Recent TSH electrolytes are  okay  Palpitations and exertional sinus tachycardia in and that at the night mostly from her sleep apnea.  Advised her to exercise on regular basis, weight loss and religiously use her CPAP  back on verapamil.  She could not tolerate Cardizem.  Since she had nonsustained VT, complaining of worsening chest pain or shortness of breath.  We performed a nuclear stress test which was low risk study.  Echocardiogram showed preserved LV systolic and diastolic function.     2.  Diabetes on metformin and she is doing okay and follows with Dr. Aquino     3.  Hyperlipidemia had dizziness from Lipitor.  Will try Crestor in the setting of her elevated ASCVD risk score     4.  Osteoarthritis status post knee replacement doing okay     5.  Hypothyroidism on Synthroid supplements.    6.  Hypertension:  Well-controlled on current regimen losartan 25 mg and verapamil    7.  DVT:  On chronic anticoagulation with Eliquis.  Recent CBC with normal hemoglobin  She has an IVC filter from before, referred her to surgery for potential retrieval of her IVC but she is recommended to keep it in place.  And recurrent DVT in October.  She was also noted to have a small fistula right profunda to common femoral vein.  Has been treated conservatively.      Prevention:  Patient's Body mass index is 30.98 kg/m². BMI is above normal parameters. Recommendations include: exercise counseling and nutrition counseling.      Mini Andersen is a former smoker      AAA Screening:   Not needed          This document has been electronically signed by Andrey Dubon MD on April 13, 2023 10:47 CDT

## 2023-04-13 NOTE — PROGRESS NOTES
[facility] Cardiology  OFFICE NOTE    Cardiovascular Medicine  Andrey Dubon M.D., RPVI         No referring provider defined for this encounter.    Thank you for asking me to see Mini Andersen for palpitations.    This is a 68 y.o. female with:    1. Palpitations    2. Pure hypercholesterolemia    3. Obstructive sleep apnea syndrome    4. Deep vein thrombosis (DVT) of femoral vein of both lower extremities, unspecified chronicity (CMS/[facility])    5. Type 2 diabetes mellitus without complication, without long-term current use of insulin (CMS/[facility])          Chief complaint -Palpitations        History of present Illness- 68 y.o.-year-old lady with history of diabetes, hypertension and hyperlipidemia had bilateral knee replacement surgery and subsequently she developed DVT and she had DVT before and after surgery and she is going to be on chronic anticoagulation.      Patient had palpitations after her surgery, when she would get up and walk around.  Most likely sinus tachycardia, no diagnosis of arrhythmias, subsequently patient was started on verapamil with improvement in her symptoms.  However patient is here for follow-up and reported that she has been out of her verapamil for the last 1 month without any recurrence of palpitations.  She will have occasional palpitations at night.  She wants to come off the verapamil at this point currently denying any chest pain or shortness of breath  No bleeding problems from [facility]    04/13/2023:  Patient is here earlier than her expected appointment due to chest pain.  She describes substernal heaviness when she is exerting herself happening almost every day with associated pounding and palpitations.    01/12/2023:  Had recurrence of DVT in November.  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  Occasional palpitations.  No further exertional chest pains.    12/08/2020;  No acute issues  since last visit.  Has occasional palpitations whenever she is exerting though are occasionally in the middle of the night however she has sleep apnea and does not use her CPAP religiously.  No bleeding problems on Eliquis.  No palpitations otherwise.    10/15/2021;  Is having worsening palpitations now at night.  She has not been using her CPAP regularly.  No bleeding problems from Eliquis    05/11/2022:  Patient event Holter monitor since last visit.  Her triggered events are associated normal sinus rhythm.  She did have a few runs of SVT and one episode of nonsustained VT.  Got her started on Cardizem but she could not tolerate it.  Continues to have palpitations.  Also reported occasional chest pain or shortness of breath with exertion.  Has been under a lot of stress    Review of Systems - Constitution: Negative for weakness, weight gain and weight loss.   HENT: Negative for congestion.    Eyes: Negative for blurred vision.   Cardiovascular: As mentioned above  Respiratory: Negative for cough and hemoptysis.    Endocrine: Negative for polydipsia and polyuria.   Hematologic/Lymphatic: Negative for bleeding problem. Does not bruise/bleed easily.   Skin: Negative for flushing.   Musculoskeletal: Negative for neck pain and stiffness.   Gastrointestinal: Negative for abdominal pain, diarrhea, jaundice, melena, nausea and vomiting.   Genitourinary: Negative for dysuria and hematuria.   Neurological: Negative for dizziness, focal weakness and numbness.   Psychiatric/Behavioral: Negative for altered mental status and depression.     I reviewed the ROS as documented here and confirmed the accuracy of it with the patient today. 4/13/2023        All other systems were reviewed and were negative.    family history includes Cancer in her brother; Diabetes in her brother and sister; Heart disease in her brother; Hypertension in her brother; Osteoporosis in her mother.     reports that she quit smoking about 43 years ago. Her  smoking use included cigarettes. She started smoking about 53 years ago. She has never used smokeless tobacco. She reports that she does not drink alcohol and does not use drugs.    Allergies   Allergen Reactions   • Bextra [Valdecoxib] Shortness Of Breath     Shortness of breath   • Cephalosporins Shortness Of Breath   • Detrol La [Tolterodine Tartrate Er] Shortness Of Breath   • Dicyclomine Shortness Of Breath     Shortness of breath   • Fluoxetine Shortness Of Breath   • Keflex [Cephalexin] Shortness Of Breath   • Toprol Xl [Metoprolol] Anaphylaxis   • Chocolate Flavor Swelling   • Omeprazole-Sodium Bicarbonate Unknown (See Comments)   • Other      Fresh flowers causes throat swelling   • Amoxicillin Rash   • Aspirin Rash   • Claritin-D 12 Hour [Loratadine-Pseudoephedrine Er] Rash   • Codeine Rash   • Demerol [Meperidine] Rash   • Levaquin [Levofloxacin] Rash     Was told not allergic   • Lipitor [Atorvastatin] Other (See Comments)     cramping   • Macrobid [Nitrofurantoin] Rash   • Morphine And Related Rash   • Paxil [Paroxetine Hcl] Rash   • Sulfa Antibiotics Rash   • Tape Rash   • Tramadol Rash   • Vioxx [Rofecoxib] Rash   • Zegerid [Omeprazole] Rash   • Zoloft [Sertraline Hcl] Rash         Current Outpatient Medications:   •  Alcohol Swabs (B-D SINGLE USE SWABS REGULAR) pads, , Disp: , Rfl:   •  Calcium Citrate (CITRACAL PO), Take 2 tablets by mouth Daily As Needed., Disp: , Rfl:   •  carboxymethylcellulose (REFRESH PLUS) 0.5 % solution, Administer 1 drop to both eyes Daily As Needed for Dry Eyes., Disp: , Rfl:   •  clonazePAM (KlonoPIN) 0.5 MG tablet, Take 1 tablet by mouth 2 (Two) Times a Day As Needed for Anxiety., Disp: , Rfl:   •  cyanocobalamin 1000 MCG/ML injection, Inject 1 mL into the appropriate muscle as directed by prescriber., Disp: , Rfl:   •  cyclobenzaprine (FLEXERIL) 10 MG tablet, Take 1 tablet by mouth 3 (Three) Times a Day As Needed., Disp: , Rfl:   •  diphenhydrAMINE (BENADRYL) 25 mg  capsule, Take 1 capsule by mouth Every 6 (Six) Hours As Needed., Disp: , Rfl: 6  •  Eliquis 5 MG tablet tablet, TAKE 1 TABLET BY MOUTH EVERY 12 (TWELVE) HOURS., Disp: 180 tablet, Rfl: 3  •  ferrous sulfate 324 (65 Fe) MG tablet delayed-release EC tablet, Take 1 tablet by mouth Daily With Breakfast., Disp: , Rfl:   •  furosemide (LASIX) 40 MG tablet, Take 1 tablet by mouth As Needed., Disp: , Rfl:   •  levothyroxine (SYNTHROID, LEVOTHROID) 50 MCG tablet, Take 1 tablet by mouth Daily., Disp: , Rfl: 1  •  losartan (COZAAR) 25 MG tablet, Take 1 tablet by mouth Daily., Disp: , Rfl:   •  metFORMIN (GLUCOPHAGE) 500 MG tablet, Take 1 tablet by mouth Daily With Breakfast., Disp: , Rfl: 1  •  ondansetron ODT (ZOFRAN-ODT) 4 MG disintegrating tablet, Place 1 tablet on the tongue Every 6 (Six) Hours As Needed for Nausea or Vomiting., Disp: 10 tablet, Rfl: 0  •  ONE TOUCH ULTRA TEST test strip, TEST BID, Disp: , Rfl: 5  •  pantoprazole (PROTONIX) 40 MG EC tablet, Take 1 tablet by mouth Daily., Disp: , Rfl:   •  promethazine (PHENERGAN) 25 MG tablet, Take 1 tablet by mouth Every 6 (Six) Hours As Needed., Disp: , Rfl:   •  sucralfate (Carafate) 1 g tablet, Take  by mouth Every 8 (Eight) Hours., Disp: , Rfl:   •  TRUEplus Lancets 28G misc, , Disp: , Rfl:   •  verapamil SR (CALAN-SR) 120 MG CR tablet, TAKE 1 TABLET EVERY NIGHT, Disp: 90 tablet, Rfl: 1  •  vitamin D (ERGOCALCIFEROL) 95649 UNITS capsule capsule, Take 1 capsule by mouth Every 7 (Seven) Days., Disp: , Rfl: 1  •  HYDROcodone-acetaminophen (NORCO) 5-325 MG per tablet, TAKE 1 TABLET BY MOUTH EVERY 8 (EIGHT) HOURS AS NEEDED FOR PAIN. PRN, Disp: , Rfl:   •  nystatin (MYCOSTATIN) 939967 UNIT/GM cream, , Disp: , Rfl:   •  omeprazole (priLOSEC) 40 MG capsule, , Disp: , Rfl:     Physical Exam:  Vitals:    04/13/23 1041   BP: 124/82   BP Location: Left arm   Patient Position: Sitting   Cuff Size: Adult   Pulse: 75   SpO2: 98%   Weight: 101 kg (222 lb 1.6 oz)   Height: 180.3 cm  "(71\")   PainSc: 0-No pain   PainLoc: Chest     Current Pain Level: none  Pulse Ox: Normal  on room air  General: alert, appears stated age and cooperative     Body Habitus: well-nourished    HEENT: Head: Normocephalic, no lesions, without obvious abnormality. No arcus senilis, xanthelasma or xanthomas.    Neuro: alert, oriented x3  Pulses: 2+ and symmetric  JVP: Volume/Pulsation: Normal.  Normal waveforms.   Appropriate inspiratory decrease.  No Kussmaul's. No Padmaja's.   Carotid Exam: no bruit normal pulsation bilaterally   Carotid Volume: normal.     Respirations: no increased work of breathing   Chest:  Normal    Pulmonary:Normal   Precordium: Normal impulses. P2 is not palpable.  RV Heave: absent  LV Heave: absent  [place]:  normal size and placement  Palpable S4: absent.  Heart rate: normal    Heart Rhythm: regular     Heart Sounds: S1: normal  S2: normal  S3: absent   S4: absent  Opening Snap: absent    Pericardial Rub:  Absent: .    Abdomen:   Appearance: normal .  Palpation: Soft, non-tender to palpation, bowel sounds positive in all four quadrants; no guarding or rebound tenderness  Extremity: no edema.   LE Skin: no rashes  LE Hair:  normal  LE Pulses: well perfused with normal pulses in the distal extremities  Pallor on elevation: Absent. Rubor on dependency: None    I have reexamined the patient and the results are consistent with the previously documented exam. Andrey Dubon MD       DATA REVIEWED:     EKG. I personally reviewed and interpreted the EKG.  Normal sinus rhythm normal EKG.    ECG/EMG Results (all)     None        ---------------------------------------------------  TTE/RORY:  Results for orders placed in visit on 05/11/22    Adult Transthoracic Echo Complete W/ Cont if Necessary Per Protocol    Interpretation Summary  · Left ventricular ejection fraction appears to be 61 - 65%. Left ventricular systolic function is normal.  · Left ventricular diastolic function was normal.  · Estimated right " ventricular systolic pressure from tricuspid regurgitation is normal (<35 mmHg).      --------------------------------------------------------------------------------------------------  LABS:     The 10-year CVD risk score (Viki et al., 2008) is: 23.1%    Values used to calculate the score:      Age: 64 years      Sex: Female      Diabetic: Yes      Tobacco smoker: No      Systolic Blood Pressure: 138 mmHg      Is BP treated: No      HDL Cholesterol: 46 mg/dL      Total Cholesterol: 241 mg/dL         Lab Results   Component Value Date    GLUCOSE 106 (H) 07/21/2019    BUN 10 11/23/2022    CREATININE 0.91 11/23/2022    EGFRIFNONA 77 07/21/2019    EGFRIFAFRI 87 07/12/2021    BCR 13.2 07/21/2019    K 4.2 08/16/2022    CO2 29 08/16/2022    CALCIUM 9.3 08/16/2022    ALBUMIN 4.1 08/16/2022    AST 32 08/16/2022    ALT 31 08/16/2022     Lab Results   Component Value Date    WBC 5.4 10/21/2019    HGB 13.2 10/21/2019    HCT 39.3 10/21/2019    MCV 88.6 10/21/2019     10/21/2019     Lab Results   Component Value Date    CHOL 241 (H) 11/11/2019    CHLPL 220 (H) 06/10/2022    TRIG 150 06/10/2022    HDL 43 06/10/2022     06/10/2022     Lab Results   Component Value Date    TSH 1.58 06/10/2022     Lab Results   Component Value Date    CKTOTAL 74 09/18/2017    CKMB 0.96 09/18/2017    TROPONINI <0.012 09/18/2017     Lab Results   Component Value Date    HGBA1C 6.5 (H) 06/10/2022     Lab Results   Component Value Date    DDIMER 715 (H) 09/01/2015     Lab Results   Component Value Date    ALT 31 08/16/2022     Lab Results   Component Value Date    HGBA1C 6.5 (H) 06/10/2022    HGBA1C 6.1 (H) 07/12/2021    HGBA1C 5.7 12/03/2020     Lab Results   Component Value Date    CREATININE 0.91 11/23/2022     Lab Results   Component Value Date    IRON 36 03/14/2019     Lab Results   Component Value Date    INR 1.02 10/01/2019    INR 1.03 09/18/2017    INR 0.97 09/16/2017    PROTIME 10.6 10/01/2019    PROTIME 13.4 09/18/2017     [facility] 12.8 09/16/2017       Interpretation Summary    · A relatively benign monitor study.  · Patient had a minimum heart of 57 bpm, maximal heart rate of 167 bpm and average heart rate of 88 bpm. Predominant underlying rhythm was sinus rhythm 7 supraventricular tachycardia runs occurred, the run with the fastest interval lasting 5 beats with a max rate of 167 bpm, the longest lasting 9 beats an average rate of 124 bpm.  · PACs were rare less than 1% PVCs were rate less than 1%  · Normal diurnal variation heart rate.  · Diary event of arm neck pain, chest pain, fluttering, anxiety, shortness of breath and lightheadedness were associated with normal sinus rhythm  · One episode of chest pounding was associated with sinus tachycardia.  · Patient did not have triggered events with her SVT.  · Patient had total of 13 triggered events which were associated with normal sinus rhythm or sinus tachycardia.     Blood work from [facility].    CMP was unremarkable.  No recently seen    Assessment/Plan     1.  Chest pain: She has risk factors of hypertension, hyperlipidemia, diabetes.  On anticoagulation with Xarelto for recurrent DVT.  We did a nuclear stress test last year for nonsustained ventricular tachycardia which was low risk.  However patient is having recurrent symptoms which are concerning for angina.  I discussed option of cardiac cath versus CTA with the patient, patient reported she had a cath several years ago with Dr. Nunes and did not have a good experience that she wants to pursue CTA for now.  We will arrange for CTA afib normal and she is in need a cardiac cath.  Advised her to seek medical attention if significant chest pain nonresolving.  Continue Eliquis/Cardizem.  We will start aspirin of significant CAD on CTA.    2.  Palpitations:  Monitor was benign, she did have a few SVTs but she is asymptomatic with these.  Triggered events are associated normal sinus rhythm.  Recent TSH electrolytes are  okay  Palpitations and exertional sinus tachycardia in and that at the night mostly from her sleep apnea.  Advised her to exercise on regular basis, weight loss and religiously use her CPAP  back on verapamil.  She could not tolerate Cardizem.  Since she had nonsustained VT, complaining of worsening chest pain or shortness of breath.  We performed a nuclear stress test which was low risk study.  Echocardiogram showed preserved LV systolic and diastolic function.     2.  Diabetes on metformin and she is doing okay and follows with Dr. Aquino     3.  Hyperlipidemia had dizziness from Lipitor.  Will try Crestor in the setting of her elevated ASCVD risk score     4.  Osteoarthritis status post knee replacement doing okay     5.  Hypothyroidism on Synthroid supplements.    6.  Hypertension:  Well-controlled on current regimen losartan 25 mg and verapamil    7.  DVT:  On chronic anticoagulation with Eliquis.  Recent CBC with normal hemoglobin  She has an IVC filter from before, referred her to surgery for potential retrieval of her IVC but she is recommended to keep it in place.  And recurrent DVT in October.  She was also noted to have a small fistula right profunda to common femoral vein.  Has been treated conservatively.      Prevention:  Patient's Body mass index is 30.98 kg/m². BMI is above normal parameters. Recommendations include: exercise counseling and nutrition counseling.      Mini Andersen is a former smoker      AAA Screening:   Not needed          This document has been electronically signed by Andrey Dubon MD on April 13, 2023 10:47 CDT

## 2023-04-26 ENCOUNTER — HOSPITAL ENCOUNTER (OUTPATIENT)
Dept: CT IMAGING | Facility: HOSPITAL | Age: 68
Discharge: HOME | End: 2023-04-26
Payer: MEDICARE

## 2023-04-26 VITALS
RESPIRATION RATE: 20 BRPM | DIASTOLIC BLOOD PRESSURE: 64 MMHG | HEART RATE: 72 BPM | OXYGEN SATURATION: 98 % | SYSTOLIC BLOOD PRESSURE: 137 MMHG

## 2023-04-26 PROCEDURE — 25510000001 IOPAMIDOL PER 1 ML: Performed by: INTERNAL MEDICINE

## 2023-04-26 PROCEDURE — 63710000001 NITROGLYCERIN 0.4 MG SUBLINGUAL TABLET: Performed by: RADIOLOGY

## 2023-04-26 PROCEDURE — 75574 CT ANGIO HRT W/3D IMAGE: CPT

## 2023-04-26 PROCEDURE — A9270 NON-COVERED ITEM OR SERVICE: HCPCS | Performed by: RADIOLOGY

## 2023-04-26 RX ORDER — NITROGLYCERIN 0.4 MG/1
TABLET SUBLINGUAL AS NEEDED
Status: COMPLETED | OUTPATIENT
Start: 2023-04-26 | End: 2023-04-26

## 2023-04-26 RX ORDER — NITROGLYCERIN 0.4 MG/1
TABLET SUBLINGUAL
Status: DISPENSED
Start: 2023-04-26 | End: 2023-04-26

## 2023-04-26 RX ADMIN — NITROGLYCERIN 0.4 MG: 0.4 TABLET, ORALLY DISINTEGRATING SUBLINGUAL at 09:00

## 2023-04-26 RX ADMIN — IOPAMIDOL 100 ML: 755 INJECTION, SOLUTION INTRAVENOUS at 09:27

## 2023-04-27 ENCOUNTER — PREP FOR SURGERY (OUTPATIENT)
Dept: OTHER | Facility: HOSPITAL | Age: 68
End: 2023-04-27
Payer: MEDICARE

## 2023-04-27 DIAGNOSIS — R07.9 CHEST PAIN, UNSPECIFIED TYPE: Primary | ICD-10-CM

## 2023-04-27 RX ORDER — SODIUM CHLORIDE 9 MG/ML
50 INJECTION, SOLUTION INTRAVENOUS CONTINUOUS
OUTPATIENT
Start: 2023-04-27 | End: 2023-04-27

## 2023-04-27 RX ORDER — SODIUM CHLORIDE 0.9 % (FLUSH) 0.9 %
3 SYRINGE (ML) INJECTION EVERY 12 HOURS SCHEDULED
OUTPATIENT
Start: 2023-04-27

## 2023-04-27 RX ORDER — SODIUM CHLORIDE 0.9 % (FLUSH) 0.9 %
10 SYRINGE (ML) INJECTION AS NEEDED
OUTPATIENT
Start: 2023-04-27

## 2023-04-28 ENCOUNTER — TELEPHONE (OUTPATIENT)
Dept: CARDIOLOGY | Facility: CLINIC | Age: 68
End: 2023-04-28
Payer: MEDICARE

## 2023-04-28 NOTE — TELEPHONE ENCOUNTER
----- Message from Andrey Dubon MD sent at 4/27/2023  4:04 PM CDT -----  Needs cath. Orders are in. Can do the 9th  ----- Message -----  From: Interface, Rad Results [place] In  Sent: 4/26/2023   1:51 PM CDT  To: Andrey Dubon MD      Patient has been scheduled for [facility] on may 9. She was instructed to hold eliquis 24 hours prior and to hold the morning of her procedure. She was also instructed to hold metformin 2 days before , the day of, and two days after. All other instructions were discussed with the patient.

## 2023-05-09 ENCOUNTER — HOSPITAL ENCOUNTER (OUTPATIENT)
Facility: HOSPITAL | Age: 68
Setting detail: HOSPITAL OUTPATIENT SURGERY
Discharge: HOME | End: 2023-05-09
Attending: INTERNAL MEDICINE | Admitting: INTERNAL MEDICINE
Payer: MEDICARE

## 2023-05-09 ENCOUNTER — TELEPHONE (OUTPATIENT)
Dept: CARDIOLOGY | Facility: CLINIC | Age: 68
End: 2023-05-09
Payer: MEDICARE

## 2023-05-09 VITALS
RESPIRATION RATE: 18 BRPM | DIASTOLIC BLOOD PRESSURE: 54 MMHG | HEIGHT: 72 IN | BODY MASS INDEX: 30.49 KG/M2 | SYSTOLIC BLOOD PRESSURE: 117 MMHG | OXYGEN SATURATION: 96 % | TEMPERATURE: 97.9 F | WEIGHT: 225.09 LBS | HEART RATE: 79 BPM

## 2023-05-09 DIAGNOSIS — R07.9 CHEST PAIN, UNSPECIFIED TYPE: ICD-10-CM

## 2023-05-09 LAB
ANION GAP SERPL CALCULATED.3IONS-SCNC: 12 MMOL/L (ref 5–15)
BUN SERPL-MCNC: 15 MG/DL (ref 8–23)
BUN/CREAT SERPL: 16.3 (ref 7–25)
CALCIUM SPEC-SCNC: 9.5 MG/DL (ref 8.6–10.5)
CHLORIDE SERPL-SCNC: 106 MMOL/L (ref 98–107)
CO2 SERPL-SCNC: 24 MMOL/L (ref 22–29)
CREAT SERPL-MCNC: 0.92 MG/DL (ref 0.57–1)
DEPRECATED RDW RBC AUTO: 47.3 FL (ref 37–54)
EGFRCR SERPLBLD CKD-EPI 2021: 68.8 ML/MIN/1.73
ERYTHROCYTE [DISTWIDTH] IN BLOOD BY AUTOMATED COUNT: 14.1 % (ref 12.3–15.4)
GLUCOSE SERPL-MCNC: 92 MG/DL (ref 65–99)
HCT VFR BLD AUTO: 39.2 % (ref 34–46.6)
HGB BLD-MCNC: 12.4 G/DL (ref 12–15.9)
INR PPP: 1.03 (ref 0.8–1.2)
MCH RBC QN AUTO: 28.8 PG (ref 26.6–33)
MCHC RBC AUTO-ENTMCNC: 31.6 G/DL (ref 31.5–35.7)
MCV RBC AUTO: 91.2 FL (ref 79–97)
PLATELET # BLD AUTO: 146 10*3/MM3 (ref 140–450)
PMV BLD AUTO: 10.1 FL (ref 6–12)
POTASSIUM SERPL-SCNC: 4.4 MMOL/L (ref 3.5–5.2)
PROTHROMBIN TIME: 13.4 SECONDS (ref 11.1–15.3)
RBC # BLD AUTO: 4.3 10*6/MM3 (ref 3.77–5.28)
SODIUM SERPL-SCNC: 142 MMOL/L (ref 136–145)
WBC NRBC COR # BLD: 5.02 10*3/MM3 (ref 3.4–10.8)

## 2023-05-09 PROCEDURE — 85610 PROTHROMBIN TIME: CPT | Performed by: INTERNAL MEDICINE

## 2023-05-09 PROCEDURE — 25010000002 FENTANYL CITRATE (PF) 50 MCG/ML SOLUTION: Performed by: INTERNAL MEDICINE

## 2023-05-09 PROCEDURE — C1769 GUIDE WIRE: HCPCS | Performed by: INTERNAL MEDICINE

## 2023-05-09 PROCEDURE — 25010000002 MIDAZOLAM PER 1 MG: Performed by: INTERNAL MEDICINE

## 2023-05-09 PROCEDURE — 93458 L HRT ARTERY/VENTRICLE ANGIO: CPT | Performed by: INTERNAL MEDICINE

## 2023-05-09 PROCEDURE — 80048 BASIC METABOLIC PNL TOTAL CA: CPT | Performed by: INTERNAL MEDICINE

## 2023-05-09 PROCEDURE — 25510000001 IOPAMIDOL PER 1 ML: Performed by: INTERNAL MEDICINE

## 2023-05-09 PROCEDURE — 85027 COMPLETE CBC AUTOMATED: CPT | Performed by: INTERNAL MEDICINE

## 2023-05-09 PROCEDURE — C1894 INTRO/SHEATH, NON-LASER: HCPCS | Performed by: INTERNAL MEDICINE

## 2023-05-09 RX ORDER — ACETAMINOPHEN 325 MG/1
650 TABLET ORAL EVERY 4 HOURS PRN
Status: DISCONTINUED | OUTPATIENT
Start: 2023-05-09 | End: 2023-05-09 | Stop reason: HOSPADM

## 2023-05-09 RX ORDER — SODIUM CHLORIDE 9 MG/ML
125 INJECTION, SOLUTION INTRAVENOUS CONTINUOUS
Status: DISCONTINUED | OUTPATIENT
Start: 2023-05-09 | End: 2023-05-09 | Stop reason: HOSPADM

## 2023-05-09 RX ORDER — SODIUM CHLORIDE 0.9 % (FLUSH) 0.9 %
3 SYRINGE (ML) INJECTION EVERY 12 HOURS SCHEDULED
Status: DISCONTINUED | OUTPATIENT
Start: 2023-05-09 | End: 2023-05-09 | Stop reason: HOSPADM

## 2023-05-09 RX ORDER — SODIUM CHLORIDE 0.9 % (FLUSH) 0.9 %
10 SYRINGE (ML) INJECTION AS NEEDED
Status: DISCONTINUED | OUTPATIENT
Start: 2023-05-09 | End: 2023-05-09 | Stop reason: HOSPADM

## 2023-05-09 RX ORDER — NITROGLYCERIN 5 MG/ML
INJECTION, SOLUTION INTRAVENOUS
Status: DISCONTINUED | OUTPATIENT
Start: 2023-05-09 | End: 2023-05-09 | Stop reason: HOSPADM

## 2023-05-09 RX ORDER — LIDOCAINE HYDROCHLORIDE 20 MG/ML
INJECTION, SOLUTION EPIDURAL; INFILTRATION; INTRACAUDAL; PERINEURAL
Status: DISCONTINUED | OUTPATIENT
Start: 2023-05-09 | End: 2023-05-09 | Stop reason: HOSPADM

## 2023-05-09 RX ORDER — MIDAZOLAM HYDROCHLORIDE 1 MG/ML
INJECTION INTRAMUSCULAR; INTRAVENOUS
Status: DISCONTINUED | OUTPATIENT
Start: 2023-05-09 | End: 2023-05-09 | Stop reason: HOSPADM

## 2023-05-09 RX ORDER — FENTANYL CITRATE 50 UG/ML
INJECTION, SOLUTION INTRAMUSCULAR; INTRAVENOUS
Status: DISCONTINUED | OUTPATIENT
Start: 2023-05-09 | End: 2023-05-09 | Stop reason: HOSPADM

## 2023-05-09 RX ORDER — SODIUM CHLORIDE 9 MG/ML
50 INJECTION, SOLUTION INTRAVENOUS CONTINUOUS
Status: DISCONTINUED | OUTPATIENT
Start: 2023-05-09 | End: 2023-05-09

## 2023-05-09 RX ADMIN — SODIUM CHLORIDE 50 ML/HR: 9 INJECTION, SOLUTION INTRAVENOUS at 08:28

## 2023-05-09 NOTE — BRIEF OP NOTE
Cardiac cath showed mild nonobstructive disease  Normal left-sided filling pressures    Medical management.

## 2023-05-09 NOTE — INTERVAL H&P NOTE
Patient continues to have chest pain with multiple risk factors.  Symptoms are concerning for exertional angina.  We attempted to perform a CTA.  However the RCA and left circumflex had limited evaluation due to motion artifact.  In the setting of recurrent chest pain with multiple risk factors we discussed about cardiac cath for definitive evaluation.    [facility] Pre-Op:    Invasive coronary angiography was recommended to the patient.  The patientdenies  bleeding issues. The patient reports use of antiplatelet agents.  The patient denies  CKD. The patient denies  contrast allergy. The patient reports use of diabetes medications.    Pre-Cath Surgical History: NA    The indications, risks/benefits and alternatives of diagnostic left heart cardiac catheterization, angiography, conscious sedation, and possible blood transfusion were discussed in detail with the patient. The potential complications of 1/2000 chance of death, 1/1000 chance of heart attack or stroke, 1/500 chance of bleeding or clotting of the femoral artery, and 1/500 chance of allergic reaction to contrast were discussed. We also reviewed possible complications of infection and kidney dysfunction. If PCI were performed and intra-coronary stents indicated, we discussed the details about ECHO. This included a review of the risks of the infrequent, but relatively higher incidence of late thrombosis with ECHO. The importance of maintaining a consistent daily regimen of aspirin and an additional anti-platelet agent for as long as directed after implantation was emphasized. No contraindications were found. The patient  appeared to understand and agreed to the above.  -Left heart catheterization, Coronary angiography, Graft angiography, In-situ LIMA angiography, Left ventriculography, Intravascular ultrasound, Optical coherence tomography, Flow wire, Balloon Angioplasty, Coronary stent, Graft stent, Aortography, Iliofemoral angiography, Device closure, femoral  artery, Intra-aortic balloon pump, Impella LV assist device implantation, Transvenous pacemaker, Pericardiocentesis, and Subclavian angiography  -hold oral diabetic medications the morning of surgery  -Hold NOAC for 28 hours    ASA Class: III  Mallampati Score: II      Contraindications to DAPT: None    H&P reviewed. The patient was examined and there are no changes to the H&P.

## 2023-05-09 NOTE — Clinical Note
Hemostasis started on the right radial artery. R-Band was used in achieving hemostasis. Radial compression device applied to vessel. Hemostasis achieved successfully. Closure device additional comment: 13

## 2023-05-09 NOTE — TELEPHONE ENCOUNTER
[address]i [address] RegSched Rep  [place], [place]  Phone Number:  [phone] (Call me)     Wants call back about when she can start taking Eliquis again/call back #[phone]   Patient advised she may resume eliquis tomorrow per dr. Dubon.

## 2023-05-15 ENCOUNTER — TELEPHONE (OUTPATIENT)
Dept: CARDIOLOGY | Facility: CLINIC | Age: 68
End: 2023-05-15
Payer: MEDICARE

## 2023-05-15 NOTE — TELEPHONE ENCOUNTER
Yi Lane RegSched Rep  [place], [place]  Phone Number:  [phone] (Call me)     Wants call back about some yellow stuff coming out of her incisions where she had heart cath, wants to ask if it is normal call back # [phone]    Patient will be seen for wound check tomorrow at 9 am per cassy resendiz.

## 2023-05-19 ENCOUNTER — DOCUMENTATION (OUTPATIENT)
Dept: CARDIAC REHAB | Facility: HOSPITAL | Age: 68
End: 2023-05-19
Payer: MEDICARE

## 2023-05-24 ENCOUNTER — APPOINTMENT (OUTPATIENT)
Dept: ULTRASOUND IMAGING | Facility: HOSPITAL | Age: 68
End: 2023-05-24
Payer: MEDICARE

## 2023-05-24 ENCOUNTER — HOSPITAL ENCOUNTER (EMERGENCY)
Facility: HOSPITAL | Age: 68
Discharge: HOME | End: 2023-05-25
Attending: EMERGENCY MEDICINE
Payer: MEDICARE

## 2023-05-24 DIAGNOSIS — M79.89 ARM SWELLING: Primary | ICD-10-CM

## 2023-05-24 PROCEDURE — 99282 EMERGENCY DEPT VISIT SF MDM: CPT

## 2023-05-24 PROCEDURE — 93971 EXTREMITY STUDY: CPT

## 2023-05-25 VITALS
BODY MASS INDEX: 31.36 KG/M2 | HEIGHT: 71 IN | SYSTOLIC BLOOD PRESSURE: 139 MMHG | TEMPERATURE: 98.3 F | RESPIRATION RATE: 16 BRPM | DIASTOLIC BLOOD PRESSURE: 61 MMHG | HEART RATE: 74 BPM | WEIGHT: 224 LBS | OXYGEN SATURATION: 96 %

## 2023-05-25 NOTE — ED NOTES
Pt presents to the ED with complaints of right sided arm pain and swelling. Pt reports having a heart cath done two weeks ago and thinks that she may have a blood clot. Pt is currently on blood thinners due to previous blood clots in her legs. Pt states that she hasnt had anything for pain since yesterday at 5pm.

## 2023-05-25 NOTE — ED PROVIDER NOTES
Subjective   History of Present Illness  Pt presents with R arm swelling and pain x 1 week.   Patient had heart cath 2 weeks ago, entry site at right wrist  Has hx of blood clots, on anticoagulation. Reports compliance with eliquis bid, denies missing any doses.   Pain and swelling in upper right arm.   Pain is aching. Feeling tightness in arm.     Has UTI currently, taking cipro     Upper Extremity Issue  Location:  Arm  Arm location:  R arm  Injury: no    Pain details:     Quality:  Aching    Radiates to:  Does not radiate    Severity:  Mild    Onset quality:  Gradual    Duration:  1 week    Timing:  Constant (constant for last 3 days)    Progression:  Worsening  Handedness:  Right-handed  Relieved by:  Narcotics  Worsened by:  Movement  Associated symptoms: back pain    Associated symptoms: no fever, no numbness and no tingling    Associated symptoms comment:  Back pain is burning, located across upper back  Risk factors: no recent illness        Review of Systems   Constitutional: Negative for fever.   Respiratory: Positive for shortness of breath (at baseline).    Cardiovascular: Negative for chest pain.   Gastrointestinal: Positive for diarrhea (chronic) and nausea. Negative for abdominal pain, blood in stool and vomiting.   Genitourinary: Positive for dysuria.   Musculoskeletal: Positive for back pain.   Skin: Negative for rash.   Neurological: Positive for light-headedness and headaches. Negative for numbness.       Past Medical History:   Diagnosis Date   • Acid reflux    • Acute peritonitis    • Allergic rhinitis    • Arthritis    • Bee sting    • Blood clot in vein, right leg    • Borderline glaucoma    • Bunion    • Callus    • Chronic bronchitis    • Constipation     severe with an immotile colon      • Deep venous thrombosis    • High cholesterol    • History of transfusion    • Hypothyroidism    • Ingrown toenail    • Intestinal volvulus    • Muscle atrophy     O/E   • Nuclear senile cataract    •  Nuclear senile cataract    • Plantar fasciitis    • Polyneuropathy in diabetes    • PONV (postoperative nausea and vomiting)    • Sleep apnea    • Type 2 diabetes mellitus     NO BDR   • Type 2 diabetes mellitus without complications    • Unspecified disease of nail        Allergies   Allergen Reactions   • Bextra [Valdecoxib] Shortness Of Breath     Shortness of breath   • Cephalosporins Shortness Of Breath   • Detrol La [Tolterodine Tartrate Er] Shortness Of Breath   • Dicyclomine Shortness Of Breath     Shortness of breath   • Fluoxetine Shortness Of Breath   • Keflex [Cephalexin] Shortness Of Breath   • Toprol Xl [Metoprolol] Anaphylaxis   • Chocolate Flavor Swelling   • Omeprazole-Sodium Bicarbonate Unknown (See Comments)   • Other      Fresh flowers causes throat swelling   • Amoxicillin Rash   • Aspirin Rash   • Claritin-D 12 Hour [Loratadine-Pseudoephedrine Er] Rash   • Codeine Rash   • Demerol [Meperidine] Rash   • Levaquin [Levofloxacin] Rash     Was told not allergic   • Lipitor [Atorvastatin] Other (See Comments)     cramping   • Macrobid [Nitrofurantoin] Rash   • Morphine And Related Rash   • Paxil [Paroxetine Hcl] Rash   • Sulfa Antibiotics Rash   • Tape Rash   • Tramadol Rash   • Vioxx [Rofecoxib] Rash   • Zegerid [Omeprazole] Rash   • Zoloft [Sertraline Hcl] Rash       Past Surgical History:   Procedure Laterality Date   • APPENDECTOMY     • BLADDER SUSPENSION     • CARDIAC CATHETERIZATION N/A 5/9/2023    Procedure: Left Heart Cath;  Surgeon: Andrey Dubon MD;  Location: [facility] CATH INVASIVE LOCATION;  Service: Cardiology;  Laterality: N/A;   • CHOLECYSTECTOMY     • COLECTOMY PARTIAL / TOTAL  05/22/2014    Subtotal colectomy with ineo-rectostomy.   • ENDOSCOPY N/A 05/01/2018    Procedure: ESOPHAGOGASTRODUODENOSCOPY;  Surgeon: Carlos Eduardo Cyr DO;  Location: [facility] ENDOSCOPY;  Service: Gastroenterology   • ENDOSCOPY N/A 03/26/2019    Procedure: ESOPHAGOGASTRODUODENOSCOPY;  Surgeon: Carlos Eduardo Cyr  DO KASANDRA;  Location: [facility] ENDOSCOPY;  Service: Gastroenterology   • ENDOSCOPY N/A 05/05/2020    Procedure: ESOPHAGOGASTRODUODENOSCOPY;  Surgeon: Carlos Eduardo Cyr DO;  Location: [facility] OR;  Service: Gastroenterology;  Laterality: N/A;   • ENDOSCOPY N/A 03/18/2021    Procedure: ESOPHAGOGASTRODUODENOSCOPY;  Surgeon: Carlos Eduardo Cyr DO;  Location: [facility] ENDOSCOPY;  Service: Gastroenterology;  Laterality: N/A;   • ENDOSCOPY N/A 07/14/2022    Procedure: ESOPHAGOGASTRODUODENOSCOPY 9:00;  Surgeon: Carlos Eduardo Cyr DO;  Location: [facility] ENDOSCOPY;  Service: Gastroenterology;  Laterality: N/A;   • HERNIA REPAIR      multiple   • HYSTERECTOMY     • INJECTION OF MEDICATION  12/07/2010    DEPO MEDROL   • INTERVENTIONAL RADIOLOGY PROCEDURE Right 10/27/2022    Procedure: IR venogram lower extremity right;  Surgeon: Arley Randhawa MD;  Location: [facility] ANGIO INVASIVE LOCATION;  Service: Interventional Radiology;  Laterality: Right;   • JOINT REPLACEMENT Left 07/01/2017    knee   • KNEE ARTHROSCOPY Left    • LAPAROSCOPIC CHOLECYSTECTOMY     • SALPINGO OOPHORECTOMY Left    • SALPINGO OOPHORECTOMY Right    • SIGMOIDOSCOPY N/A 03/26/2019    Procedure: SIGMOIDOSCOPY FLEXIBLE;  Surgeon: Carlos Eduardo Cyr DO;  Location: [facility] ENDOSCOPY;  Service: Gastroenterology   • SIGMOIDOSCOPY N/A 12/1/2022    Procedure: SIGMOIDOSCOPY FLEXIBLE;  Surgeon: Carlos Eduardo Cyr DO;  Location: [facility] ENDOSCOPY;  Service: Gastroenterology;  Laterality: N/A;   • TOTAL KNEE ARTHROPLASTY Left 07/24/2017    Procedure: TOTAL KNEE ARTHROPLASTY ATTUNE with adductor canal block;  Surgeon: Jose Ballesteros MD;  Location: [facility] OR;  Service:        Family History   Problem Relation Age of Onset   • Osteoporosis Mother    • Diabetes Sister    • Cancer Brother    • Heart disease Brother    • Diabetes Brother    • Hypertension Brother        Social History     Socioeconomic History   • Marital status:    Tobacco Use   • Smoking status:  Former     Packs/day: 0.00     Years: 0.00     Pack years: 0.00     Types: Cigarettes     Start date:      Quit date: 1980     Years since quittin.4   • Smokeless tobacco: Never   Vaping Use   • Vaping Use: Never used   Substance and Sexual Activity   • Alcohol use: No   • Drug use: No   • Sexual activity: Defer     Birth control/protection: Surgical           Objective   Physical Exam  Constitutional:       General: She is not in acute distress.  HENT:      Head: Normocephalic and atraumatic.   Cardiovascular:      Rate and Rhythm: Normal rate and regular rhythm.   Pulmonary:      Effort: Pulmonary effort is normal. No respiratory distress.   Abdominal:      Palpations: Abdomen is soft.      Tenderness: There is no abdominal tenderness.   Musculoskeletal:      Right lower leg: No edema.      Left lower leg: No edema.      Comments: No appreciated swelling of right UE compared to left on physical exam    Skin:     General: Skin is warm.   Neurological:      Mental Status: She is alert. Mental status is at baseline.   Psychiatric:         Mood and Affect: Mood normal.         Behavior: Behavior normal.         Procedures           ED Course  ED Course as of 23 0131   Wed May 24, 2023   2351 US Venous Doppler Upper Extremity Right (duplex) [JS]   Thu May 25, 2023   0013 US Venous Doppler Upper Extremity Right (duplex) [JS]      ED Course User Index  [JS] Cole Miles MD                                           Medical Decision Making  US Venous Doppler Upper Extremity Right (duplex)    Result Date: 2023  Normal right upper extremity venous duplex ultrasound.  No evidence of DVT.     Patient remained hemodynamically stable throughout ED encounter. Return precautions discussed.  Patient to follow-up with PCP and cardiology for further monitoring.    Arm swelling: acute illness or injury  Amount and/or Complexity of Data Reviewed  External Data Reviewed: radiology.  Radiology: ordered.  Decision-making details documented in ED Course.          Final diagnoses:   Arm swelling       ED Disposition  ED Disposition     ED Disposition   Discharge    Condition   Stable    Comment   --             MundoMartha watkins, APRN  [address] [address]  [phone]    Schedule an appointment as soon as possible for a visit in 3 days           Medication List      No changes were made to your prescriptions during this visit.         This document has been electronically signed by Cole Miles MD on May 25, 2023 01:31 CDT       Cole Miles MD  Resident  05/25/23 0131

## 2023-06-30 PROBLEM — R07.89 CHEST PAIN, ATYPICAL: Status: ACTIVE | Noted: 2023-06-30

## 2023-07-28 ENCOUNTER — HOSPITAL ENCOUNTER (EMERGENCY)
Facility: HOSPITAL | Age: 68
Discharge: HOME | End: 2023-07-29
Attending: EMERGENCY MEDICINE
Payer: MEDICARE

## 2023-07-28 DIAGNOSIS — U07.1 COVID-19 VIRUS INFECTION: Primary | ICD-10-CM

## 2023-07-28 PROCEDURE — 99283 EMERGENCY DEPT VISIT LOW MDM: CPT

## 2023-07-29 ENCOUNTER — APPOINTMENT (OUTPATIENT)
Dept: GENERAL RADIOLOGY | Facility: HOSPITAL | Age: 68
End: 2023-07-29
Payer: MEDICARE

## 2023-07-29 VITALS
HEART RATE: 80 BPM | RESPIRATION RATE: 18 BRPM | BODY MASS INDEX: 31.36 KG/M2 | DIASTOLIC BLOOD PRESSURE: 88 MMHG | TEMPERATURE: 98.7 F | OXYGEN SATURATION: 95 % | SYSTOLIC BLOOD PRESSURE: 169 MMHG | HEIGHT: 71 IN | WEIGHT: 224 LBS

## 2023-07-29 LAB
ALBUMIN SERPL-MCNC: 3.8 G/DL (ref 3.5–5.2)
ALBUMIN/GLOB SERPL: 1 G/DL
ALP SERPL-CCNC: 99 U/L (ref 39–117)
ALT SERPL W P-5'-P-CCNC: 20 U/L (ref 1–33)
ANION GAP SERPL CALCULATED.3IONS-SCNC: 11 MMOL/L (ref 5–15)
AST SERPL-CCNC: 21 U/L (ref 1–32)
BASOPHILS # BLD AUTO: 0.02 10*3/MM3 (ref 0–0.2)
BASOPHILS NFR BLD AUTO: 0.4 % (ref 0–1.5)
BILIRUB SERPL-MCNC: 0.3 MG/DL (ref 0–1.2)
BUN SERPL-MCNC: 7 MG/DL (ref 8–23)
BUN/CREAT SERPL: 8.8 (ref 7–25)
CALCIUM SPEC-SCNC: 9.4 MG/DL (ref 8.6–10.5)
CHLORIDE SERPL-SCNC: 102 MMOL/L (ref 98–107)
CO2 SERPL-SCNC: 28 MMOL/L (ref 22–29)
CREAT SERPL-MCNC: 0.8 MG/DL (ref 0.57–1)
DEPRECATED RDW RBC AUTO: 44.5 FL (ref 37–54)
EGFRCR SERPLBLD CKD-EPI 2021: 81.4 ML/MIN/1.73
EOSINOPHIL # BLD AUTO: 0.31 10*3/MM3 (ref 0–0.4)
EOSINOPHIL NFR BLD AUTO: 6.6 % (ref 0.3–6.2)
ERYTHROCYTE [DISTWIDTH] IN BLOOD BY AUTOMATED COUNT: 13.4 % (ref 12.3–15.4)
GLOBULIN UR ELPH-MCNC: 3.8 GM/DL
GLUCOSE SERPL-MCNC: 104 MG/DL (ref 65–99)
HCT VFR BLD AUTO: 40.3 % (ref 34–46.6)
HGB BLD-MCNC: 12.5 G/DL (ref 12–15.9)
HOLD SPECIMEN: NORMAL
IMM GRANULOCYTES # BLD AUTO: 0.01 10*3/MM3 (ref 0–0.05)
IMM GRANULOCYTES NFR BLD AUTO: 0.2 % (ref 0–0.5)
LYMPHOCYTES # BLD AUTO: 1.19 10*3/MM3 (ref 0.7–3.1)
LYMPHOCYTES NFR BLD AUTO: 25.2 % (ref 19.6–45.3)
MCH RBC QN AUTO: 28.1 PG (ref 26.6–33)
MCHC RBC AUTO-ENTMCNC: 31 G/DL (ref 31.5–35.7)
MCV RBC AUTO: 90.6 FL (ref 79–97)
MONOCYTES # BLD AUTO: 0.51 10*3/MM3 (ref 0.1–0.9)
MONOCYTES NFR BLD AUTO: 10.8 % (ref 5–12)
NEUTROPHILS NFR BLD AUTO: 2.68 10*3/MM3 (ref 1.7–7)
NEUTROPHILS NFR BLD AUTO: 56.8 % (ref 42.7–76)
NRBC BLD AUTO-RTO: 0 /100 WBC (ref 0–0.2)
PLATELET # BLD AUTO: 151 10*3/MM3 (ref 140–450)
PMV BLD AUTO: 10.7 FL (ref 6–12)
POTASSIUM SERPL-SCNC: 3.9 MMOL/L (ref 3.5–5.2)
PROT SERPL-MCNC: 7.6 G/DL (ref 6–8.5)
RBC # BLD AUTO: 4.45 10*6/MM3 (ref 3.77–5.28)
SODIUM SERPL-SCNC: 141 MMOL/L (ref 136–145)
WBC NRBC COR # BLD: 4.72 10*3/MM3 (ref 3.4–10.8)
WHOLE BLOOD HOLD COAG: NORMAL

## 2023-07-29 PROCEDURE — 71045 X-RAY EXAM CHEST 1 VIEW: CPT

## 2023-07-29 PROCEDURE — 85025 COMPLETE CBC W/AUTO DIFF WBC: CPT | Performed by: EMERGENCY MEDICINE

## 2023-07-29 PROCEDURE — 80053 COMPREHEN METABOLIC PANEL: CPT | Performed by: EMERGENCY MEDICINE

## 2023-07-29 RX ORDER — SODIUM CHLORIDE 0.9 % (FLUSH) 0.9 %
10 SYRINGE (ML) INJECTION AS NEEDED
Status: DISCONTINUED | OUTPATIENT
Start: 2023-07-29 | End: 2023-07-29 | Stop reason: HOSPADM

## 2023-07-29 RX ORDER — GUAIFENESIN 600 MG/1
1200 TABLET, EXTENDED RELEASE ORAL 2 TIMES DAILY PRN
Qty: 14 TABLET | Refills: 0 | Status: SHIPPED | OUTPATIENT
Start: 2023-07-29

## 2023-07-29 RX ORDER — ALBUTEROL SULFATE 90 UG/1
2 AEROSOL, METERED RESPIRATORY (INHALATION) EVERY 4 HOURS PRN
Qty: 6.7 G | Refills: 0 | Status: SHIPPED | OUTPATIENT
Start: 2023-07-29 | End: 2023-07-29 | Stop reason: SDUPTHER

## 2023-07-29 RX ORDER — DEXTROMETHORPHAN HYDROBROMIDE AND PROMETHAZINE HYDROCHLORIDE 15; 6.25 MG/5ML; MG/5ML
5 SYRUP ORAL 4 TIMES DAILY PRN
Qty: 118 ML | Refills: 0 | Status: SHIPPED | OUTPATIENT
Start: 2023-07-29

## 2023-07-29 RX ORDER — DEXTROMETHORPHAN HYDROBROMIDE AND PROMETHAZINE HYDROCHLORIDE 15; 6.25 MG/5ML; MG/5ML
5 SYRUP ORAL 4 TIMES DAILY PRN
Qty: 118 ML | Refills: 0 | Status: SHIPPED | OUTPATIENT
Start: 2023-07-29 | End: 2023-07-29 | Stop reason: SDUPTHER

## 2023-07-29 RX ORDER — ALBUTEROL SULFATE 90 UG/1
2 AEROSOL, METERED RESPIRATORY (INHALATION) EVERY 4 HOURS PRN
Qty: 6.7 G | Refills: 0 | Status: SHIPPED | OUTPATIENT
Start: 2023-07-29

## 2023-07-29 RX ORDER — GUAIFENESIN 600 MG/1
1200 TABLET, EXTENDED RELEASE ORAL 2 TIMES DAILY PRN
Qty: 14 TABLET | Refills: 0 | Status: SHIPPED | OUTPATIENT
Start: 2023-07-29 | End: 2023-07-29 | Stop reason: SDUPTHER

## 2023-07-29 NOTE — ED PROVIDER NOTES
Subjective   History of Present Illness  67yo female pmh significant htn/hyperlipidemia/hypothyroid/cad/simone/recurrent DVT with chronic anticoagulation, presents ED c/o 3d hx congestion/chills.  Pt seen urgent care earlier today with COVID19(+) test.  Pt referred ED for counseling and possible paxlovid prescription.  ROS neg fever/soa/chest pain/nausea/vomiting.    History provided by:  Patient  URI  Presenting symptoms: congestion and cough      Review of Systems   Constitutional: Negative.    HENT:  Positive for congestion.    Eyes: Negative.    Respiratory:  Positive for cough.    Cardiovascular: Negative.    Gastrointestinal: Negative.    Genitourinary: Negative.    Musculoskeletal: Negative.    Skin: Negative.    Allergic/Immunologic: Negative for immunocompromised state.   All other systems reviewed and are negative.    Past Medical History:   Diagnosis Date    Acid reflux     Acute peritonitis     Allergic rhinitis     Arthritis     Bee sting     Blood clot in vein, right leg     Borderline glaucoma     Bunion     Callus     Chronic bronchitis     Constipation     severe with an immotile colon       Deep venous thrombosis     DVT (deep venous thrombosis)     High cholesterol     History of transfusion     Hypothyroidism     Ingrown toenail     Intestinal volvulus     Muscle atrophy     O/E    Nuclear senile cataract     Nuclear senile cataract     Plantar fasciitis     Polyneuropathy in diabetes     PONV (postoperative nausea and vomiting)     Sleep apnea     Type 2 diabetes mellitus     NO BDR    Type 2 diabetes mellitus without complications     Unspecified disease of nail        Allergies   Allergen Reactions    Bextra [Valdecoxib] Shortness Of Breath     Shortness of breath    Cephalosporins Shortness Of Breath    Detrol La [Tolterodine Tartrate Er] Shortness Of Breath    Dicyclomine Shortness Of Breath     Shortness of breath    Fluoxetine Shortness Of Breath    Keflex [Cephalexin] Shortness Of Breath     Toprol Xl [Metoprolol] Anaphylaxis    Chocolate Flavor Swelling    Omeprazole-Sodium Bicarbonate Unknown (See Comments)    Other      Fresh flowers causes throat swelling    Amoxicillin Rash    Aspirin Rash    Claritin-D 12 Hour [Loratadine-Pseudoephedrine Er] Rash    Codeine Rash    Demerol [Meperidine] Rash    Levaquin [Levofloxacin] Rash     Was told not allergic    Lipitor [Atorvastatin] Other (See Comments)     cramping    Morphine And Related Rash    Paxil [Paroxetine Hcl] Rash    Sulfa Antibiotics Rash    Tape Rash    Tramadol Rash    Vioxx [Rofecoxib] Rash    Zegerid [Omeprazole] Rash    Zoloft [Sertraline Hcl] Rash       Past Surgical History:   Procedure Laterality Date    APPENDECTOMY      BLADDER SUSPENSION      CARDIAC CATHETERIZATION N/A 5/9/2023    Procedure: Left Heart Cath;  Surgeon: Andrey Dubon MD;  Location: [facility] CATH INVASIVE LOCATION;  Service: Cardiology;  Laterality: N/A;    CHOLECYSTECTOMY      COLECTOMY PARTIAL / TOTAL  05/22/2014    Subtotal colectomy with ineo-rectostomy.    ENDOSCOPY N/A 05/01/2018    Procedure: ESOPHAGOGASTRODUODENOSCOPY;  Surgeon: Carlos Eduardo Cyr DO;  Location: [facility] ENDOSCOPY;  Service: Gastroenterology    ENDOSCOPY N/A 03/26/2019    Procedure: ESOPHAGOGASTRODUODENOSCOPY;  Surgeon: Carlos Eduardo Cyr DO;  Location: [facility] ENDOSCOPY;  Service: Gastroenterology    ENDOSCOPY N/A 05/05/2020    Procedure: ESOPHAGOGASTRODUODENOSCOPY;  Surgeon: Carlos Eduardo Cyr DO;  Location: [facility] OR;  Service: Gastroenterology;  Laterality: N/A;    ENDOSCOPY N/A 03/18/2021    Procedure: ESOPHAGOGASTRODUODENOSCOPY;  Surgeon: Carlos Eduardo Cyr DO;  Location: [facility] ENDOSCOPY;  Service: Gastroenterology;  Laterality: N/A;    ENDOSCOPY N/A 07/14/2022    Procedure: ESOPHAGOGASTRODUODENOSCOPY 9:00;  Surgeon: Carlos Eduardo Cyr DO;  Location: [facility] ENDOSCOPY;  Service: Gastroenterology;  Laterality: N/A;    HERNIA REPAIR      multiple    HYSTERECTOMY      INJECTION OF MEDICATION   2010    DEPO MEDROL    INTERVENTIONAL RADIOLOGY PROCEDURE Right 10/27/2022    Procedure: IR venogram lower extremity right;  Surgeon: Arley Randhawa MD;  Location: [facility] ANGIO INVASIVE LOCATION;  Service: Interventional Radiology;  Laterality: Right;    JOINT REPLACEMENT Left 2017    knee    KNEE ARTHROSCOPY Left     LAPAROSCOPIC CHOLECYSTECTOMY      SALPINGO OOPHORECTOMY Left     SALPINGO OOPHORECTOMY Right     SIGMOIDOSCOPY N/A 2019    Procedure: SIGMOIDOSCOPY FLEXIBLE;  Surgeon: Carlos Eduardo Cyr DO;  Location: [facility] ENDOSCOPY;  Service: Gastroenterology    SIGMOIDOSCOPY N/A 2022    Procedure: SIGMOIDOSCOPY FLEXIBLE;  Surgeon: Carlos Eduardo Cyr DO;  Location: [facility] ENDOSCOPY;  Service: Gastroenterology;  Laterality: N/A;    TOTAL KNEE ARTHROPLASTY Left 2017    Procedure: TOTAL KNEE ARTHROPLASTY ATTUNE with adductor canal block;  Surgeon: Jose Ballesteros MD;  Location: [facility] OR;  Service:        Family History   Problem Relation Age of Onset    Osteoporosis Mother     Diabetes Sister     Cancer Brother     Heart disease Brother     Diabetes Brother     Hypertension Brother        Social History     Socioeconomic History    Marital status:    Tobacco Use    Smoking status: Former     Packs/day: 0.00     Years: 0.00     Pack years: 0.00     Types: Cigarettes     Start date:      Quit date:      Years since quittin.6    Smokeless tobacco: Never   Vaping Use    Vaping Use: Never used   Substance and Sexual Activity    Alcohol use: No    Drug use: No    Sexual activity: Defer     Birth control/protection: Surgical           Objective   Physical Exam  Vitals and nursing note reviewed.   Constitutional:       Appearance: Normal appearance.   HENT:      Head: Normocephalic and atraumatic.      Right Ear: External ear normal.      Left Ear: External ear normal.      Nose: Nose normal.      Mouth/Throat:      Mouth: Mucous membranes are moist.       Pharynx: Oropharynx is clear.   Eyes:      Pupils: Pupils are equal, round, and reactive to light.   Cardiovascular:      Rate and Rhythm: Normal rate and regular rhythm.      Pulses: Normal pulses.      Heart sounds: Normal heart sounds. No murmur heard.    No friction rub. No gallop.   Pulmonary:      Effort: Pulmonary effort is normal. No respiratory distress.      Breath sounds: Normal breath sounds. No wheezing, rhonchi or rales.   Abdominal:      General: Abdomen is flat. Bowel sounds are normal. There is no distension.      Palpations: Abdomen is soft.      Tenderness: There is no abdominal tenderness. There is no guarding or rebound.   Musculoskeletal:         General: No swelling or deformity.      Cervical back: Normal range of motion and neck supple. No rigidity.      Right lower leg: No edema.      Left lower leg: No edema.   Lymphadenopathy:      Cervical: No cervical adenopathy.   Skin:     General: Skin is warm.   Neurological:      General: No focal deficit present.      Mental Status: She is alert and oriented to person, place, and time.      GCS: GCS eye subscore is 4. GCS verbal subscore is 5. GCS motor subscore is 6.       Procedures           ED Course      Labs Reviewed   COMPREHENSIVE METABOLIC PANEL - Abnormal; Notable for the following components:       Result Value    Glucose 104 (*)     BUN 7 (*)     All other components within normal limits    Narrative:     GFR Normal >60  Chronic Kidney Disease <60  Kidney Failure <15     CBC WITH AUTO DIFFERENTIAL - Abnormal; Notable for the following components:    MCHC 31.0 (*)     Eosinophil % 6.6 (*)     All other components within normal limits   CBC AND DIFFERENTIAL    Narrative:     The following orders were created for panel order CBC & Differential.  Procedure                               Abnormality         Status                     ---------                               -----------         ------                     CBC Auto  Differential[[id number]]        Abnormal            Final result                 Please view results for these tests on the individual orders.   EXTRA TUBES    Narrative:     The following orders were created for panel order Extra Tubes.  Procedure                               Abnormality         Status                     ---------                               -----------         ------                     Gold Top - SST[[id number]]                                   In process                 Light Blue Top[[id number]]                                   In process                   Please view results for these tests on the individual orders.   GOLD TOP - SST   LIGHT BLUE TOP     CT Abdomen Pelvis With Contrast    Result Date: 7/19/2023  Narrative: Recurrent urinary tract infection CT urogram with 100 cc of Omnipaque and reconstructed 3-D projected images: There is a nodule in the posterior segment of the left lower lobe that is been seen on earlier studies and is being followed The liver has a homogeneous normal appearance . The gallbladder has been removed . The common duct is small . No pancreatic mass or inflammation . The spleen and adrenal glands are normal . The kidneys do not have any stones, hydronephrosis, or perinephric edema . The ureters are unremarkable and no stones or other obstructing lesion . The right kidney has focal areas of cortical thinning in the upper and lower poles and in the upper medial pole there  is very little residual cortex and an enlarged adjacent atelectasis no retroperitoneal adenopathy or mass . There are wallstents in the common iliac veins and there is a filter in the IVC The bladder is adequately distended and no mural thickening or mass is seen . The uterus and ovaries are not seen and have presumably been removed . There are changes of partial colectomy .    Impression: 1. The left kidney, ureters, and bladder have a normal appearance 2. The right kidney has focal areas of  parenchymal thinning with mild blunting and enlargement of the upper medial calyx; these changes are long-standing and not  recent [id number]    XR Chest 1 View    Result Date: 7/29/2023  Narrative: Comparison: None FINDINGS: No focal consolidation, pleural effusion, or pneumothorax.  Cardiomediastinal silhouette is unremarkable.                                         Medical Decision Making  Labs/radiographic studies reviewed.  CXR: no active disease.  CBC/CMP: unremarkable.  Sao2 95% RA.  AFVSS.  No evidence respiratory insufficiency.  Patient counseled regarding hx of recurrent VTE requiring lifelong anticoagulation in setting of COVID19 with mild illness, paxlovid contraindicated secondary to interaction with NOAC et concern for bleeding complications.  Would not recommend withholding NOAC at this time.  Pt to be treated symptomatically at this time; pt agreeable to treatment recommendation.  Stable DC.    Problems Addressed:  COVID-19 virus infection: complicated acute illness or injury    Amount and/or Complexity of Data Reviewed  Labs: ordered.  Radiology: ordered.    Risk  OTC drugs.  Prescription drug management.        Final diagnoses:   COVID-19 virus infection       ED Disposition  ED Disposition       ED Disposition   Discharge    Condition   Good    Comment   --               Martha Flower, APRN  [address] [address]  [phone]    In 1 week           Medication List        New Prescriptions      albuterol sulfate  (90 Base) MCG/ACT inhaler  Commonly known as: PROVENTIL HFA;VENTOLIN HFA;PROAIR HFA  Inhale 2 puffs Every 4 (Four) Hours As Needed for Shortness of Air or Wheezing.     guaiFENesin 600 MG 12 hr tablet  Commonly known as: MUCINEX  Take 2 tablets by mouth 2 (Two) Times a Day As Needed for Cough or Congestion.     promethazine-dextromethorphan 6.25-15 MG/5ML syrup  Commonly known as: PROMETHAZINE-DM  Take 5 mL by mouth 4 (Four) Times a Day As Needed for  Cough.               Where to Get Your Medications        These medications were sent to [facility] - [place], [place] - [address] - [phone]  - [phone] FX  [address], [facility] [address]      Phone: [phone]   albuterol sulfate  (90 Base) MCG/ACT inhaler  guaiFENesin 600 MG 12 hr tablet  promethazine-dextromethorphan 6.25-15 MG/5ML syrup            Dong Hayes MD  07/29/23 0227

## 2023-07-29 NOTE — DISCHARGE INSTRUCTIONS
Return ED fever, chest pain, shortness of air, vomiting, dehydration, worse condition, any other concerns  Self isolation per CDC guidelines as directed/isolate for 10 days from 1st day of symptoms

## (undated) DEVICE — PINNACLE INTRODUCER SHEATH: Brand: PINNACLE

## (undated) DEVICE — 3M™ DURAPORE™ SURGICAL TAPE 1538-3, 3 INCH X 10 YARD (7,5CM X 9,1M), 4 ROLLS/BOX: Brand: 3M™ DURAPORE™

## (undated) DEVICE — RADIFOCUS GLIDEWIRE: Brand: GLIDEWIRE

## (undated) DEVICE — BNDG ELAS ELITE V/CLOSE 6IN 5YD LF STRL

## (undated) DEVICE — SOL IRR NACL 0.9PCT 3000ML

## (undated) DEVICE — MODEL AT P65, P/N 701554-001KIT CONTENTS: HAND CONTROLLER, 3-WAY HIGH-PRESSURE STOPCOCK WITH ROTATING END AND PREMIUM HIGH-PRESSURE TUBING: Brand: ANGIOTOUCH® KIT

## (undated) DEVICE — CANN SMPL SOFTECH BIFLO ETCO2 A/M 7FT

## (undated) DEVICE — DISPOSABLE TOURNIQUET CUFF SINGLE BLADDER, DUAL PORT AND QUICK CONNECT CONNECTOR: Brand: COLOR CUFF

## (undated) DEVICE — NO-SCRATCH ™ SMALL WHITNEY CURETTE ™ IS A SINGLE-USE, PLASTIC CURETTE FOR QUICKLY APPLYING, MANIPULATING AND REMOVING BONE CEMENT DURING HIP AND KNEE REPLACEMENT SURGERY. THE PLASTIC IS SOFTER THAN STEEL INSTRUMENTS, REDUCING THE RISK OF DAMAGING THE PROSTHESIS WITH METAL INSTRUMENTS.  THE CURETTE’S 6MM TIP REMOVES EXCESS CEMENT FROM REPLACEMENT HIPS AND KNEES. EASY-TO-MANEUVER, THE SMALL BLUE CURETTE LETS YOU REMOVE CEMENT FROM ALL EDGES OF THE PROSTHESIS.NO-SCRATCH WHITNEY SMALL CURETTE FEATURES:SAFER THAN STEEL- MADE OF PLASTIC - STURDY YET SOFTER THAN SURGICAL STEEL.HANDIER- EACH TOOL HAS A MOLDED-IN THUMB INDENTATION INSTANTLY ORIENTING THE TOOL.- EASIER TO MANEUVER IN HARD TO SEE PLACES.- COLOR-CODED FOR EASY IDENTIFICATION.FASTER- COMES INDIVIDUALLY PACKAGED IN STERILE, PEEL OPEN POUCH, READY TO GO.- APPLIES, MANIPULATES, OR REMOVES CEMENT WITH FINGERTIP PRECISION.ECONOMICAL- THE COST OF A SINGLE REVISION DWARFS THE COST OF A SINGLE-USE CURETTE. - DISPOSABLE – THERE’S NO NEED TO WASTE TIME REMOVING HARDENED CEMENT OR RE-STERILIZING TOOLS.- LESS EXPENSIVE TO BUY AND INVENTORY - ORDER ONLY THE TOOL YOU USE.- PACKAGED 25 INDIVIDUALLY WRAPPED TOOLS TO A CARTON FOR CONVENIENT SHELF STORAGE.: Brand: WHITNEY NO-SCRATCH CURETTE (SMALL)

## (undated) DEVICE — ELECTRODE,RT,STRESS,FOAM,50PK: Brand: MEDLINE

## (undated) DEVICE — STPLR SKIN VISISTAT WD 35CT

## (undated) DEVICE — GOWN,PREVENTION PLUS,XLONG/XLARGE,STRL: Brand: MEDLINE

## (undated) DEVICE — BITEBLOCK ENDO W/STRAP 60F A/ LF DISP

## (undated) DEVICE — KT DRN EVAC WND PVC PCH WTROC RND 10F400

## (undated) DEVICE — HOOD, PEEL-AWAY: Brand: FLYTE

## (undated) DEVICE — GOWN,AURORA,NOREINF,RAGLAN,XL,STERILE: Brand: MEDLINE

## (undated) DEVICE — DRSNG GZ CURAD XEROFORM NONADHR OVERWRAP 5X9IN

## (undated) DEVICE — MODEL BT2000 P/N 700287-012KIT CONTENTS: MANIFOLD WITH SALINE AND CONTRAST PORTS, SALINE TUBING WITH SPIKE AND HAND SYRINGE, TRANSDUCER: Brand: BT2000 AUTOMATED MANIFOLD KIT

## (undated) DEVICE — GLV SURG TRIUMPH NATURAL W/ALOE PF LTX 6 STRL

## (undated) DEVICE — SPNG DRN AMD EXCILON 6PLY 4X4IN PK/2

## (undated) DEVICE — PAD GRND REM POLYHESIVE A/ DISP

## (undated) DEVICE — PEEL-AWAY TOGA, 2X LARGE: Brand: FLYTE

## (undated) DEVICE — CATHETER,URETHRAL,REDRUBBER,STRL,16FR: Brand: MEDLINE

## (undated) DEVICE — GW PERIPH GUIDERIGHT STD/EXCHNG/J/TIP SS 0.035IN 5X260CM

## (undated) DEVICE — GLV SURG SENSICARE GREEN W/ALOE PF LF 6 STRL

## (undated) DEVICE — SINGLE-USE BIOPSY FORCEPS: Brand: RADIAL JAW 4

## (undated) DEVICE — 3 BONE CEMENT MIXER: Brand: MIXEVAC

## (undated) DEVICE — CLEANGUIDE DISPOSABLE MARKED SPRING TIP GUIDEWIRE, 1.86 MM X 210 CM: Brand: CLEANGUIDE

## (undated) DEVICE — SUT ETHIB 0/0 CT1 30IN X424H

## (undated) DEVICE — GLV SURG SENSICARE ALOE LF PF SZ7.5 GRN

## (undated) DEVICE — GLV SURG TRIUMPH PF LTX 7 STRL

## (undated) DEVICE — RECIPROCATING BLADE, DOUBLE SIDED, OFFSET  (70.0 X 1.0 X 12.5MM)

## (undated) DEVICE — PK KN TOTL LF 60

## (undated) DEVICE — NDL HYPO PRECISIONGLIDE/REG 18G 1IN PNK

## (undated) DEVICE — GLV SURG TRIUMPH LT PF LTX 7 STRL

## (undated) DEVICE — UNDRPD BREATH 23X36 BG/10

## (undated) DEVICE — HANDPIECE SET WITH COAXIAL HIGH FLOW TIP AND SUCTION TUBE: Brand: INTERPULSE

## (undated) DEVICE — PK ANGIO LF 60

## (undated) DEVICE — DRSNG GZ CURAD XEROFORM NONADHS 5X9IN STRL

## (undated) DEVICE — STRYKER PERFORMANCE SERIES SAGITTAL BLADE: Brand: STRYKER PERFORMANCE SERIES

## (undated) DEVICE — BNDG ELAS CO-FLEX SLF ADHR 4IN5YD LF STRL

## (undated) DEVICE — TRAY,FOLEY INSERTION,PVP,10ML SYRINGE: Brand: MEDLINE

## (undated) DEVICE — GLV SURG TRIUMPH LT PF LTX 8 STRL

## (undated) DEVICE — DRAPE,U/ SHT,SPLIT,PLAS,STERIL: Brand: MEDLINE

## (undated) DEVICE — LUBE JELLY PK/2.75GM STRL BX/144

## (undated) DEVICE — APPL CHLORAPREP W/TINT 26ML ORNG

## (undated) DEVICE — GLV SURG SENSICARE GREEN W/ALOE PF LF 8.5 STRL

## (undated) DEVICE — GLV SURG SENSICARE GREEN W/ALOE PF LF 6.5 STRL

## (undated) DEVICE — X-DRAPE ABS 12"X17" .25MM LEAD EQUIV STERILE X-RAY SHIELD 10/BOX: Brand: X-DRAPE

## (undated) DEVICE — TR BAND RADIAL ARTERY COMPRESSION DEVICE: Brand: TR BAND

## (undated) DEVICE — SYR LL TP 10ML STRL

## (undated) DEVICE — DESTINATION PERIPHERAL GUIDING SHEATH: Brand: DESTINATION

## (undated) DEVICE — INTENDED FOR TISSUE SEPARATION, AND OTHER PROCEDURES THAT REQUIRE A SHARP SURGICAL BLADE TO PUNCTURE OR CUT.: Brand: BARD-PARKER ® CARBON RIB-BACK BLADES

## (undated) DEVICE — TBG HI PRESSURE 500PSI 20IN

## (undated) DEVICE — SUT VIC 0 CT1 36IN J946H

## (undated) DEVICE — ULTRAVERSE 035 PTA DILATATION CATHETER 10.0MM X 40MM BALLOON, 75CM SHAFT: Brand: ULTRAVERSE® 035 PTA DILATATION CATHETER

## (undated) DEVICE — SYS PANNUS RETENT LF

## (undated) DEVICE — ANTIBACTERIAL UNDYED BRAIDED (POLYGLACTIN 910), SYNTHETIC ABSORBABLE SUTURE: Brand: COATED VICRYL

## (undated) DEVICE — TRAP,MUCUS SPECIMEN,40CC: Brand: MEDLINE

## (undated) DEVICE — SUT ETHIB 0 CT1 CR8 18IN CX21D

## (undated) DEVICE — GLIDESHEATH SLENDER STAINLESS STEEL KIT: Brand: GLIDESHEATH SLENDER

## (undated) DEVICE — GLV SURG TRIUMPH LT PF LTX 6.5 STRL

## (undated) DEVICE — GLV SURG SENSICARE MICRO PF LF 7.5 STRL

## (undated) DEVICE — PK CATH LAB 60

## (undated) DEVICE — KT INTRO MINISTICK MAX W/GW PALLADIUM ECHO 4F 21G 7CM

## (undated) DEVICE — SPNG GZ WOVN 4X4IN 12PLY 10/BX STRL

## (undated) DEVICE — DRSNG WND BORDR/ADHS NONADHR/GZ LF 4X10IN STRL

## (undated) DEVICE — RADIFOCUS OPTITORQUE ANGIOGRAPHIC CATHETER: Brand: OPTITORQUE

## (undated) DEVICE — SOL IRR NACL 0.9PCT BT 1000ML